# Patient Record
Sex: FEMALE | Race: WHITE | NOT HISPANIC OR LATINO
[De-identification: names, ages, dates, MRNs, and addresses within clinical notes are randomized per-mention and may not be internally consistent; named-entity substitution may affect disease eponyms.]

---

## 2018-06-22 PROBLEM — Z00.00 ENCOUNTER FOR PREVENTIVE HEALTH EXAMINATION: Status: ACTIVE | Noted: 2018-06-22

## 2018-06-27 ENCOUNTER — APPOINTMENT (OUTPATIENT)
Dept: RADIOLOGY | Facility: CLINIC | Age: 63
End: 2018-06-27
Payer: MEDICARE

## 2018-06-27 ENCOUNTER — OUTPATIENT (OUTPATIENT)
Dept: OUTPATIENT SERVICES | Facility: HOSPITAL | Age: 63
LOS: 1 days | End: 2018-06-27
Payer: MEDICARE

## 2018-06-27 DIAGNOSIS — Z00.8 ENCOUNTER FOR OTHER GENERAL EXAMINATION: ICD-10-CM

## 2018-06-27 PROCEDURE — 77080 DXA BONE DENSITY AXIAL: CPT | Mod: 26

## 2018-06-27 PROCEDURE — 73630 X-RAY EXAM OF FOOT: CPT | Mod: 26,LT

## 2018-06-27 PROCEDURE — 73630 X-RAY EXAM OF FOOT: CPT

## 2018-06-27 PROCEDURE — 77080 DXA BONE DENSITY AXIAL: CPT

## 2018-07-03 PROBLEM — Z00.00 ENCOUNTER FOR PREVENTIVE HEALTH EXAMINATION: Noted: 2018-07-03

## 2018-07-06 ENCOUNTER — APPOINTMENT (OUTPATIENT)
Dept: CT IMAGING | Facility: CLINIC | Age: 63
End: 2018-07-06

## 2018-07-11 ENCOUNTER — APPOINTMENT (OUTPATIENT)
Dept: CT IMAGING | Facility: CLINIC | Age: 63
End: 2018-07-11

## 2018-07-25 ENCOUNTER — APPOINTMENT (OUTPATIENT)
Dept: NEUROLOGY | Facility: CLINIC | Age: 63
End: 2018-07-25

## 2018-09-26 ENCOUNTER — APPOINTMENT (OUTPATIENT)
Dept: RADIOLOGY | Facility: CLINIC | Age: 63
End: 2018-09-26

## 2020-12-14 ENCOUNTER — TRANSCRIPTION ENCOUNTER (OUTPATIENT)
Age: 65
End: 2020-12-14

## 2023-10-26 ENCOUNTER — INPATIENT (INPATIENT)
Facility: HOSPITAL | Age: 68
LOS: 12 days | Discharge: NURSING, REHAB OR LONG-TERM CARE | DRG: 682 | End: 2023-11-08
Attending: INTERNAL MEDICINE | Admitting: INTERNAL MEDICINE
Payer: MEDICARE

## 2023-10-26 VITALS
HEIGHT: 64 IN | OXYGEN SATURATION: 97 % | HEART RATE: 71 BPM | SYSTOLIC BLOOD PRESSURE: 92 MMHG | TEMPERATURE: 100 F | DIASTOLIC BLOOD PRESSURE: 57 MMHG | RESPIRATION RATE: 20 BRPM | WEIGHT: 176.37 LBS

## 2023-10-26 PROCEDURE — 73562 X-RAY EXAM OF KNEE 3: CPT | Mod: 26,RT

## 2023-10-26 PROCEDURE — 93010 ELECTROCARDIOGRAM REPORT: CPT

## 2023-10-26 PROCEDURE — 71045 X-RAY EXAM CHEST 1 VIEW: CPT | Mod: 26

## 2023-10-26 PROCEDURE — 99285 EMERGENCY DEPT VISIT HI MDM: CPT

## 2023-10-26 PROCEDURE — 70450 CT HEAD/BRAIN W/O DYE: CPT | Mod: 26,MA

## 2023-10-26 RX ORDER — SODIUM CHLORIDE 9 MG/ML
2500 INJECTION INTRAMUSCULAR; INTRAVENOUS; SUBCUTANEOUS ONCE
Refills: 0 | Status: COMPLETED | OUTPATIENT
Start: 2023-10-26 | End: 2023-10-26
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
## 2023-10-26 NOTE — ED PROVIDER NOTE - NSICDXPASTMEDICALHX_GEN_ALL_CORE_FT
PAST MEDICAL HISTORY:  Bipolar illness     HLD (hyperlipidemia)     HTN (hypertension)     MDD (major depressive disorder)

## 2023-10-26 NOTE — ED ADULT NURSE NOTE - NSFALLHARMRISKINTERV_ED_ALL_ED
Assistance OOB with selected safe patient handling equipment if applicable/Assistance with ambulation/Communicate risk of Fall with Harm to all staff, patient, and family/Monitor gait and stability/Monitor for mental status changes and reorient to person, place, and time, as needed/Move patient closer to nursing station/within visual sight of ED staff/Provide patient with walking aids/Provide visual cue: red socks, yellow wristband, yellow gown, etc/Reinforce activity limits and safety measures with patient and family/Toileting schedule using arm’s reach rule for commode and bathroom/Use of alarms - bed, stretcher, chair and/or video monitoring/Bed in lowest position, wheels locked, appropriate side rails in place/Call bell, personal items and telephone in reach/Instruct patient to call for assistance before getting out of bed/chair/stretcher/Non-slip footwear applied when patient is off stretcher/[place] to call system/Physically safe environment - no spills, clutter or unnecessary equipment/Purposeful Proactive Rounding/Room/bathroom lighting operational, light cord in reach
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
## 2023-10-27 ENCOUNTER — TRANSCRIPTION ENCOUNTER (OUTPATIENT)
Age: 68
End: 2023-10-27

## 2023-10-27 DIAGNOSIS — E86.0 DEHYDRATION: ICD-10-CM

## 2023-10-27 LAB
ALBUMIN SERPL ELPH-MCNC: 3.1 G/DL — LOW (ref 3.3–5)
ALBUMIN SERPL ELPH-MCNC: 3.1 G/DL — LOW (ref 3.3–5)
ALBUMIN SERPL ELPH-MCNC: 3.2 G/DL — LOW (ref 3.3–5)
ALBUMIN SERPL ELPH-MCNC: 3.2 G/DL — LOW (ref 3.3–5)
ALBUMIN SERPL ELPH-MCNC: 3.3 G/DL — SIGNIFICANT CHANGE UP (ref 3.3–5)
ALBUMIN SERPL ELPH-MCNC: 3.3 G/DL — SIGNIFICANT CHANGE UP (ref 3.3–5)
ALP SERPL-CCNC: 121 U/L — HIGH (ref 30–120)
ALP SERPL-CCNC: 121 U/L — HIGH (ref 30–120)
ALP SERPL-CCNC: 122 U/L — HIGH (ref 30–120)
ALP SERPL-CCNC: 122 U/L — HIGH (ref 30–120)
ALP SERPL-CCNC: 128 U/L — HIGH (ref 30–120)
ALP SERPL-CCNC: 128 U/L — HIGH (ref 30–120)
ALT FLD-CCNC: 25 U/L — SIGNIFICANT CHANGE UP (ref 10–60)
ALT FLD-CCNC: 25 U/L — SIGNIFICANT CHANGE UP (ref 10–60)
ALT FLD-CCNC: 26 U/L — SIGNIFICANT CHANGE UP (ref 10–60)
AMMONIA BLD-MCNC: 17 UMOL/L — SIGNIFICANT CHANGE UP (ref 11–32)
AMMONIA BLD-MCNC: 17 UMOL/L — SIGNIFICANT CHANGE UP (ref 11–32)
ANION GAP SERPL CALC-SCNC: 4 MMOL/L — LOW (ref 5–17)
ANION GAP SERPL CALC-SCNC: 4 MMOL/L — LOW (ref 5–17)
ANION GAP SERPL CALC-SCNC: 5 MMOL/L — SIGNIFICANT CHANGE UP (ref 5–17)
ANION GAP SERPL CALC-SCNC: 5 MMOL/L — SIGNIFICANT CHANGE UP (ref 5–17)
ANION GAP SERPL CALC-SCNC: 6 MMOL/L — SIGNIFICANT CHANGE UP (ref 5–17)
ANION GAP SERPL CALC-SCNC: 6 MMOL/L — SIGNIFICANT CHANGE UP (ref 5–17)
APPEARANCE UR: ABNORMAL
APPEARANCE UR: ABNORMAL
APTT BLD: 26.9 SEC — SIGNIFICANT CHANGE UP (ref 24.5–35.6)
APTT BLD: 26.9 SEC — SIGNIFICANT CHANGE UP (ref 24.5–35.6)
AST SERPL-CCNC: 22 U/L — SIGNIFICANT CHANGE UP (ref 10–40)
AST SERPL-CCNC: 22 U/L — SIGNIFICANT CHANGE UP (ref 10–40)
AST SERPL-CCNC: 24 U/L — SIGNIFICANT CHANGE UP (ref 10–40)
AST SERPL-CCNC: 24 U/L — SIGNIFICANT CHANGE UP (ref 10–40)
AST SERPL-CCNC: 25 U/L — SIGNIFICANT CHANGE UP (ref 10–40)
AST SERPL-CCNC: 25 U/L — SIGNIFICANT CHANGE UP (ref 10–40)
BACTERIA # UR AUTO: ABNORMAL /HPF
BACTERIA # UR AUTO: ABNORMAL /HPF
BASOPHILS # BLD AUTO: 0.04 K/UL — SIGNIFICANT CHANGE UP (ref 0–0.2)
BASOPHILS # BLD AUTO: 0.04 K/UL — SIGNIFICANT CHANGE UP (ref 0–0.2)
BASOPHILS # BLD AUTO: 0.05 K/UL — SIGNIFICANT CHANGE UP (ref 0–0.2)
BASOPHILS # BLD AUTO: 0.05 K/UL — SIGNIFICANT CHANGE UP (ref 0–0.2)
BASOPHILS NFR BLD AUTO: 0.6 % — SIGNIFICANT CHANGE UP (ref 0–2)
BILIRUB SERPL-MCNC: 0.6 MG/DL — SIGNIFICANT CHANGE UP (ref 0.2–1.2)
BILIRUB SERPL-MCNC: 0.8 MG/DL — SIGNIFICANT CHANGE UP (ref 0.2–1.2)
BILIRUB SERPL-MCNC: 0.8 MG/DL — SIGNIFICANT CHANGE UP (ref 0.2–1.2)
BILIRUB UR-MCNC: NEGATIVE — SIGNIFICANT CHANGE UP
BILIRUB UR-MCNC: NEGATIVE — SIGNIFICANT CHANGE UP
BUN SERPL-MCNC: 19 MG/DL — SIGNIFICANT CHANGE UP (ref 7–23)
BUN SERPL-MCNC: 19 MG/DL — SIGNIFICANT CHANGE UP (ref 7–23)
BUN SERPL-MCNC: 24 MG/DL — HIGH (ref 7–23)
BUN SERPL-MCNC: 24 MG/DL — HIGH (ref 7–23)
BUN SERPL-MCNC: 27 MG/DL — HIGH (ref 7–23)
BUN SERPL-MCNC: 27 MG/DL — HIGH (ref 7–23)
CALCIUM SERPL-MCNC: 8.7 MG/DL — SIGNIFICANT CHANGE UP (ref 8.4–10.5)
CALCIUM SERPL-MCNC: 8.7 MG/DL — SIGNIFICANT CHANGE UP (ref 8.4–10.5)
CALCIUM SERPL-MCNC: 8.9 MG/DL — SIGNIFICANT CHANGE UP (ref 8.4–10.5)
CALCIUM SERPL-MCNC: 8.9 MG/DL — SIGNIFICANT CHANGE UP (ref 8.4–10.5)
CALCIUM SERPL-MCNC: 9.4 MG/DL — SIGNIFICANT CHANGE UP (ref 8.4–10.5)
CALCIUM SERPL-MCNC: 9.4 MG/DL — SIGNIFICANT CHANGE UP (ref 8.4–10.5)
CHLORIDE SERPL-SCNC: 104 MMOL/L — SIGNIFICANT CHANGE UP (ref 96–108)
CHLORIDE SERPL-SCNC: 104 MMOL/L — SIGNIFICANT CHANGE UP (ref 96–108)
CHLORIDE SERPL-SCNC: 106 MMOL/L — SIGNIFICANT CHANGE UP (ref 96–108)
CHLORIDE SERPL-SCNC: 106 MMOL/L — SIGNIFICANT CHANGE UP (ref 96–108)
CHLORIDE SERPL-SCNC: 109 MMOL/L — HIGH (ref 96–108)
CHLORIDE SERPL-SCNC: 109 MMOL/L — HIGH (ref 96–108)
CHOLEST SERPL-MCNC: 121 MG/DL — SIGNIFICANT CHANGE UP
CHOLEST SERPL-MCNC: 121 MG/DL — SIGNIFICANT CHANGE UP
CO2 SERPL-SCNC: 24 MMOL/L — SIGNIFICANT CHANGE UP (ref 22–31)
CO2 SERPL-SCNC: 24 MMOL/L — SIGNIFICANT CHANGE UP (ref 22–31)
CO2 SERPL-SCNC: 25 MMOL/L — SIGNIFICANT CHANGE UP (ref 22–31)
CO2 SERPL-SCNC: 25 MMOL/L — SIGNIFICANT CHANGE UP (ref 22–31)
CO2 SERPL-SCNC: 27 MMOL/L — SIGNIFICANT CHANGE UP (ref 22–31)
CO2 SERPL-SCNC: 27 MMOL/L — SIGNIFICANT CHANGE UP (ref 22–31)
COLOR SPEC: YELLOW — SIGNIFICANT CHANGE UP
COLOR SPEC: YELLOW — SIGNIFICANT CHANGE UP
CREAT SERPL-MCNC: 1.29 MG/DL — SIGNIFICANT CHANGE UP (ref 0.5–1.3)
CREAT SERPL-MCNC: 1.29 MG/DL — SIGNIFICANT CHANGE UP (ref 0.5–1.3)
CREAT SERPL-MCNC: 1.54 MG/DL — HIGH (ref 0.5–1.3)
CREAT SERPL-MCNC: 1.54 MG/DL — HIGH (ref 0.5–1.3)
CREAT SERPL-MCNC: 1.78 MG/DL — HIGH (ref 0.5–1.3)
CREAT SERPL-MCNC: 1.78 MG/DL — HIGH (ref 0.5–1.3)
DIFF PNL FLD: NEGATIVE — SIGNIFICANT CHANGE UP
DIFF PNL FLD: NEGATIVE — SIGNIFICANT CHANGE UP
EGFR: 30 ML/MIN/1.73M2 — LOW
EGFR: 30 ML/MIN/1.73M2 — LOW
EGFR: 36 ML/MIN/1.73M2 — LOW
EGFR: 36 ML/MIN/1.73M2 — LOW
EGFR: 45 ML/MIN/1.73M2 — LOW
EGFR: 45 ML/MIN/1.73M2 — LOW
EOSINOPHIL # BLD AUTO: 0.25 K/UL — SIGNIFICANT CHANGE UP (ref 0–0.5)
EOSINOPHIL # BLD AUTO: 0.25 K/UL — SIGNIFICANT CHANGE UP (ref 0–0.5)
EOSINOPHIL # BLD AUTO: 0.26 K/UL — SIGNIFICANT CHANGE UP (ref 0–0.5)
EOSINOPHIL # BLD AUTO: 0.26 K/UL — SIGNIFICANT CHANGE UP (ref 0–0.5)
EOSINOPHIL NFR BLD AUTO: 3.3 % — SIGNIFICANT CHANGE UP (ref 0–6)
EOSINOPHIL NFR BLD AUTO: 3.3 % — SIGNIFICANT CHANGE UP (ref 0–6)
EOSINOPHIL NFR BLD AUTO: 3.9 % — SIGNIFICANT CHANGE UP (ref 0–6)
EOSINOPHIL NFR BLD AUTO: 3.9 % — SIGNIFICANT CHANGE UP (ref 0–6)
EPI CELLS # UR: PRESENT
EPI CELLS # UR: PRESENT
FLUAV AG NPH QL: SIGNIFICANT CHANGE UP
FLUAV AG NPH QL: SIGNIFICANT CHANGE UP
FLUBV AG NPH QL: SIGNIFICANT CHANGE UP
FLUBV AG NPH QL: SIGNIFICANT CHANGE UP
GLUCOSE SERPL-MCNC: 102 MG/DL — HIGH (ref 70–99)
GLUCOSE SERPL-MCNC: 102 MG/DL — HIGH (ref 70–99)
GLUCOSE SERPL-MCNC: 134 MG/DL — HIGH (ref 70–99)
GLUCOSE SERPL-MCNC: 134 MG/DL — HIGH (ref 70–99)
GLUCOSE SERPL-MCNC: 98 MG/DL — SIGNIFICANT CHANGE UP (ref 70–99)
GLUCOSE SERPL-MCNC: 98 MG/DL — SIGNIFICANT CHANGE UP (ref 70–99)
GLUCOSE UR QL: NEGATIVE MG/DL — SIGNIFICANT CHANGE UP
GLUCOSE UR QL: NEGATIVE MG/DL — SIGNIFICANT CHANGE UP
HCT VFR BLD CALC: 33.3 % — LOW (ref 34.5–45)
HCT VFR BLD CALC: 33.3 % — LOW (ref 34.5–45)
HCT VFR BLD CALC: 40.3 % — SIGNIFICANT CHANGE UP (ref 34.5–45)
HCT VFR BLD CALC: 40.3 % — SIGNIFICANT CHANGE UP (ref 34.5–45)
HDLC SERPL-MCNC: 61 MG/DL — SIGNIFICANT CHANGE UP
HDLC SERPL-MCNC: 61 MG/DL — SIGNIFICANT CHANGE UP
HGB BLD-MCNC: 11.7 G/DL — SIGNIFICANT CHANGE UP (ref 11.5–15.5)
HGB BLD-MCNC: 11.7 G/DL — SIGNIFICANT CHANGE UP (ref 11.5–15.5)
HGB BLD-MCNC: 9.9 G/DL — LOW (ref 11.5–15.5)
HGB BLD-MCNC: 9.9 G/DL — LOW (ref 11.5–15.5)
IMM GRANULOCYTES NFR BLD AUTO: 0.2 % — SIGNIFICANT CHANGE UP (ref 0–0.9)
IMM GRANULOCYTES NFR BLD AUTO: 0.2 % — SIGNIFICANT CHANGE UP (ref 0–0.9)
IMM GRANULOCYTES NFR BLD AUTO: 0.3 % — SIGNIFICANT CHANGE UP (ref 0–0.9)
IMM GRANULOCYTES NFR BLD AUTO: 0.3 % — SIGNIFICANT CHANGE UP (ref 0–0.9)
INR BLD: 0.99 RATIO — SIGNIFICANT CHANGE UP (ref 0.85–1.18)
INR BLD: 0.99 RATIO — SIGNIFICANT CHANGE UP (ref 0.85–1.18)
KETONES UR-MCNC: NEGATIVE MG/DL — SIGNIFICANT CHANGE UP
KETONES UR-MCNC: NEGATIVE MG/DL — SIGNIFICANT CHANGE UP
LACTATE SERPL-SCNC: 0.9 MMOL/L — SIGNIFICANT CHANGE UP (ref 0.7–2)
LACTATE SERPL-SCNC: 0.9 MMOL/L — SIGNIFICANT CHANGE UP (ref 0.7–2)
LACTATE SERPL-SCNC: 1.1 MMOL/L — SIGNIFICANT CHANGE UP (ref 0.7–2)
LACTATE SERPL-SCNC: 1.1 MMOL/L — SIGNIFICANT CHANGE UP (ref 0.7–2)
LEUKOCYTE ESTERASE UR-ACNC: ABNORMAL
LEUKOCYTE ESTERASE UR-ACNC: ABNORMAL
LIPID PNL WITH DIRECT LDL SERPL: 40 MG/DL — SIGNIFICANT CHANGE UP
LIPID PNL WITH DIRECT LDL SERPL: 40 MG/DL — SIGNIFICANT CHANGE UP
LYMPHOCYTES # BLD AUTO: 1.59 K/UL — SIGNIFICANT CHANGE UP (ref 1–3.3)
LYMPHOCYTES # BLD AUTO: 1.59 K/UL — SIGNIFICANT CHANGE UP (ref 1–3.3)
LYMPHOCYTES # BLD AUTO: 1.87 K/UL — SIGNIFICANT CHANGE UP (ref 1–3.3)
LYMPHOCYTES # BLD AUTO: 1.87 K/UL — SIGNIFICANT CHANGE UP (ref 1–3.3)
LYMPHOCYTES # BLD AUTO: 23.4 % — SIGNIFICANT CHANGE UP (ref 13–44)
LYMPHOCYTES # BLD AUTO: 23.4 % — SIGNIFICANT CHANGE UP (ref 13–44)
LYMPHOCYTES # BLD AUTO: 24.6 % — SIGNIFICANT CHANGE UP (ref 13–44)
LYMPHOCYTES # BLD AUTO: 24.6 % — SIGNIFICANT CHANGE UP (ref 13–44)
MAGNESIUM SERPL-MCNC: 2.1 MG/DL — SIGNIFICANT CHANGE UP (ref 1.6–2.6)
MAGNESIUM SERPL-MCNC: 2.4 MG/DL — SIGNIFICANT CHANGE UP (ref 1.6–2.6)
MAGNESIUM SERPL-MCNC: 2.4 MG/DL — SIGNIFICANT CHANGE UP (ref 1.6–2.6)
MCHC RBC-ENTMCNC: 28.9 PG — SIGNIFICANT CHANGE UP (ref 27–34)
MCHC RBC-ENTMCNC: 28.9 PG — SIGNIFICANT CHANGE UP (ref 27–34)
MCHC RBC-ENTMCNC: 29 GM/DL — LOW (ref 32–36)
MCHC RBC-ENTMCNC: 29 GM/DL — LOW (ref 32–36)
MCHC RBC-ENTMCNC: 29.4 PG — SIGNIFICANT CHANGE UP (ref 27–34)
MCHC RBC-ENTMCNC: 29.4 PG — SIGNIFICANT CHANGE UP (ref 27–34)
MCHC RBC-ENTMCNC: 29.7 GM/DL — LOW (ref 32–36)
MCHC RBC-ENTMCNC: 29.7 GM/DL — LOW (ref 32–36)
MCV RBC AUTO: 98.8 FL — SIGNIFICANT CHANGE UP (ref 80–100)
MCV RBC AUTO: 98.8 FL — SIGNIFICANT CHANGE UP (ref 80–100)
MCV RBC AUTO: 99.5 FL — SIGNIFICANT CHANGE UP (ref 80–100)
MCV RBC AUTO: 99.5 FL — SIGNIFICANT CHANGE UP (ref 80–100)
MONOCYTES # BLD AUTO: 0.71 K/UL — SIGNIFICANT CHANGE UP (ref 0–0.9)
MONOCYTES # BLD AUTO: 0.71 K/UL — SIGNIFICANT CHANGE UP (ref 0–0.9)
MONOCYTES # BLD AUTO: 0.96 K/UL — HIGH (ref 0–0.9)
MONOCYTES # BLD AUTO: 0.96 K/UL — HIGH (ref 0–0.9)
MONOCYTES NFR BLD AUTO: 11 % — SIGNIFICANT CHANGE UP (ref 2–14)
MONOCYTES NFR BLD AUTO: 11 % — SIGNIFICANT CHANGE UP (ref 2–14)
MONOCYTES NFR BLD AUTO: 12 % — SIGNIFICANT CHANGE UP (ref 2–14)
MONOCYTES NFR BLD AUTO: 12 % — SIGNIFICANT CHANGE UP (ref 2–14)
MRSA PCR RESULT.: SIGNIFICANT CHANGE UP
MRSA PCR RESULT.: SIGNIFICANT CHANGE UP
NEUTROPHILS # BLD AUTO: 3.87 K/UL — SIGNIFICANT CHANGE UP (ref 1.8–7.4)
NEUTROPHILS # BLD AUTO: 3.87 K/UL — SIGNIFICANT CHANGE UP (ref 1.8–7.4)
NEUTROPHILS # BLD AUTO: 4.83 K/UL — SIGNIFICANT CHANGE UP (ref 1.8–7.4)
NEUTROPHILS # BLD AUTO: 4.83 K/UL — SIGNIFICANT CHANGE UP (ref 1.8–7.4)
NEUTROPHILS NFR BLD AUTO: 59.7 % — SIGNIFICANT CHANGE UP (ref 43–77)
NEUTROPHILS NFR BLD AUTO: 59.7 % — SIGNIFICANT CHANGE UP (ref 43–77)
NEUTROPHILS NFR BLD AUTO: 60.4 % — SIGNIFICANT CHANGE UP (ref 43–77)
NEUTROPHILS NFR BLD AUTO: 60.4 % — SIGNIFICANT CHANGE UP (ref 43–77)
NITRITE UR-MCNC: POSITIVE
NITRITE UR-MCNC: POSITIVE
NON HDL CHOLESTEROL: 60 MG/DL — SIGNIFICANT CHANGE UP
NON HDL CHOLESTEROL: 60 MG/DL — SIGNIFICANT CHANGE UP
NRBC # BLD: 0 /100 WBCS — SIGNIFICANT CHANGE UP (ref 0–0)
PH UR: 5.5 — SIGNIFICANT CHANGE UP (ref 5–8)
PH UR: 5.5 — SIGNIFICANT CHANGE UP (ref 5–8)
PHOSPHATE SERPL-MCNC: 3.5 MG/DL — SIGNIFICANT CHANGE UP (ref 2.5–4.5)
PHOSPHATE SERPL-MCNC: 3.5 MG/DL — SIGNIFICANT CHANGE UP (ref 2.5–4.5)
PLATELET # BLD AUTO: 251 K/UL — SIGNIFICANT CHANGE UP (ref 150–400)
PLATELET # BLD AUTO: 251 K/UL — SIGNIFICANT CHANGE UP (ref 150–400)
PLATELET # BLD AUTO: 258 K/UL — SIGNIFICANT CHANGE UP (ref 150–400)
PLATELET # BLD AUTO: 258 K/UL — SIGNIFICANT CHANGE UP (ref 150–400)
POTASSIUM SERPL-MCNC: 4 MMOL/L — SIGNIFICANT CHANGE UP (ref 3.5–5.3)
POTASSIUM SERPL-MCNC: 4 MMOL/L — SIGNIFICANT CHANGE UP (ref 3.5–5.3)
POTASSIUM SERPL-MCNC: 4.4 MMOL/L — SIGNIFICANT CHANGE UP (ref 3.5–5.3)
POTASSIUM SERPL-MCNC: 4.4 MMOL/L — SIGNIFICANT CHANGE UP (ref 3.5–5.3)
POTASSIUM SERPL-MCNC: 4.5 MMOL/L — SIGNIFICANT CHANGE UP (ref 3.5–5.3)
POTASSIUM SERPL-MCNC: 4.5 MMOL/L — SIGNIFICANT CHANGE UP (ref 3.5–5.3)
POTASSIUM SERPL-SCNC: 4 MMOL/L — SIGNIFICANT CHANGE UP (ref 3.5–5.3)
POTASSIUM SERPL-SCNC: 4 MMOL/L — SIGNIFICANT CHANGE UP (ref 3.5–5.3)
POTASSIUM SERPL-SCNC: 4.4 MMOL/L — SIGNIFICANT CHANGE UP (ref 3.5–5.3)
POTASSIUM SERPL-SCNC: 4.4 MMOL/L — SIGNIFICANT CHANGE UP (ref 3.5–5.3)
POTASSIUM SERPL-SCNC: 4.5 MMOL/L — SIGNIFICANT CHANGE UP (ref 3.5–5.3)
POTASSIUM SERPL-SCNC: 4.5 MMOL/L — SIGNIFICANT CHANGE UP (ref 3.5–5.3)
PROCALCITONIN SERPL-MCNC: 0.09 NG/ML — SIGNIFICANT CHANGE UP (ref 0.02–0.1)
PROCALCITONIN SERPL-MCNC: 0.09 NG/ML — SIGNIFICANT CHANGE UP (ref 0.02–0.1)
PROT SERPL-MCNC: 5.7 G/DL — LOW (ref 6–8.3)
PROT SERPL-MCNC: 5.7 G/DL — LOW (ref 6–8.3)
PROT SERPL-MCNC: 6.5 G/DL — SIGNIFICANT CHANGE UP (ref 6–8.3)
PROT SERPL-MCNC: 6.5 G/DL — SIGNIFICANT CHANGE UP (ref 6–8.3)
PROT SERPL-MCNC: 6.7 G/DL — SIGNIFICANT CHANGE UP (ref 6–8.3)
PROT SERPL-MCNC: 6.7 G/DL — SIGNIFICANT CHANGE UP (ref 6–8.3)
PROT UR-MCNC: SIGNIFICANT CHANGE UP MG/DL
PROT UR-MCNC: SIGNIFICANT CHANGE UP MG/DL
PROTHROM AB SERPL-ACNC: 11.1 SEC — SIGNIFICANT CHANGE UP (ref 9.5–13)
PROTHROM AB SERPL-ACNC: 11.1 SEC — SIGNIFICANT CHANGE UP (ref 9.5–13)
RBC # BLD: 3.37 M/UL — LOW (ref 3.8–5.2)
RBC # BLD: 3.37 M/UL — LOW (ref 3.8–5.2)
RBC # BLD: 4.05 M/UL — SIGNIFICANT CHANGE UP (ref 3.8–5.2)
RBC # BLD: 4.05 M/UL — SIGNIFICANT CHANGE UP (ref 3.8–5.2)
RBC # FLD: 14.1 % — SIGNIFICANT CHANGE UP (ref 10.3–14.5)
RBC CASTS # UR COMP ASSIST: 0 /HPF — SIGNIFICANT CHANGE UP (ref 0–4)
RBC CASTS # UR COMP ASSIST: 0 /HPF — SIGNIFICANT CHANGE UP (ref 0–4)
RSV RNA NPH QL NAA+NON-PROBE: SIGNIFICANT CHANGE UP
RSV RNA NPH QL NAA+NON-PROBE: SIGNIFICANT CHANGE UP
S AUREUS DNA NOSE QL NAA+PROBE: SIGNIFICANT CHANGE UP
S AUREUS DNA NOSE QL NAA+PROBE: SIGNIFICANT CHANGE UP
SARS-COV-2 RNA SPEC QL NAA+PROBE: SIGNIFICANT CHANGE UP
SARS-COV-2 RNA SPEC QL NAA+PROBE: SIGNIFICANT CHANGE UP
SODIUM SERPL-SCNC: 135 MMOL/L — SIGNIFICANT CHANGE UP (ref 135–145)
SODIUM SERPL-SCNC: 140 MMOL/L — SIGNIFICANT CHANGE UP (ref 135–145)
SODIUM SERPL-SCNC: 140 MMOL/L — SIGNIFICANT CHANGE UP (ref 135–145)
SP GR SPEC: 1.01 — SIGNIFICANT CHANGE UP (ref 1–1.03)
SP GR SPEC: 1.01 — SIGNIFICANT CHANGE UP (ref 1–1.03)
T3 SERPL-MCNC: 60 NG/DL — LOW (ref 80–200)
T3 SERPL-MCNC: 60 NG/DL — LOW (ref 80–200)
T4 AB SER-ACNC: 6.2 UG/DL — SIGNIFICANT CHANGE UP (ref 4.6–12)
T4 AB SER-ACNC: 6.2 UG/DL — SIGNIFICANT CHANGE UP (ref 4.6–12)
TRIGL SERPL-MCNC: 114 MG/DL — SIGNIFICANT CHANGE UP
TRIGL SERPL-MCNC: 114 MG/DL — SIGNIFICANT CHANGE UP
TROPONIN I, HIGH SENSITIVITY RESULT: 6.3 NG/L — SIGNIFICANT CHANGE UP
TROPONIN I, HIGH SENSITIVITY RESULT: 6.3 NG/L — SIGNIFICANT CHANGE UP
TSH SERPL-MCNC: 4.58 UIU/ML — HIGH (ref 0.27–4.2)
TSH SERPL-MCNC: 4.58 UIU/ML — HIGH (ref 0.27–4.2)
UROBILINOGEN FLD QL: 1 MG/DL — SIGNIFICANT CHANGE UP (ref 0.2–1)
UROBILINOGEN FLD QL: 1 MG/DL — SIGNIFICANT CHANGE UP (ref 0.2–1)
WBC # BLD: 6.47 K/UL — SIGNIFICANT CHANGE UP (ref 3.8–10.5)
WBC # BLD: 6.47 K/UL — SIGNIFICANT CHANGE UP (ref 3.8–10.5)
WBC # BLD: 7.99 K/UL — SIGNIFICANT CHANGE UP (ref 3.8–10.5)
WBC # BLD: 7.99 K/UL — SIGNIFICANT CHANGE UP (ref 3.8–10.5)
WBC # FLD AUTO: 6.47 K/UL — SIGNIFICANT CHANGE UP (ref 3.8–10.5)
WBC # FLD AUTO: 6.47 K/UL — SIGNIFICANT CHANGE UP (ref 3.8–10.5)
WBC # FLD AUTO: 7.99 K/UL — SIGNIFICANT CHANGE UP (ref 3.8–10.5)
WBC # FLD AUTO: 7.99 K/UL — SIGNIFICANT CHANGE UP (ref 3.8–10.5)
WBC UR QL: >50 /HPF — HIGH (ref 0–5)
WBC UR QL: >50 /HPF — HIGH (ref 0–5)

## 2023-10-27 PROCEDURE — 99221 1ST HOSP IP/OBS SF/LOW 40: CPT

## 2023-10-27 PROCEDURE — 99291 CRITICAL CARE FIRST HOUR: CPT

## 2023-10-27 PROCEDURE — 93010 ELECTROCARDIOGRAM REPORT: CPT

## 2023-10-27 RX ORDER — CLONAZEPAM 1 MG
0.5 TABLET ORAL DAILY
Refills: 0 | Status: DISCONTINUED | OUTPATIENT
Start: 2023-10-27 | End: 2023-10-27

## 2023-10-27 RX ORDER — PANTOPRAZOLE SODIUM 20 MG/1
40 TABLET, DELAYED RELEASE ORAL
Refills: 0 | Status: DISCONTINUED | OUTPATIENT
Start: 2023-10-27 | End: 2023-11-08

## 2023-10-27 RX ORDER — PIPERACILLIN AND TAZOBACTAM 4; .5 G/20ML; G/20ML
3.38 INJECTION, POWDER, LYOPHILIZED, FOR SOLUTION INTRAVENOUS ONCE
Refills: 0 | Status: COMPLETED | OUTPATIENT
Start: 2023-10-27 | End: 2023-10-27

## 2023-10-27 RX ORDER — SODIUM CHLORIDE 9 MG/ML
1000 INJECTION, SOLUTION INTRAVENOUS ONCE
Refills: 0 | Status: COMPLETED | OUTPATIENT
Start: 2023-10-27 | End: 2023-10-27

## 2023-10-27 RX ORDER — VANCOMYCIN HCL 1 G
1000 VIAL (EA) INTRAVENOUS ONCE
Refills: 0 | Status: COMPLETED | OUTPATIENT
Start: 2023-10-27 | End: 2023-10-27

## 2023-10-27 RX ORDER — GABAPENTIN 400 MG/1
0 CAPSULE ORAL
Refills: 0 | DISCHARGE

## 2023-10-27 RX ORDER — CLONAZEPAM 1 MG
0.5 TABLET ORAL
Refills: 0 | Status: DISCONTINUED | OUTPATIENT
Start: 2023-10-27 | End: 2023-11-03

## 2023-10-27 RX ORDER — SODIUM CHLORIDE 9 MG/ML
1000 INJECTION INTRAMUSCULAR; INTRAVENOUS; SUBCUTANEOUS
Refills: 0 | Status: DISCONTINUED | OUTPATIENT
Start: 2023-10-27 | End: 2023-10-27

## 2023-10-27 RX ORDER — ATORVASTATIN CALCIUM 80 MG/1
40 TABLET, FILM COATED ORAL AT BEDTIME
Refills: 0 | Status: DISCONTINUED | OUTPATIENT
Start: 2023-10-27 | End: 2023-11-08

## 2023-10-27 RX ORDER — FLUOXETINE HCL 10 MG
1 CAPSULE ORAL
Refills: 0 | DISCHARGE

## 2023-10-27 RX ORDER — CLOPIDOGREL BISULFATE 75 MG/1
75 TABLET, FILM COATED ORAL DAILY
Refills: 0 | Status: DISCONTINUED | OUTPATIENT
Start: 2023-10-27 | End: 2023-11-08

## 2023-10-27 RX ORDER — HEPARIN SODIUM 5000 [USP'U]/ML
5000 INJECTION INTRAVENOUS; SUBCUTANEOUS EVERY 8 HOURS
Refills: 0 | Status: DISCONTINUED | OUTPATIENT
Start: 2023-10-27 | End: 2023-11-08

## 2023-10-27 RX ORDER — MAGNESIUM SULFATE 500 MG/ML
2 VIAL (ML) INJECTION ONCE
Refills: 0 | Status: COMPLETED | OUTPATIENT
Start: 2023-10-27 | End: 2023-10-27

## 2023-10-27 RX ORDER — ACETAMINOPHEN 500 MG
650 TABLET ORAL EVERY 6 HOURS
Refills: 0 | Status: DISCONTINUED | OUTPATIENT
Start: 2023-10-27 | End: 2023-11-08

## 2023-10-27 RX ORDER — SODIUM CHLORIDE 9 MG/ML
1000 INJECTION, SOLUTION INTRAVENOUS
Refills: 0 | Status: DISCONTINUED | OUTPATIENT
Start: 2023-10-27 | End: 2023-10-29

## 2023-10-27 RX ORDER — HEPARIN SODIUM 5000 [USP'U]/ML
5000 INJECTION INTRAVENOUS; SUBCUTANEOUS EVERY 12 HOURS
Refills: 0 | Status: DISCONTINUED | OUTPATIENT
Start: 2023-10-27 | End: 2023-10-27

## 2023-10-27 RX ADMIN — SODIUM CHLORIDE 2500 MILLILITER(S): 9 INJECTION INTRAMUSCULAR; INTRAVENOUS; SUBCUTANEOUS at 01:45

## 2023-10-27 RX ADMIN — HEPARIN SODIUM 5000 UNIT(S): 5000 INJECTION INTRAVENOUS; SUBCUTANEOUS at 14:55

## 2023-10-27 RX ADMIN — SODIUM CHLORIDE 75 MILLILITER(S): 9 INJECTION, SOLUTION INTRAVENOUS at 22:11

## 2023-10-27 RX ADMIN — HEPARIN SODIUM 5000 UNIT(S): 5000 INJECTION INTRAVENOUS; SUBCUTANEOUS at 22:10

## 2023-10-27 RX ADMIN — PANTOPRAZOLE SODIUM 40 MILLIGRAM(S): 20 TABLET, DELAYED RELEASE ORAL at 06:25

## 2023-10-27 RX ADMIN — PIPERACILLIN AND TAZOBACTAM 3.38 GRAM(S): 4; .5 INJECTION, POWDER, LYOPHILIZED, FOR SOLUTION INTRAVENOUS at 02:30

## 2023-10-27 RX ADMIN — Medication 1000 MILLIGRAM(S): at 03:23

## 2023-10-27 RX ADMIN — Medication 250 MILLIGRAM(S): at 02:23

## 2023-10-27 RX ADMIN — SODIUM CHLORIDE 75 MILLILITER(S): 9 INJECTION, SOLUTION INTRAVENOUS at 06:25

## 2023-10-27 RX ADMIN — PIPERACILLIN AND TAZOBACTAM 200 GRAM(S): 4; .5 INJECTION, POWDER, LYOPHILIZED, FOR SOLUTION INTRAVENOUS at 02:00

## 2023-10-27 RX ADMIN — ATORVASTATIN CALCIUM 40 MILLIGRAM(S): 80 TABLET, FILM COATED ORAL at 22:11

## 2023-10-27 RX ADMIN — Medication 0.5 MILLIGRAM(S): at 11:50

## 2023-10-27 RX ADMIN — SODIUM CHLORIDE 2500 MILLILITER(S): 9 INJECTION INTRAMUSCULAR; INTRAVENOUS; SUBCUTANEOUS at 00:45

## 2023-10-27 RX ADMIN — SODIUM CHLORIDE 1000 MILLILITER(S): 9 INJECTION, SOLUTION INTRAVENOUS at 04:55

## 2023-10-27 RX ADMIN — Medication 0.5 MILLIGRAM(S): at 22:10

## 2023-10-27 RX ADMIN — Medication 2 GRAM(S): at 03:22

## 2023-10-27 RX ADMIN — HEPARIN SODIUM 5000 UNIT(S): 5000 INJECTION INTRAVENOUS; SUBCUTANEOUS at 06:25

## 2023-10-27 RX ADMIN — Medication 50 GRAM(S): at 02:22
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
## 2023-10-27 NOTE — CARE COORDINATION ASSESSMENT. - NSPASTMEDSURGHISTORY_GEN_ALL_CORE_FT
PAST MEDICAL & SURGICAL HISTORY:  MDD (major depressive disorder)      Bipolar illness      HLD (hyperlipidemia)      HTN (hypertension)      No significant past surgical history
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
## 2023-10-27 NOTE — CARE COORDINATION ASSESSMENT. - NSDCPLANSERVICES_GEN_ALL_CORE
RN/CM met with pt, noted her to be alert and able to participate but with intermittent periods of forgetfulness. CM explained role, provided pt with CM contact info. Initiated DC planning discussion. Pt stated that she has newly moved into assisted living facility- [facility] [phone]/fax [phone]. Utilizes pharmacy: [facility] [phone] and that she has NO family- as both her parents are  and her brother also passed away. Pt is NOT aware about any legal guardian. Pt stated that she utilizes a walker @ assisted living facility and sees assisted living facility MD is DR Carrie Quinn. DC plan pending pt's hospital course. CM remains available and continues to follow case. RN/CM met with pt, noted her to be alert and able to participate but with intermittent periods of forgetfulness. CM explained role, provided pt with CM contact info. Initiated DC planning discussion. Pt stated that she has newly moved into assisted living facility- [facility] [phone]/fax [phone]. Utilizes pharmacy: [facility] [phone] and that she has NO family- as both her parents are  and her brother also passed away. Pt is NOT aware about any legal guardian. Pt stated that she utilizes a walker @ assisted living facility and sees assisted living facility MD is DR Carrie Quinn. DC plan pending pt's hospital course. As per assisted living staff, once pt is medically optimized for transition back, will need the following DC paperwork to be faxed over to [phone]: 9912 LEX form, updated PT report, COVID swab within 24-72H of DC (if indicated); any NEW/CHANGED meds to be sent to [facility]; and if home care indicated, preferred home care agency is [facility] [phone].  @ this time, assisted living facility staff who has more info on pt is NOT available, CM will re-attempt to reach out to said staff.   CM remains available and continues to follow case. RN/CM met with pt, noted her to be alert and able to participate but with intermittent periods of forgetfulness. CM explained role, provided pt with CM contact info. Initiated DC planning discussion. Pt stated that she has newly moved into assisted living facility- [facility] [phone]/fax [phone]. Utilizes pharmacy: [facility] [phone] and that she has NO family- as both her parents are  and her brother also passed away. Pt is NOT aware about any legal guardian. Pt stated that she utilizes a walker @ assisted living facility and sees assisted living facility MD is DR Carrie Quinn. DC plan pending pt's hospital course. As per assisted living staff, once pt is medically optimized for transition back, will need the following DC paperwork to be faxed over to [phone]: 3122 [facility] form, updated PT report, COVID swab within 24-72H of DC (if indicated); any NEW/CHANGED meds to be sent to [facility]; and if home care indicated, preferred home care agency is [facility].  Phone: [phone].  @ this time, assisted living facility staff who has more info on pt is NOT available, CM will re-attempt to reach out to said staff.   CM remains available and continues to follow case.

## 2023-10-27 NOTE — DISCHARGE NOTE NURSING/CASE MANAGEMENT/SOCIAL WORK - NSDPFAC_GEN_ALL_CORE
[facility] [phone]/fax [phone]. [facility] [phone]/fax [phone].

## 2023-10-27 NOTE — DISCHARGE NOTE NURSING/CASE MANAGEMENT/SOCIAL WORK - NSDCFUADDAPPT_GEN_ALL_CORE_FT
You/ assisted living facility staff have been provided with discharge notice for today 11/08/2023.   You will have an appointment with your assisted living physician, Dr Carrie Quinn within 24-48 H of your discharge from the hospital.

## 2023-10-27 NOTE — CARE COORDINATION ASSESSMENT. - NSCAREPROVIDERS_GEN_ALL_CORE_FT
CARE PROVIDERS:  Accepting Physician: Emilee Garcia  Access Services: Verona Zamudio  Access Services: Ishaan Medina  Access Services: Jassi Munoz  Administration: Ida Graff  Administration: Genaro Bear  Administration: Pedrito Sagastume  Administration: Ebonie Rodriguez  Admitting: Duke Gleason  Attending: Duke Gleason  Case Management: Katerina Cartwright  Consultant: Deisy Kruse  Consultant: Xavier Rosado  Consultant: Chris Keyes  Consultant: Pedrito Sagastume  Consultant: Destin Brennan  Covering Team: NIMISHA Young  ED Attending: Oscar Wall  ED Nurse: Indira Stanton  Infection Control: Janneth White  Nurse: Mary Anne John  Nurse: Agatha James  Nurse: Stephy Nazario  Nurse: Margaret Cole  Nurse: Carlos Iraheta  Nurse: Raysa Stack  Nurse: Jossie Jorge  Nurse: Juliet Lopez  Nurse: Alvina Ennis  Nurse: Elaina Woodward  Nurse: Rajan, Soumya Ray  Ordered: ServiceAccount, SCMMLM  Ordered: ServiceAccount, SCMMLM  Ordered: Physician, Ordering  Outpatient Provider: Destin Brennan  Override: Kenny Stacksia  Override: Rajan, Soumya Ray  Override: Juliet Lopez  PCA/Nursing Assistant: Rosalind Hodgson  Primary Team: Emilee Garcia  Primary Team: Adin Miguel  Primary Team: Henrik Chu  Quality Review: Tiffanie Kong  Registered Dietitian: Amna Flores  Registered Dietitian: Delmis Horta  Respiratory Therapy: Brooklynn Vieyra  : Hieu Lemos  Team: GUSTAVO HARRELL Hospitalists, Team  UR// Supp. Assoc.: Lore Juarez  UR// Supp. Assoc.: Monique Nicolas   CARE PROVIDERS:  Accepting Physician: Emilee Garcia  Access Services: Verona Zamudio  Access Services: Ishaan Medina  Access Services: Jassi Munoz  Administration: Ida Graff  Administration: Pedrito Sagastume  Administration: Ebonie Rodriguez  Admitting: Duke Gleason  Attending: Duke Gleason  Case Management: Katerina Cartwright  Consultant: Deisy Kruse  Consultant: Xavier Rosado  Consultant: Chris Keyes  Consultant: Pedrito Sagastume  Consultant: Desitn Brennan  Covering Team: NIMISHA Young  ED Attending: Oscar Wall  ED Nurse: Indira Stanton  Infection Control: Janneth White  Nurse: Mary Anne John  Nurse: Agatha James  Nurse: Stephy Nazario  Nurse: Margaret Cole  Nurse: Carlos Iraheta  Nurse: Kenny Stacksia  Nurse: Jossie Jorge  Nurse: Alvina Ennis  Nurse: Elaina Woodward  Nurse: Soumya Tolentino  Nurse: Juliet Lopez  Ordered: ServiceAccount, SCMMLM  Ordered: ServiceAccount, SCMMLM  Ordered: Physician, Ordering  Outpatient Provider: Destin Brennan  Override: Kenny Stacksia  Override: Soumya Tolentino  Override: Arielle Lopezhleen  PCA/Nursing Assistant: Rosalind Hodgson  Physical Therapy: Abi Sanders  Primary Team: Adin Miguel  Primary Team: Henrik Chu  Primary Team: Emilee Garcia  Quality Review: Tiffanie Kong  Registered Dietitian: Amna Flores  Registered Dietitian: Delmis Horta  Respiratory Therapy: Brooklynn Vieyra  : Hieu Lemos  Team: GUSTAVO  Hospitalists, Team  UR// Supp. Assoc.: Lore Juarez  UR// Supp. Assoc.: Monique Nicolas

## 2023-10-27 NOTE — DISCHARGE NOTE NURSING/CASE MANAGEMENT/SOCIAL WORK - OTHER MODE OF TRANSPORTATION
You are being transported upon your discharge from the hospital via ambulette- [facility]/Ambul [phone]
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
## 2023-10-27 NOTE — PATIENT PROFILE ADULT - FALL HARM RISK - HARM RISK INTERVENTIONS
Assistance with ambulation/Assistance OOB with selected safe patient handling equipment/Communicate Risk of Fall with Harm to all staff/Discuss with provider need for PT consult/Monitor gait and stability/Reinforce activity limits and safety measures with patient and family/Tailored Fall Risk Interventions/Visual Cue: Yellow wristband and red socks/Bed in lowest position, wheels locked, appropriate side rails in place/Call bell, personal items and telephone in reach/Instruct patient to call for assistance before getting out of bed or chair/Non-slip footwear when patient is out of bed/[place] to call system/Physically safe environment - no spills, clutter or unnecessary equipment/Purposeful Proactive Rounding/Room/bathroom lighting operational, light cord in reach

## 2023-10-27 NOTE — PHYSICAL THERAPY INITIAL EVALUATION ADULT - RANGE OF MOTION EXAMINATION, REHAB EVAL
left shld flex 1/4 range; right knee flex 1/4 range; left knee flex 1/2 range/Right UE ROM was WFL (within functional limits)/deficits as listed below

## 2023-10-27 NOTE — DISCHARGE NOTE NURSING/CASE MANAGEMENT/SOCIAL WORK - NSDCPEFALRISK_GEN_ALL_CORE
From: Lico Salinas  To: Mynor Teresa M.D.  Sent: 1/1/2021 11:15 AM PST  Subject: Procedure Question    Lico Salinas can not get in to see you until Friday, January 8th. Please make a referral to an endocrinologist for him, ASABRAULIO. This Dr. Pickering asked for the MRI that you sent, but since he says this is not psychological he thinks it is medical, could this be a problem with his para-thyroid? The lethargy, anxiety, etc. and his shakiness and unsteady on his feet are the same that happened to a friends brother, and he got better after they removed it. If you think I should take him down to [facility] I will do that right away. I am losing him. He is no better for being at [facility].   Pina  
For information on Fall & Injury Prevention, visit: [email or web address] OR  [email or web address] OR  https://www.cdc.gov/steadi/patient.html

## 2023-10-27 NOTE — DIETITIAN INITIAL EVALUATION ADULT - PERTINENT MEDS FT
MEDICATIONS  (STANDING):  atorvastatin 40 milliGRAM(s) Oral at bedtime  clonazePAM  Tablet 0.5 milliGRAM(s) Oral daily  heparin   Injectable 5000 Unit(s) SubCutaneous every 8 hours  lactated ringers. 1000 milliLiter(s) (75 mL/Hr) IV Continuous <Continuous>  pantoprazole    Tablet 40 milliGRAM(s) Oral before breakfast    MEDICATIONS  (PRN):  acetaminophen     Tablet .. 650 milliGRAM(s) Oral every 6 hours PRN Temp greater or equal to 38C (100.4F), Mild Pain (1 - 3), Moderate Pain (4 - 6)

## 2023-10-27 NOTE — DISCHARGE NOTE NURSING/CASE MANAGEMENT/SOCIAL WORK - NSDCCRNAME_GEN_ALL_CORE_FT
You are a resident of [facility] [phone]/fax [phone]. Utilizing pharmacy: [facility] [phone]  You are a NEW resident of [facility] [phone]/fax [phone]. Utilizing [facility] [phone] pharmacy.

## 2023-10-27 NOTE — DIETITIAN INITIAL EVALUATION ADULT - ADD RECOMMEND
1) encourage PO intake  2) encourage menu selection  3) provide patient food preferences upon request

## 2023-10-27 NOTE — DISCHARGE NOTE NURSING/CASE MANAGEMENT/SOCIAL WORK - NSSCCARECORD_GEN_ALL_CORE
[facility]

## 2023-10-27 NOTE — BH CONSULTATION LIAISON ASSESSMENT NOTE - NSBHCHARTREVIEWVS_PSY_A_CORE FT
Vital Signs Last 24 Hrs  T(C): 36.7 (27 Oct 2023 12:00), Max: 37.5 (26 Oct 2023 23:09)  T(F): 98 (27 Oct 2023 12:00), Max: 99.5 (26 Oct 2023 23:09)  HR: 61 (27 Oct 2023 12:00) (61 - 71)  BP: 112/66 (27 Oct 2023 12:00) (79/41 - 112/66)  BP(mean): 80 (27 Oct 2023 12:00) (68 - 91)  RR: 18 (27 Oct 2023 12:00) (12 - 20)  SpO2: 100% (27 Oct 2023 12:00) (93% - 100%)    Parameters below as of 27 Oct 2023 12:00  Patient On (Oxygen Delivery Method): room air

## 2023-10-27 NOTE — PHYSICAL THERAPY INITIAL EVALUATION ADULT - ADDITIONAL COMMENTS
Pt. lives in [facility] living but only moved there ~2 weeks ago. Pt. states she ambulated with a walker and assistance but performed ADLs independently.  Unsure of accuracy of pt's history.
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
## 2023-10-27 NOTE — PROVIDER CONTACT NOTE (EICU) - ASSESSMENT
Telehealth evaluation precludes physical exam. Pt not evaluated by me due to location. Per bedside team, pt is awake and alert, non toxic appearing and in no distress.

## 2023-10-27 NOTE — BH CONSULTATION LIAISON ASSESSMENT NOTE - NSBHCHARTREVIEWLAB_PSY_A_CORE FT
11.7   7.99  )-----------( 258      ( 27 Oct 2023 05:30 )             40.3   10-27    140  |  109<H>  |  19  ----------------------------<  134<H>  4.4   |  25  |  1.29    Ca    8.7      27 Oct 2023 12:19  Phos  3.5     10-27  Mg     2.1     10-27    TPro  5.7<L>  /  Alb  3.1<L>  /  TBili  0.6  /  DBili  x   /  AST  24  /  ALT  26  /  AlkPhos  122<H>  10-27
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
## 2023-10-27 NOTE — CHART NOTE - NSCHARTNOTEFT_GEN_A_CORE
Patient seen, admitted for GURDEEP, AMS, prolonged qtc, moved to SPCU for initial borderline hypotension, improved with IVF. BP currently stable, can downgrade to remote tele. Dr. Garcia aware, will take over further management
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
## 2023-10-27 NOTE — DIETITIAN INITIAL EVALUATION ADULT - PERTINENT LABORATORY DATA
10-27    140  |  109<H>  |  19  ----------------------------<  134<H>  4.4   |  25  |  1.29    Ca    8.7      27 Oct 2023 12:19  Phos  3.5     10-27  Mg     2.1     10-27    TPro  5.7<L>  /  Alb  3.1<L>  /  TBili  0.6  /  DBili  x   /  AST  24  /  ALT  26  /  AlkPhos  122<H>  10-27

## 2023-10-27 NOTE — DIETITIAN INITIAL EVALUATION ADULT - ORAL INTAKE PTA/DIET HISTORY
patient reports good appetite and good PO intake PTA at assisted living, unclear if she was following any therapeutic diet at facility.

## 2023-10-27 NOTE — CARE COORDINATION ASSESSMENT. - PRO ARRIVE FROM
resident of assisted living facility [facility] [phone]/fax [phone]. Utilizes pharmacy: [facility] [phone]/assisted living facility
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
## 2023-10-27 NOTE — SOCIAL WORK PROGRESS NOTE - NSSWPROGRESSNOTE_GEN_ALL_CORE
SW reviewed patients medical record.  Pt is a 68-year-old female from [facility].  Admitted for aggression and dehydration.  No emergency contact listed.  Called [facility] and was told patient has no family and has no guardian.  She is diagnosed with dementia.   Pt has only been at [facility] since Tuesday.  The head nurse Rip has information about patients' history but will not be in until Sunday.  SW to follow for needs and support.

## 2023-10-27 NOTE — BH CONSULTATION LIAISON ASSESSMENT NOTE - ACCESS TO FIREARM
No
What Type Of Note Output Would You Prefer (Optional)?: Standard Output
What Is The Reason For Today's Visit?: Full Body Skin Examination
What Is The Reason For Today's Visit? (Being Monitored For X): the development of new lesions
Additional History: Patient has red itchy spots on his arms and abdomen. Present for weeks. He was given triamcinolone and that did not help. Patient has not started new medication or changed anything new.

## 2023-10-27 NOTE — H&P ADULT - NEUROLOGICAL
cranial nerves II-XII intact/sensation intact/responds to pain/responds to verbal commands/deep reflexes intact/cranial nerves intact details…

## 2023-10-27 NOTE — CARE COORDINATION ASSESSMENT. - LIVING ARRANGEMENTS, PROFILE
[facility] [phone]/fax [phone]./assisted living

## 2023-10-27 NOTE — CARE COORDINATION ASSESSMENT. - NSPTRESPFORCHILD_GEN_ALL_CORE
Rest, ice, and elevate. Take Tylenol for pain. I will let you know your x-rays results, but I went ahead and placed a referral for ortho. May need MRI.   Patient Education     Knee Pain  Knee pain is very common. It’s especially common in active people who put a lot of pressure on their knees, like runners. It affects women more often than men.  Your kneecap (patella) is a thick, round bone. It covers and protects the front portion of your knee joint. It moves along a groove in your thighbone (femur) as part of the patellofemoral joint. A layer of cartilage surrounds the underside of your kneecap. This layer protects it from grinding against your femur.  When this cartilage softens and breaks down, it can cause knee pain. This is partly because of repetitive stress. The stress irritates the lining of the joint. This causes pain in the underlying bone.  What causes knee pain?  Many things can cause knee pain. You may have more than one cause. Some of these include:  · Overuse of the knee joint  · The kneecap doesn’t line up with the tissue around it  · Damage to small nerves in the area  · Damage to the ligament-like structure that holds the kneecap in place (retinaculum)  · Breakdown of the bone under the cartilage  · Swelling in the soft tissues around the kneecap  · Injury  You might be more likely to have knee pain if you:  · Exercise a lot  · Recently increased the intensity of your workouts  · Have a body mass index (BMI) greater than 25  · Have poor alignment of your kneecap  · Walk with your feet turned overly outward or inward  · Have weakness in surrounding muscle groups (inner quad or hip adductor muscles)  · Have too much tightness in surrounding muscle groups (hamstrings or iliotibial band)  · Have a recent history of injury to the area  · Are female  Symptoms of knee pain  This type of knee pain is a dull, aching pain in the front of the knee in the area under and around the kneecap. This pain may start  quickly or slowly. Your pain might be worse when you squat, run, or sit for a long time. You might also sometimes feel like your knee is giving out. You may have symptoms in one or both of your knees.  Diagnosing knee pain  Your healthcare provider will ask about your medical history and your symptoms. Be sure to describe any activities that make your knee pain worse. He or she will look at your knee. This will include tests of your range of motion, strength, and areas of pain of your knee. Your knee alignment will be checked.  Your healthcare provider will need to rule out other causes of your knee pain, such as arthritis. You may need an imaging test, such as an X-ray or MRI.  Treatment for knee pain  Treatments that can help ease your symptoms may include:  · Avoiding activities for a while that make your pain worse, returning to activity over time  · Icing the outside of your knee when it causes you pain  · Taking over-the-counter pain medicine  · Wearing a knee brace or taping your knee to support it  · Wearing special shoe inserts to help keep your feet in the proper alignment  · Doing special exercises to stretch and strengthen the muscles around your hip and your knee  These steps help most people manage knee pain. But some cases of knee pain need to be treated with surgery. You may need surgery right away. Or you may need it later if other treatments don’t work. Your healthcare provider may refer you to an orthopedic surgeon. He or she will talk with you about your choices.  Preventing knee pain  Losing weight and correcting excess muscle tightness or muscle weakness may help lower your risk.  In some cases, you can prevent knee pain. To help prevent a flare-up of knee pain, you do these things:  · Regularly do all the exercises your doctor or physical therapist advises  · Support your knee as advised by your doctor or physical therapist  · Increase training gradually, and ease up on training when  needed  · Have an expert check your gait for running or other sporting activities  · Stretch properly before and after exercise  · Replace your running shoes regularly  · Lose excess weight     When to call your healthcare provider  Call your healthcare provider right away if:  · Your symptoms don’t get better after a few weeks of treatment  · You have any new symptoms   Date Last Reviewed: 4/1/2017  © 7030-6365 [employer]. [address] [place] [address], [place] [address]. All rights reserved. This information is not intended as a substitute for professional medical care. Always follow your healthcare professional's instructions.            No
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
## 2023-10-27 NOTE — DISCHARGE NOTE NURSING/CASE MANAGEMENT/SOCIAL WORK - NSSCNAMETXT_GEN_ALL_CORE
[facility].  Phone: [phone]. Home care agency will reach out to you within 24-72 hours of your discharge to schedule home care visit/eval appointment with you. Please call agency for any queries regarding home care services

## 2023-10-27 NOTE — DIETITIAN INITIAL EVALUATION ADULT - OTHER CALCULATIONS
IBW used in calculating patient's estimated nutrient needs as it is unclear to how accurate dosing wt is

## 2023-10-27 NOTE — PROVIDER CONTACT NOTE (EICU) - RECOMMENDATIONS
- etiology of hypotension unclear but low suspicion for infectious etiology  - CXR clear, awaiting UA- could be UTI explaining change in behavior  - receiving IVF hydration, lactate is negative  - noted prolonged QTc, avoid any QTc prolonging meds  - at this time, pt does not require ICU level of care - pt to be admitted to tele; please back with any further questions or if clinical status changes  Discussed w/ ICU KAROLYN Judge - etiology of hypotension unclear but low suspicion for infectious etiology  - CXR clear, awaiting UA- could be UTI explaining change in behavior  - receiving IVF hydration, lactate is negative  - noted prolonged QTc, avoid any QTc prolonging meds  - pt was admitted to tele initially but upgraded to SPCU due to recurrent hypotension - although not requiring pressors  Discussed w/ ICU KAROLYN Judge

## 2023-10-27 NOTE — DIETITIAN INITIAL EVALUATION ADULT - NSFNSPHYEXAMSKINFT_GEN_A_CORE
Pressure Injury 1: coccyx, Stage I  Pressure Injury 2: none, none  Pressure Injury 3: none, none  Pressure Injury 4: none, none  Pressure Injury 5: none, none  Pressure Injury 6: none, none  Pressure Injury 7: none, none  Pressure Injury 8: none, none  Pressure Injury 9: none, none  Pressure Injury 10: none, none  Pressure Injury 11: none, none, Pressure Injury 1: coccyx, Stage I  Pressure Injury 2: none, none  Pressure Injury 3: none, none  Pressure Injury 4: none, none  Pressure Injury 5: none, none  Pressure Injury 6: none, none  Pressure Injury 7: none, none  Pressure Injury 8: none, none  Pressure Injury 9: none, none  Pressure Injury 10: none, none  Pressure Injury 11: none, none

## 2023-10-28 LAB
ALBUMIN SERPL ELPH-MCNC: 3 G/DL — LOW (ref 3.3–5)
ALBUMIN SERPL ELPH-MCNC: 3 G/DL — LOW (ref 3.3–5)
ALBUMIN SERPL ELPH-MCNC: 3.3 G/DL — SIGNIFICANT CHANGE UP (ref 3.3–5)
ALBUMIN SERPL ELPH-MCNC: 3.3 G/DL — SIGNIFICANT CHANGE UP (ref 3.3–5)
ALP SERPL-CCNC: 133 U/L — HIGH (ref 30–120)
ALP SERPL-CCNC: 133 U/L — HIGH (ref 30–120)
ALP SERPL-CCNC: 139 U/L — HIGH (ref 30–120)
ALP SERPL-CCNC: 139 U/L — HIGH (ref 30–120)
ALT FLD-CCNC: 28 U/L — SIGNIFICANT CHANGE UP (ref 10–60)
ALT FLD-CCNC: 28 U/L — SIGNIFICANT CHANGE UP (ref 10–60)
ALT FLD-CCNC: 29 U/L — SIGNIFICANT CHANGE UP (ref 10–60)
ALT FLD-CCNC: 29 U/L — SIGNIFICANT CHANGE UP (ref 10–60)
ANION GAP SERPL CALC-SCNC: 5 MMOL/L — SIGNIFICANT CHANGE UP (ref 5–17)
ANION GAP SERPL CALC-SCNC: 5 MMOL/L — SIGNIFICANT CHANGE UP (ref 5–17)
ANION GAP SERPL CALC-SCNC: 7 MMOL/L — SIGNIFICANT CHANGE UP (ref 5–17)
ANION GAP SERPL CALC-SCNC: 7 MMOL/L — SIGNIFICANT CHANGE UP (ref 5–17)
AST SERPL-CCNC: 20 U/L — SIGNIFICANT CHANGE UP (ref 10–40)
AST SERPL-CCNC: 20 U/L — SIGNIFICANT CHANGE UP (ref 10–40)
AST SERPL-CCNC: 34 U/L — SIGNIFICANT CHANGE UP (ref 10–40)
AST SERPL-CCNC: 34 U/L — SIGNIFICANT CHANGE UP (ref 10–40)
BASOPHILS # BLD AUTO: 0.05 K/UL — SIGNIFICANT CHANGE UP (ref 0–0.2)
BASOPHILS # BLD AUTO: 0.05 K/UL — SIGNIFICANT CHANGE UP (ref 0–0.2)
BASOPHILS NFR BLD AUTO: 0.7 % — SIGNIFICANT CHANGE UP (ref 0–2)
BASOPHILS NFR BLD AUTO: 0.7 % — SIGNIFICANT CHANGE UP (ref 0–2)
BILIRUB SERPL-MCNC: 0.6 MG/DL — SIGNIFICANT CHANGE UP (ref 0.2–1.2)
BILIRUB SERPL-MCNC: 0.6 MG/DL — SIGNIFICANT CHANGE UP (ref 0.2–1.2)
BILIRUB SERPL-MCNC: 0.7 MG/DL — SIGNIFICANT CHANGE UP (ref 0.2–1.2)
BILIRUB SERPL-MCNC: 0.7 MG/DL — SIGNIFICANT CHANGE UP (ref 0.2–1.2)
BUN SERPL-MCNC: 14 MG/DL — SIGNIFICANT CHANGE UP (ref 7–23)
CALCIUM SERPL-MCNC: 9.5 MG/DL — SIGNIFICANT CHANGE UP (ref 8.4–10.5)
CALCIUM SERPL-MCNC: 9.5 MG/DL — SIGNIFICANT CHANGE UP (ref 8.4–10.5)
CALCIUM SERPL-MCNC: 9.6 MG/DL — SIGNIFICANT CHANGE UP (ref 8.4–10.5)
CALCIUM SERPL-MCNC: 9.6 MG/DL — SIGNIFICANT CHANGE UP (ref 8.4–10.5)
CHLORIDE SERPL-SCNC: 106 MMOL/L — SIGNIFICANT CHANGE UP (ref 96–108)
CO2 SERPL-SCNC: 24 MMOL/L — SIGNIFICANT CHANGE UP (ref 22–31)
CO2 SERPL-SCNC: 24 MMOL/L — SIGNIFICANT CHANGE UP (ref 22–31)
CO2 SERPL-SCNC: 27 MMOL/L — SIGNIFICANT CHANGE UP (ref 22–31)
CO2 SERPL-SCNC: 27 MMOL/L — SIGNIFICANT CHANGE UP (ref 22–31)
CORTIS AM PEAK SERPL-MCNC: 11.7 UG/DL — SIGNIFICANT CHANGE UP (ref 6–18.4)
CORTIS AM PEAK SERPL-MCNC: 11.7 UG/DL — SIGNIFICANT CHANGE UP (ref 6–18.4)
CREAT SERPL-MCNC: 0.91 MG/DL — SIGNIFICANT CHANGE UP (ref 0.5–1.3)
CREAT SERPL-MCNC: 0.91 MG/DL — SIGNIFICANT CHANGE UP (ref 0.5–1.3)
CREAT SERPL-MCNC: 1.1 MG/DL — SIGNIFICANT CHANGE UP (ref 0.5–1.3)
CREAT SERPL-MCNC: 1.1 MG/DL — SIGNIFICANT CHANGE UP (ref 0.5–1.3)
EGFR: 54 ML/MIN/1.73M2 — LOW
EGFR: 54 ML/MIN/1.73M2 — LOW
EGFR: 68 ML/MIN/1.73M2 — SIGNIFICANT CHANGE UP
EGFR: 68 ML/MIN/1.73M2 — SIGNIFICANT CHANGE UP
EOSINOPHIL # BLD AUTO: 0.24 K/UL — SIGNIFICANT CHANGE UP (ref 0–0.5)
EOSINOPHIL # BLD AUTO: 0.24 K/UL — SIGNIFICANT CHANGE UP (ref 0–0.5)
EOSINOPHIL NFR BLD AUTO: 3.5 % — SIGNIFICANT CHANGE UP (ref 0–6)
EOSINOPHIL NFR BLD AUTO: 3.5 % — SIGNIFICANT CHANGE UP (ref 0–6)
FERRITIN SERPL-MCNC: 39 NG/ML — SIGNIFICANT CHANGE UP (ref 13–330)
FERRITIN SERPL-MCNC: 39 NG/ML — SIGNIFICANT CHANGE UP (ref 13–330)
GLUCOSE SERPL-MCNC: 114 MG/DL — HIGH (ref 70–99)
GLUCOSE SERPL-MCNC: 114 MG/DL — HIGH (ref 70–99)
GLUCOSE SERPL-MCNC: 126 MG/DL — HIGH (ref 70–99)
GLUCOSE SERPL-MCNC: 126 MG/DL — HIGH (ref 70–99)
HCT VFR BLD CALC: 31.6 % — LOW (ref 34.5–45)
HCT VFR BLD CALC: 31.6 % — LOW (ref 34.5–45)
HCV AB S/CO SERPL IA: 0.1 S/CO — SIGNIFICANT CHANGE UP (ref 0–0.99)
HCV AB S/CO SERPL IA: 0.1 S/CO — SIGNIFICANT CHANGE UP (ref 0–0.99)
HCV AB SERPL-IMP: SIGNIFICANT CHANGE UP
HCV AB SERPL-IMP: SIGNIFICANT CHANGE UP
HGB BLD-MCNC: 9.8 G/DL — LOW (ref 11.5–15.5)
HGB BLD-MCNC: 9.8 G/DL — LOW (ref 11.5–15.5)
IMM GRANULOCYTES NFR BLD AUTO: 0.3 % — SIGNIFICANT CHANGE UP (ref 0–0.9)
IMM GRANULOCYTES NFR BLD AUTO: 0.3 % — SIGNIFICANT CHANGE UP (ref 0–0.9)
LITHIUM SERPL-MCNC: 1.25 MMOL/L — HIGH (ref 0.6–1.2)
LITHIUM SERPL-MCNC: 1.25 MMOL/L — HIGH (ref 0.6–1.2)
LYMPHOCYTES # BLD AUTO: 1.44 K/UL — SIGNIFICANT CHANGE UP (ref 1–3.3)
LYMPHOCYTES # BLD AUTO: 1.44 K/UL — SIGNIFICANT CHANGE UP (ref 1–3.3)
LYMPHOCYTES # BLD AUTO: 21.1 % — SIGNIFICANT CHANGE UP (ref 13–44)
LYMPHOCYTES # BLD AUTO: 21.1 % — SIGNIFICANT CHANGE UP (ref 13–44)
MAGNESIUM SERPL-MCNC: 1.7 MG/DL — SIGNIFICANT CHANGE UP (ref 1.6–2.6)
MAGNESIUM SERPL-MCNC: 1.7 MG/DL — SIGNIFICANT CHANGE UP (ref 1.6–2.6)
MCHC RBC-ENTMCNC: 30.2 PG — SIGNIFICANT CHANGE UP (ref 27–34)
MCHC RBC-ENTMCNC: 30.2 PG — SIGNIFICANT CHANGE UP (ref 27–34)
MCHC RBC-ENTMCNC: 31 GM/DL — LOW (ref 32–36)
MCHC RBC-ENTMCNC: 31 GM/DL — LOW (ref 32–36)
MCV RBC AUTO: 97.2 FL — SIGNIFICANT CHANGE UP (ref 80–100)
MCV RBC AUTO: 97.2 FL — SIGNIFICANT CHANGE UP (ref 80–100)
MONOCYTES # BLD AUTO: 0.48 K/UL — SIGNIFICANT CHANGE UP (ref 0–0.9)
MONOCYTES # BLD AUTO: 0.48 K/UL — SIGNIFICANT CHANGE UP (ref 0–0.9)
MONOCYTES NFR BLD AUTO: 7 % — SIGNIFICANT CHANGE UP (ref 2–14)
MONOCYTES NFR BLD AUTO: 7 % — SIGNIFICANT CHANGE UP (ref 2–14)
NEUTROPHILS # BLD AUTO: 4.61 K/UL — SIGNIFICANT CHANGE UP (ref 1.8–7.4)
NEUTROPHILS # BLD AUTO: 4.61 K/UL — SIGNIFICANT CHANGE UP (ref 1.8–7.4)
NEUTROPHILS NFR BLD AUTO: 67.4 % — SIGNIFICANT CHANGE UP (ref 43–77)
NEUTROPHILS NFR BLD AUTO: 67.4 % — SIGNIFICANT CHANGE UP (ref 43–77)
NRBC # BLD: 0 /100 WBCS — SIGNIFICANT CHANGE UP (ref 0–0)
NRBC # BLD: 0 /100 WBCS — SIGNIFICANT CHANGE UP (ref 0–0)
PLATELET # BLD AUTO: 239 K/UL — SIGNIFICANT CHANGE UP (ref 150–400)
PLATELET # BLD AUTO: 239 K/UL — SIGNIFICANT CHANGE UP (ref 150–400)
POTASSIUM SERPL-MCNC: 4.7 MMOL/L — SIGNIFICANT CHANGE UP (ref 3.5–5.3)
POTASSIUM SERPL-MCNC: 4.7 MMOL/L — SIGNIFICANT CHANGE UP (ref 3.5–5.3)
POTASSIUM SERPL-MCNC: 4.8 MMOL/L — SIGNIFICANT CHANGE UP (ref 3.5–5.3)
POTASSIUM SERPL-MCNC: 4.8 MMOL/L — SIGNIFICANT CHANGE UP (ref 3.5–5.3)
POTASSIUM SERPL-SCNC: 4.7 MMOL/L — SIGNIFICANT CHANGE UP (ref 3.5–5.3)
POTASSIUM SERPL-SCNC: 4.7 MMOL/L — SIGNIFICANT CHANGE UP (ref 3.5–5.3)
POTASSIUM SERPL-SCNC: 4.8 MMOL/L — SIGNIFICANT CHANGE UP (ref 3.5–5.3)
POTASSIUM SERPL-SCNC: 4.8 MMOL/L — SIGNIFICANT CHANGE UP (ref 3.5–5.3)
PROT SERPL-MCNC: 6.7 G/DL — SIGNIFICANT CHANGE UP (ref 6–8.3)
PROT SERPL-MCNC: 6.7 G/DL — SIGNIFICANT CHANGE UP (ref 6–8.3)
PROT SERPL-MCNC: 6.8 G/DL — SIGNIFICANT CHANGE UP (ref 6–8.3)
PROT SERPL-MCNC: 6.8 G/DL — SIGNIFICANT CHANGE UP (ref 6–8.3)
RBC # BLD: 3.25 M/UL — LOW (ref 3.8–5.2)
RBC # BLD: 3.25 M/UL — LOW (ref 3.8–5.2)
RBC # FLD: 13.9 % — SIGNIFICANT CHANGE UP (ref 10.3–14.5)
RBC # FLD: 13.9 % — SIGNIFICANT CHANGE UP (ref 10.3–14.5)
SODIUM SERPL-SCNC: 137 MMOL/L — SIGNIFICANT CHANGE UP (ref 135–145)
SODIUM SERPL-SCNC: 137 MMOL/L — SIGNIFICANT CHANGE UP (ref 135–145)
SODIUM SERPL-SCNC: 138 MMOL/L — SIGNIFICANT CHANGE UP (ref 135–145)
SODIUM SERPL-SCNC: 138 MMOL/L — SIGNIFICANT CHANGE UP (ref 135–145)
VIT B12 SERPL-MCNC: 1411 PG/ML — HIGH (ref 232–1245)
VIT B12 SERPL-MCNC: 1411 PG/ML — HIGH (ref 232–1245)
WBC # BLD: 6.84 K/UL — SIGNIFICANT CHANGE UP (ref 3.8–10.5)
WBC # BLD: 6.84 K/UL — SIGNIFICANT CHANGE UP (ref 3.8–10.5)
WBC # FLD AUTO: 6.84 K/UL — SIGNIFICANT CHANGE UP (ref 3.8–10.5)
WBC # FLD AUTO: 6.84 K/UL — SIGNIFICANT CHANGE UP (ref 3.8–10.5)

## 2023-10-28 PROCEDURE — 93010 ELECTROCARDIOGRAM REPORT: CPT

## 2023-10-28 RX ORDER — QUETIAPINE FUMARATE 200 MG/1
100 TABLET, FILM COATED ORAL AT BEDTIME
Refills: 0 | Status: DISCONTINUED | OUTPATIENT
Start: 2023-10-28 | End: 2023-11-08

## 2023-10-28 RX ORDER — SODIUM CHLORIDE 9 MG/ML
1000 INJECTION, SOLUTION INTRAVENOUS
Refills: 0 | Status: DISCONTINUED | OUTPATIENT
Start: 2023-10-28 | End: 2023-10-29

## 2023-10-28 RX ORDER — MAGNESIUM SULFATE 500 MG/ML
1 VIAL (ML) INJECTION ONCE
Refills: 0 | Status: COMPLETED | OUTPATIENT
Start: 2023-10-28 | End: 2023-10-28

## 2023-10-28 RX ORDER — GABAPENTIN 400 MG/1
300 CAPSULE ORAL EVERY 8 HOURS
Refills: 0 | Status: DISCONTINUED | OUTPATIENT
Start: 2023-10-28 | End: 2023-11-08

## 2023-10-28 RX ADMIN — Medication 0.5 MILLIGRAM(S): at 17:17

## 2023-10-28 RX ADMIN — HEPARIN SODIUM 5000 UNIT(S): 5000 INJECTION INTRAVENOUS; SUBCUTANEOUS at 06:06

## 2023-10-28 RX ADMIN — GABAPENTIN 300 MILLIGRAM(S): 400 CAPSULE ORAL at 21:46

## 2023-10-28 RX ADMIN — ATORVASTATIN CALCIUM 40 MILLIGRAM(S): 80 TABLET, FILM COATED ORAL at 21:46

## 2023-10-28 RX ADMIN — QUETIAPINE FUMARATE 100 MILLIGRAM(S): 200 TABLET, FILM COATED ORAL at 21:46

## 2023-10-28 RX ADMIN — HEPARIN SODIUM 5000 UNIT(S): 5000 INJECTION INTRAVENOUS; SUBCUTANEOUS at 14:34

## 2023-10-28 RX ADMIN — Medication 0.5 MILLIGRAM(S): at 06:06

## 2023-10-28 RX ADMIN — PANTOPRAZOLE SODIUM 40 MILLIGRAM(S): 20 TABLET, DELAYED RELEASE ORAL at 06:06

## 2023-10-28 RX ADMIN — HEPARIN SODIUM 5000 UNIT(S): 5000 INJECTION INTRAVENOUS; SUBCUTANEOUS at 21:47

## 2023-10-28 RX ADMIN — CLOPIDOGREL BISULFATE 75 MILLIGRAM(S): 75 TABLET, FILM COATED ORAL at 11:49

## 2023-10-28 RX ADMIN — Medication 100 GRAM(S): at 21:47
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
## 2023-10-28 NOTE — PROGRESS NOTE ADULT - ASSESSMENT
68F w/ MDD, HTN, HLD, bipolar disorder. Sent in from assisted living for aggressive behavior admitted to the ICU for    # Sepsis, resolving  # Hypotension, resolved  # UTI  # GURDEEP  # Prolonged QTC    Neuro:  - CTH negative  - Checked Pt Parminder NEVAREZ and discussion with pt about outpt medications however medication list sent from rehab does not reflect. As per Parminder NEVAREZ and PT she takes as follows;  - Lithium 300mg BID for Bipolar Schizophrenia, No lithium level upon admission, Level ordered for am  - Prozac 60 mg Daily for depression  - Klonopin 0.5 BID, I increased klonopin to BID  - Plavix 75mg daily added, Pt states she had MRI outpt which showed Ministroke  - Seroquel 200 nightly, pt states she has not taken in approx 3 weeks as she does not like how tired it makes her feel  - Gabapentin 300 mg TID for Knee Pain  - QTC tonight is 497, will continue to hold all antipsych meds till morning EKG, further medication to be adjusted as per primary team    CV:  - No active issues, Normotensive  - Statin  - Repeat EKG with QTC of 497, Daily EKG added, will continue to hold antipsych meds for the night pending repeat EKG in am.  - Holding Lisinopril currently in setting of GURDEEP which is improving, will treat hypertension PRN with hydralazine    Pulm:  - Supplemental oxygen as needed to maintain SpO2 > 92%,  - Incentive spirometry                  GI:  - Continue Protonix 40mg Daily  - Regular diet    Renal:  - Continue to monitor Bun/Cr and UOP  - Replacing electrolytes as needed with Goal K> 4, PO> 3, Mg> 2               - Strict I&O's  - Avoid Nephro toxic medication  - Renally dose meds, Cr improving    Heme:  - Heparin for DVT prophylaxis    ID:  - UA grossly positive awaiting urine Cx, Pt being monitored off Abx at this time per primary team. Procal .09. Blood Cx NGTD  - Microbiology and Radiology reviewed   - trend CBC with diff, CMP  and fever curve    Endo:  - ISS for aggressive glycemic control to limit FS glucose to < 180mg/dl.  - Keep Euthyroid    Time spent on this patient encounter, which includes documenting this note in the electronic medical record, was 45 minutes including assessing the presenting problems with associated risks, reviewing the medical record to prepare for the encounter, and meeting face to face with the patient to obtain additional history. I have also performed an appropriate physical exam, made interventions listed and ordered and interpreted appropriate diagnostic studies as documented. To improve communication and patient safety, I have coordinated care with the multidisciplinary team including the bedside nurse, appropriate attending of record and consultants as needed.
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
## 2023-10-28 NOTE — PROGRESS NOTE ADULT - ASSESSMENT
The patient is a 68 year old female with a history of HTN, HL, bipolar disorder, dementia who presents with agitation.     Plan:  - Initial ECG with significantly prolonged QTc  - Repeat ECG with improved QTc ~500  - Slow titration of QTc prolonging medications  - Hold lisinopril due to GURDEEP

## 2023-10-29 LAB
BASOPHILS # BLD AUTO: 0.04 K/UL — SIGNIFICANT CHANGE UP (ref 0–0.2)
BASOPHILS # BLD AUTO: 0.04 K/UL — SIGNIFICANT CHANGE UP (ref 0–0.2)
BASOPHILS NFR BLD AUTO: 0.7 % — SIGNIFICANT CHANGE UP (ref 0–2)
BASOPHILS NFR BLD AUTO: 0.7 % — SIGNIFICANT CHANGE UP (ref 0–2)
EOSINOPHIL # BLD AUTO: 0.14 K/UL — SIGNIFICANT CHANGE UP (ref 0–0.5)
EOSINOPHIL # BLD AUTO: 0.14 K/UL — SIGNIFICANT CHANGE UP (ref 0–0.5)
EOSINOPHIL NFR BLD AUTO: 2.3 % — SIGNIFICANT CHANGE UP (ref 0–6)
EOSINOPHIL NFR BLD AUTO: 2.3 % — SIGNIFICANT CHANGE UP (ref 0–6)
HCT VFR BLD CALC: 33.4 % — LOW (ref 34.5–45)
HCT VFR BLD CALC: 33.4 % — LOW (ref 34.5–45)
HGB BLD-MCNC: 10.6 G/DL — LOW (ref 11.5–15.5)
HGB BLD-MCNC: 10.6 G/DL — LOW (ref 11.5–15.5)
IMM GRANULOCYTES NFR BLD AUTO: 0.3 % — SIGNIFICANT CHANGE UP (ref 0–0.9)
IMM GRANULOCYTES NFR BLD AUTO: 0.3 % — SIGNIFICANT CHANGE UP (ref 0–0.9)
IRON SATN MFR SERPL: 15 % — SIGNIFICANT CHANGE UP (ref 14–50)
IRON SATN MFR SERPL: 15 % — SIGNIFICANT CHANGE UP (ref 14–50)
IRON SATN MFR SERPL: 46 UG/DL — SIGNIFICANT CHANGE UP (ref 30–160)
IRON SATN MFR SERPL: 46 UG/DL — SIGNIFICANT CHANGE UP (ref 30–160)
LYMPHOCYTES # BLD AUTO: 1.12 K/UL — SIGNIFICANT CHANGE UP (ref 1–3.3)
LYMPHOCYTES # BLD AUTO: 1.12 K/UL — SIGNIFICANT CHANGE UP (ref 1–3.3)
LYMPHOCYTES # BLD AUTO: 18.3 % — SIGNIFICANT CHANGE UP (ref 13–44)
LYMPHOCYTES # BLD AUTO: 18.3 % — SIGNIFICANT CHANGE UP (ref 13–44)
MCHC RBC-ENTMCNC: 30 PG — SIGNIFICANT CHANGE UP (ref 27–34)
MCHC RBC-ENTMCNC: 30 PG — SIGNIFICANT CHANGE UP (ref 27–34)
MCHC RBC-ENTMCNC: 31.7 GM/DL — LOW (ref 32–36)
MCHC RBC-ENTMCNC: 31.7 GM/DL — LOW (ref 32–36)
MCV RBC AUTO: 94.6 FL — SIGNIFICANT CHANGE UP (ref 80–100)
MCV RBC AUTO: 94.6 FL — SIGNIFICANT CHANGE UP (ref 80–100)
MONOCYTES # BLD AUTO: 0.41 K/UL — SIGNIFICANT CHANGE UP (ref 0–0.9)
MONOCYTES # BLD AUTO: 0.41 K/UL — SIGNIFICANT CHANGE UP (ref 0–0.9)
MONOCYTES NFR BLD AUTO: 6.7 % — SIGNIFICANT CHANGE UP (ref 2–14)
MONOCYTES NFR BLD AUTO: 6.7 % — SIGNIFICANT CHANGE UP (ref 2–14)
NEUTROPHILS # BLD AUTO: 4.4 K/UL — SIGNIFICANT CHANGE UP (ref 1.8–7.4)
NEUTROPHILS # BLD AUTO: 4.4 K/UL — SIGNIFICANT CHANGE UP (ref 1.8–7.4)
NEUTROPHILS NFR BLD AUTO: 71.7 % — SIGNIFICANT CHANGE UP (ref 43–77)
NEUTROPHILS NFR BLD AUTO: 71.7 % — SIGNIFICANT CHANGE UP (ref 43–77)
NRBC # BLD: 0 /100 WBCS — SIGNIFICANT CHANGE UP (ref 0–0)
NRBC # BLD: 0 /100 WBCS — SIGNIFICANT CHANGE UP (ref 0–0)
PLATELET # BLD AUTO: 234 K/UL — SIGNIFICANT CHANGE UP (ref 150–400)
PLATELET # BLD AUTO: 234 K/UL — SIGNIFICANT CHANGE UP (ref 150–400)
RBC # BLD: 3.53 M/UL — LOW (ref 3.8–5.2)
RBC # BLD: 3.53 M/UL — LOW (ref 3.8–5.2)
RBC # FLD: 13.8 % — SIGNIFICANT CHANGE UP (ref 10.3–14.5)
RBC # FLD: 13.8 % — SIGNIFICANT CHANGE UP (ref 10.3–14.5)
TIBC SERPL-MCNC: 296 UG/DL — SIGNIFICANT CHANGE UP (ref 220–430)
TIBC SERPL-MCNC: 296 UG/DL — SIGNIFICANT CHANGE UP (ref 220–430)
UIBC SERPL-MCNC: 250 UG/DL — SIGNIFICANT CHANGE UP (ref 110–370)
UIBC SERPL-MCNC: 250 UG/DL — SIGNIFICANT CHANGE UP (ref 110–370)
WBC # BLD: 6.13 K/UL — SIGNIFICANT CHANGE UP (ref 3.8–10.5)
WBC # BLD: 6.13 K/UL — SIGNIFICANT CHANGE UP (ref 3.8–10.5)
WBC # FLD AUTO: 6.13 K/UL — SIGNIFICANT CHANGE UP (ref 3.8–10.5)
WBC # FLD AUTO: 6.13 K/UL — SIGNIFICANT CHANGE UP (ref 3.8–10.5)

## 2023-10-29 RX ORDER — LISINOPRIL 2.5 MG/1
5 TABLET ORAL DAILY
Refills: 0 | Status: DISCONTINUED | OUTPATIENT
Start: 2023-10-29 | End: 2023-11-08

## 2023-10-29 RX ADMIN — Medication 0.5 MILLIGRAM(S): at 05:58

## 2023-10-29 RX ADMIN — HEPARIN SODIUM 5000 UNIT(S): 5000 INJECTION INTRAVENOUS; SUBCUTANEOUS at 05:58

## 2023-10-29 RX ADMIN — Medication 0.5 MILLIGRAM(S): at 17:33

## 2023-10-29 RX ADMIN — CLOPIDOGREL BISULFATE 75 MILLIGRAM(S): 75 TABLET, FILM COATED ORAL at 11:35

## 2023-10-29 RX ADMIN — ATORVASTATIN CALCIUM 40 MILLIGRAM(S): 80 TABLET, FILM COATED ORAL at 22:31

## 2023-10-29 RX ADMIN — SODIUM CHLORIDE 50 MILLILITER(S): 9 INJECTION, SOLUTION INTRAVENOUS at 00:15

## 2023-10-29 RX ADMIN — PANTOPRAZOLE SODIUM 40 MILLIGRAM(S): 20 TABLET, DELAYED RELEASE ORAL at 05:58

## 2023-10-29 RX ADMIN — GABAPENTIN 300 MILLIGRAM(S): 400 CAPSULE ORAL at 05:58

## 2023-10-29 RX ADMIN — HEPARIN SODIUM 5000 UNIT(S): 5000 INJECTION INTRAVENOUS; SUBCUTANEOUS at 14:24

## 2023-10-29 RX ADMIN — HEPARIN SODIUM 5000 UNIT(S): 5000 INJECTION INTRAVENOUS; SUBCUTANEOUS at 22:31

## 2023-10-29 RX ADMIN — LISINOPRIL 5 MILLIGRAM(S): 2.5 TABLET ORAL at 11:35

## 2023-10-29 RX ADMIN — QUETIAPINE FUMARATE 100 MILLIGRAM(S): 200 TABLET, FILM COATED ORAL at 22:31

## 2023-10-29 RX ADMIN — GABAPENTIN 300 MILLIGRAM(S): 400 CAPSULE ORAL at 22:31

## 2023-10-29 RX ADMIN — GABAPENTIN 300 MILLIGRAM(S): 400 CAPSULE ORAL at 14:23
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
## 2023-10-29 NOTE — PROGRESS NOTE ADULT - ASSESSMENT
The patient is a 68 year old female with a history of HTN, HL, bipolar disorder, dementia who presents with agitation.     Plan:  - Initial ECG with significantly prolonged QTc  - Repeat ECG with improved QTc ~500  - Slow titration of QTc prolonging medications. Repeat ECG for any significant changes to medications.  - Resume lisinopril 5 mg daily
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
## 2023-10-30 LAB
-  AMIKACIN: SIGNIFICANT CHANGE UP
-  AMIKACIN: SIGNIFICANT CHANGE UP
-  AMOXICILLIN/CLAVULANIC ACID: SIGNIFICANT CHANGE UP
-  AMOXICILLIN/CLAVULANIC ACID: SIGNIFICANT CHANGE UP
-  AMPICILLIN/SULBACTAM: SIGNIFICANT CHANGE UP
-  AMPICILLIN/SULBACTAM: SIGNIFICANT CHANGE UP
-  AMPICILLIN: SIGNIFICANT CHANGE UP
-  AMPICILLIN: SIGNIFICANT CHANGE UP
-  AZTREONAM: SIGNIFICANT CHANGE UP
-  AZTREONAM: SIGNIFICANT CHANGE UP
-  CEFAZOLIN: SIGNIFICANT CHANGE UP
-  CEFAZOLIN: SIGNIFICANT CHANGE UP
-  CEFEPIME: SIGNIFICANT CHANGE UP
-  CEFEPIME: SIGNIFICANT CHANGE UP
-  CEFOXITIN: SIGNIFICANT CHANGE UP
-  CEFOXITIN: SIGNIFICANT CHANGE UP
-  CEFTRIAXONE: SIGNIFICANT CHANGE UP
-  CEFTRIAXONE: SIGNIFICANT CHANGE UP
-  CEFUROXIME: SIGNIFICANT CHANGE UP
-  CEFUROXIME: SIGNIFICANT CHANGE UP
-  CIPROFLOXACIN: SIGNIFICANT CHANGE UP
-  CIPROFLOXACIN: SIGNIFICANT CHANGE UP
-  ERTAPENEM: SIGNIFICANT CHANGE UP
-  ERTAPENEM: SIGNIFICANT CHANGE UP
-  GENTAMICIN: SIGNIFICANT CHANGE UP
-  GENTAMICIN: SIGNIFICANT CHANGE UP
-  IMIPENEM: SIGNIFICANT CHANGE UP
-  IMIPENEM: SIGNIFICANT CHANGE UP
-  LEVOFLOXACIN: SIGNIFICANT CHANGE UP
-  LEVOFLOXACIN: SIGNIFICANT CHANGE UP
-  MEROPENEM: SIGNIFICANT CHANGE UP
-  MEROPENEM: SIGNIFICANT CHANGE UP
-  NITROFURANTOIN: SIGNIFICANT CHANGE UP
-  NITROFURANTOIN: SIGNIFICANT CHANGE UP
-  PIPERACILLIN/TAZOBACTAM: SIGNIFICANT CHANGE UP
-  PIPERACILLIN/TAZOBACTAM: SIGNIFICANT CHANGE UP
-  TOBRAMYCIN: SIGNIFICANT CHANGE UP
-  TOBRAMYCIN: SIGNIFICANT CHANGE UP
-  TRIMETHOPRIM/SULFAMETHOXAZOLE: SIGNIFICANT CHANGE UP
-  TRIMETHOPRIM/SULFAMETHOXAZOLE: SIGNIFICANT CHANGE UP
ALBUMIN SERPL ELPH-MCNC: 3.2 G/DL — LOW (ref 3.3–5)
ALBUMIN SERPL ELPH-MCNC: 3.2 G/DL — LOW (ref 3.3–5)
ALP SERPL-CCNC: 137 U/L — HIGH (ref 30–120)
ALP SERPL-CCNC: 137 U/L — HIGH (ref 30–120)
ALT FLD-CCNC: 41 U/L — SIGNIFICANT CHANGE UP (ref 10–60)
ALT FLD-CCNC: 41 U/L — SIGNIFICANT CHANGE UP (ref 10–60)
ANION GAP SERPL CALC-SCNC: 8 MMOL/L — SIGNIFICANT CHANGE UP (ref 5–17)
ANION GAP SERPL CALC-SCNC: 8 MMOL/L — SIGNIFICANT CHANGE UP (ref 5–17)
AST SERPL-CCNC: 40 U/L — SIGNIFICANT CHANGE UP (ref 10–40)
AST SERPL-CCNC: 40 U/L — SIGNIFICANT CHANGE UP (ref 10–40)
BASOPHILS # BLD AUTO: 0.06 K/UL — SIGNIFICANT CHANGE UP (ref 0–0.2)
BASOPHILS # BLD AUTO: 0.06 K/UL — SIGNIFICANT CHANGE UP (ref 0–0.2)
BASOPHILS NFR BLD AUTO: 1 % — SIGNIFICANT CHANGE UP (ref 0–2)
BASOPHILS NFR BLD AUTO: 1 % — SIGNIFICANT CHANGE UP (ref 0–2)
BILIRUB SERPL-MCNC: 1.1 MG/DL — SIGNIFICANT CHANGE UP (ref 0.2–1.2)
BILIRUB SERPL-MCNC: 1.1 MG/DL — SIGNIFICANT CHANGE UP (ref 0.2–1.2)
BUN SERPL-MCNC: 11 MG/DL — SIGNIFICANT CHANGE UP (ref 7–23)
BUN SERPL-MCNC: 11 MG/DL — SIGNIFICANT CHANGE UP (ref 7–23)
CALCIUM SERPL-MCNC: 9.5 MG/DL — SIGNIFICANT CHANGE UP (ref 8.4–10.5)
CALCIUM SERPL-MCNC: 9.5 MG/DL — SIGNIFICANT CHANGE UP (ref 8.4–10.5)
CHLORIDE SERPL-SCNC: 106 MMOL/L — SIGNIFICANT CHANGE UP (ref 96–108)
CHLORIDE SERPL-SCNC: 106 MMOL/L — SIGNIFICANT CHANGE UP (ref 96–108)
CO2 SERPL-SCNC: 26 MMOL/L — SIGNIFICANT CHANGE UP (ref 22–31)
CO2 SERPL-SCNC: 26 MMOL/L — SIGNIFICANT CHANGE UP (ref 22–31)
CREAT SERPL-MCNC: 0.91 MG/DL — SIGNIFICANT CHANGE UP (ref 0.5–1.3)
CREAT SERPL-MCNC: 0.91 MG/DL — SIGNIFICANT CHANGE UP (ref 0.5–1.3)
CULTURE RESULTS: ABNORMAL
CULTURE RESULTS: ABNORMAL
EGFR: 68 ML/MIN/1.73M2 — SIGNIFICANT CHANGE UP
EGFR: 68 ML/MIN/1.73M2 — SIGNIFICANT CHANGE UP
EOSINOPHIL # BLD AUTO: 0.25 K/UL — SIGNIFICANT CHANGE UP (ref 0–0.5)
EOSINOPHIL # BLD AUTO: 0.25 K/UL — SIGNIFICANT CHANGE UP (ref 0–0.5)
EOSINOPHIL NFR BLD AUTO: 4 % — SIGNIFICANT CHANGE UP (ref 0–6)
EOSINOPHIL NFR BLD AUTO: 4 % — SIGNIFICANT CHANGE UP (ref 0–6)
GLUCOSE SERPL-MCNC: 112 MG/DL — HIGH (ref 70–99)
GLUCOSE SERPL-MCNC: 112 MG/DL — HIGH (ref 70–99)
HCT VFR BLD CALC: 33.6 % — LOW (ref 34.5–45)
HCT VFR BLD CALC: 33.6 % — LOW (ref 34.5–45)
HGB BLD-MCNC: 10.6 G/DL — LOW (ref 11.5–15.5)
HGB BLD-MCNC: 10.6 G/DL — LOW (ref 11.5–15.5)
IMM GRANULOCYTES NFR BLD AUTO: 0.6 % — SIGNIFICANT CHANGE UP (ref 0–0.9)
IMM GRANULOCYTES NFR BLD AUTO: 0.6 % — SIGNIFICANT CHANGE UP (ref 0–0.9)
LYMPHOCYTES # BLD AUTO: 1.91 K/UL — SIGNIFICANT CHANGE UP (ref 1–3.3)
LYMPHOCYTES # BLD AUTO: 1.91 K/UL — SIGNIFICANT CHANGE UP (ref 1–3.3)
LYMPHOCYTES # BLD AUTO: 30.3 % — SIGNIFICANT CHANGE UP (ref 13–44)
LYMPHOCYTES # BLD AUTO: 30.3 % — SIGNIFICANT CHANGE UP (ref 13–44)
MCHC RBC-ENTMCNC: 30 PG — SIGNIFICANT CHANGE UP (ref 27–34)
MCHC RBC-ENTMCNC: 30 PG — SIGNIFICANT CHANGE UP (ref 27–34)
MCHC RBC-ENTMCNC: 31.5 GM/DL — LOW (ref 32–36)
MCHC RBC-ENTMCNC: 31.5 GM/DL — LOW (ref 32–36)
MCV RBC AUTO: 95.2 FL — SIGNIFICANT CHANGE UP (ref 80–100)
MCV RBC AUTO: 95.2 FL — SIGNIFICANT CHANGE UP (ref 80–100)
METHOD TYPE: SIGNIFICANT CHANGE UP
METHOD TYPE: SIGNIFICANT CHANGE UP
MONOCYTES # BLD AUTO: 0.5 K/UL — SIGNIFICANT CHANGE UP (ref 0–0.9)
MONOCYTES # BLD AUTO: 0.5 K/UL — SIGNIFICANT CHANGE UP (ref 0–0.9)
MONOCYTES NFR BLD AUTO: 7.9 % — SIGNIFICANT CHANGE UP (ref 2–14)
MONOCYTES NFR BLD AUTO: 7.9 % — SIGNIFICANT CHANGE UP (ref 2–14)
NEUTROPHILS # BLD AUTO: 3.55 K/UL — SIGNIFICANT CHANGE UP (ref 1.8–7.4)
NEUTROPHILS # BLD AUTO: 3.55 K/UL — SIGNIFICANT CHANGE UP (ref 1.8–7.4)
NEUTROPHILS NFR BLD AUTO: 56.2 % — SIGNIFICANT CHANGE UP (ref 43–77)
NEUTROPHILS NFR BLD AUTO: 56.2 % — SIGNIFICANT CHANGE UP (ref 43–77)
NRBC # BLD: 0 /100 WBCS — SIGNIFICANT CHANGE UP (ref 0–0)
NRBC # BLD: 0 /100 WBCS — SIGNIFICANT CHANGE UP (ref 0–0)
ORGANISM # SPEC MICROSCOPIC CNT: ABNORMAL
PLATELET # BLD AUTO: 184 K/UL — SIGNIFICANT CHANGE UP (ref 150–400)
PLATELET # BLD AUTO: 184 K/UL — SIGNIFICANT CHANGE UP (ref 150–400)
POTASSIUM SERPL-MCNC: 5 MMOL/L — SIGNIFICANT CHANGE UP (ref 3.5–5.3)
POTASSIUM SERPL-MCNC: 5 MMOL/L — SIGNIFICANT CHANGE UP (ref 3.5–5.3)
POTASSIUM SERPL-SCNC: 5 MMOL/L — SIGNIFICANT CHANGE UP (ref 3.5–5.3)
POTASSIUM SERPL-SCNC: 5 MMOL/L — SIGNIFICANT CHANGE UP (ref 3.5–5.3)
PROT SERPL-MCNC: 6.4 G/DL — SIGNIFICANT CHANGE UP (ref 6–8.3)
PROT SERPL-MCNC: 6.4 G/DL — SIGNIFICANT CHANGE UP (ref 6–8.3)
RBC # BLD: 3.53 M/UL — LOW (ref 3.8–5.2)
RBC # BLD: 3.53 M/UL — LOW (ref 3.8–5.2)
RBC # FLD: 14.4 % — SIGNIFICANT CHANGE UP (ref 10.3–14.5)
RBC # FLD: 14.4 % — SIGNIFICANT CHANGE UP (ref 10.3–14.5)
SODIUM SERPL-SCNC: 140 MMOL/L — SIGNIFICANT CHANGE UP (ref 135–145)
SODIUM SERPL-SCNC: 140 MMOL/L — SIGNIFICANT CHANGE UP (ref 135–145)
SPECIMEN SOURCE: SIGNIFICANT CHANGE UP
SPECIMEN SOURCE: SIGNIFICANT CHANGE UP
WBC # BLD: 6.31 K/UL — SIGNIFICANT CHANGE UP (ref 3.8–10.5)
WBC # BLD: 6.31 K/UL — SIGNIFICANT CHANGE UP (ref 3.8–10.5)
WBC # FLD AUTO: 6.31 K/UL — SIGNIFICANT CHANGE UP (ref 3.8–10.5)
WBC # FLD AUTO: 6.31 K/UL — SIGNIFICANT CHANGE UP (ref 3.8–10.5)

## 2023-10-30 PROCEDURE — 99231 SBSQ HOSP IP/OBS SF/LOW 25: CPT

## 2023-10-30 RX ORDER — OLANZAPINE 15 MG/1
5 TABLET, FILM COATED ORAL ONCE
Refills: 0 | Status: COMPLETED | OUTPATIENT
Start: 2023-10-30 | End: 2023-10-30

## 2023-10-30 RX ORDER — CEFTRIAXONE 500 MG/1
1000 INJECTION, POWDER, FOR SOLUTION INTRAMUSCULAR; INTRAVENOUS EVERY 24 HOURS
Refills: 0 | Status: COMPLETED | OUTPATIENT
Start: 2023-10-30 | End: 2023-11-02

## 2023-10-30 RX ORDER — LACTOBACILLUS ACIDOPHILUS 100MM CELL
1 CAPSULE ORAL
Refills: 0 | Status: DISCONTINUED | OUTPATIENT
Start: 2023-10-30 | End: 2023-11-08

## 2023-10-30 RX ADMIN — LISINOPRIL 5 MILLIGRAM(S): 2.5 TABLET ORAL at 06:03

## 2023-10-30 RX ADMIN — QUETIAPINE FUMARATE 100 MILLIGRAM(S): 200 TABLET, FILM COATED ORAL at 21:20

## 2023-10-30 RX ADMIN — Medication 0.5 MILLIGRAM(S): at 18:49

## 2023-10-30 RX ADMIN — Medication 0.5 MILLIGRAM(S): at 06:03

## 2023-10-30 RX ADMIN — ATORVASTATIN CALCIUM 40 MILLIGRAM(S): 80 TABLET, FILM COATED ORAL at 21:20

## 2023-10-30 RX ADMIN — CLOPIDOGREL BISULFATE 75 MILLIGRAM(S): 75 TABLET, FILM COATED ORAL at 11:22

## 2023-10-30 RX ADMIN — HEPARIN SODIUM 5000 UNIT(S): 5000 INJECTION INTRAVENOUS; SUBCUTANEOUS at 06:04

## 2023-10-30 RX ADMIN — CEFTRIAXONE 100 MILLIGRAM(S): 500 INJECTION, POWDER, FOR SOLUTION INTRAMUSCULAR; INTRAVENOUS at 11:23

## 2023-10-30 RX ADMIN — OLANZAPINE 5 MILLIGRAM(S): 15 TABLET, FILM COATED ORAL at 16:48

## 2023-10-30 RX ADMIN — PANTOPRAZOLE SODIUM 40 MILLIGRAM(S): 20 TABLET, DELAYED RELEASE ORAL at 06:04

## 2023-10-30 RX ADMIN — GABAPENTIN 300 MILLIGRAM(S): 400 CAPSULE ORAL at 21:20

## 2023-10-30 RX ADMIN — GABAPENTIN 300 MILLIGRAM(S): 400 CAPSULE ORAL at 06:04
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
## 2023-10-30 NOTE — BH CONSULTATION LIAISON PROGRESS NOTE - NSBHFUPINTERVALHXFT_PSY_A_CORE
Patient seen, evaluated and chart reviewed. Patient presents with pleasant confusion, engages, but with very limited understanding of her medical situation. The issue of capacity was raised.
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
## 2023-10-30 NOTE — BH CONSULTATION LIAISON PROGRESS NOTE - NSBHCHARTREVIEWLAB_PSY_A_CORE FT
10.6   6.31  )-----------( 184      ( 30 Oct 2023 06:30 )             33.6   10-30    140  |  106  |  11  ----------------------------<  112<H>  5.0   |  26  |  0.91    Ca    9.5      30 Oct 2023 06:30    TPro  6.4  /  Alb  3.2<L>  /  TBili  1.1  /  DBili  x   /  AST  40  /  ALT  41  /  AlkPhos  137<H>  10-30

## 2023-10-30 NOTE — SOCIAL WORK PROGRESS NOTE - NSSWPROGRESSNOTE_GEN_ALL_CORE
met with patient downgraded from [facility]. I explained name role availability. Patient seems confused at times. She said she was in a facility and then moved to [facility]. I was able to obtain additional collateral from  at [facility] Rip [phone]. She confirmed patient was admitted to them last Monday or Tuesday from [facility] where she was since 7/18/2023. When she arrived there Rip said she was not the same as how she had been presented during video interview. I spoke with admitting office Ebonie at [place] [phone] who confirmed that she was there from 7/18/2023-10/23/2023 and was paying privately. Rip from [facility] reported that she is paying privately for [facility] and she thinks she maybe more appropriate for [facility]. Patient seems confused at times, had no recollection of events leading up to admission to [place]. PTE recommending ASHWIN at this time. Per Rip to return to [facility] she needs to be able to walk 175 feet with device independently as well as transfers. SW will update team and speak with Dr Baird from psychiatry to determine if she can participate with planning.   met with patient downgraded from [facility]. I explained name role availability. Patient seems confused at times. She said she was in a facility and then moved to [facility]. I was able to obtain additional collateral from  at [facility] Rip [phone]. She confirmed patient was admitted to them last Monday or Tuesday from [facility] where she was since 7/18/2023. When she arrived there Rip said she was not the same as how she had been presented during video interview. I spoke with admitting office Ebonie at [place] [phone] who confirmed that she was there from 7/18/2023-10/23/2023 and was paying privately. Rip from [facility] reported that she is paying privately for [facility] and she thinks she maybe more appropriate for [facility]. Patient seems confused at times, had no recollection of events leading up to admission to [place]. PTE recommending ASHWIN at this time. Per Rip to return to [facility] she needs to be able to walk 175 feet with device independently as well as transfers. SW will update team and speak with Dr Baird from psychiatry to determine if she can participate with planning. Patient does not have an emergency contact or next of kin per patient and per OBM.  met with patient downgraded from [facility]. I explained name role availability. Patient seems confused at times. She said she was in a facility and then moved to [facility]. I was able to obtain additional collateral from  at [facility] Rip [phone]. She confirmed patient was admitted to them last Monday or Tuesday from [facility] where she was since 7/18/2023. When she arrived there Rip said she was not the same as how she had been presented during interview. I spoke with admitting office Eboine at [place] [phone] who confirmed that she was there from 7/18/2023-10/23/2023 and was paying privately. Rip from [facility] reported that she is paying privately for [facility] and she thinks she maybe more appropriate for [facility]. Patient seems confused at times, had no recollection of events leading up to admission to [place]. PTE recommending ASHWIN at this time. Per Rip to return to [facility] she needs to be able to walk 175 feet with device independently as well as transfers. SW will update team and speak with Dr Baird from psychiatry to determine if she can participate with planning. Patient does not have an emergency contact or next of kin per patient and per OBM.

## 2023-10-30 NOTE — BH CONSULTATION LIAISON PROGRESS NOTE - CURRENT MEDICATION
MEDICATIONS  (STANDING):  atorvastatin 40 milliGRAM(s) Oral at bedtime  cefTRIAXone   IVPB 1000 milliGRAM(s) IV Intermittent every 24 hours  clonazePAM  Tablet 0.5 milliGRAM(s) Oral two times a day  clopidogrel Tablet 75 milliGRAM(s) Oral daily  gabapentin 300 milliGRAM(s) Oral every 8 hours  heparin   Injectable 5000 Unit(s) SubCutaneous every 8 hours  lactobacillus acidophilus 1 Tablet(s) Oral two times a day with meals  lisinopril 5 milliGRAM(s) Oral daily  pantoprazole    Tablet 40 milliGRAM(s) Oral before breakfast  QUEtiapine 100 milliGRAM(s) Oral at bedtime    MEDICATIONS  (PRN):  acetaminophen     Tablet .. 650 milliGRAM(s) Oral every 6 hours PRN Temp greater or equal to 38C (100.4F), Mild Pain (1 - 3), Moderate Pain (4 - 6)
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
## 2023-10-30 NOTE — BH CONSULTATION LIAISON PROGRESS NOTE - NSBHCHARTREVIEWVS_PSY_A_CORE FT
Vital Signs Last 24 Hrs  T(C): 36.9 (30 Oct 2023 07:29), Max: 37.6 (29 Oct 2023 15:00)  T(F): 98.5 (30 Oct 2023 07:29), Max: 99.6 (29 Oct 2023 15:00)  HR: 66 (30 Oct 2023 07:29) (65 - 70)  BP: 137/80 (30 Oct 2023 07:29) (116/56 - 149/74)  BP(mean): --  RR: 18 (30 Oct 2023 07:29) (18 - 18)  SpO2: 94% (30 Oct 2023 07:29) (93% - 97%)    Parameters below as of 30 Oct 2023 07:29  Patient On (Oxygen Delivery Method): room air

## 2023-10-30 NOTE — BH CONSULTATION LIAISON PROGRESS NOTE - NSBHFUPMEDSE_PSY_A_CORE
None known FOLLOW UP WITH YOUR PEDIATRICIAN WITHIN 3 DAYS          Jaundice, K[place]  Jaundice is when certain parts of the body turn a yellowish color. These include:  The skin.  The whites of the eyes.  The lining of the mouth and nose.  Jaundice often lasts about 2–3 weeks in babies who are . It often goes away in less than 2 weeks in babies who are fed with formula.    What are the causes?  Jaundice is caused by having too much of a substance called bilirubin in the blood. Bilirubin is made during the normal breakdown of red blood cells in the body.    In newborns, red blood cells break down quickly, but the liver is not yet ready to process the extra bilirubin at a normal rate. This is often the cause of jaundice. There are many things that can lead to jaundice in newborns.    Problems before, during, or right after birth    This condition may occur if a baby:  Was born at less than 38 weeks (premature).  Is smaller than other babies of the same age.  Is getting breast milk only. Do not stop breastfeeding unless your baby's doctor tells you to do that.  Is not feeding well and is not getting enough calories.  Is born to a mother who has diabetes.  Has birth injuries, such as bruises on the scalp or other areas of the body.  Has a blood type that does not match the mother's blood type.  Other health problems    This condition may also occur if a baby:  Has liver problems.  Does not have enough of certain enzymes.  Has red blood cells that break apart too quickly.  Has bleeding inside his or her body.  Has disorders that are passed from parent to child (inherited).  Is born with too many red blood cells.  Has an infection.  What increases the risk?  Having a family history of jaundice.  Being of , , or Spanish descent.  What are the signs or symptoms?  Yellow color in these areas:  The skin.  Whites of the eyes.  Inside the nose, mouth, or lips.  Not feeding well.  Being sleepy.  Weak cry.  Seizures, in very bad cases.  How is this treated?  A baby lying down under a light therapy lamp. The baby's eyes are covered with an eye mask.  Treatment for jaundice depends on how bad the condition is.  Mild cases may not need treatment.  Worse cases will be treated. Treatment may include:  Using a certain type of lamp or a mattress with a certain type of lights. This is called light therapy (phototherapy).  Feeding your baby more often, such as every 1–2 hours.  Giving fluids in an IV tube to make it easy for your baby to pee (urinate) and poop (have a bowel movement).  Giving your baby a protein (immunoglobulin G or IgG) through an IV tube.  A blood exchange (exchange transfusion). The baby's blood is removed and replaced with blood from a donor. This is very rare.  Treating any other causes of the jaundice.  Follow these instructions at home:  Phototherapy    You may be given lights or a blanket that treats jaundice. Follow instructions from your baby's doctor. You may be told:  How to use these lights for your baby.  To cover your baby's eyes while he or she is under the lights.  To avoid interruptions. Only take your baby out of the lights for feedings and diaper changes.  General instructions    Watch your baby to see if he or she is getting more yellow. Undress your baby and look at his or her skin in natural sunlight. You may not be able to see the yellow color under the lights in your home.  Feed your baby often. This includes the following:  If you are breastfeeding, feed your baby 8–12 times a day.  If you are feeding with formula, ask your baby's doctor how often to feed your baby.  Give added fluids only as told by your baby's doctor.  Keep track of how many times your baby pees and poops each day. Watch for changes.  Keep all follow-up visits. Your baby may need more blood tests.  Contact a doctor if your baby:  Has jaundice that lasts more than 2 weeks.  Stops wetting diapers normally. During the first 4 days after birth, your baby should:  Have 4–6 wet diapers a day.  Poop 3–4 times a day.  Gets more fussy than usual.  Is more sleepy than usual.  Has a fever.  Is not nursing or bottle-feeding well or vomits often.  Does not gain weight as expected.  Gets more yellow or the color spreads to your baby's arms, legs, or feet.  Gets a rash after being treated with lights.  Get help right away if your baby:  Stops breathing or turns blue.  Starts to look or act sick.  Is very sleepy or is hard to wake up.  Seems floppy or arches his or her back.  Has an unusual or high-pitched cry.  Has movements that are not normal.  Has eye movements that are not normal.  Is younger than 3 months and has a temperature of 100.4°F (38°C) or higher.  These symptoms may be an emergency. Do not wait to see if the symptoms will go away. Get help right away. Call your local emergency services (911 in the [place]    Summary  Jaundice is when the skin, the whites of the eyes, and the lining of the mouth and nose turn a yellow color.  Jaundice often lasts about 2–3 weeks in babies who are . It often clears up in less than 2 weeks in babies who are formula fed.  Contact the doctor if your baby is not feeding well, or if the jaundice lasts more than 2 weeks.  Get help right away if your baby stops breathing or turns blue, acts sick, or has eye movements that are not normal.  This information is not intended to replace advice given to you by your health care provider. Make sure you discuss any questions you have with your health care provider.

## 2023-10-30 NOTE — PROGRESS NOTE ADULT - ASSESSMENT
The patient is a 68 year old female with a history of HTN, HL, bipolar disorder, dementia who presents with agitation.     Plan:  - Initial ECG with significantly prolonged QTc  - Repeat ECG with improved QTc ~500  - Slow titration of QTc prolonging medications. Repeat ECG for any significant changes to medications.  - Continue lisinopril 5 mg daily

## 2023-10-31 LAB
ALBUMIN SERPL ELPH-MCNC: 3.3 G/DL — SIGNIFICANT CHANGE UP (ref 3.3–5)
ALBUMIN SERPL ELPH-MCNC: 3.3 G/DL — SIGNIFICANT CHANGE UP (ref 3.3–5)
ALP SERPL-CCNC: 136 U/L — HIGH (ref 30–120)
ALP SERPL-CCNC: 136 U/L — HIGH (ref 30–120)
ALT FLD-CCNC: 45 U/L — SIGNIFICANT CHANGE UP (ref 10–60)
ALT FLD-CCNC: 45 U/L — SIGNIFICANT CHANGE UP (ref 10–60)
ANION GAP SERPL CALC-SCNC: 9 MMOL/L — SIGNIFICANT CHANGE UP (ref 5–17)
ANION GAP SERPL CALC-SCNC: 9 MMOL/L — SIGNIFICANT CHANGE UP (ref 5–17)
AST SERPL-CCNC: 48 U/L — HIGH (ref 10–40)
AST SERPL-CCNC: 48 U/L — HIGH (ref 10–40)
BASOPHILS # BLD AUTO: 0.04 K/UL — SIGNIFICANT CHANGE UP (ref 0–0.2)
BASOPHILS # BLD AUTO: 0.04 K/UL — SIGNIFICANT CHANGE UP (ref 0–0.2)
BASOPHILS NFR BLD AUTO: 0.7 % — SIGNIFICANT CHANGE UP (ref 0–2)
BASOPHILS NFR BLD AUTO: 0.7 % — SIGNIFICANT CHANGE UP (ref 0–2)
BILIRUB SERPL-MCNC: 0.7 MG/DL — SIGNIFICANT CHANGE UP (ref 0.2–1.2)
BILIRUB SERPL-MCNC: 0.7 MG/DL — SIGNIFICANT CHANGE UP (ref 0.2–1.2)
BUN SERPL-MCNC: 13 MG/DL — SIGNIFICANT CHANGE UP (ref 7–23)
BUN SERPL-MCNC: 13 MG/DL — SIGNIFICANT CHANGE UP (ref 7–23)
CALCIUM SERPL-MCNC: 10 MG/DL — SIGNIFICANT CHANGE UP (ref 8.4–10.5)
CALCIUM SERPL-MCNC: 10 MG/DL — SIGNIFICANT CHANGE UP (ref 8.4–10.5)
CHLORIDE SERPL-SCNC: 104 MMOL/L — SIGNIFICANT CHANGE UP (ref 96–108)
CHLORIDE SERPL-SCNC: 104 MMOL/L — SIGNIFICANT CHANGE UP (ref 96–108)
CO2 SERPL-SCNC: 29 MMOL/L — SIGNIFICANT CHANGE UP (ref 22–31)
CO2 SERPL-SCNC: 29 MMOL/L — SIGNIFICANT CHANGE UP (ref 22–31)
CREAT SERPL-MCNC: 0.95 MG/DL — SIGNIFICANT CHANGE UP (ref 0.5–1.3)
CREAT SERPL-MCNC: 0.95 MG/DL — SIGNIFICANT CHANGE UP (ref 0.5–1.3)
EGFR: 64 ML/MIN/1.73M2 — SIGNIFICANT CHANGE UP
EGFR: 64 ML/MIN/1.73M2 — SIGNIFICANT CHANGE UP
EOSINOPHIL # BLD AUTO: 0.22 K/UL — SIGNIFICANT CHANGE UP (ref 0–0.5)
EOSINOPHIL # BLD AUTO: 0.22 K/UL — SIGNIFICANT CHANGE UP (ref 0–0.5)
EOSINOPHIL NFR BLD AUTO: 4 % — SIGNIFICANT CHANGE UP (ref 0–6)
EOSINOPHIL NFR BLD AUTO: 4 % — SIGNIFICANT CHANGE UP (ref 0–6)
GLUCOSE SERPL-MCNC: 117 MG/DL — HIGH (ref 70–99)
GLUCOSE SERPL-MCNC: 117 MG/DL — HIGH (ref 70–99)
HCT VFR BLD CALC: 36.5 % — SIGNIFICANT CHANGE UP (ref 34.5–45)
HCT VFR BLD CALC: 36.5 % — SIGNIFICANT CHANGE UP (ref 34.5–45)
HGB BLD-MCNC: 10.9 G/DL — LOW (ref 11.5–15.5)
HGB BLD-MCNC: 10.9 G/DL — LOW (ref 11.5–15.5)
IMM GRANULOCYTES NFR BLD AUTO: 0.5 % — SIGNIFICANT CHANGE UP (ref 0–0.9)
IMM GRANULOCYTES NFR BLD AUTO: 0.5 % — SIGNIFICANT CHANGE UP (ref 0–0.9)
LITHIUM SERPL-MCNC: 0.31 MMOL/L — LOW (ref 0.6–1.2)
LITHIUM SERPL-MCNC: 0.31 MMOL/L — LOW (ref 0.6–1.2)
LYMPHOCYTES # BLD AUTO: 1.57 K/UL — SIGNIFICANT CHANGE UP (ref 1–3.3)
LYMPHOCYTES # BLD AUTO: 1.57 K/UL — SIGNIFICANT CHANGE UP (ref 1–3.3)
LYMPHOCYTES # BLD AUTO: 28.3 % — SIGNIFICANT CHANGE UP (ref 13–44)
LYMPHOCYTES # BLD AUTO: 28.3 % — SIGNIFICANT CHANGE UP (ref 13–44)
MCHC RBC-ENTMCNC: 29.1 PG — SIGNIFICANT CHANGE UP (ref 27–34)
MCHC RBC-ENTMCNC: 29.1 PG — SIGNIFICANT CHANGE UP (ref 27–34)
MCHC RBC-ENTMCNC: 29.9 GM/DL — LOW (ref 32–36)
MCHC RBC-ENTMCNC: 29.9 GM/DL — LOW (ref 32–36)
MCV RBC AUTO: 97.3 FL — SIGNIFICANT CHANGE UP (ref 80–100)
MCV RBC AUTO: 97.3 FL — SIGNIFICANT CHANGE UP (ref 80–100)
MONOCYTES # BLD AUTO: 0.42 K/UL — SIGNIFICANT CHANGE UP (ref 0–0.9)
MONOCYTES # BLD AUTO: 0.42 K/UL — SIGNIFICANT CHANGE UP (ref 0–0.9)
MONOCYTES NFR BLD AUTO: 7.6 % — SIGNIFICANT CHANGE UP (ref 2–14)
MONOCYTES NFR BLD AUTO: 7.6 % — SIGNIFICANT CHANGE UP (ref 2–14)
NEUTROPHILS # BLD AUTO: 3.27 K/UL — SIGNIFICANT CHANGE UP (ref 1.8–7.4)
NEUTROPHILS # BLD AUTO: 3.27 K/UL — SIGNIFICANT CHANGE UP (ref 1.8–7.4)
NEUTROPHILS NFR BLD AUTO: 58.9 % — SIGNIFICANT CHANGE UP (ref 43–77)
NEUTROPHILS NFR BLD AUTO: 58.9 % — SIGNIFICANT CHANGE UP (ref 43–77)
NRBC # BLD: 0 /100 WBCS — SIGNIFICANT CHANGE UP (ref 0–0)
NRBC # BLD: 0 /100 WBCS — SIGNIFICANT CHANGE UP (ref 0–0)
PLATELET # BLD AUTO: 212 K/UL — SIGNIFICANT CHANGE UP (ref 150–400)
PLATELET # BLD AUTO: 212 K/UL — SIGNIFICANT CHANGE UP (ref 150–400)
POTASSIUM SERPL-MCNC: 4.7 MMOL/L — SIGNIFICANT CHANGE UP (ref 3.5–5.3)
POTASSIUM SERPL-MCNC: 4.7 MMOL/L — SIGNIFICANT CHANGE UP (ref 3.5–5.3)
POTASSIUM SERPL-SCNC: 4.7 MMOL/L — SIGNIFICANT CHANGE UP (ref 3.5–5.3)
POTASSIUM SERPL-SCNC: 4.7 MMOL/L — SIGNIFICANT CHANGE UP (ref 3.5–5.3)
PROT SERPL-MCNC: 7.1 G/DL — SIGNIFICANT CHANGE UP (ref 6–8.3)
PROT SERPL-MCNC: 7.1 G/DL — SIGNIFICANT CHANGE UP (ref 6–8.3)
RBC # BLD: 3.75 M/UL — LOW (ref 3.8–5.2)
RBC # BLD: 3.75 M/UL — LOW (ref 3.8–5.2)
RBC # FLD: 14.4 % — SIGNIFICANT CHANGE UP (ref 10.3–14.5)
RBC # FLD: 14.4 % — SIGNIFICANT CHANGE UP (ref 10.3–14.5)
SODIUM SERPL-SCNC: 142 MMOL/L — SIGNIFICANT CHANGE UP (ref 135–145)
SODIUM SERPL-SCNC: 142 MMOL/L — SIGNIFICANT CHANGE UP (ref 135–145)
WBC # BLD: 5.55 K/UL — SIGNIFICANT CHANGE UP (ref 3.8–10.5)
WBC # BLD: 5.55 K/UL — SIGNIFICANT CHANGE UP (ref 3.8–10.5)
WBC # FLD AUTO: 5.55 K/UL — SIGNIFICANT CHANGE UP (ref 3.8–10.5)
WBC # FLD AUTO: 5.55 K/UL — SIGNIFICANT CHANGE UP (ref 3.8–10.5)

## 2023-10-31 RX ORDER — LITHIUM CARBONATE 300 MG/1
300 TABLET, EXTENDED RELEASE ORAL
Refills: 0 | Status: DISCONTINUED | OUTPATIENT
Start: 2023-10-31 | End: 2023-11-08

## 2023-10-31 RX ORDER — FLUOXETINE HCL 10 MG
40 CAPSULE ORAL DAILY
Refills: 0 | Status: DISCONTINUED | OUTPATIENT
Start: 2023-10-31 | End: 2023-11-08

## 2023-10-31 RX ADMIN — Medication 0.5 MILLIGRAM(S): at 06:18

## 2023-10-31 RX ADMIN — CLOPIDOGREL BISULFATE 75 MILLIGRAM(S): 75 TABLET, FILM COATED ORAL at 12:47

## 2023-10-31 RX ADMIN — GABAPENTIN 300 MILLIGRAM(S): 400 CAPSULE ORAL at 06:18

## 2023-10-31 RX ADMIN — LITHIUM CARBONATE 300 MILLIGRAM(S): 300 TABLET, EXTENDED RELEASE ORAL at 18:49

## 2023-10-31 RX ADMIN — QUETIAPINE FUMARATE 100 MILLIGRAM(S): 200 TABLET, FILM COATED ORAL at 21:24

## 2023-10-31 RX ADMIN — Medication 1 TABLET(S): at 09:16

## 2023-10-31 RX ADMIN — HEPARIN SODIUM 5000 UNIT(S): 5000 INJECTION INTRAVENOUS; SUBCUTANEOUS at 21:24

## 2023-10-31 RX ADMIN — Medication 40 MILLIGRAM(S): at 12:47

## 2023-10-31 RX ADMIN — GABAPENTIN 300 MILLIGRAM(S): 400 CAPSULE ORAL at 14:01

## 2023-10-31 RX ADMIN — CEFTRIAXONE 100 MILLIGRAM(S): 500 INJECTION, POWDER, FOR SOLUTION INTRAMUSCULAR; INTRAVENOUS at 12:46

## 2023-10-31 RX ADMIN — LISINOPRIL 5 MILLIGRAM(S): 2.5 TABLET ORAL at 06:19

## 2023-10-31 RX ADMIN — Medication 1 TABLET(S): at 18:49

## 2023-10-31 RX ADMIN — HEPARIN SODIUM 5000 UNIT(S): 5000 INJECTION INTRAVENOUS; SUBCUTANEOUS at 06:19

## 2023-10-31 RX ADMIN — ATORVASTATIN CALCIUM 40 MILLIGRAM(S): 80 TABLET, FILM COATED ORAL at 21:24

## 2023-10-31 RX ADMIN — Medication 0.5 MILLIGRAM(S): at 18:49

## 2023-10-31 RX ADMIN — GABAPENTIN 300 MILLIGRAM(S): 400 CAPSULE ORAL at 21:31

## 2023-10-31 RX ADMIN — HEPARIN SODIUM 5000 UNIT(S): 5000 INJECTION INTRAVENOUS; SUBCUTANEOUS at 14:01

## 2023-10-31 RX ADMIN — PANTOPRAZOLE SODIUM 40 MILLIGRAM(S): 20 TABLET, DELAYED RELEASE ORAL at 06:19

## 2023-10-31 NOTE — PROGRESS NOTE ADULT - ASSESSMENT
The patient is a 68 year old female with a history of HTN, HL, bipolar disorder, dementia who presents with agitation.     Plan:  - Initial ECG with significantly prolonged QTc  - Repeat ECG with improved QTc ~500  - Slow titration of QTc prolonging medications. Repeat ECG for any significant changes to medications.  - Continue lisinopril 5 mg daily The patient is a 68 year old female with a history of HTN, HL, bipolar disorder, dementia who presents with agitation.     Plan:  - Initial ECG with significantly prolonged QTc  - QTc now near normal around 470  - No cardiac contraindication to use of QTc prolonging medications  - Continue lisinopril 5 mg daily
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
## 2023-10-31 NOTE — SOCIAL WORK PROGRESS NOTE - NSSWPROGRESSNOTE_GEN_ALL_CORE
received call from Rip the  from [facility] and wanted return call from me to discuss case with her and with Patti. I called back and left message. I met with patient to gather additional information and she was able to tell me she paid [facility] by check 300 for a deposit and then additional money for her stay which is 4000.00 per month for semi private. She said they told her once she spends down her money to 30K they will apply for medicaid for her. She was not able to tell me where she was prior to her admission to [facility]. She cannot recall where she was living before that. She does not have POA and reported her bank account is with [employer] plus she seemed to describe a care home account. SW will follow and I encouraged her to work with physical therapy when they see her.
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
## 2023-11-01 LAB
ALBUMIN SERPL ELPH-MCNC: 2.9 G/DL — LOW (ref 3.3–5)
ALBUMIN SERPL ELPH-MCNC: 2.9 G/DL — LOW (ref 3.3–5)
ALP SERPL-CCNC: 121 U/L — HIGH (ref 30–120)
ALP SERPL-CCNC: 121 U/L — HIGH (ref 30–120)
ALT FLD-CCNC: 42 U/L — SIGNIFICANT CHANGE UP (ref 10–60)
ALT FLD-CCNC: 42 U/L — SIGNIFICANT CHANGE UP (ref 10–60)
ANION GAP SERPL CALC-SCNC: 7 MMOL/L — SIGNIFICANT CHANGE UP (ref 5–17)
ANION GAP SERPL CALC-SCNC: 7 MMOL/L — SIGNIFICANT CHANGE UP (ref 5–17)
AST SERPL-CCNC: 35 U/L — SIGNIFICANT CHANGE UP (ref 10–40)
AST SERPL-CCNC: 35 U/L — SIGNIFICANT CHANGE UP (ref 10–40)
BASOPHILS # BLD AUTO: 0.06 K/UL — SIGNIFICANT CHANGE UP (ref 0–0.2)
BASOPHILS # BLD AUTO: 0.06 K/UL — SIGNIFICANT CHANGE UP (ref 0–0.2)
BASOPHILS NFR BLD AUTO: 1.2 % — SIGNIFICANT CHANGE UP (ref 0–2)
BASOPHILS NFR BLD AUTO: 1.2 % — SIGNIFICANT CHANGE UP (ref 0–2)
BILIRUB SERPL-MCNC: 0.4 MG/DL — SIGNIFICANT CHANGE UP (ref 0.2–1.2)
BILIRUB SERPL-MCNC: 0.4 MG/DL — SIGNIFICANT CHANGE UP (ref 0.2–1.2)
BUN SERPL-MCNC: 15 MG/DL — SIGNIFICANT CHANGE UP (ref 7–23)
BUN SERPL-MCNC: 15 MG/DL — SIGNIFICANT CHANGE UP (ref 7–23)
CALCIUM SERPL-MCNC: 9.4 MG/DL — SIGNIFICANT CHANGE UP (ref 8.4–10.5)
CALCIUM SERPL-MCNC: 9.4 MG/DL — SIGNIFICANT CHANGE UP (ref 8.4–10.5)
CHLORIDE SERPL-SCNC: 103 MMOL/L — SIGNIFICANT CHANGE UP (ref 96–108)
CHLORIDE SERPL-SCNC: 103 MMOL/L — SIGNIFICANT CHANGE UP (ref 96–108)
CO2 SERPL-SCNC: 26 MMOL/L — SIGNIFICANT CHANGE UP (ref 22–31)
CO2 SERPL-SCNC: 26 MMOL/L — SIGNIFICANT CHANGE UP (ref 22–31)
CREAT SERPL-MCNC: 1.14 MG/DL — SIGNIFICANT CHANGE UP (ref 0.5–1.3)
CREAT SERPL-MCNC: 1.14 MG/DL — SIGNIFICANT CHANGE UP (ref 0.5–1.3)
CULTURE RESULTS: SIGNIFICANT CHANGE UP
EGFR: 52 ML/MIN/1.73M2 — LOW
EGFR: 52 ML/MIN/1.73M2 — LOW
EOSINOPHIL # BLD AUTO: 0.24 K/UL — SIGNIFICANT CHANGE UP (ref 0–0.5)
EOSINOPHIL # BLD AUTO: 0.24 K/UL — SIGNIFICANT CHANGE UP (ref 0–0.5)
EOSINOPHIL NFR BLD AUTO: 4.7 % — SIGNIFICANT CHANGE UP (ref 0–6)
EOSINOPHIL NFR BLD AUTO: 4.7 % — SIGNIFICANT CHANGE UP (ref 0–6)
GLUCOSE SERPL-MCNC: 123 MG/DL — HIGH (ref 70–99)
GLUCOSE SERPL-MCNC: 123 MG/DL — HIGH (ref 70–99)
HCT VFR BLD CALC: 33.5 % — LOW (ref 34.5–45)
HCT VFR BLD CALC: 33.5 % — LOW (ref 34.5–45)
HGB BLD-MCNC: 10.1 G/DL — LOW (ref 11.5–15.5)
HGB BLD-MCNC: 10.1 G/DL — LOW (ref 11.5–15.5)
IMM GRANULOCYTES NFR BLD AUTO: 0.4 % — SIGNIFICANT CHANGE UP (ref 0–0.9)
IMM GRANULOCYTES NFR BLD AUTO: 0.4 % — SIGNIFICANT CHANGE UP (ref 0–0.9)
LYMPHOCYTES # BLD AUTO: 1.46 K/UL — SIGNIFICANT CHANGE UP (ref 1–3.3)
LYMPHOCYTES # BLD AUTO: 1.46 K/UL — SIGNIFICANT CHANGE UP (ref 1–3.3)
LYMPHOCYTES # BLD AUTO: 28.5 % — SIGNIFICANT CHANGE UP (ref 13–44)
LYMPHOCYTES # BLD AUTO: 28.5 % — SIGNIFICANT CHANGE UP (ref 13–44)
MCHC RBC-ENTMCNC: 29.2 PG — SIGNIFICANT CHANGE UP (ref 27–34)
MCHC RBC-ENTMCNC: 29.2 PG — SIGNIFICANT CHANGE UP (ref 27–34)
MCHC RBC-ENTMCNC: 30.1 GM/DL — LOW (ref 32–36)
MCHC RBC-ENTMCNC: 30.1 GM/DL — LOW (ref 32–36)
MCV RBC AUTO: 96.8 FL — SIGNIFICANT CHANGE UP (ref 80–100)
MCV RBC AUTO: 96.8 FL — SIGNIFICANT CHANGE UP (ref 80–100)
MONOCYTES # BLD AUTO: 0.34 K/UL — SIGNIFICANT CHANGE UP (ref 0–0.9)
MONOCYTES # BLD AUTO: 0.34 K/UL — SIGNIFICANT CHANGE UP (ref 0–0.9)
MONOCYTES NFR BLD AUTO: 6.6 % — SIGNIFICANT CHANGE UP (ref 2–14)
MONOCYTES NFR BLD AUTO: 6.6 % — SIGNIFICANT CHANGE UP (ref 2–14)
NEUTROPHILS # BLD AUTO: 3 K/UL — SIGNIFICANT CHANGE UP (ref 1.8–7.4)
NEUTROPHILS # BLD AUTO: 3 K/UL — SIGNIFICANT CHANGE UP (ref 1.8–7.4)
NEUTROPHILS NFR BLD AUTO: 58.6 % — SIGNIFICANT CHANGE UP (ref 43–77)
NEUTROPHILS NFR BLD AUTO: 58.6 % — SIGNIFICANT CHANGE UP (ref 43–77)
NRBC # BLD: 0 /100 WBCS — SIGNIFICANT CHANGE UP (ref 0–0)
NRBC # BLD: 0 /100 WBCS — SIGNIFICANT CHANGE UP (ref 0–0)
PLATELET # BLD AUTO: 259 K/UL — SIGNIFICANT CHANGE UP (ref 150–400)
PLATELET # BLD AUTO: 259 K/UL — SIGNIFICANT CHANGE UP (ref 150–400)
POTASSIUM SERPL-MCNC: 4.5 MMOL/L — SIGNIFICANT CHANGE UP (ref 3.5–5.3)
POTASSIUM SERPL-MCNC: 4.5 MMOL/L — SIGNIFICANT CHANGE UP (ref 3.5–5.3)
POTASSIUM SERPL-SCNC: 4.5 MMOL/L — SIGNIFICANT CHANGE UP (ref 3.5–5.3)
POTASSIUM SERPL-SCNC: 4.5 MMOL/L — SIGNIFICANT CHANGE UP (ref 3.5–5.3)
PROT SERPL-MCNC: 6.4 G/DL — SIGNIFICANT CHANGE UP (ref 6–8.3)
PROT SERPL-MCNC: 6.4 G/DL — SIGNIFICANT CHANGE UP (ref 6–8.3)
RBC # BLD: 3.46 M/UL — LOW (ref 3.8–5.2)
RBC # BLD: 3.46 M/UL — LOW (ref 3.8–5.2)
RBC # FLD: 14.5 % — SIGNIFICANT CHANGE UP (ref 10.3–14.5)
RBC # FLD: 14.5 % — SIGNIFICANT CHANGE UP (ref 10.3–14.5)
SODIUM SERPL-SCNC: 136 MMOL/L — SIGNIFICANT CHANGE UP (ref 135–145)
SODIUM SERPL-SCNC: 136 MMOL/L — SIGNIFICANT CHANGE UP (ref 135–145)
SPECIMEN SOURCE: SIGNIFICANT CHANGE UP
WBC # BLD: 5.12 K/UL — SIGNIFICANT CHANGE UP (ref 3.8–10.5)
WBC # BLD: 5.12 K/UL — SIGNIFICANT CHANGE UP (ref 3.8–10.5)
WBC # FLD AUTO: 5.12 K/UL — SIGNIFICANT CHANGE UP (ref 3.8–10.5)
WBC # FLD AUTO: 5.12 K/UL — SIGNIFICANT CHANGE UP (ref 3.8–10.5)

## 2023-11-01 RX ADMIN — GABAPENTIN 300 MILLIGRAM(S): 400 CAPSULE ORAL at 06:08

## 2023-11-01 RX ADMIN — ATORVASTATIN CALCIUM 40 MILLIGRAM(S): 80 TABLET, FILM COATED ORAL at 21:23

## 2023-11-01 RX ADMIN — CEFTRIAXONE 100 MILLIGRAM(S): 500 INJECTION, POWDER, FOR SOLUTION INTRAMUSCULAR; INTRAVENOUS at 12:48

## 2023-11-01 RX ADMIN — LITHIUM CARBONATE 300 MILLIGRAM(S): 300 TABLET, EXTENDED RELEASE ORAL at 06:08

## 2023-11-01 RX ADMIN — PANTOPRAZOLE SODIUM 40 MILLIGRAM(S): 20 TABLET, DELAYED RELEASE ORAL at 06:08

## 2023-11-01 RX ADMIN — Medication 1 TABLET(S): at 18:46

## 2023-11-01 RX ADMIN — Medication 0.5 MILLIGRAM(S): at 06:08

## 2023-11-01 RX ADMIN — Medication 0.5 MILLIGRAM(S): at 18:47

## 2023-11-01 RX ADMIN — CLOPIDOGREL BISULFATE 75 MILLIGRAM(S): 75 TABLET, FILM COATED ORAL at 13:34

## 2023-11-01 RX ADMIN — LITHIUM CARBONATE 300 MILLIGRAM(S): 300 TABLET, EXTENDED RELEASE ORAL at 18:47

## 2023-11-01 RX ADMIN — GABAPENTIN 300 MILLIGRAM(S): 400 CAPSULE ORAL at 13:35

## 2023-11-01 RX ADMIN — Medication 1 TABLET(S): at 09:10

## 2023-11-01 RX ADMIN — QUETIAPINE FUMARATE 100 MILLIGRAM(S): 200 TABLET, FILM COATED ORAL at 21:23

## 2023-11-01 RX ADMIN — HEPARIN SODIUM 5000 UNIT(S): 5000 INJECTION INTRAVENOUS; SUBCUTANEOUS at 13:35

## 2023-11-01 RX ADMIN — LISINOPRIL 5 MILLIGRAM(S): 2.5 TABLET ORAL at 06:08

## 2023-11-01 RX ADMIN — GABAPENTIN 300 MILLIGRAM(S): 400 CAPSULE ORAL at 21:24

## 2023-11-01 RX ADMIN — HEPARIN SODIUM 5000 UNIT(S): 5000 INJECTION INTRAVENOUS; SUBCUTANEOUS at 21:23

## 2023-11-01 RX ADMIN — HEPARIN SODIUM 5000 UNIT(S): 5000 INJECTION INTRAVENOUS; SUBCUTANEOUS at 06:08

## 2023-11-01 RX ADMIN — Medication 40 MILLIGRAM(S): at 13:34

## 2023-11-01 NOTE — PROGRESS NOTE ADULT - ASSESSMENT
The patient is a 68 year old female with a history of HTN, HL, bipolar disorder, dementia who presents with agitation.     Plan:  - Initial ECG with significantly prolonged QTc  - QTc now near normal around 470  - No cardiac contraindication to use of QTc prolonging medications  - Continue lisinopril 5 mg daily

## 2023-11-01 NOTE — SOCIAL WORK PROGRESS NOTE - NSSWPROGRESSNOTE_GEN_ALL_CORE
met with patient who said Blanca came to visit her and she gave me permission to call her back [phone]. She is the residential  at [facility] where she was living prior to [place] [facility] [place] [place]. then she was transferred to sub acute rehab at [place] until her transition last Monday to [facility]. I spoke with Crystal at [facility] and she said they can accept her back would need 3122, mental health form, covid19 swab and she will need to be able to walk 75 feet with walker and she will accept her back if she is assist of one. Blanca provided us with name of a cousin Maria Del Carmen Pendleton [phone] and another cousin Julius Lozano [phone]. I met with patient provided support and I encouraged her to work with Physical therapy today so she can return to [facility]. I met with patient who seems more alert today and Dr Petit will see her again to determine if she can participate with planning. I met with her and she agrees with plan to return to [facility] but at this point she refused us contacting any family. She will speak with [facility] about getting a roomate there so the cost is more affordable. Blanca at [facility] requested we call her once we have plan so she can arrange to have her belongings brought to [facility]. I will update
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
## 2023-11-02 LAB
ALBUMIN SERPL ELPH-MCNC: 3 G/DL — LOW (ref 3.3–5)
ALBUMIN SERPL ELPH-MCNC: 3 G/DL — LOW (ref 3.3–5)
ALP SERPL-CCNC: 127 U/L — HIGH (ref 30–120)
ALP SERPL-CCNC: 127 U/L — HIGH (ref 30–120)
ALT FLD-CCNC: 34 U/L — SIGNIFICANT CHANGE UP (ref 10–60)
ALT FLD-CCNC: 34 U/L — SIGNIFICANT CHANGE UP (ref 10–60)
ANION GAP SERPL CALC-SCNC: 8 MMOL/L — SIGNIFICANT CHANGE UP (ref 5–17)
ANION GAP SERPL CALC-SCNC: 8 MMOL/L — SIGNIFICANT CHANGE UP (ref 5–17)
AST SERPL-CCNC: 29 U/L — SIGNIFICANT CHANGE UP (ref 10–40)
AST SERPL-CCNC: 29 U/L — SIGNIFICANT CHANGE UP (ref 10–40)
BASOPHILS # BLD AUTO: 0.06 K/UL — SIGNIFICANT CHANGE UP (ref 0–0.2)
BASOPHILS # BLD AUTO: 0.06 K/UL — SIGNIFICANT CHANGE UP (ref 0–0.2)
BASOPHILS NFR BLD AUTO: 1 % — SIGNIFICANT CHANGE UP (ref 0–2)
BASOPHILS NFR BLD AUTO: 1 % — SIGNIFICANT CHANGE UP (ref 0–2)
BILIRUB SERPL-MCNC: 0.4 MG/DL — SIGNIFICANT CHANGE UP (ref 0.2–1.2)
BILIRUB SERPL-MCNC: 0.4 MG/DL — SIGNIFICANT CHANGE UP (ref 0.2–1.2)
BUN SERPL-MCNC: 19 MG/DL — SIGNIFICANT CHANGE UP (ref 7–23)
BUN SERPL-MCNC: 19 MG/DL — SIGNIFICANT CHANGE UP (ref 7–23)
CALCIUM SERPL-MCNC: 9.9 MG/DL — SIGNIFICANT CHANGE UP (ref 8.4–10.5)
CALCIUM SERPL-MCNC: 9.9 MG/DL — SIGNIFICANT CHANGE UP (ref 8.4–10.5)
CHLORIDE SERPL-SCNC: 104 MMOL/L — SIGNIFICANT CHANGE UP (ref 96–108)
CHLORIDE SERPL-SCNC: 104 MMOL/L — SIGNIFICANT CHANGE UP (ref 96–108)
CO2 SERPL-SCNC: 27 MMOL/L — SIGNIFICANT CHANGE UP (ref 22–31)
CO2 SERPL-SCNC: 27 MMOL/L — SIGNIFICANT CHANGE UP (ref 22–31)
CREAT SERPL-MCNC: 0.87 MG/DL — SIGNIFICANT CHANGE UP (ref 0.5–1.3)
CREAT SERPL-MCNC: 0.87 MG/DL — SIGNIFICANT CHANGE UP (ref 0.5–1.3)
EGFR: 72 ML/MIN/1.73M2 — SIGNIFICANT CHANGE UP
EGFR: 72 ML/MIN/1.73M2 — SIGNIFICANT CHANGE UP
EOSINOPHIL # BLD AUTO: 0.25 K/UL — SIGNIFICANT CHANGE UP (ref 0–0.5)
EOSINOPHIL # BLD AUTO: 0.25 K/UL — SIGNIFICANT CHANGE UP (ref 0–0.5)
EOSINOPHIL NFR BLD AUTO: 4.3 % — SIGNIFICANT CHANGE UP (ref 0–6)
EOSINOPHIL NFR BLD AUTO: 4.3 % — SIGNIFICANT CHANGE UP (ref 0–6)
GLUCOSE SERPL-MCNC: 110 MG/DL — HIGH (ref 70–99)
GLUCOSE SERPL-MCNC: 110 MG/DL — HIGH (ref 70–99)
HCT VFR BLD CALC: 36.8 % — SIGNIFICANT CHANGE UP (ref 34.5–45)
HCT VFR BLD CALC: 36.8 % — SIGNIFICANT CHANGE UP (ref 34.5–45)
HGB BLD-MCNC: 11 G/DL — LOW (ref 11.5–15.5)
HGB BLD-MCNC: 11 G/DL — LOW (ref 11.5–15.5)
IMM GRANULOCYTES NFR BLD AUTO: 0.3 % — SIGNIFICANT CHANGE UP (ref 0–0.9)
IMM GRANULOCYTES NFR BLD AUTO: 0.3 % — SIGNIFICANT CHANGE UP (ref 0–0.9)
LITHIUM SERPL-MCNC: 0.51 MMOL/L — LOW (ref 0.6–1.2)
LITHIUM SERPL-MCNC: 0.51 MMOL/L — LOW (ref 0.6–1.2)
LYMPHOCYTES # BLD AUTO: 1.76 K/UL — SIGNIFICANT CHANGE UP (ref 1–3.3)
LYMPHOCYTES # BLD AUTO: 1.76 K/UL — SIGNIFICANT CHANGE UP (ref 1–3.3)
LYMPHOCYTES # BLD AUTO: 30.3 % — SIGNIFICANT CHANGE UP (ref 13–44)
LYMPHOCYTES # BLD AUTO: 30.3 % — SIGNIFICANT CHANGE UP (ref 13–44)
MCHC RBC-ENTMCNC: 29.3 PG — SIGNIFICANT CHANGE UP (ref 27–34)
MCHC RBC-ENTMCNC: 29.3 PG — SIGNIFICANT CHANGE UP (ref 27–34)
MCHC RBC-ENTMCNC: 29.9 GM/DL — LOW (ref 32–36)
MCHC RBC-ENTMCNC: 29.9 GM/DL — LOW (ref 32–36)
MCV RBC AUTO: 98.1 FL — SIGNIFICANT CHANGE UP (ref 80–100)
MCV RBC AUTO: 98.1 FL — SIGNIFICANT CHANGE UP (ref 80–100)
MONOCYTES # BLD AUTO: 0.48 K/UL — SIGNIFICANT CHANGE UP (ref 0–0.9)
MONOCYTES # BLD AUTO: 0.48 K/UL — SIGNIFICANT CHANGE UP (ref 0–0.9)
MONOCYTES NFR BLD AUTO: 8.3 % — SIGNIFICANT CHANGE UP (ref 2–14)
MONOCYTES NFR BLD AUTO: 8.3 % — SIGNIFICANT CHANGE UP (ref 2–14)
NEUTROPHILS # BLD AUTO: 3.23 K/UL — SIGNIFICANT CHANGE UP (ref 1.8–7.4)
NEUTROPHILS # BLD AUTO: 3.23 K/UL — SIGNIFICANT CHANGE UP (ref 1.8–7.4)
NEUTROPHILS NFR BLD AUTO: 55.8 % — SIGNIFICANT CHANGE UP (ref 43–77)
NEUTROPHILS NFR BLD AUTO: 55.8 % — SIGNIFICANT CHANGE UP (ref 43–77)
NRBC # BLD: 0 /100 WBCS — SIGNIFICANT CHANGE UP (ref 0–0)
NRBC # BLD: 0 /100 WBCS — SIGNIFICANT CHANGE UP (ref 0–0)
PLATELET # BLD AUTO: 256 K/UL — SIGNIFICANT CHANGE UP (ref 150–400)
PLATELET # BLD AUTO: 256 K/UL — SIGNIFICANT CHANGE UP (ref 150–400)
POTASSIUM SERPL-MCNC: 4.4 MMOL/L — SIGNIFICANT CHANGE UP (ref 3.5–5.3)
POTASSIUM SERPL-MCNC: 4.4 MMOL/L — SIGNIFICANT CHANGE UP (ref 3.5–5.3)
POTASSIUM SERPL-SCNC: 4.4 MMOL/L — SIGNIFICANT CHANGE UP (ref 3.5–5.3)
POTASSIUM SERPL-SCNC: 4.4 MMOL/L — SIGNIFICANT CHANGE UP (ref 3.5–5.3)
PROT SERPL-MCNC: 6.9 G/DL — SIGNIFICANT CHANGE UP (ref 6–8.3)
PROT SERPL-MCNC: 6.9 G/DL — SIGNIFICANT CHANGE UP (ref 6–8.3)
RBC # BLD: 3.75 M/UL — LOW (ref 3.8–5.2)
RBC # BLD: 3.75 M/UL — LOW (ref 3.8–5.2)
RBC # FLD: 14.3 % — SIGNIFICANT CHANGE UP (ref 10.3–14.5)
RBC # FLD: 14.3 % — SIGNIFICANT CHANGE UP (ref 10.3–14.5)
SODIUM SERPL-SCNC: 139 MMOL/L — SIGNIFICANT CHANGE UP (ref 135–145)
SODIUM SERPL-SCNC: 139 MMOL/L — SIGNIFICANT CHANGE UP (ref 135–145)
WBC # BLD: 5.8 K/UL — SIGNIFICANT CHANGE UP (ref 3.8–10.5)
WBC # BLD: 5.8 K/UL — SIGNIFICANT CHANGE UP (ref 3.8–10.5)
WBC # FLD AUTO: 5.8 K/UL — SIGNIFICANT CHANGE UP (ref 3.8–10.5)
WBC # FLD AUTO: 5.8 K/UL — SIGNIFICANT CHANGE UP (ref 3.8–10.5)

## 2023-11-02 PROCEDURE — 70220 X-RAY EXAM OF SINUSES: CPT | Mod: 26

## 2023-11-02 RX ORDER — CEFUROXIME AXETIL 250 MG
500 TABLET ORAL EVERY 12 HOURS
Refills: 0 | Status: COMPLETED | OUTPATIENT
Start: 2023-11-03 | End: 2023-11-04

## 2023-11-02 RX ADMIN — GABAPENTIN 300 MILLIGRAM(S): 400 CAPSULE ORAL at 15:30

## 2023-11-02 RX ADMIN — HEPARIN SODIUM 5000 UNIT(S): 5000 INJECTION INTRAVENOUS; SUBCUTANEOUS at 06:09

## 2023-11-02 RX ADMIN — PANTOPRAZOLE SODIUM 40 MILLIGRAM(S): 20 TABLET, DELAYED RELEASE ORAL at 06:10

## 2023-11-02 RX ADMIN — QUETIAPINE FUMARATE 100 MILLIGRAM(S): 200 TABLET, FILM COATED ORAL at 21:34

## 2023-11-02 RX ADMIN — GABAPENTIN 300 MILLIGRAM(S): 400 CAPSULE ORAL at 06:10

## 2023-11-02 RX ADMIN — HEPARIN SODIUM 5000 UNIT(S): 5000 INJECTION INTRAVENOUS; SUBCUTANEOUS at 21:35

## 2023-11-02 RX ADMIN — Medication 0.5 MILLIGRAM(S): at 06:10

## 2023-11-02 RX ADMIN — HEPARIN SODIUM 5000 UNIT(S): 5000 INJECTION INTRAVENOUS; SUBCUTANEOUS at 15:29

## 2023-11-02 RX ADMIN — Medication 1 TABLET(S): at 17:36

## 2023-11-02 RX ADMIN — GABAPENTIN 300 MILLIGRAM(S): 400 CAPSULE ORAL at 21:34

## 2023-11-02 RX ADMIN — CEFTRIAXONE 100 MILLIGRAM(S): 500 INJECTION, POWDER, FOR SOLUTION INTRAMUSCULAR; INTRAVENOUS at 11:57

## 2023-11-02 RX ADMIN — Medication 40 MILLIGRAM(S): at 11:57

## 2023-11-02 RX ADMIN — CLOPIDOGREL BISULFATE 75 MILLIGRAM(S): 75 TABLET, FILM COATED ORAL at 11:57

## 2023-11-02 RX ADMIN — LITHIUM CARBONATE 300 MILLIGRAM(S): 300 TABLET, EXTENDED RELEASE ORAL at 06:10

## 2023-11-02 RX ADMIN — LITHIUM CARBONATE 300 MILLIGRAM(S): 300 TABLET, EXTENDED RELEASE ORAL at 17:36

## 2023-11-02 RX ADMIN — Medication 1 TABLET(S): at 11:57

## 2023-11-02 RX ADMIN — LISINOPRIL 5 MILLIGRAM(S): 2.5 TABLET ORAL at 06:14

## 2023-11-02 RX ADMIN — ATORVASTATIN CALCIUM 40 MILLIGRAM(S): 80 TABLET, FILM COATED ORAL at 21:34

## 2023-11-02 RX ADMIN — Medication 0.5 MILLIGRAM(S): at 17:36
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
## 2023-11-02 NOTE — PROGRESS NOTE ADULT - ASSESSMENT
The patient is a 68 year old female with a history of HTN, HL, bipolar disorder, dementia who presents with agitation.     Plan:  - Initial ECG with significantly prolonged QTc  - QTc now near normal around 470  - No cardiac contraindication to use of QTc prolonging medications  - Continue lisinopril 5 mg daily  - Discharge planning
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
## 2023-11-03 ENCOUNTER — TRANSCRIPTION ENCOUNTER (OUTPATIENT)
Age: 68
End: 2023-11-03

## 2023-11-03 LAB
ALBUMIN SERPL ELPH-MCNC: 3.1 G/DL — LOW (ref 3.3–5)
ALBUMIN SERPL ELPH-MCNC: 3.1 G/DL — LOW (ref 3.3–5)
ALP SERPL-CCNC: 121 U/L — HIGH (ref 30–120)
ALP SERPL-CCNC: 121 U/L — HIGH (ref 30–120)
ALT FLD-CCNC: 39 U/L — SIGNIFICANT CHANGE UP (ref 10–60)
ALT FLD-CCNC: 39 U/L — SIGNIFICANT CHANGE UP (ref 10–60)
ANION GAP SERPL CALC-SCNC: 7 MMOL/L — SIGNIFICANT CHANGE UP (ref 5–17)
ANION GAP SERPL CALC-SCNC: 7 MMOL/L — SIGNIFICANT CHANGE UP (ref 5–17)
AST SERPL-CCNC: 34 U/L — SIGNIFICANT CHANGE UP (ref 10–40)
AST SERPL-CCNC: 34 U/L — SIGNIFICANT CHANGE UP (ref 10–40)
BASOPHILS # BLD AUTO: 0.06 K/UL — SIGNIFICANT CHANGE UP (ref 0–0.2)
BASOPHILS # BLD AUTO: 0.06 K/UL — SIGNIFICANT CHANGE UP (ref 0–0.2)
BASOPHILS NFR BLD AUTO: 1 % — SIGNIFICANT CHANGE UP (ref 0–2)
BASOPHILS NFR BLD AUTO: 1 % — SIGNIFICANT CHANGE UP (ref 0–2)
BILIRUB SERPL-MCNC: 0.4 MG/DL — SIGNIFICANT CHANGE UP (ref 0.2–1.2)
BILIRUB SERPL-MCNC: 0.4 MG/DL — SIGNIFICANT CHANGE UP (ref 0.2–1.2)
BUN SERPL-MCNC: 17 MG/DL — SIGNIFICANT CHANGE UP (ref 7–23)
BUN SERPL-MCNC: 17 MG/DL — SIGNIFICANT CHANGE UP (ref 7–23)
CALCIUM SERPL-MCNC: 9.9 MG/DL — SIGNIFICANT CHANGE UP (ref 8.4–10.5)
CALCIUM SERPL-MCNC: 9.9 MG/DL — SIGNIFICANT CHANGE UP (ref 8.4–10.5)
CHLORIDE SERPL-SCNC: 104 MMOL/L — SIGNIFICANT CHANGE UP (ref 96–108)
CHLORIDE SERPL-SCNC: 104 MMOL/L — SIGNIFICANT CHANGE UP (ref 96–108)
CO2 SERPL-SCNC: 28 MMOL/L — SIGNIFICANT CHANGE UP (ref 22–31)
CO2 SERPL-SCNC: 28 MMOL/L — SIGNIFICANT CHANGE UP (ref 22–31)
CREAT SERPL-MCNC: 0.89 MG/DL — SIGNIFICANT CHANGE UP (ref 0.5–1.3)
CREAT SERPL-MCNC: 0.89 MG/DL — SIGNIFICANT CHANGE UP (ref 0.5–1.3)
EGFR: 70 ML/MIN/1.73M2 — SIGNIFICANT CHANGE UP
EGFR: 70 ML/MIN/1.73M2 — SIGNIFICANT CHANGE UP
EOSINOPHIL # BLD AUTO: 0.29 K/UL — SIGNIFICANT CHANGE UP (ref 0–0.5)
EOSINOPHIL # BLD AUTO: 0.29 K/UL — SIGNIFICANT CHANGE UP (ref 0–0.5)
EOSINOPHIL NFR BLD AUTO: 4.9 % — SIGNIFICANT CHANGE UP (ref 0–6)
EOSINOPHIL NFR BLD AUTO: 4.9 % — SIGNIFICANT CHANGE UP (ref 0–6)
GLUCOSE SERPL-MCNC: 107 MG/DL — HIGH (ref 70–99)
GLUCOSE SERPL-MCNC: 107 MG/DL — HIGH (ref 70–99)
HCT VFR BLD CALC: 31.2 % — LOW (ref 34.5–45)
HCT VFR BLD CALC: 31.2 % — LOW (ref 34.5–45)
HGB BLD-MCNC: 9.6 G/DL — LOW (ref 11.5–15.5)
HGB BLD-MCNC: 9.6 G/DL — LOW (ref 11.5–15.5)
IMM GRANULOCYTES NFR BLD AUTO: 0.5 % — SIGNIFICANT CHANGE UP (ref 0–0.9)
IMM GRANULOCYTES NFR BLD AUTO: 0.5 % — SIGNIFICANT CHANGE UP (ref 0–0.9)
LYMPHOCYTES # BLD AUTO: 1.58 K/UL — SIGNIFICANT CHANGE UP (ref 1–3.3)
LYMPHOCYTES # BLD AUTO: 1.58 K/UL — SIGNIFICANT CHANGE UP (ref 1–3.3)
LYMPHOCYTES # BLD AUTO: 26.9 % — SIGNIFICANT CHANGE UP (ref 13–44)
LYMPHOCYTES # BLD AUTO: 26.9 % — SIGNIFICANT CHANGE UP (ref 13–44)
MCHC RBC-ENTMCNC: 29.8 PG — SIGNIFICANT CHANGE UP (ref 27–34)
MCHC RBC-ENTMCNC: 29.8 PG — SIGNIFICANT CHANGE UP (ref 27–34)
MCHC RBC-ENTMCNC: 30.8 GM/DL — LOW (ref 32–36)
MCHC RBC-ENTMCNC: 30.8 GM/DL — LOW (ref 32–36)
MCV RBC AUTO: 96.9 FL — SIGNIFICANT CHANGE UP (ref 80–100)
MCV RBC AUTO: 96.9 FL — SIGNIFICANT CHANGE UP (ref 80–100)
MONOCYTES # BLD AUTO: 0.47 K/UL — SIGNIFICANT CHANGE UP (ref 0–0.9)
MONOCYTES # BLD AUTO: 0.47 K/UL — SIGNIFICANT CHANGE UP (ref 0–0.9)
MONOCYTES NFR BLD AUTO: 8 % — SIGNIFICANT CHANGE UP (ref 2–14)
MONOCYTES NFR BLD AUTO: 8 % — SIGNIFICANT CHANGE UP (ref 2–14)
NEUTROPHILS # BLD AUTO: 3.44 K/UL — SIGNIFICANT CHANGE UP (ref 1.8–7.4)
NEUTROPHILS # BLD AUTO: 3.44 K/UL — SIGNIFICANT CHANGE UP (ref 1.8–7.4)
NEUTROPHILS NFR BLD AUTO: 58.7 % — SIGNIFICANT CHANGE UP (ref 43–77)
NEUTROPHILS NFR BLD AUTO: 58.7 % — SIGNIFICANT CHANGE UP (ref 43–77)
NRBC # BLD: 0 /100 WBCS — SIGNIFICANT CHANGE UP (ref 0–0)
NRBC # BLD: 0 /100 WBCS — SIGNIFICANT CHANGE UP (ref 0–0)
PLATELET # BLD AUTO: 263 K/UL — SIGNIFICANT CHANGE UP (ref 150–400)
PLATELET # BLD AUTO: 263 K/UL — SIGNIFICANT CHANGE UP (ref 150–400)
POTASSIUM SERPL-MCNC: 4.4 MMOL/L — SIGNIFICANT CHANGE UP (ref 3.5–5.3)
POTASSIUM SERPL-MCNC: 4.4 MMOL/L — SIGNIFICANT CHANGE UP (ref 3.5–5.3)
POTASSIUM SERPL-SCNC: 4.4 MMOL/L — SIGNIFICANT CHANGE UP (ref 3.5–5.3)
POTASSIUM SERPL-SCNC: 4.4 MMOL/L — SIGNIFICANT CHANGE UP (ref 3.5–5.3)
PROT SERPL-MCNC: 6.7 G/DL — SIGNIFICANT CHANGE UP (ref 6–8.3)
PROT SERPL-MCNC: 6.7 G/DL — SIGNIFICANT CHANGE UP (ref 6–8.3)
RBC # BLD: 3.22 M/UL — LOW (ref 3.8–5.2)
RBC # BLD: 3.22 M/UL — LOW (ref 3.8–5.2)
RBC # FLD: 14.3 % — SIGNIFICANT CHANGE UP (ref 10.3–14.5)
RBC # FLD: 14.3 % — SIGNIFICANT CHANGE UP (ref 10.3–14.5)
SARS-COV-2 RNA SPEC QL NAA+PROBE: SIGNIFICANT CHANGE UP
SARS-COV-2 RNA SPEC QL NAA+PROBE: SIGNIFICANT CHANGE UP
SODIUM SERPL-SCNC: 139 MMOL/L — SIGNIFICANT CHANGE UP (ref 135–145)
SODIUM SERPL-SCNC: 139 MMOL/L — SIGNIFICANT CHANGE UP (ref 135–145)
WBC # BLD: 5.87 K/UL — SIGNIFICANT CHANGE UP (ref 3.8–10.5)
WBC # BLD: 5.87 K/UL — SIGNIFICANT CHANGE UP (ref 3.8–10.5)
WBC # FLD AUTO: 5.87 K/UL — SIGNIFICANT CHANGE UP (ref 3.8–10.5)
WBC # FLD AUTO: 5.87 K/UL — SIGNIFICANT CHANGE UP (ref 3.8–10.5)

## 2023-11-03 RX ORDER — CLONAZEPAM 1 MG
1 TABLET ORAL
Refills: 0 | DISCHARGE

## 2023-11-03 RX ORDER — CEFUROXIME AXETIL 250 MG
1 TABLET ORAL
Qty: 4 | Refills: 0
Start: 2023-11-03 | End: 2023-11-04

## 2023-11-03 RX ORDER — LISINOPRIL 2.5 MG/1
1 TABLET ORAL
Qty: 0 | Refills: 0 | DISCHARGE
Start: 2023-11-03

## 2023-11-03 RX ADMIN — LITHIUM CARBONATE 300 MILLIGRAM(S): 300 TABLET, EXTENDED RELEASE ORAL at 17:04

## 2023-11-03 RX ADMIN — GABAPENTIN 300 MILLIGRAM(S): 400 CAPSULE ORAL at 13:32

## 2023-11-03 RX ADMIN — Medication 500 MILLIGRAM(S): at 05:55

## 2023-11-03 RX ADMIN — Medication 500 MILLIGRAM(S): at 17:04

## 2023-11-03 RX ADMIN — LITHIUM CARBONATE 300 MILLIGRAM(S): 300 TABLET, EXTENDED RELEASE ORAL at 05:55

## 2023-11-03 RX ADMIN — HEPARIN SODIUM 5000 UNIT(S): 5000 INJECTION INTRAVENOUS; SUBCUTANEOUS at 05:53

## 2023-11-03 RX ADMIN — LISINOPRIL 5 MILLIGRAM(S): 2.5 TABLET ORAL at 05:55

## 2023-11-03 RX ADMIN — Medication 0.5 MILLIGRAM(S): at 05:53

## 2023-11-03 RX ADMIN — Medication 40 MILLIGRAM(S): at 11:00

## 2023-11-03 RX ADMIN — GABAPENTIN 300 MILLIGRAM(S): 400 CAPSULE ORAL at 21:12

## 2023-11-03 RX ADMIN — PANTOPRAZOLE SODIUM 40 MILLIGRAM(S): 20 TABLET, DELAYED RELEASE ORAL at 08:08

## 2023-11-03 RX ADMIN — ATORVASTATIN CALCIUM 40 MILLIGRAM(S): 80 TABLET, FILM COATED ORAL at 21:12

## 2023-11-03 RX ADMIN — HEPARIN SODIUM 5000 UNIT(S): 5000 INJECTION INTRAVENOUS; SUBCUTANEOUS at 13:33

## 2023-11-03 RX ADMIN — Medication 0.5 MILLIGRAM(S): at 17:04

## 2023-11-03 RX ADMIN — Medication 1 TABLET(S): at 17:04

## 2023-11-03 RX ADMIN — QUETIAPINE FUMARATE 100 MILLIGRAM(S): 200 TABLET, FILM COATED ORAL at 21:12

## 2023-11-03 RX ADMIN — CLOPIDOGREL BISULFATE 75 MILLIGRAM(S): 75 TABLET, FILM COATED ORAL at 11:00

## 2023-11-03 RX ADMIN — HEPARIN SODIUM 5000 UNIT(S): 5000 INJECTION INTRAVENOUS; SUBCUTANEOUS at 21:12

## 2023-11-03 RX ADMIN — GABAPENTIN 300 MILLIGRAM(S): 400 CAPSULE ORAL at 05:53

## 2023-11-03 RX ADMIN — Medication 1 TABLET(S): at 08:07

## 2023-11-03 NOTE — DISCHARGE NOTE PROVIDER - NSDCCAREPROVSEEN_GEN_ALL_CORE_FT
Jose, Emilee Petit, Jerrica Brennan, Destin Boswell, Karen Rosado, Xavier Lay, Harmeet Sagastume, Pedrito

## 2023-11-03 NOTE — CASE MANAGEMENT PROGRESS NOTE - NSCMPROGRESSNOTE_GEN_ALL_CORE
RN/CM has received notification from MD that pt is medically cleared for transition back today 11/03/2023 to [facility] [phone] [phone]. CM has reached out assisted living facility, faxed over DC paperwork and then received call from assisted living facility RN Rip [phone] who stated that @ this time, they are UNABLE to accept pt back as they would need to eval if pt is still appropriate for return to [facility] or would she need a different level of care. Assisted living facility RN evaluator's earliest availability will be on TUES 11/07/2023. Staff on unit and MD made aware of above. CM remains available and continues to follow case.

## 2023-11-03 NOTE — DISCHARGE NOTE PROVIDER - DETAILS OF MALNUTRITION DIAGNOSIS/DIAGNOSES
This patient has been assessed with a concern for Malnutrition and was treated during this hospitalization for the following Nutrition diagnosis/diagnoses:     -  10/31/2023: Morbid obesity (BMI > 40)

## 2023-11-03 NOTE — DISCHARGE NOTE PROVIDER - NSDCMRMEDTOKEN_GEN_ALL_CORE_FT
atorvastatin 40 mg oral tablet: 1 tab(s) orally once a day (at bedtime)  cefuroxime 500 mg oral tablet: 1 tab(s) orally every 12 hours  FLUoxetine 60 mg oral tablet: 1 tab(s) orally once a day  gabapentin 300 mg oral tablet: orally 3 times a day  KlonoPIN 0.5 mg oral tablet: 1 tab(s) orally 2 times a day  lisinopril 5 mg oral tablet: 1 tab(s) orally once a day  lithium 300 mg oral capsule: 1 cap(s) orally 2 times a day  Plavix 75 mg oral tablet: 1 tab(s) orally once a day  Seroquel 200 mg oral tablet: 1 tab(s) orally once a day (at bedtime)  Vitamin D2 capsule po weekly on Wednesdays:
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
## 2023-11-03 NOTE — PATIENT CHOICE NOTE. - NSPTCHOICENOTES_GEN_ALL_CORE
Pt has deferred to assisted living facility staff to coordinate any skilled home care services. As per assisted living facility, home care agency of choice is [facility].  Phone: [phone]

## 2023-11-03 NOTE — DISCHARGE NOTE PROVIDER - NSDCCPCAREPLAN_GEN_ALL_CORE_FT
PRINCIPAL DISCHARGE DIAGNOSIS  Diagnosis: Long QT interval  Assessment and Plan of Treatment: resolved      SECONDARY DISCHARGE DIAGNOSES  Diagnosis: E. coli UTI  Assessment and Plan of Treatment: ceftin x 2 days    Diagnosis: Chronic sinusitis  Assessment and Plan of Treatment: f up with ent    Diagnosis: Bipolar disorder  Assessment and Plan of Treatment: continue current tt
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
## 2023-11-04 RX ADMIN — Medication 40 MILLIGRAM(S): at 12:01

## 2023-11-04 RX ADMIN — GABAPENTIN 300 MILLIGRAM(S): 400 CAPSULE ORAL at 15:16

## 2023-11-04 RX ADMIN — LITHIUM CARBONATE 300 MILLIGRAM(S): 300 TABLET, EXTENDED RELEASE ORAL at 05:24

## 2023-11-04 RX ADMIN — ATORVASTATIN CALCIUM 40 MILLIGRAM(S): 80 TABLET, FILM COATED ORAL at 20:48

## 2023-11-04 RX ADMIN — QUETIAPINE FUMARATE 100 MILLIGRAM(S): 200 TABLET, FILM COATED ORAL at 20:48

## 2023-11-04 RX ADMIN — PANTOPRAZOLE SODIUM 40 MILLIGRAM(S): 20 TABLET, DELAYED RELEASE ORAL at 05:25

## 2023-11-04 RX ADMIN — LISINOPRIL 5 MILLIGRAM(S): 2.5 TABLET ORAL at 05:24

## 2023-11-04 RX ADMIN — CLOPIDOGREL BISULFATE 75 MILLIGRAM(S): 75 TABLET, FILM COATED ORAL at 12:01

## 2023-11-04 RX ADMIN — HEPARIN SODIUM 5000 UNIT(S): 5000 INJECTION INTRAVENOUS; SUBCUTANEOUS at 05:24

## 2023-11-04 RX ADMIN — Medication 500 MILLIGRAM(S): at 05:24

## 2023-11-04 RX ADMIN — GABAPENTIN 300 MILLIGRAM(S): 400 CAPSULE ORAL at 05:24

## 2023-11-04 RX ADMIN — Medication 1 TABLET(S): at 07:41

## 2023-11-04 RX ADMIN — Medication 1 TABLET(S): at 18:18

## 2023-11-04 RX ADMIN — HEPARIN SODIUM 5000 UNIT(S): 5000 INJECTION INTRAVENOUS; SUBCUTANEOUS at 15:16

## 2023-11-04 RX ADMIN — HEPARIN SODIUM 5000 UNIT(S): 5000 INJECTION INTRAVENOUS; SUBCUTANEOUS at 20:48

## 2023-11-04 RX ADMIN — Medication 500 MILLIGRAM(S): at 18:19

## 2023-11-04 RX ADMIN — GABAPENTIN 300 MILLIGRAM(S): 400 CAPSULE ORAL at 20:48

## 2023-11-04 RX ADMIN — LITHIUM CARBONATE 300 MILLIGRAM(S): 300 TABLET, EXTENDED RELEASE ORAL at 18:18
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
## 2023-11-04 NOTE — PROGRESS NOTE ADULT - ASSESSMENT
The patient is a 68 year old female with a history of HTN, HL, bipolar disorder, dementia who presents with agitation.     11/4/23  Seen at [facility] telemetry  Lying flat, comfortably  Awake and alert  No cardiac complaints    Plan:  - Initial ECG with significantly prolonged QTc  - QTc now near normal around 470  - No cardiac contraindication to use of QTc prolonging medications  - Continue lisinopril 5 mg daily                  atorvastatin-40mg HS  - Follow EKG's  - On ceftin  - Discharge planning

## 2023-11-05 RX ORDER — CLONAZEPAM 1 MG
0.5 TABLET ORAL
Refills: 0 | Status: DISCONTINUED | OUTPATIENT
Start: 2023-11-05 | End: 2023-11-08

## 2023-11-05 RX ADMIN — HEPARIN SODIUM 5000 UNIT(S): 5000 INJECTION INTRAVENOUS; SUBCUTANEOUS at 05:31

## 2023-11-05 RX ADMIN — LITHIUM CARBONATE 300 MILLIGRAM(S): 300 TABLET, EXTENDED RELEASE ORAL at 05:31

## 2023-11-05 RX ADMIN — PANTOPRAZOLE SODIUM 40 MILLIGRAM(S): 20 TABLET, DELAYED RELEASE ORAL at 05:31

## 2023-11-05 RX ADMIN — LITHIUM CARBONATE 300 MILLIGRAM(S): 300 TABLET, EXTENDED RELEASE ORAL at 17:47

## 2023-11-05 RX ADMIN — GABAPENTIN 300 MILLIGRAM(S): 400 CAPSULE ORAL at 05:31

## 2023-11-05 RX ADMIN — Medication 0.5 MILLIGRAM(S): at 17:47

## 2023-11-05 RX ADMIN — Medication 1 TABLET(S): at 08:38

## 2023-11-05 RX ADMIN — QUETIAPINE FUMARATE 100 MILLIGRAM(S): 200 TABLET, FILM COATED ORAL at 21:19

## 2023-11-05 RX ADMIN — Medication 1 TABLET(S): at 17:47

## 2023-11-05 RX ADMIN — ATORVASTATIN CALCIUM 40 MILLIGRAM(S): 80 TABLET, FILM COATED ORAL at 21:19

## 2023-11-05 RX ADMIN — GABAPENTIN 300 MILLIGRAM(S): 400 CAPSULE ORAL at 14:08

## 2023-11-05 RX ADMIN — LISINOPRIL 5 MILLIGRAM(S): 2.5 TABLET ORAL at 05:31

## 2023-11-05 RX ADMIN — CLOPIDOGREL BISULFATE 75 MILLIGRAM(S): 75 TABLET, FILM COATED ORAL at 12:11

## 2023-11-05 RX ADMIN — HEPARIN SODIUM 5000 UNIT(S): 5000 INJECTION INTRAVENOUS; SUBCUTANEOUS at 14:08

## 2023-11-05 RX ADMIN — GABAPENTIN 300 MILLIGRAM(S): 400 CAPSULE ORAL at 21:19

## 2023-11-05 RX ADMIN — HEPARIN SODIUM 5000 UNIT(S): 5000 INJECTION INTRAVENOUS; SUBCUTANEOUS at 21:20

## 2023-11-05 RX ADMIN — Medication 40 MILLIGRAM(S): at 12:11
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
## 2023-11-05 NOTE — PROGRESS NOTE ADULT - ASSESSMENT
The patient is a 68 year old female with a history of HTN, HL, bipolar disorder, dementia who presents with agitation.     11/5/23  Seen at [facility] telemetry  Lying flat, comfortably  Awake and alert  No cardiac complaints    Plan:  - Initial ECG with significantly prolonged QTc  - QTc now near normal around 470  - No cardiac contraindication to use of QTc prolonging medications  - Continue lisinopril 5 mg daily                  atorvastatin-40mg HS  - Follow EKG's  - Completed ceftin  - Discharge planning The patient is a 68 year old female with a history of HTN, HL, bipolar disorder, dementia who presents with agitation.     11/5/23  Seen at [facility]  Lying flat, comfortably  Awake and alert  No cardiac complaints    Plan:  - Initial ECG with significantly prolonged QTc  - QTc now near normal around 470  - No cardiac contraindication to use of QTc prolonging medications  - Continue lisinopril 5 mg daily                  atorvastatin-40mg HS  - Follow EKG's  - Completed ceftin  - Discharge planning
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
## 2023-11-06 LAB
ALBUMIN SERPL ELPH-MCNC: 3.1 G/DL — LOW (ref 3.3–5)
ALBUMIN SERPL ELPH-MCNC: 3.1 G/DL — LOW (ref 3.3–5)
ALP SERPL-CCNC: 105 U/L — SIGNIFICANT CHANGE UP (ref 30–120)
ALP SERPL-CCNC: 105 U/L — SIGNIFICANT CHANGE UP (ref 30–120)
ALT FLD-CCNC: 34 U/L — SIGNIFICANT CHANGE UP (ref 10–60)
ALT FLD-CCNC: 34 U/L — SIGNIFICANT CHANGE UP (ref 10–60)
ANION GAP SERPL CALC-SCNC: 7 MMOL/L — SIGNIFICANT CHANGE UP (ref 5–17)
ANION GAP SERPL CALC-SCNC: 7 MMOL/L — SIGNIFICANT CHANGE UP (ref 5–17)
AST SERPL-CCNC: 21 U/L — SIGNIFICANT CHANGE UP (ref 10–40)
AST SERPL-CCNC: 21 U/L — SIGNIFICANT CHANGE UP (ref 10–40)
BILIRUB SERPL-MCNC: 0.4 MG/DL — SIGNIFICANT CHANGE UP (ref 0.2–1.2)
BILIRUB SERPL-MCNC: 0.4 MG/DL — SIGNIFICANT CHANGE UP (ref 0.2–1.2)
BUN SERPL-MCNC: 22 MG/DL — SIGNIFICANT CHANGE UP (ref 7–23)
BUN SERPL-MCNC: 22 MG/DL — SIGNIFICANT CHANGE UP (ref 7–23)
CALCIUM SERPL-MCNC: 9.6 MG/DL — SIGNIFICANT CHANGE UP (ref 8.4–10.5)
CALCIUM SERPL-MCNC: 9.6 MG/DL — SIGNIFICANT CHANGE UP (ref 8.4–10.5)
CHLORIDE SERPL-SCNC: 103 MMOL/L — SIGNIFICANT CHANGE UP (ref 96–108)
CHLORIDE SERPL-SCNC: 103 MMOL/L — SIGNIFICANT CHANGE UP (ref 96–108)
CO2 SERPL-SCNC: 26 MMOL/L — SIGNIFICANT CHANGE UP (ref 22–31)
CO2 SERPL-SCNC: 26 MMOL/L — SIGNIFICANT CHANGE UP (ref 22–31)
CREAT SERPL-MCNC: 0.94 MG/DL — SIGNIFICANT CHANGE UP (ref 0.5–1.3)
CREAT SERPL-MCNC: 0.94 MG/DL — SIGNIFICANT CHANGE UP (ref 0.5–1.3)
EGFR: 65 ML/MIN/1.73M2 — SIGNIFICANT CHANGE UP
EGFR: 65 ML/MIN/1.73M2 — SIGNIFICANT CHANGE UP
GLUCOSE SERPL-MCNC: 112 MG/DL — HIGH (ref 70–99)
GLUCOSE SERPL-MCNC: 112 MG/DL — HIGH (ref 70–99)
HCT VFR BLD CALC: 33 % — LOW (ref 34.5–45)
HCT VFR BLD CALC: 33 % — LOW (ref 34.5–45)
HGB BLD-MCNC: 10 G/DL — LOW (ref 11.5–15.5)
HGB BLD-MCNC: 10 G/DL — LOW (ref 11.5–15.5)
MCHC RBC-ENTMCNC: 29.3 PG — SIGNIFICANT CHANGE UP (ref 27–34)
MCHC RBC-ENTMCNC: 29.3 PG — SIGNIFICANT CHANGE UP (ref 27–34)
MCHC RBC-ENTMCNC: 30.3 GM/DL — LOW (ref 32–36)
MCHC RBC-ENTMCNC: 30.3 GM/DL — LOW (ref 32–36)
MCV RBC AUTO: 96.8 FL — SIGNIFICANT CHANGE UP (ref 80–100)
MCV RBC AUTO: 96.8 FL — SIGNIFICANT CHANGE UP (ref 80–100)
NRBC # BLD: 0 /100 WBCS — SIGNIFICANT CHANGE UP (ref 0–0)
NRBC # BLD: 0 /100 WBCS — SIGNIFICANT CHANGE UP (ref 0–0)
PLATELET # BLD AUTO: 272 K/UL — SIGNIFICANT CHANGE UP (ref 150–400)
PLATELET # BLD AUTO: 272 K/UL — SIGNIFICANT CHANGE UP (ref 150–400)
POTASSIUM SERPL-MCNC: 4.5 MMOL/L — SIGNIFICANT CHANGE UP (ref 3.5–5.3)
POTASSIUM SERPL-MCNC: 4.5 MMOL/L — SIGNIFICANT CHANGE UP (ref 3.5–5.3)
POTASSIUM SERPL-SCNC: 4.5 MMOL/L — SIGNIFICANT CHANGE UP (ref 3.5–5.3)
POTASSIUM SERPL-SCNC: 4.5 MMOL/L — SIGNIFICANT CHANGE UP (ref 3.5–5.3)
PROT SERPL-MCNC: 6.6 G/DL — SIGNIFICANT CHANGE UP (ref 6–8.3)
PROT SERPL-MCNC: 6.6 G/DL — SIGNIFICANT CHANGE UP (ref 6–8.3)
RBC # BLD: 3.41 M/UL — LOW (ref 3.8–5.2)
RBC # BLD: 3.41 M/UL — LOW (ref 3.8–5.2)
RBC # FLD: 13.8 % — SIGNIFICANT CHANGE UP (ref 10.3–14.5)
RBC # FLD: 13.8 % — SIGNIFICANT CHANGE UP (ref 10.3–14.5)
SODIUM SERPL-SCNC: 136 MMOL/L — SIGNIFICANT CHANGE UP (ref 135–145)
SODIUM SERPL-SCNC: 136 MMOL/L — SIGNIFICANT CHANGE UP (ref 135–145)
WBC # BLD: 5.32 K/UL — SIGNIFICANT CHANGE UP (ref 3.8–10.5)
WBC # BLD: 5.32 K/UL — SIGNIFICANT CHANGE UP (ref 3.8–10.5)
WBC # FLD AUTO: 5.32 K/UL — SIGNIFICANT CHANGE UP (ref 3.8–10.5)
WBC # FLD AUTO: 5.32 K/UL — SIGNIFICANT CHANGE UP (ref 3.8–10.5)

## 2023-11-06 RX ADMIN — GABAPENTIN 300 MILLIGRAM(S): 400 CAPSULE ORAL at 05:01

## 2023-11-06 RX ADMIN — PANTOPRAZOLE SODIUM 40 MILLIGRAM(S): 20 TABLET, DELAYED RELEASE ORAL at 05:04

## 2023-11-06 RX ADMIN — Medication 40 MILLIGRAM(S): at 11:35

## 2023-11-06 RX ADMIN — LISINOPRIL 5 MILLIGRAM(S): 2.5 TABLET ORAL at 05:01

## 2023-11-06 RX ADMIN — Medication 0.5 MILLIGRAM(S): at 17:12

## 2023-11-06 RX ADMIN — GABAPENTIN 300 MILLIGRAM(S): 400 CAPSULE ORAL at 21:29

## 2023-11-06 RX ADMIN — LITHIUM CARBONATE 300 MILLIGRAM(S): 300 TABLET, EXTENDED RELEASE ORAL at 17:11

## 2023-11-06 RX ADMIN — Medication 1 TABLET(S): at 07:55

## 2023-11-06 RX ADMIN — HEPARIN SODIUM 5000 UNIT(S): 5000 INJECTION INTRAVENOUS; SUBCUTANEOUS at 21:30

## 2023-11-06 RX ADMIN — HEPARIN SODIUM 5000 UNIT(S): 5000 INJECTION INTRAVENOUS; SUBCUTANEOUS at 05:02

## 2023-11-06 RX ADMIN — CLOPIDOGREL BISULFATE 75 MILLIGRAM(S): 75 TABLET, FILM COATED ORAL at 11:35

## 2023-11-06 RX ADMIN — GABAPENTIN 300 MILLIGRAM(S): 400 CAPSULE ORAL at 13:15

## 2023-11-06 RX ADMIN — HEPARIN SODIUM 5000 UNIT(S): 5000 INJECTION INTRAVENOUS; SUBCUTANEOUS at 13:15

## 2023-11-06 RX ADMIN — LITHIUM CARBONATE 300 MILLIGRAM(S): 300 TABLET, EXTENDED RELEASE ORAL at 05:01

## 2023-11-06 RX ADMIN — ATORVASTATIN CALCIUM 40 MILLIGRAM(S): 80 TABLET, FILM COATED ORAL at 21:29

## 2023-11-06 RX ADMIN — Medication 1 TABLET(S): at 17:11

## 2023-11-06 RX ADMIN — QUETIAPINE FUMARATE 100 MILLIGRAM(S): 200 TABLET, FILM COATED ORAL at 21:29

## 2023-11-06 RX ADMIN — Medication 0.5 MILLIGRAM(S): at 05:01
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
## 2023-11-06 NOTE — CASE MANAGEMENT PROGRESS NOTE - NSCMPROGRESSNOTE_GEN_ALL_CORE
RN/CM received notification from [facility] RN evaluator, Rip that she will come tomorrow TUES 11/07/2023 between 9:30 am-10 am to evaluate if pt is appropriate for return to assisted living facility. CM has reached out to PT dept and made them aware of above scheduled evaluation. CM has notified CM leadership of above as well. CM remains available and continues to follow case.

## 2023-11-07 LAB
SARS-COV-2 RNA SPEC QL NAA+PROBE: SIGNIFICANT CHANGE UP
SARS-COV-2 RNA SPEC QL NAA+PROBE: SIGNIFICANT CHANGE UP

## 2023-11-07 RX ADMIN — Medication 0.5 MILLIGRAM(S): at 17:16

## 2023-11-07 RX ADMIN — PANTOPRAZOLE SODIUM 40 MILLIGRAM(S): 20 TABLET, DELAYED RELEASE ORAL at 05:54

## 2023-11-07 RX ADMIN — ATORVASTATIN CALCIUM 40 MILLIGRAM(S): 80 TABLET, FILM COATED ORAL at 21:51

## 2023-11-07 RX ADMIN — LITHIUM CARBONATE 300 MILLIGRAM(S): 300 TABLET, EXTENDED RELEASE ORAL at 17:16

## 2023-11-07 RX ADMIN — HEPARIN SODIUM 5000 UNIT(S): 5000 INJECTION INTRAVENOUS; SUBCUTANEOUS at 13:45

## 2023-11-07 RX ADMIN — Medication 0.5 MILLIGRAM(S): at 05:54

## 2023-11-07 RX ADMIN — GABAPENTIN 300 MILLIGRAM(S): 400 CAPSULE ORAL at 05:54

## 2023-11-07 RX ADMIN — HEPARIN SODIUM 5000 UNIT(S): 5000 INJECTION INTRAVENOUS; SUBCUTANEOUS at 21:52

## 2023-11-07 RX ADMIN — HEPARIN SODIUM 5000 UNIT(S): 5000 INJECTION INTRAVENOUS; SUBCUTANEOUS at 05:53

## 2023-11-07 RX ADMIN — Medication 40 MILLIGRAM(S): at 11:29

## 2023-11-07 RX ADMIN — GABAPENTIN 300 MILLIGRAM(S): 400 CAPSULE ORAL at 21:51

## 2023-11-07 RX ADMIN — QUETIAPINE FUMARATE 100 MILLIGRAM(S): 200 TABLET, FILM COATED ORAL at 21:51

## 2023-11-07 RX ADMIN — Medication 1 TABLET(S): at 08:05

## 2023-11-07 RX ADMIN — Medication 1 TABLET(S): at 17:15

## 2023-11-07 RX ADMIN — CLOPIDOGREL BISULFATE 75 MILLIGRAM(S): 75 TABLET, FILM COATED ORAL at 11:29

## 2023-11-07 RX ADMIN — LITHIUM CARBONATE 300 MILLIGRAM(S): 300 TABLET, EXTENDED RELEASE ORAL at 05:47

## 2023-11-07 RX ADMIN — GABAPENTIN 300 MILLIGRAM(S): 400 CAPSULE ORAL at 13:46
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
## 2023-11-07 NOTE — CASE MANAGEMENT PROGRESS NOTE - NSCMPROGRESSNOTE_GEN_ALL_CORE
RN/CM has met with RN evaluator Rip from [facility] [phone]. Pt evaluated today with PT session and as per said evaluator, current assisted living facility [facility] unable to meet pt's current needs but [facility] [phone] will be able to and recommended that this CM arrange for pt to be DCed to NEW assisted living facility [facility] tomorrow WED 11/08/2023. Assisted living facility evaluator has requested the following DC paperwork to be faxed over to [phone]: [phone] snf form, Mental Health Eval form, PT report, COVID swab within 24-72H of DC (if indicated); any NEW/CHANGED meds to be sent to [facility]; and if home care indicated, preferred home care agency is [facility]. Phone: [phone]. CM has referred case to aforementioned home care agency and confirmed that pt has been accepted with start of care on 11/09/2023. CM made hospital MD aware of need for above DC paperwork and will fax them accordingly once available. CM will arrange for DC transport accordingly. Staff on unit apprised re: all above. CM remains available and continues to follow case.

## 2023-11-08 VITALS
DIASTOLIC BLOOD PRESSURE: 69 MMHG | TEMPERATURE: 98 F | SYSTOLIC BLOOD PRESSURE: 130 MMHG | HEART RATE: 78 BPM | RESPIRATION RATE: 18 BRPM | OXYGEN SATURATION: 98 %

## 2023-11-08 RX ORDER — GABAPENTIN 400 MG/1
1 CAPSULE ORAL
Qty: 90 | Refills: 0
Start: 2023-11-08 | End: 2023-12-07

## 2023-11-08 RX ORDER — CLONAZEPAM 1 MG
1 TABLET ORAL
Refills: 0 | DISCHARGE

## 2023-11-08 RX ORDER — LITHIUM CARBONATE 300 MG/1
1 TABLET, EXTENDED RELEASE ORAL
Qty: 60 | Refills: 0
Start: 2023-11-08 | End: 2023-12-07

## 2023-11-08 RX ORDER — LITHIUM CARBONATE 300 MG/1
1 TABLET, EXTENDED RELEASE ORAL
Refills: 0 | DISCHARGE

## 2023-11-08 RX ORDER — CLONAZEPAM 1 MG
1 TABLET ORAL
Qty: 60 | Refills: 0
Start: 2023-11-08 | End: 2023-12-07

## 2023-11-08 RX ORDER — CHOLECALCIFEROL (VITAMIN D3) 125 MCG
1 CAPSULE ORAL
Qty: 1 | Refills: 0
Start: 2023-11-08 | End: 2023-12-07

## 2023-11-08 RX ORDER — QUETIAPINE FUMARATE 200 MG/1
1 TABLET, FILM COATED ORAL
Refills: 0 | DISCHARGE

## 2023-11-08 RX ORDER — FLUOXETINE HCL 10 MG
1 CAPSULE ORAL
Refills: 0 | DISCHARGE

## 2023-11-08 RX ORDER — ATORVASTATIN CALCIUM 80 MG/1
1 TABLET, FILM COATED ORAL
Refills: 0 | DISCHARGE

## 2023-11-08 RX ORDER — QUETIAPINE FUMARATE 200 MG/1
1 TABLET, FILM COATED ORAL
Qty: 30 | Refills: 0
Start: 2023-11-08 | End: 2023-12-07

## 2023-11-08 RX ORDER — CLOPIDOGREL BISULFATE 75 MG/1
1 TABLET, FILM COATED ORAL
Refills: 0 | DISCHARGE

## 2023-11-08 RX ORDER — CLOPIDOGREL BISULFATE 75 MG/1
1 TABLET, FILM COATED ORAL
Qty: 30 | Refills: 0
Start: 2023-11-08 | End: 2023-12-07

## 2023-11-08 RX ORDER — FLUOXETINE HCL 10 MG
1 CAPSULE ORAL
Qty: 30 | Refills: 0
Start: 2023-11-08 | End: 2023-12-07

## 2023-11-08 RX ORDER — ATORVASTATIN CALCIUM 80 MG/1
1 TABLET, FILM COATED ORAL
Qty: 30 | Refills: 0
Start: 2023-11-08 | End: 2023-12-07

## 2023-11-08 RX ORDER — LITHIUM CARBONATE 300 MG
1 CAPSULE ORAL
Qty: 60 | Refills: 0 | DISCHARGE
Start: 2023-11-08 | End: 2023-12-07

## 2023-11-08 RX ADMIN — GABAPENTIN 300 MILLIGRAM(S): 400 CAPSULE ORAL at 05:39

## 2023-11-08 RX ADMIN — CLOPIDOGREL BISULFATE 75 MILLIGRAM(S): 75 TABLET, FILM COATED ORAL at 12:35

## 2023-11-08 RX ADMIN — Medication 1 TABLET(S): at 07:49

## 2023-11-08 RX ADMIN — LISINOPRIL 5 MILLIGRAM(S): 2.5 TABLET ORAL at 05:39

## 2023-11-08 RX ADMIN — Medication 0.5 MILLIGRAM(S): at 05:39

## 2023-11-08 RX ADMIN — HEPARIN SODIUM 5000 UNIT(S): 5000 INJECTION INTRAVENOUS; SUBCUTANEOUS at 05:39

## 2023-11-08 RX ADMIN — PANTOPRAZOLE SODIUM 40 MILLIGRAM(S): 20 TABLET, DELAYED RELEASE ORAL at 05:38

## 2023-11-08 RX ADMIN — LITHIUM CARBONATE 300 MILLIGRAM(S): 300 TABLET, EXTENDED RELEASE ORAL at 05:39

## 2023-11-08 RX ADMIN — Medication 40 MILLIGRAM(S): at 12:35

## 2023-11-08 NOTE — PROGRESS NOTE ADULT - SUBJECTIVE AND OBJECTIVE BOX
SRIDEVI BELLE is a 68yFemale , patient examined and chart reviewed.     INTERVAL HPI/ OVERNIGHT EVENTS:   No events.   Afebrile.    PAST MEDICAL & SURGICAL HISTORY:  HTN (hypertension)  HLD (hyperlipidemia)  Bipolar illness  MDD (major depressive disorder)      For details regarding the patient's social history, family history, and other miscellaneous elements, please refer the initial infectious diseases consultation and/or the admitting history and physical examination for this admission.    ROS:  Unable to obtain due to : pt's condition    ALLERGIES  sulfa drugs (Other)  Ambien (Other)      Current inpatient medications :    ANTIBIOTICS/RELEVANT:    MEDICATIONS  (STANDING):  atorvastatin 40 milliGRAM(s) Oral at bedtime  clonazePAM  Tablet 0.5 milliGRAM(s) Oral two times a day  clopidogrel Tablet 75 milliGRAM(s) Oral daily  FLUoxetine 40 milliGRAM(s) Oral daily  gabapentin 300 milliGRAM(s) Oral every 8 hours  heparin   Injectable 5000 Unit(s) SubCutaneous every 8 hours  lactobacillus acidophilus 1 Tablet(s) Oral two times a day with meals  lisinopril 5 milliGRAM(s) Oral daily  lithium 300 milliGRAM(s) Oral two times a day  pantoprazole    Tablet 40 milliGRAM(s) Oral before breakfast  QUEtiapine 100 milliGRAM(s) Oral at bedtime    MEDICATIONS  (PRN):  acetaminophen     Tablet .. 650 milliGRAM(s) Oral every 6 hours PRN Temp greater or equal to 38C (100.4F), Mild Pain (1 - 3), Moderate Pain (4 - 6)    Objective:  Vital Signs Last 24 Hrs  T(C): 36.8 (06 Nov 2023 04:52), Max: 36.8 (06 Nov 2023 04:52)  T(F): 98.2 (06 Nov 2023 04:52), Max: 98.2 (06 Nov 2023 04:52)  HR: 73 (06 Nov 2023 04:52) (69 - 73)  BP: 105/68 (06 Nov 2023 04:52) (95/64 - 105/68)  RR: 17 (06 Nov 2023 04:52) (17 - 19)  SpO2: 93% (06 Nov 2023 04:52) (93% - 95%)    Parameters below as of 06 Nov 2023 04:52  Patient On (Oxygen Delivery Method): room air    Physical Exam:  General: no acute distress  Neck: supple, trachea midline  Lungs: clear, no wheeze/rhonchi  Cardiovascular: regular rate and rhythm, S1 S2  Abdomen: soft, nontender,  bowel sounds normal  Neurological: confused  Skin: no rash  Extremities: no edema    LABS:                        10.0   5.32  )-----------( 272      ( 06 Nov 2023 06:30 )             33.0   11-06    136  |  103  |  22  ----------------------------<  112<H>  4.5   |  26  |  0.94    Ca    9.6      06 Nov 2023 06:30    TPro  6.6  /  Alb  3.1<L>  /  TBili  0.4  /  DBili  x   /  AST  21  /  ALT  34  /  AlkPhos  105  11-06      Urine Microscopic-Add On (NC) (10.27.23 @ 05:48)    Red Blood Cell - Urine: 0 /HPF   White Blood Cell - Urine: >50 /HPF   Bacteria: Many /HPF   Squamous Epithelial Cells: Present  Urinalysis (10.27.23 @ 05:48)    Glucose Qualitative, Urine: Negative mg/dL   Blood, Urine: Negative   pH Urine: 5.5   Color: Yellow   Urine Appearance: Cloudy   Bilirubin: Negative   Ketone - Urine: Negative mg/dL   Specific Gravity: 1.013   Protein, Urine: Trace mg/dL   Urobilinogen: 1.0 mg/dL   Nitrite: Positive   Leukocyte Esterase Concentration: Large      MICROBIOLOGY:  Culture - Urine (10.27.23 @ 05:48)    -  Amikacin: S <=16   -  Amoxicillin/Clavulanic Acid: S <=8/4   -  Ampicillin: S <=8 These ampicillin results predict results for amoxicillin   -  Ampicillin/Sulbactam: S <=4/2   -  Aztreonam: S <=4   -  Cefazolin: S <=2 For uncomplicated UTI with K. pneumoniae, E. coli, or P. mirablis: SARA <=16 is sensitive and SARA >=32 is resistant. This also predicts results for oral agents cefaclor, cefdinir, cefpodoxime, cefprozil, cefuroxime axetil, cephalexin and locarbef for uncomplicated UTI. Note that some isolates may be susceptible to these agents while testing resistant to cefazolin.   -  Cefepime: S <=2   -  Cefoxitin: S <=8   -  Ceftriaxone: S <=1   -  Cefuroxime: S <=4   -  Ciprofloxacin: S <=0.25   -  Ertapenem: S <=0.5   -  Gentamicin: S <=2   -  Imipenem: S <=1   -  Levofloxacin: S <=0.5   -  Meropenem: S <=1   -  Nitrofurantoin: S <=32 Should not be used to treat pyelonephritis   -  Piperacillin/Tazobactam: S <=8   -  Tobramycin: S <=2   -  Trimethoprim/Sulfamethoxazole: S <=0.5/9.5   Specimen Source: Clean Catch Clean Catch (Midstream)   Culture Results:   50,000 - 99,000 CFU/mL Escherichia coli   Organism Identification: Escherichia coli   Organism: Escherichia coli   Method Type: SARA      RADIOLOGY & ADDITIONAL STUDIES:      Assessment :  69YO F resident of [facility] PMH of MDD, HTN HLD, Bipolar disorder fro [facility],  brought from assisted living by EMS with complaint of aggressive behavior.  Seen by psych.   UA with pyuria. Ucx grew Ecoli.  Pt unable to provide history. ?UTI vs asymptomatic bacteruria  Clinically stable    Plan :   Supportive care and monitor off antibiotics  Off Rocephin -->Ceftin ended 11/4/23  Trend temps and cbc  Asp precautions  Stable from ID standpoint  Dc planning per primary team      Continue with present regiment.  Appropriate use of antibiotics and adverse effects reviewed.      > 35 minutes were spent in direct patient care reviewing notes, medications ,labs data/ imaging , discussion with multidisciplinary team.    Thank you for allowing me to participate in care of your patient .    Karen Boswell MD  Infectious Disease  [phone]
Chief Complaint: Agitation    Interval Events: No events overnight.    Review of Systems:  General: No fevers, chills, weight gain  Skin: No rashes, color changes  Cardiovascular: No chest pain, orthopnea  Respiratory: No shortness of breath, cough  Gastrointestinal: No nausea, abdominal pain  Genitourinary: No incontinence, pain with urination  Musculoskeletal: No pain, swelling, decreased range of motion  Neurological: No headache, weakness  Psychiatric: No depression, anxiety  Endocrine: No weight gain, increased thirst  All other systems are comprehensively negative.    Physical Exam:  Vital Signs Last 24 Hrs  T(C): 36.6 (31 Oct 2023 08:17), Max: 36.7 (30 Oct 2023 23:30)  T(F): 97.8 (31 Oct 2023 08:17), Max: 98.1 (30 Oct 2023 23:30)  HR: 77 (31 Oct 2023 08:17) (64 - 79)  BP: 121/73 (31 Oct 2023 08:17) (119/59 - 123/78)  BP(mean): --  RR: 18 (31 Oct 2023 08:17) (17 - 18)  SpO2: 96% (31 Oct 2023 08:17) (95% - 96%)  Parameters below as of 31 Oct 2023 08:17  Patient On (Oxygen Delivery Method): room air  General: NAD  HEENT: MMM  Neck: No JVD, no carotid bruit  Lungs: CTAB  CV: RRR, nl S1/S2, no M/R/G  Abdomen: S/NT/ND, +BS  Extremities: No LE edema, no cyanosis  Neuro: AAOx3, non-focal  Skin: No rash    Labs:    10-30    140  |  106  |  11  ----------------------------<  112<H>  5.0   |  26  |  0.91    Ca    9.5      30 Oct 2023 06:30    TPro  6.4  /  Alb  3.2<L>  /  TBili  1.1  /  DBili  x   /  AST  40  /  ALT  41  /  AlkPhos  137<H>  10-30                        10.9   5.55  )-----------( 212      ( 31 Oct 2023 06:00 )             36.5       ECG/Telemetry: Sinus rhythm
Chief Complaint: Agitation    Interval Events: No events overnight.    Review of Systems:  General: No fevers, chills, weight gain  Skin: No rashes, color changes  Cardiovascular: No chest pain, orthopnea  Respiratory: No shortness of breath, cough  Gastrointestinal: No nausea, abdominal pain  Genitourinary: No incontinence, pain with urination  Musculoskeletal: No pain, swelling, decreased range of motion  Neurological: No headache, weakness  Psychiatric: No depression, anxiety  Endocrine: No weight gain, increased thirst  All other systems are comprehensively negative.    Physical Exam:  Vital Signs Last 24 Hrs  T(C): 36.8 (07 Nov 2023 05:04), Max: 37 (06 Nov 2023 20:58)  T(F): 98.2 (07 Nov 2023 05:04), Max: 98.6 (06 Nov 2023 20:58)  HR: 65 (07 Nov 2023 05:51) (65 - 78)  BP: 97/53 (07 Nov 2023 05:51) (95/55 - 111/66)  BP(mean): 67 (07 Nov 2023 05:51) (67 - 67)  RR: 17 (07 Nov 2023 05:04) (17 - 18)  SpO2: 96% (07 Nov 2023 05:04) (96% - 97%)  Parameters below as of 07 Nov 2023 05:04  Patient On (Oxygen Delivery Method): room air  General: NAD  HEENT: MMM  Neck: No JVD, no carotid bruit  Lungs: CTAB  CV: RRR, nl S1/S2, no M/R/G  Abdomen: S/NT/ND, +BS  Extremities: No LE edema, no cyanosis  Neuro: AAOx3, non-focal  Skin: No rash    Labs:    11-06    136  |  103  |  22  ----------------------------<  112<H>  4.5   |  26  |  0.94    Ca    9.6      06 Nov 2023 06:30    TPro  6.6  /  Alb  3.1<L>  /  TBili  0.4  /  DBili  x   /  AST  21  /  ALT  34  /  AlkPhos  105  11-06                        10.0   5.32  )-----------( 272      ( 06 Nov 2023 06:30 )             33.0       ECG/Telemetry: Sinus rhythm
Chief Complaint: Agitation    Interval Events: No events overnight.    Review of Systems:  General: No fevers, chills, weight gain  Skin: No rashes, color changes  Cardiovascular: No chest pain, orthopnea  Respiratory: No shortness of breath, cough  Gastrointestinal: No nausea, abdominal pain  Genitourinary: No incontinence, pain with urination  Musculoskeletal: No pain, swelling, decreased range of motion  Neurological: No headache, weakness  Psychiatric: No depression, anxiety  Endocrine: No weight gain, increased thirst  All other systems are comprehensively negative.    Physical Exam:  Vital Signs Last 24 Hrs  T(C): 36.8 (08 Nov 2023 04:42), Max: 36.9 (07 Nov 2023 21:24)  T(F): 98.2 (08 Nov 2023 04:42), Max: 98.4 (07 Nov 2023 21:24)  HR: 74 (08 Nov 2023 04:42) (62 - 75)  BP: 129/74 (08 Nov 2023 04:42) (98/65 - 129/74)  BP(mean): --  RR: 18 (08 Nov 2023 04:42) (18 - 19)  SpO2: 95% (08 Nov 2023 04:42) (93% - 95%)  Parameters below as of 08 Nov 2023 04:42  Patient On (Oxygen Delivery Method): room air  General: NAD  HEENT: MMM  Neck: No JVD, no carotid bruit  Lungs: CTAB  CV: RRR, nl S1/S2, no M/R/G  Abdomen: S/NT/ND, +BS  Extremities: No LE edema, no cyanosis  Neuro: AAOx3, non-focal  Skin: No rash    Labs:        ECG/Telemetry: Sinus rhythm
Chief Complaint: Agitation    Interval Events: No events overnight.    Review of Systems:  General: No fevers, chills, weight gain  Skin: No rashes, color changes  Cardiovascular: No chest pain, orthopnea  Respiratory: No shortness of breath, cough  Gastrointestinal: No nausea, abdominal pain  Genitourinary: No incontinence, pain with urination  Musculoskeletal: No pain, swelling, decreased range of motion  Neurological: No headache, weakness  Psychiatric: No depression, anxiety  Endocrine: No weight gain, increased thirst  All other systems are comprehensively negative.    Physical Exam:  Vital Signs Last 24 Hrs  T(C): 36.9 (30 Oct 2023 07:29), Max: 37.6 (29 Oct 2023 15:00)  T(F): 98.5 (30 Oct 2023 07:29), Max: 99.6 (29 Oct 2023 15:00)  HR: 66 (30 Oct 2023 07:29) (65 - 70)  BP: 137/80 (30 Oct 2023 07:29) (116/56 - 149/74)  BP(mean): --  RR: 18 (30 Oct 2023 07:29) (18 - 18)  SpO2: 94% (30 Oct 2023 07:29) (93% - 97%)  Parameters below as of 30 Oct 2023 07:29  Patient On (Oxygen Delivery Method): room air  General: NAD  HEENT: MMM  Neck: No JVD, no carotid bruit  Lungs: CTAB  CV: RRR, nl S1/S2, no M/R/G  Abdomen: S/NT/ND, +BS  Extremities: No LE edema, no cyanosis  Neuro: AAOx3, non-focal  Skin: No rash    Labs:    10-30    140  |  106  |  11  ----------------------------<  112<H>  5.0   |  26  |  0.91    Ca    9.5      30 Oct 2023 06:30    TPro  6.4  /  Alb  3.2<L>  /  TBili  1.1  /  DBili  x   /  AST  40  /  ALT  41  /  AlkPhos  137<H>  10-30                        10.6   6.31  )-----------( 184      ( 30 Oct 2023 06:30 )             33.6       ECG/Telemetry: Sinus rhythm
Chief Complaint: Agitation    Interval Events: No events overnight.    Review of Systems:  General: No fevers, chills, weight gain  Skin: No rashes, color changes  Cardiovascular: No chest pain, orthopnea  Respiratory: No shortness of breath, cough  Gastrointestinal: No nausea, abdominal pain  Genitourinary: No incontinence, pain with urination  Musculoskeletal: No pain, swelling, decreased range of motion  Neurological: No headache, weakness  Psychiatric: No depression, anxiety  Endocrine: No weight gain, increased thirst  All other systems are comprehensively negative.    Physical Exam:  Vitals:        Vital Signs Last 24 Hrs  T(C): 36.8 (28 Oct 2023 08:02), Max: 37.1 (27 Oct 2023 19:31)  T(F): 98.3 (28 Oct 2023 08:02), Max: 98.8 (28 Oct 2023 01:26)  HR: 65 (28 Oct 2023 08:00) (61 - 69)  BP: 132/69 (28 Oct 2023 08:00) (95/58 - 132/69)  BP(mean): 85 (28 Oct 2023 08:00) (65 - 86)  RR: 15 (28 Oct 2023 08:00) (10 - 20)  SpO2: 99% (28 Oct 2023 08:00) (94% - 100%)  Parameters below as of 28 Oct 2023 08:00  Patient On (Oxygen Delivery Method): room air  General: NAD  HEENT: MMM  Neck: No JVD, no carotid bruit  Lungs: CTAB  CV: RRR, nl S1/S2, no M/R/G  Abdomen: S/NT/ND, +BS  Extremities: No LE edema, no cyanosis  Neuro: AAOx3, non-focal  Skin: No rash    Labs:                        9.8    6.84  )-----------( 239      ( 28 Oct 2023 05:45 )             31.6     10-28    138  |  106  |  14  ----------------------------<  114<H>  4.7   |  27  |  1.10    Ca    9.6      28 Oct 2023 05:45  Phos  3.5     10-27  Mg     1.7     10-28    TPro  6.8  /  Alb  3.3  /  TBili  0.6  /  DBili  x   /  AST  20  /  ALT  28  /  AlkPhos  139<H>  10-28        PT/INR - ( 27 Oct 2023 00:50 )   PT: 11.1 sec;   INR: 0.99 ratio         PTT - ( 27 Oct 2023 00:50 )  PTT:26.9 sec    ECG/Telemetry: Sinus rhythm
Date/Time Patient Seen:  		  Referring MD:   Data Reviewed	       Patient is a 68y old  Female who presents with a chief complaint of aggressive behaviour (02 Nov 2023 12:09)      Subjective/HPI     PAST MEDICAL & SURGICAL HISTORY:  HTN (hypertension)    HLD (hyperlipidemia)    Bipolar illness    MDD (major depressive disorder)    No significant past surgical history          Medication list         MEDICATIONS  (STANDING):  atorvastatin 40 milliGRAM(s) Oral at bedtime  cefuroxime   Tablet 500 milliGRAM(s) Oral every 12 hours  clonazePAM  Tablet 0.5 milliGRAM(s) Oral two times a day  clopidogrel Tablet 75 milliGRAM(s) Oral daily  FLUoxetine 40 milliGRAM(s) Oral daily  gabapentin 300 milliGRAM(s) Oral every 8 hours  heparin   Injectable 5000 Unit(s) SubCutaneous every 8 hours  lactobacillus acidophilus 1 Tablet(s) Oral two times a day with meals  lisinopril 5 milliGRAM(s) Oral daily  lithium 300 milliGRAM(s) Oral two times a day  pantoprazole    Tablet 40 milliGRAM(s) Oral before breakfast  QUEtiapine 100 milliGRAM(s) Oral at bedtime    MEDICATIONS  (PRN):  acetaminophen     Tablet .. 650 milliGRAM(s) Oral every 6 hours PRN Temp greater or equal to 38C (100.4F), Mild Pain (1 - 3), Moderate Pain (4 - 6)         Vitals log        ICU Vital Signs Last 24 Hrs  T(C): 36.8 (03 Nov 2023 04:57), Max: 37.1 (02 Nov 2023 17:05)  T(F): 98.2 (03 Nov 2023 04:57), Max: 98.7 (02 Nov 2023 17:05)  HR: 68 (03 Nov 2023 04:57) (62 - 78)  BP: 107/72 (03 Nov 2023 04:57) (101/60 - 118/58)  BP(mean): --  ABP: --  ABP(mean): --  RR: 18 (03 Nov 2023 04:57) (15 - 18)  SpO2: 97% (03 Nov 2023 04:57) (92% - 97%)    O2 Parameters below as of 03 Nov 2023 04:57  Patient On (Oxygen Delivery Method): room air                 Input and Output:  I&O's Detail    01 Nov 2023 07:01  -  02 Nov 2023 07:00  --------------------------------------------------------  IN:  Total IN: 0 mL    OUT:    Voided (mL): 801 mL  Total OUT: 801 mL    Total NET: -801 mL      02 Nov 2023 07:01  -  03 Nov 2023 06:32  --------------------------------------------------------  IN:  Total IN: 0 mL    OUT:    Voided (mL): 1700 mL  Total OUT: 1700 mL    Total NET: -1700 mL          Lab Data                        11.0   5.80  )-----------( 256      ( 02 Nov 2023 06:00 )             36.8     11-02    139  |  104  |  19  ----------------------------<  110<H>  4.4   |  27  |  0.87    Ca    9.9      02 Nov 2023 06:00    TPro  6.9  /  Alb  3.0<L>  /  TBili  0.4  /  DBili  x   /  AST  29  /  ALT  34  /  AlkPhos  127<H>  11-02            Review of Systems	      Objective     Physical Examination    heart s1s2  lung dc BS  head nc      Pertinent Lab findings & Imaging      Teresita:  NO   Adequate UO     I&O's Detail    01 Nov 2023 07:01  -  02 Nov 2023 07:00  --------------------------------------------------------  IN:  Total IN: 0 mL    OUT:    Voided (mL): 801 mL  Total OUT: 801 mL    Total NET: -801 mL      02 Nov 2023 07:01  -  03 Nov 2023 06:32  --------------------------------------------------------  IN:  Total IN: 0 mL    OUT:    Voided (mL): 1700 mL  Total OUT: 1700 mL    Total NET: -1700 mL               Discussed with:     Cultures:	        Radiology                            
Date/Time Patient Seen:  		  Referring MD:   Data Reviewed	       Patient is a 68y old  Female who presents with a chief complaint of aggressive behaviour (06 Nov 2023 12:31)      Subjective/HPI     PAST MEDICAL & SURGICAL HISTORY:  HTN (hypertension)    HLD (hyperlipidemia)    Bipolar illness    MDD (major depressive disorder)    No significant past surgical history          Medication list         MEDICATIONS  (STANDING):  atorvastatin 40 milliGRAM(s) Oral at bedtime  clonazePAM  Tablet 0.5 milliGRAM(s) Oral two times a day  clopidogrel Tablet 75 milliGRAM(s) Oral daily  FLUoxetine 40 milliGRAM(s) Oral daily  gabapentin 300 milliGRAM(s) Oral every 8 hours  heparin   Injectable 5000 Unit(s) SubCutaneous every 8 hours  lactobacillus acidophilus 1 Tablet(s) Oral two times a day with meals  lisinopril 5 milliGRAM(s) Oral daily  lithium 300 milliGRAM(s) Oral two times a day  pantoprazole    Tablet 40 milliGRAM(s) Oral before breakfast  QUEtiapine 100 milliGRAM(s) Oral at bedtime    MEDICATIONS  (PRN):  acetaminophen     Tablet .. 650 milliGRAM(s) Oral every 6 hours PRN Temp greater or equal to 38C (100.4F), Mild Pain (1 - 3), Moderate Pain (4 - 6)         Vitals log        ICU Vital Signs Last 24 Hrs  T(C): 36.8 (07 Nov 2023 05:04), Max: 37 (06 Nov 2023 20:58)  T(F): 98.2 (07 Nov 2023 05:04), Max: 98.6 (06 Nov 2023 20:58)  HR: 65 (07 Nov 2023 05:51) (65 - 78)  BP: 97/53 (07 Nov 2023 05:51) (95/55 - 111/66)  BP(mean): 67 (07 Nov 2023 05:51) (67 - 67)  ABP: --  ABP(mean): --  RR: 17 (07 Nov 2023 05:04) (17 - 18)  SpO2: 96% (07 Nov 2023 05:04) (96% - 97%)    O2 Parameters below as of 07 Nov 2023 05:04  Patient On (Oxygen Delivery Method): room air                 Input and Output:  I&O's Detail    05 Nov 2023 07:01  -  06 Nov 2023 07:00  --------------------------------------------------------  IN:  Total IN: 0 mL    OUT:    Voided (mL): 2200 mL  Total OUT: 2200 mL    Total NET: -2200 mL      06 Nov 2023 07:01  - 07 Nov 2023 05:52  --------------------------------------------------------  IN:  Total IN: 0 mL    OUT:    Voided (mL): 700 mL  Total OUT: 700 mL    Total NET: -700 mL          Lab Data                        10.0   5.32  )-----------( 272      ( 06 Nov 2023 06:30 )             33.0     11-06    136  |  103  |  22  ----------------------------<  112<H>  4.5   |  26  |  0.94    Ca    9.6      06 Nov 2023 06:30    TPro  6.6  /  Alb  3.1<L>  /  TBili  0.4  /  DBili  x   /  AST  21  /  ALT  34  /  AlkPhos  105  11-06            Review of Systems	      Objective     Physical Examination    heart s1s2  lung dc BS  head nc      Pertinent Lab findings & Imaging      Teresita:  NO   Adequate UO     I&O's Detail    05 Nov 2023 07:01  -  06 Nov 2023 07:00  --------------------------------------------------------  IN:  Total IN: 0 mL    OUT:    Voided (mL): 2200 mL  Total OUT: 2200 mL    Total NET: -2200 mL      06 Nov 2023 07:01  -  07 Nov 2023 05:52  --------------------------------------------------------  IN:  Total IN: 0 mL    OUT:    Voided (mL): 700 mL  Total OUT: 700 mL    Total NET: -700 mL               Discussed with:     Cultures:	        Radiology                            
Neurology follow up note    SRIDEVI [id number]      Interval History:    Patient feels ok no new complaints.    Allergies    sulfa drugs (Other)  Ambien (Other)    Intolerances        MEDICATIONS    acetaminophen     Tablet .. 650 milliGRAM(s) Oral every 6 hours PRN  atorvastatin 40 milliGRAM(s) Oral at bedtime  cefTRIAXone   IVPB 1000 milliGRAM(s) IV Intermittent every 24 hours  clonazePAM  Tablet 0.5 milliGRAM(s) Oral two times a day  clopidogrel Tablet 75 milliGRAM(s) Oral daily  FLUoxetine 40 milliGRAM(s) Oral daily  gabapentin 300 milliGRAM(s) Oral every 8 hours  heparin   Injectable 5000 Unit(s) SubCutaneous every 8 hours  lactobacillus acidophilus 1 Tablet(s) Oral two times a day with meals  lisinopril 5 milliGRAM(s) Oral daily  lithium 300 milliGRAM(s) Oral two times a day  pantoprazole    Tablet 40 milliGRAM(s) Oral before breakfast  QUEtiapine 100 milliGRAM(s) Oral at bedtime              Vital Signs Last 24 Hrs  T(C): 36.7 (02 Nov 2023 08:08), Max: 36.9 (02 Nov 2023 01:00)  T(F): 98.1 (02 Nov 2023 08:08), Max: 98.4 (02 Nov 2023 01:00)  HR: 62 (02 Nov 2023 08:08) (62 - 71)  BP: 110/60 (02 Nov 2023 08:08) (110/60 - 116/77)  BP(mean): --  RR: 17 (02 Nov 2023 08:08) (17 - 17)  SpO2: 92% (02 Nov 2023 08:08) (92% - 94%)    Parameters below as of 02 Nov 2023 01:00  Patient On (Oxygen Delivery Method): room air            REVIEW OF SYSTEMS:  Constitutionally, the patient denies fever, chills, or night sweats.  Head:  No headaches.  Eyes:  No double vision or blurry vision.  Ears:  No ringing in the ears.  Neck:  No neck pain.  Cardiovascular:  No chest pain.  Respiratory:  No shortness of breath.  Abdomen:  No nausea, vomiting, or abdominal pain.  Extremities/Neurological:  No numbness or tingling.  Musculoskeletal:  No joint pain.        PHYSICAL EXAMINATION:  HEENT:  Head:  Normocephalic, atraumatic.  Eyes:  No scleral icterus.  Ears:  Hearing appeared to be intact.  NECK:  Supple.  CARDIOVASCULAR:  S1 and S2 heard.  RESPIRATORY:  Air entry bilaterally.  ABDOMEN:  Soft, nontender.EXTREMITIES:  No clubbing or cyanosis was noted.      NEUROLOGIC:  The patient awake and alert.  Location [facility], year was 2023, month was November.  Upon conversing with the patient at times she appears slightly forgetful, was able to name simple objects.  Extraocular movements were intact.  Speech was fluent.  Smile symmetric.  The patient has a runny nose on the left, as per her states that she has underlying nose issue, not new.  Motor:  Bilateral upper 4/5, bilateral lower 3/5.  Sensory:  Bilateral upper and lower intact to light touch.            LABS:  CBC Full  -  ( 02 Nov 2023 06:00 )  WBC Count : 5.80 K/uL  RBC Count : 3.75 M/uL  Hemoglobin : 11.0 g/dL  Hematocrit : 36.8 %  Platelet Count - Automated : 256 K/uL  Mean Cell Volume : 98.1 fl  Mean Cell Hemoglobin : 29.3 pg  Mean Cell Hemoglobin Concentration : 29.9 gm/dL  Auto Neutrophil # : 3.23 K/uL  Auto Lymphocyte # : 1.76 K/uL  Auto Monocyte # : 0.48 K/uL  Auto Eosinophil # : 0.25 K/uL  Auto Basophil # : 0.06 K/uL  Auto Neutrophil % : 55.8 %  Auto Lymphocyte % : 30.3 %  Auto Monocyte % : 8.3 %  Auto Eosinophil % : 4.3 %  Auto Basophil % : 1.0 %    Urinalysis Basic - ( 02 Nov 2023 06:00 )    Color: x / Appearance: x / SG: x / pH: x  Gluc: 110 mg/dL / Ketone: x  / Bili: x / Urobili: x   Blood: x / Protein: x / Nitrite: x   Leuk Esterase: x / RBC: x / WBC x   Sq Epi: x / Non Sq Epi: x / Bacteria: x      11-02    139  |  104  |  19  ----------------------------<  110<H>  4.4   |  27  |  0.87    Ca    9.9      02 Nov 2023 06:00    TPro  6.9  /  Alb  3.0<L>  /  TBili  0.4  /  DBili  x   /  AST  29  /  ALT  34  /  AlkPhos  127<H>  11-02    Hemoglobin A1C:     LIVER FUNCTIONS - ( 02 Nov 2023 06:00 )  Alb: 3.0 g/dL / Pro: 6.9 g/dL / ALK PHOS: 127 U/L / ALT: 34 U/L / AST: 29 U/L / GGT: x           Vitamin B12         RADIOLOGY      ANALYSIS AND PLAN:  This is a 68-year-old with episode of altered mental status.  For episode of altered mental status, most like secondary to metabolic underlying urinary tract infection, suspect the patient also has slight mild cognitive impairment and a history of underlying bipolar disorder, suspect less likely this is a primary CNS event.  For possibly urinary tract infection, antibiotics as needed.  For history of bipolar disorder, continue the patient on home psychiatric medications, if necessary can try the patient on Depakote or Seroquel if she continues to be agitated.  For hyperlipidemia, continue the patient's statin.  For hypertension, monitor systolic blood pressure.  fall precautions   neurologic wise stable   physical therapy as tolerated      50 minutes of time was spent with the patient, plan of care, reviewing data, with greater than 50% of the visit was spent counseling and/or coordinating care with multidisciplinary healthcare team  
Neurology follow up note    SRIDEVI [id number]      Interval History:    Patient feels ok no new complaints.    Allergies    sulfa drugs (Other)  Ambien (Other)    Intolerances        MEDICATIONS    acetaminophen     Tablet .. 650 milliGRAM(s) Oral every 6 hours PRN  atorvastatin 40 milliGRAM(s) Oral at bedtime  clonazePAM  Tablet 0.5 milliGRAM(s) Oral two times a day  clopidogrel Tablet 75 milliGRAM(s) Oral daily  FLUoxetine 40 milliGRAM(s) Oral daily  gabapentin 300 milliGRAM(s) Oral every 8 hours  heparin   Injectable 5000 Unit(s) SubCutaneous every 8 hours  lactobacillus acidophilus 1 Tablet(s) Oral two times a day with meals  lisinopril 5 milliGRAM(s) Oral daily  lithium 300 milliGRAM(s) Oral two times a day  pantoprazole    Tablet 40 milliGRAM(s) Oral before breakfast  QUEtiapine 100 milliGRAM(s) Oral at bedtime          Height (cm): 162.6 (11-08 @ 04:42)  Weight (kg): 100.1 (11-08 @ 04:42)  BMI (kg/m2): 37.9 (11-08 @ 04:42)    Vital Signs Last 24 Hrs  T(C): 36.8 (08 Nov 2023 04:42), Max: 36.9 (07 Nov 2023 21:24)  T(F): 98.2 (08 Nov 2023 04:42), Max: 98.4 (07 Nov 2023 21:24)  HR: 74 (08 Nov 2023 04:42) (62 - 75)  BP: 129/74 (08 Nov 2023 04:42) (98/65 - 129/74)  BP(mean): --  RR: 18 (08 Nov 2023 04:42) (18 - 19)  SpO2: 95% (08 Nov 2023 04:42) (93% - 95%)    Parameters below as of 08 Nov 2023 04:42  Patient On (Oxygen Delivery Method): room air      REVIEW OF SYSTEMS:  Constitutionally, the patient denies fever, chills, or night sweats.  Head:  No headaches.  Eyes:  No double vision or blurry vision.  Ears:  No ringing in the ears.  Neck:  No neck pain.  Cardiovascular:  No chest pain.  Respiratory:  No shortness of breath.  Abdomen:  No nausea, vomiting, or abdominal pain.  Extremities/Neurological:  No numbness or tingling.  Musculoskeletal:  No joint pain.      PHYSICAL EXAMINATION:  HEENT:  Head:  Normocephalic, atraumatic.  Eyes:  No scleral icterus.  Ears:  Hearing appeared to be intact.  NECK:  Supple.  CARDIOVASCULAR:  S1 and S2 heard.  RESPIRATORY:  Air entry bilaterally.  ABDOMEN:  Soft, nontender.EXTREMITIES:  No clubbing or cyanosis was noted.      NEUROLOGIC:  The patient awake and alert.  Location hospital, year was 2023, month was November.  Upon conversing with the patient at times she appears slightly forgetful, was able to name simple objects.  Extraocular movements were intact.  Speech was fluent.  Smile symmetric.  The patient has a runny nose on the left, as per her states that she has underlying nose issue, not new.  Motor:  Bilateral upper 4/5, bilateral lower 3/5.  Sensory:  Bilateral upper and lower intact to light touch.    LABS:            Hemoglobin A1C:       Vitamin B12         RADIOLOGY    ANALYSIS AND PLAN:  This is a 68-year-old with episode of altered mental status.  For episode of altered mental status, most like secondary to metabolic underlying urinary tract infection, suspect the patient also has slight mild cognitive impairment and a history of underlying bipolar disorder, suspect less likely this is a primary CNS event.  For possibly urinary tract infection, antibiotics as needed.  For history of bipolar disorder, continue the patient on home psychiatric medications, if necessary can try the patient on Depakote or Seroquel if she continues to be agitated.  For hyperlipidemia, continue the patient's statin.  For hypertension, monitor systolic blood pressure.  fall precautions   neurologic wise stable   physical therapy as tolerated neurologic wise stable   monitor oral intake as needed      50 minutes of time was spent with the patient, plan of care, reviewing data, with greater than 50% of the visit was spent counseling and/or coordinating care with multidisciplinary healthcare team  
Neurology follow up note    SRIDEVI [id number]      Interval History:    Patient feels ok no new complaints.    Allergies    sulfa drugs (Other)  Ambien (Other)    Intolerances  atorvastatin 40 milliGRAM(s) Oral at bedtime  clonazePAM  Tablet 0.5 milliGRAM(s) Oral two times a day  clopidogrel Tablet 75 milliGRAM(s) Oral daily  FLUoxetine 40 milliGRAM(s) Oral daily  gabapentin 300 milliGRAM(s) Oral every 8 hours  heparin   Injectable 5000 Unit(s) SubCutaneous every 8 hours  lactobacillus acidophilus 1 Tablet(s) Oral two times a day with meals  lisinopril 5 milliGRAM(s) Oral daily  lithium 300 milliGRAM(s) Oral two times a day  pantoprazole    Tablet 40 milliGRAM(s) Oral before breakfast  QUEtiapine 100 milliGRAM(s) Oral at bedtime      Vital Signs Last 24 Hrs  T(C): 36.6 (05 Nov 2023 04:46), Max: 37.4 (04 Nov 2023 14:38)  T(F): 97.8 (05 Nov 2023 04:46), Max: 99.4 (04 Nov 2023 14:38)  HR: 64 (05 Nov 2023 04:46) (64 - 74)  BP: 101/67 (05 Nov 2023 04:46) (99/62 - 111/74)  BP(mean): --  RR: 16 (05 Nov 2023 04:46) (16 - 19)  SpO2: 94% (05 Nov 2023 04:46) (94% - 98%)    REVIEW OF SYSTEMS:  Constitutionally, the patient denies fever, chills, or night sweats.  Head:  No headaches.  Eyes:  No double vision or blurry vision.  Ears:  No ringing in the ears.  Neck:  No neck pain.  Cardiovascular:  No chest pain.  Respiratory:  No shortness of breath.  Abdomen:  No nausea, vomiting, or abdominal pain.  Extremities/Neurological:  No numbness or tingling.  Musculoskeletal:  No joint pain.        PHYSICAL EXAMINATION:  HEENT:  Head:  Normocephalic, atraumatic.  Eyes:  No scleral icterus.  Ears:  Hearing appeared to be intact.  NECK:  Supple.  CARDIOVASCULAR:  S1 and S2 heard.  RESPIRATORY:  Air entry bilaterally.  ABDOMEN:  Soft, nontender.EXTREMITIES:  No clubbing or cyanosis was noted.      NEUROLOGIC:  The patient awake and alert.  Location hospital,  Upon conversing with the patient at times she appears slightly forgetful, was able to name simple objects.  Extraocular movements were intact.  Speech was fluent.  Smile symmetric.  The patient has a runny nose on the left, as per her states that she has underlying nose issue, not new.  Motor:  Bilateral upper 4/5, bilateral lower 3/5.  Sensory:  Bilateral upper and lower intact to light touch.        LABS:    IN:  Total IN: 0 mL    OUT:    Voided (mL): 1700 mL  Total OUT: 1700 mL    Total NET: -1700 mL      11-04 @ 08:01  -  11-04 @ 14:19  --------------------------------------------------------  IN:  Total IN: 0 mL    OUT:    Voided (mL): 900 mL  Total OUT: 900 mL    Total NET: -900 mL              RADIOLOGY        ANALYSIS AND PLAN:  This is a 68-year-old with episode of altered mental status.  For episode of altered mental status, most like secondary to metabolic underlying urinary tract infection, suspect the patient also has slight mild cognitive impairment and a history of underlying bipolar disorder, suspect less likely this is a primary CNS event.  For possibly urinary tract infection, antibiotics as needed.  For history of bipolar disorder, continue the patient on home psychiatric medications, if necessary can try the patient on Depakote or Seroquel if she continues to be agitated.  For hyperlipidemia, continue the patient's statin.  For hypertension, monitor systolic blood pressure.  fall precautions   neurologic wise stable   physical therapy as tolerated   monitor oral intake as needed    50 minutes of time was spent with the patient, plan of care, reviewing data, with greater than 50% of the visit was spent counseling and/or coordinating care with multidisciplinary healthcare team        
Patient is a 68y old  Female who presents with a chief complaint of aggressive behaviour (04 Nov 2023 11:46)      INTERVAL HPI/OVERNIGHT EVENTS:overnight events noted    Home Medications:  atorvastatin 40 mg oral tablet: 1 tab(s) orally once a day (at bedtime) (27 Oct 2023 02:34)  FLUoxetine 60 mg oral tablet: 1 tab(s) orally once a day (27 Oct 2023 02:34)  gabapentin 300 mg oral tablet: orally 3 times a day (27 Oct 2023 02:34)  KlonoPIN 0.5 mg oral tablet: 1 tab(s) orally 2 times a day (27 Oct 2023 22:35)  lisinopril 5 mg oral tablet: 1 tab(s) orally once a day (03 Nov 2023 11:55)  lithium 300 mg oral capsule: 1 cap(s) orally 2 times a day (27 Oct 2023 22:32)  Plavix 75 mg oral tablet: 1 tab(s) orally once a day (27 Oct 2023 22:33)  Seroquel 200 mg oral tablet: 1 tab(s) orally once a day (at bedtime) (27 Oct 2023 22:34)  Vitamin D2 capsule po weekly on Wednesdays:  (27 Oct 2023 02:34)      MEDICATIONS  (STANDING):  atorvastatin 40 milliGRAM(s) Oral at bedtime  cefuroxime   Tablet 500 milliGRAM(s) Oral every 12 hours  clopidogrel Tablet 75 milliGRAM(s) Oral daily  FLUoxetine 40 milliGRAM(s) Oral daily  gabapentin 300 milliGRAM(s) Oral every 8 hours  heparin   Injectable 5000 Unit(s) SubCutaneous every 8 hours  lactobacillus acidophilus 1 Tablet(s) Oral two times a day with meals  lisinopril 5 milliGRAM(s) Oral daily  lithium 300 milliGRAM(s) Oral two times a day  pantoprazole    Tablet 40 milliGRAM(s) Oral before breakfast  QUEtiapine 100 milliGRAM(s) Oral at bedtime    MEDICATIONS  (PRN):  acetaminophen     Tablet .. 650 milliGRAM(s) Oral every 6 hours PRN Temp greater or equal to 38C (100.4F), Mild Pain (1 - 3), Moderate Pain (4 - 6)      Allergies    sulfa drugs (Other)  Ambien (Other)    Intolerances        REVIEW OF SYSTEMS:  CONSTITUTIONAL: No fever, weight loss, or fatigue  EYES: No eye pain, visual disturbances, or discharge  ENMT:  No difficulty hearing, tinnitus, vertigo; No sinus or throat pain  NECK: No pain or stiffness  BREASTS: No pain, masses, or nipple discharge  RESPIRATORY: No cough, wheezing, chills or hemoptysis; No shortness of breath  CARDIOVASCULAR: No chest pain, palpitations, dizziness, or leg swelling  GASTROINTESTINAL: No abdominal or epigastric pain. No nausea, vomiting, or hematemesis; No diarrhea or constipation. No melena or hematochezia.  GENITOURINARY: No dysuria, frequency, hematuria, or incontinence  NEUROLOGICAL: No headaches, memory loss, loss of strength, numbness, or tremors    Vital Signs Last 24 Hrs  T(C): 37.4 (04 Nov 2023 10:37), Max: 37.4 (04 Nov 2023 10:37)  T(F): 99.3 (04 Nov 2023 10:37), Max: 99.3 (04 Nov 2023 10:37)  HR: 70 (04 Nov 2023 10:37) (64 - 71)  BP: 110/58 (04 Nov 2023 10:37) (94/57 - 117/61)  BP(mean): --  RR: 17 (04 Nov 2023 10:37) (17 - 19)  SpO2: 100% (04 Nov 2023 10:37) (94% - 100%)    Parameters below as of 04 Nov 2023 10:37  Patient On (Oxygen Delivery Method): room air        PHYSICAL EXAM:  GENERAL: NAD, well-groomed, well-developed  HEAD:  Atraumatic, Normocephalic  EYES: EOMI, PERRLA, conjunctiva and sclera clear  ENMT: Moist mucous membranes,   NECK: Supple, No JVD, Normal thyroid  NERVOUS SYSTEM:  Alert & Oriented X2, non focal  CHEST/LUNG: Clear to percussion bilaterally; No rales, rhonchi, wheezing, or rubs  HEART: Regular rate and rhythm; No murmurs, rubs, or gallops  ABDOMEN: Soft, Nontender, Nondistended; Bowel sounds present  EXTREMITIES:  2+ Peripheral Pulses, No clubbing, cyanosis, or edema  LYMPH: No lymphadenopathy noted  SKIN: No rashes or lesions    LABS:                        9.6    5.87  )-----------( 263      ( 03 Nov 2023 07:56 )             31.2     11-03    139  |  104  |  17  ----------------------------<  107<H>  4.4   |  28  |  0.89    Ca    9.9      03 Nov 2023 07:56    TPro  6.7  /  Alb  3.1<L>  /  TBili  0.4  /  DBili  x   /  AST  34  /  ALT  39  /  AlkPhos  121<H>  11-03      Urinalysis Basic - ( 03 Nov 2023 07:56 )    Color: x / Appearance: x / SG: x / pH: x  Gluc: 107 mg/dL / Ketone: x  / Bili: x / Urobili: x   Blood: x / Protein: x / Nitrite: x   Leuk Esterase: x / RBC: x / WBC x   Sq Epi: x / Non Sq Epi: x / Bacteria: x      CAPILLARY BLOOD GLUCOSE              I&O's Summary    03 Nov 2023 07:01  -  04 Nov 2023 07:00  --------------------------------------------------------  IN: 0 mL / OUT: 1700 mL / NET: -1700 mL    04 Nov 2023 08:01  -  04 Nov 2023 11:52  --------------------------------------------------------  IN: 0 mL / OUT: 900 mL / NET: -900 mL        RADIOLOGY & ADDITIONAL TESTS:    Imaging Personally Reviewed:  [ x] YES  [ ] NO    Consultant(s) Notes Reviewed:  [ x] YES  [ ] NO    Care Discussed with Consultants/Other Providers [x ] YES  [ ] NO
     SRIDEVI BELLE is a 68yFemale , patient examined and chart reviewed.     INTERVAL HPI/ OVERNIGHT EVENTS:   No events. NAD.  Awake. Calm.    PAST MEDICAL & SURGICAL HISTORY:  HTN (hypertension)  HLD (hyperlipidemia)  Bipolar illness  MDD (major depressive disorder)      For details regarding the patient's social history, family history, and other miscellaneous elements, please refer the initial infectious diseases consultation and/or the admitting history and physical examination for this admission.    ROS:  Unable to obtain due to : pt's condition    ALLERGIES  sulfa drugs (Other)  Ambien (Other)      Current inpatient medications :    ANTIBIOTICS/RELEVANT:  cefuroxime   Tablet 500 milliGRAM(s) Oral every 12 hours    MEDICATIONS  (STANDING):  atorvastatin 40 milliGRAM(s) Oral at bedtime  clonazePAM  Tablet 0.5 milliGRAM(s) Oral two times a day  clopidogrel Tablet 75 milliGRAM(s) Oral daily  FLUoxetine 40 milliGRAM(s) Oral daily  gabapentin 300 milliGRAM(s) Oral every 8 hours  heparin   Injectable 5000 Unit(s) SubCutaneous every 8 hours  lactobacillus acidophilus 1 Tablet(s) Oral two times a day with meals  lisinopril 5 milliGRAM(s) Oral daily  lithium 300 milliGRAM(s) Oral two times a day  pantoprazole    Tablet 40 milliGRAM(s) Oral before breakfast  QUEtiapine 100 milliGRAM(s) Oral at bedtime    MEDICATIONS  (PRN):  acetaminophen     Tablet .. 650 milliGRAM(s) Oral every 6 hours PRN Temp greater or equal to 38C (100.4F), Mild Pain (1 - 3), Moderate Pain (4 - 6)      Objective:  Vital Signs Last 24 Hrs  T(C): 37 (11-03-23 @ 14:00), Max: 37.1 (11-02-23 @ 17:05)  T(F): 98.6 (11-03-23 @ 14:00), Max: 98.7 (11-02-23 @ 17:05)  HR: 78 (11-03-23 @ 14:00) (68 - 78)  BP: 106/63 (11-03-23 @ 14:00) (101/60 - 118/58)  RR: 17 (11-03-23 @ 14:00) (15 - 18)  SpO2: 98% (11-03-23 @ 14:00) (94% - 98%)      Physical Exam:  General: no acute distress  Neck: supple, trachea midline  Lungs: clear, no wheeze/rhonchi  Cardiovascular: regular rate and rhythm, S1 S2  Abdomen: soft, nontender,  bowel sounds normal  Neurological: confused  Skin: no rash  Extremities: no edema        LABS:                        9.6    5.87  )-----------( 263      ( 03 Nov 2023 07:56 )             31.2   11-03    139  |  104  |  17  ----------------------------<  107<H>  4.4   |  28  |  0.89    Ca    9.9      03 Nov 2023 07:56    TPro  6.7  /  Alb  3.1<L>  /  TBili  0.4  /  DBili  x   /  AST  34  /  ALT  39  /  AlkPhos  121<H>  11-03    Urine Microscopic-Add On (NC) (10.27.23 @ 05:48)    Red Blood Cell - Urine: 0 /HPF   White Blood Cell - Urine: >50 /HPF   Bacteria: Many /HPF   Squamous Epithelial Cells: Present  Urinalysis (10.27.23 @ 05:48)    Glucose Qualitative, Urine: Negative mg/dL   Blood, Urine: Negative   pH Urine: 5.5   Color: Yellow   Urine Appearance: Cloudy   Bilirubin: Negative   Ketone - Urine: Negative mg/dL   Specific Gravity: 1.013   Protein, Urine: Trace mg/dL   Urobilinogen: 1.0 mg/dL   Nitrite: Positive   Leukocyte Esterase Concentration: Large      MICROBIOLOGY:  Culture - Urine (10.27.23 @ 05:48)    -  Amikacin: S <=16   -  Amoxicillin/Clavulanic Acid: S <=8/4   -  Ampicillin: S <=8 These ampicillin results predict results for amoxicillin   -  Ampicillin/Sulbactam: S <=4/2   -  Aztreonam: S <=4   -  Cefazolin: S <=2 For uncomplicated UTI with K. pneumoniae, E. coli, or P. mirablis: SARA <=16 is sensitive and SARA >=32 is resistant. This also predicts results for oral agents cefaclor, cefdinir, cefpodoxime, cefprozil, cefuroxime axetil, cephalexin and locarbef for uncomplicated UTI. Note that some isolates may be susceptible to these agents while testing resistant to cefazolin.   -  Cefepime: S <=2   -  Cefoxitin: S <=8   -  Ceftriaxone: S <=1   -  Cefuroxime: S <=4   -  Ciprofloxacin: S <=0.25   -  Ertapenem: S <=0.5   -  Gentamicin: S <=2   -  Imipenem: S <=1   -  Levofloxacin: S <=0.5   -  Meropenem: S <=1   -  Nitrofurantoin: S <=32 Should not be used to treat pyelonephritis   -  Piperacillin/Tazobactam: S <=8   -  Tobramycin: S <=2   -  Trimethoprim/Sulfamethoxazole: S <=0.5/9.5   Specimen Source: Clean Catch Clean Catch (Midstream)   Culture Results:   50,000 - 99,000 CFU/mL Escherichia coli   Organism Identification: Escherichia coli   Organism: Escherichia coli   Method Type: SARA      RADIOLOGY & ADDITIONAL STUDIES:      Assessment :  67YO F resident of [facility] PMH of MDD, HTN HLD, Bipolar disorder fro [facility] [employer],  brought from assisted living by EMS with complaint of aggressive behavior.  Seen by psych.   UA with pyuria. Ucx grew Ecoli.  Pt unable to provide history. ?UTI vs asymptomatic bacteruria  Clinically stable    Plan :   Of Rocephin   Po Ceftin till 11/4/23  Trend temps and cbc  Asp precautions  Stable from ID standpoint  Dc planning per primary team      Continue with present regiment.  Appropriate use of antibiotics and adverse effects reviewed.      > 35 minutes were spent in direct patient care reviewing notes, medications ,labs data/ imaging , discussion with multidisciplinary team.    Thank you for allowing me to participate in care of your patient .    Karen Boswell MD  Infectious Disease  [phone]
Chief Complaint: Agitation    Interval Events: No events overnight.    Review of Systems:  General: No fevers, chills, weight gain  Skin: No rashes, color changes  Cardiovascular: No chest pain, orthopnea  Respiratory: No shortness of breath, cough  Gastrointestinal: No nausea, abdominal pain  Genitourinary: No incontinence, pain with urination  Musculoskeletal: No pain, swelling, decreased range of motion  Neurological: No headache, weakness  Psychiatric: No depression, anxiety  Endocrine: No weight gain, increased thirst  All other systems are comprehensively negative.    Physical Exam:  Vital Signs Last 24 Hrs  T(C): 36.7 (02 Nov 2023 08:08), Max: 36.9 (02 Nov 2023 01:00)  T(F): 98.1 (02 Nov 2023 08:08), Max: 98.4 (02 Nov 2023 01:00)  HR: 62 (02 Nov 2023 08:08) (62 - 71)  BP: 110/60 (02 Nov 2023 08:08) (110/60 - 116/77)  BP(mean): --  RR: 17 (02 Nov 2023 08:08) (17 - 17)  SpO2: 92% (02 Nov 2023 08:08) (92% - 94%)  Parameters below as of 02 Nov 2023 01:00  Patient On (Oxygen Delivery Method): room air  General: NAD  HEENT: MMM  Neck: No JVD, no carotid bruit  Lungs: CTAB  CV: RRR, nl S1/S2, no M/R/G  Abdomen: S/NT/ND, +BS  Extremities: No LE edema, no cyanosis  Neuro: AAOx3, non-focal  Skin: No rash    Labs:    11-02    139  |  104  |  19  ----------------------------<  110<H>  4.4   |  27  |  0.87    Ca    9.9      02 Nov 2023 06:00    TPro  6.9  /  Alb  3.0<L>  /  TBili  0.4  /  DBili  x   /  AST  29  /  ALT  34  /  AlkPhos  127<H>  11-02                        11.0   5.80  )-----------( 256      ( 02 Nov 2023 06:00 )             36.8     ECG/Telemetry: Sinus rhythm
Date/Time Patient Seen:  		  Referring MD:   Data Reviewed	       Patient is a 68y old  Female who presents with a chief complaint of aggressive behaviour (01 Nov 2023 15:42)      Subjective/HPI     PAST MEDICAL & SURGICAL HISTORY:  HTN (hypertension)    HLD (hyperlipidemia)    Bipolar illness    MDD (major depressive disorder)    No significant past surgical history          Medication list         MEDICATIONS  (STANDING):  atorvastatin 40 milliGRAM(s) Oral at bedtime  cefTRIAXone   IVPB 1000 milliGRAM(s) IV Intermittent every 24 hours  clonazePAM  Tablet 0.5 milliGRAM(s) Oral two times a day  clopidogrel Tablet 75 milliGRAM(s) Oral daily  FLUoxetine 40 milliGRAM(s) Oral daily  gabapentin 300 milliGRAM(s) Oral every 8 hours  heparin   Injectable 5000 Unit(s) SubCutaneous every 8 hours  lactobacillus acidophilus 1 Tablet(s) Oral two times a day with meals  lisinopril 5 milliGRAM(s) Oral daily  lithium 300 milliGRAM(s) Oral two times a day  pantoprazole    Tablet 40 milliGRAM(s) Oral before breakfast  QUEtiapine 100 milliGRAM(s) Oral at bedtime    MEDICATIONS  (PRN):  acetaminophen     Tablet .. 650 milliGRAM(s) Oral every 6 hours PRN Temp greater or equal to 38C (100.4F), Mild Pain (1 - 3), Moderate Pain (4 - 6)         Vitals log        ICU Vital Signs Last 24 Hrs  T(C): 36.9 (02 Nov 2023 01:00), Max: 36.9 (02 Nov 2023 01:00)  T(F): 98.4 (02 Nov 2023 01:00), Max: 98.4 (02 Nov 2023 01:00)  HR: 69 (02 Nov 2023 01:00) (69 - 84)  BP: 110/68 (02 Nov 2023 01:00) (110/68 - 147/78)  BP(mean): --  ABP: --  ABP(mean): --  RR: 17 (02 Nov 2023 01:00) (17 - 18)  SpO2: 93% (02 Nov 2023 01:00) (93% - 98%)    O2 Parameters below as of 02 Nov 2023 01:00  Patient On (Oxygen Delivery Method): room air                 Input and Output:  I&O's Detail    31 Oct 2023 07:01  -  01 Nov 2023 07:00  --------------------------------------------------------  IN:  Total IN: 0 mL    OUT:    Voided (mL): 700 mL  Total OUT: 700 mL    Total NET: -700 mL      01 Nov 2023 07:01  -  02 Nov 2023 05:58  --------------------------------------------------------  IN:  Total IN: 0 mL    OUT:    Voided (mL): 1 mL  Total OUT: 1 mL    Total NET: -1 mL          Lab Data                        10.1   5.12  )-----------( 259      ( 01 Nov 2023 06:30 )             33.5     11-01    136  |  103  |  15  ----------------------------<  123<H>  4.5   |  26  |  1.14    Ca    9.4      01 Nov 2023 06:30    TPro  6.4  /  Alb  2.9<L>  /  TBili  0.4  /  DBili  x   /  AST  35  /  ALT  42  /  AlkPhos  121<H>  11-01            Review of Systems	      Objective     Physical Examination    heart s1s2  lung dc BS  head nc      Pertinent Lab findings & Imaging      Teresita:  NO   Adequate UO     I&O's Detail    31 Oct 2023 07:01  -  01 Nov 2023 07:00  --------------------------------------------------------  IN:  Total IN: 0 mL    OUT:    Voided (mL): 700 mL  Total OUT: 700 mL    Total NET: -700 mL      01 Nov 2023 07:01  -  02 Nov 2023 05:58  --------------------------------------------------------  IN:  Total IN: 0 mL    OUT:    Voided (mL): 1 mL  Total OUT: 1 mL    Total NET: -1 mL               Discussed with:     Cultures:	        Radiology                            
Neurology follow up note    SRIDEVI [id number]      Interval History:    Patient feels ok walking with physical Thearpy     Allergies    sulfa drugs (Other)  Ambien (Other)    Intolerances        MEDICATIONS    acetaminophen     Tablet .. 650 milliGRAM(s) Oral every 6 hours PRN  atorvastatin 40 milliGRAM(s) Oral at bedtime  cefTRIAXone   IVPB 1000 milliGRAM(s) IV Intermittent every 24 hours  clonazePAM  Tablet 0.5 milliGRAM(s) Oral two times a day  clopidogrel Tablet 75 milliGRAM(s) Oral daily  FLUoxetine 40 milliGRAM(s) Oral daily  gabapentin 300 milliGRAM(s) Oral every 8 hours  heparin   Injectable 5000 Unit(s) SubCutaneous every 8 hours  lactobacillus acidophilus 1 Tablet(s) Oral two times a day with meals  lisinopril 5 milliGRAM(s) Oral daily  lithium 300 milliGRAM(s) Oral two times a day  pantoprazole    Tablet 40 milliGRAM(s) Oral before breakfast  QUEtiapine 100 milliGRAM(s) Oral at bedtime              Vital Signs Last 24 Hrs  T(C): 36.8 (01 Nov 2023 07:50), Max: 36.8 (31 Oct 2023 23:25)  T(F): 98.2 (01 Nov 2023 07:50), Max: 98.3 (31 Oct 2023 23:25)  HR: 71 (01 Nov 2023 07:50) (71 - 84)  BP: 126/79 (01 Nov 2023 07:50) (109/55 - 147/78)  BP(mean): --  RR: 18 (01 Nov 2023 07:50) (16 - 18)  SpO2: 98% (01 Nov 2023 07:50) (95% - 98%)    Parameters below as of 01 Nov 2023 07:50  Patient On (Oxygen Delivery Method): room air      REVIEW OF SYSTEMS:  Constitutionally, the patient denies fever, chills, or night sweats.  Head:  No headaches.  Eyes:  No double vision or blurry vision.  Ears:  No ringing in the ears.  Neck:  No neck pain.  Cardiovascular:  No chest pain.  Respiratory:  No shortness of breath.  Abdomen:  No nausea, vomiting, or abdominal pain.  Extremities/Neurological:  No numbness or tingling.  Musculoskeletal:  No joint pain.        PHYSICAL EXAMINATION:  HEENT:  Head:  Normocephalic, atraumatic.  Eyes:  No scleral icterus.  Ears:  Hearing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 results predict results for amoxicillin   -  Ampicillin/Sulbactam: S <=4/2   -  Aztreonam: S <=4   -  Cefazolin: S <=2 For uncomplicated UTI with K. pneumoniae, E. coli, or P. mirablis: SARA <=16 is sensitive and SARA >=32 is resistant. This also predicts results for oral agents cefaclor, cefdinir, cefpodoxime, cefprozil, cefuroxime axetil, cephalexin and locarbef for uncomplicated UTI. Note that some isolates may be susceptible to these agents while testing resistant to cefazolin.   -  Cefepime: S <=2   -  Cefoxitin: S <=8   -  Ceftriaxone: S <=1   -  Cefuroxime: S <=4   -  Ciprofloxacin: S <=0.25   -  Ertapenem: S <=0.5   -  Gentamicin: S <=2   -  Imipenem: S <=1   -  Levofloxacin: S <=0.5   -  Meropenem: S <=1   -  Nitrofurantoin: S <=32 Should not be used to treat pyelonephritis   -  Piperacillin/Tazobactam: S <=8   -  Tobramycin: S <=2   -  Trimethoprim/Sulfamethoxazole: S <=0.5/9.5   Specimen Source: Clean Catch Clean Catch (Midstream)   Culture Results:   50,000 - 99,000 CFU/mL Escherichia coli   Organism Identification: Escherichia coli   Organism: Escherichia coli   Method Type: SARA      RADIOLOGY & ADDITIONAL STUDIES:      Assessment :  67YO F resident of [facility] PMH of MDD, HTN HLD, Bipolar disorder fro [facility],  brought from assisted living by EMS with complaint of aggressive behavior.  Seen by psych.   UA with pyuria. Ucx grew Ecoli.  Pt unable to provide history. ?UTI vs asymptomatic bacteruria      Plan :   Cont Rocephin x 5 days  Trend temps and cbc  Asp precautions  Stable from ID standpoint    Continue with present regiment.  Appropriate use of antibiotics and adverse effects reviewed.      > 35 minutes were spent in direct patient care reviewing notes, medications ,labs data/ imaging , discussion with multidisciplinary team.    Thank you for allowing me to participate in care of your patient .    Karen Boswell MD  Infectious Disease  [phone]
     SRIDEVI BELLE is a 68yFemale , patient examined and chart reviewed.     INTERVAL HPI/ OVERNIGHT EVENTS:   No events. Afebrile.    PAST MEDICAL & SURGICAL HISTORY:  HTN (hypertension)  HLD (hyperlipidemia)  Bipolar illness  MDD (major depressive disorder)      For details regarding the patient's social history, family history, and other miscellaneous elements, please refer the initial infectious diseases consultation and/or the admitting history and physical examination for this admission.    ROS:  Unable to obtain due to : pt's condition    ALLERGIES  sulfa drugs (Other)  Ambien (Other)      Current inpatient medications :    ANTIBIOTICS/RELEVANT:  cefTRIAXone   IVPB 1000 milliGRAM(s) IV Intermittent every 24 hours    MEDICATIONS  (STANDING):  atorvastatin 40 milliGRAM(s) Oral at bedtime  clonazePAM  Tablet 0.5 milliGRAM(s) Oral two times a day  clopidogrel Tablet 75 milliGRAM(s) Oral daily  FLUoxetine 40 milliGRAM(s) Oral daily  gabapentin 300 milliGRAM(s) Oral every 8 hours  heparin   Injectable 5000 Unit(s) SubCutaneous every 8 hours  lactobacillus acidophilus 1 Tablet(s) Oral two times a day with meals  lisinopril 5 milliGRAM(s) Oral daily  lithium 300 milliGRAM(s) Oral two times a day  pantoprazole    Tablet 40 milliGRAM(s) Oral before breakfast  QUEtiapine 100 milliGRAM(s) Oral at bedtime    MEDICATIONS  (PRN):  acetaminophen     Tablet .. 650 milliGRAM(s) Oral every 6 hours PRN Temp greater or equal to 38C (100.4F), Mild Pain (1 - 3), Moderate Pain (4 - 6)    Objective:  Vital Signs Last 24 Hrs  T(C): 36.8 (01 Nov 2023 07:50), Max: 36.8 (31 Oct 2023 23:25)  T(F): 98.2 (01 Nov 2023 07:50), Max: 98.3 (31 Oct 2023 23:25)  HR: 71 (01 Nov 2023 07:50) (71 - 84)  BP: 126/79 (01 Nov 2023 07:50) (109/55 - 147/78)  RR: 18 (01 Nov 2023 07:50) (17 - 18)  SpO2: 98% (01 Nov 2023 07:50) (95% - 98%)    Parameters below as of 01 Nov 2023 07:50  Patient On (Oxygen Delivery Method): room air      Physical Exam:  General: no acute distress  Neck: supple, trachea midline  Lungs: clear, no wheeze/rhonchi  Cardiovascular: regular rate and rhythm, S1 S2  Abdomen: soft, nontender,  bowel sounds normal  Neurological: confused  Skin: no rash  Extremities: no edema        LABS:                        10.1   5.12  )-----------( 259      ( 01 Nov 2023 06:30 )             33.5   11-01    136  |  103  |  15  ----------------------------<  123<H>  4.5   |  26  |  1.14    Ca    9.4      01 Nov 2023 06:30    TPro  6.4  /  Alb  2.9<L>  /  TBili  0.4  /  DBili  x   /  AST  35  /  ALT  42  /  AlkPhos  121<H>  11-01      Urine Microscopic-Add On (NC) (10.27.23 @ 05:48)    Red Blood Cell - Urine: 0 /HPF   White Blood Cell - Urine: >50 /HPF   Bacteria: Many /HPF   Squamous Epithelial Cells: Present  Urinalysis (10.27.23 @ 05:48)    Glucose Qualitative, Urine: Negative mg/dL   Blood, Urine: Negative   pH Urine: 5.5   Color: Yellow   Urine Appearance: Cloudy   Bilirubin: Negative   Ketone - Urine: Negative mg/dL   Specific Gravity: 1.013   Protein, Urine: Trace mg/dL   Urobilinogen: 1.0 mg/dL   Nitrite: Positive   Leukocyte Esterase Concentration: Large      MICROBIOLOGY:  Culture - Urine (10.27.23 @ 05:48)    -  Amikacin: S <=16   -  Amoxicillin/Clavulanic Acid: S <=8/4   -  Ampicillin: S <=8 These ampicillin results predict results for amoxicillin   -  Ampicillin/Sulbactam: S <=4/2   -  Aztreonam: S <=4   -  Cefazolin: S <=2 For uncomplicated UTI with K. pneumoniae, E. coli, or P. mirablis: SARA <=16 is sensitive and SARA >=32 is resistant. This also predicts results for oral agents cefaclor, cefdinir, cefpodoxime, cefprozil, cefuroxime axetil, cephalexin and locarbef for uncomplicated UTI. Note that some isolates may be susceptible to these agents while testing resistant to cefazolin.   -  Cefepime: S <=2   -  Cefoxitin: S <=8   -  Ceftriaxone: S <=1   -  Cefuroxime: S <=4   -  Ciprofloxacin: S <=0.25   -  Ertapenem: S <=0.5   -  Gentamicin: S <=2   -  Imipenem: S <=1   -  Levofloxacin: S <=0.5   -  Meropenem: S <=1   -  Nitrofurantoin: S <=32 Should not be used to treat pyelonephritis   -  Piperacillin/Tazobactam: S <=8   -  Tobramycin: S <=2   -  Trimethoprim/Sulfamethoxazole: S <=0.5/9.5   Specimen Source: Clean Catch Clean Catch (Midstream)   Culture Results:   50,000 - 99,000 CFU/mL Escherichia coli   Organism Identification: Escherichia coli   Organism: Escherichia coli   Method Type: SARA      RADIOLOGY & ADDITIONAL STUDIES:      Assessment :  69YO F resident of [facility] PMH of MDD, HTN HLD, B[facility] disorder [facility],  brought from assisted living by EMS with complaint of aggressive behavior.  Seen by psych.   UA with pyuria. Ucx grew Ecoli.  Pt unable to provide history. ?UTI vs asymptomatic bacteruria      Plan :   Cont Rocephin x 5 days till 11/4/23  Trend temps and cbc  Asp precautions  Stable from ID standpoint    Continue with present regiment.  Appropriate use of antibiotics and adverse effects reviewed.      > 35 minutes were spent in direct patient care reviewing notes, medications ,labs data/ imaging , discussion with multidisciplinary team.    Thank you for allowing me to participate in care of your patient .    Karen Boswell MD  Infectious Disease  [phone]
     SRIDEVI BELLE is a 68yFemale , patient examined and chart reviewed.     INTERVAL HPI/ OVERNIGHT EVENTS:   No events. NAD.    PAST MEDICAL & SURGICAL HISTORY:  HTN (hypertension)  HLD (hyperlipidemia)  Bipolar illness  MDD (major depressive disorder)      For details regarding the patient's social history, family history, and other miscellaneous elements, please refer the initial infectious diseases consultation and/or the admitting history and physical examination for this admission.    ROS:  Unable to obtain due to : pt's condition    ALLERGIES  sulfa drugs (Other)  Ambien (Other)      Current inpatient medications :    ANTIBIOTICS/RELEVANT:  cefTRIAXone   IVPB 1000 milliGRAM(s) IV Intermittent every 24 hours    MEDICATIONS  (STANDING):  atorvastatin 40 milliGRAM(s) Oral at bedtime  clonazePAM  Tablet 0.5 milliGRAM(s) Oral two times a day  clopidogrel Tablet 75 milliGRAM(s) Oral daily  FLUoxetine 40 milliGRAM(s) Oral daily  gabapentin 300 milliGRAM(s) Oral every 8 hours  heparin   Injectable 5000 Unit(s) SubCutaneous every 8 hours  lactobacillus acidophilus 1 Tablet(s) Oral two times a day with meals  lisinopril 5 milliGRAM(s) Oral daily  lithium 300 milliGRAM(s) Oral two times a day  pantoprazole    Tablet 40 milliGRAM(s) Oral before breakfast  QUEtiapine 100 milliGRAM(s) Oral at bedtime    MEDICATIONS  (PRN):  acetaminophen     Tablet .. 650 milliGRAM(s) Oral every 6 hours PRN Temp greater or equal to 38C (100.4F), Mild Pain (1 - 3), Moderate Pain (4 - 6)      Objective:  Vital Signs Last 24 Hrs  T(C): 37.1 (02 Nov 2023 17:05), Max: 37.1 (02 Nov 2023 17:05)  T(F): 98.7 (02 Nov 2023 17:05), Max: 98.7 (02 Nov 2023 17:05)  HR: 78 (02 Nov 2023 17:05) (62 - 78)  BP: 118/58 (02 Nov 2023 17:05) (110/60 - 118/58)  RR: 16 (02 Nov 2023 17:05) (16 - 17)  SpO2: 97% (02 Nov 2023 17:05) (92% - 97%)    Parameters below as of 02 Nov 2023 17:05  Patient On (Oxygen Delivery Method): room air      Physical Exam:  General: no acute distress  Neck: supple, trachea midline  Lungs: clear, no wheeze/rhonchi  Cardiovascular: regular rate and rhythm, S1 S2  Abdomen: soft, nontender,  bowel sounds normal  Neurological: confused  Skin: no rash  Extremities: no edema        LABS:                        11.0   5.80  )-----------( 256      ( 02 Nov 2023 06:00 )             36.8   11-02    139  |  104  |  19  ----------------------------<  110<H>  4.4   |  27  |  0.87    Ca    9.9      02 Nov 2023 06:00    TPro  6.9  /  Alb  3.0<L>  /  TBili  0.4  /  DBili  x   /  AST  29  /  ALT  34  /  AlkPhos  127<H>  11-02    Urine Microscopic-Add On (NC) (10.27.23 @ 05:48)    Red Blood Cell - Urine: 0 /HPF   White Blood Cell - Urine: >50 /HPF   Bacteria: Many /HPF   Squamous Epithelial Cells: Present  Urinalysis (10.27.23 @ 05:48)    Glucose Qualitative, Urine: Negative mg/dL   Blood, Urine: Negative   pH Urine: 5.5   Color: Yellow   Urine Appearance: Cloudy   Bilirubin: Negative   Ketone - Urine: Negative mg/dL   Specific Gravity: 1.013   Protein, Urine: Trace mg/dL   Urobilinogen: 1.0 mg/dL   Nitrite: Positive   Leukocyte Esterase Concentration: Large      MICROBIOLOGY:  Culture - Urine (10.27.23 @ 05:48)    -  Amikacin: S <=16   -  Amoxicillin/Clavulanic Acid: S <=8/4   -  Ampicillin: S <=8 These ampicillin results predict results for amoxicillin   -  Ampicillin/Sulbactam: S <=4/2   -  Aztreonam: S <=4   -  Cefazolin: S <=2 For uncomplicated UTI with K. pneumoniae, E. coli, or P. mirablis: SARA <=16 is sensitive and SARA >=32 is resistant. This also predicts results for oral agents cefaclor, cefdinir, cefpodoxime, cefprozil, cefuroxime axetil, cephalexin and locarbef for uncomplicated UTI. Note that some isolates may be susceptible to these agents while testing resistant to cefazolin.   -  Cefepime: S <=2   -  Cefoxitin: S <=8   -  Ceftriaxone: S <=1   -  Cefuroxime: S <=4   -  Ciprofloxacin: S <=0.25   -  Ertapenem: S <=0.5   -  Gentamicin: S <=2   -  Imipenem: S <=1   -  Levofloxacin: S <=0.5   -  Meropenem: S <=1   -  Nitrofurantoin: S <=32 Should not be used to treat pyelonephritis   -  Piperacillin/Tazobactam: S <=8   -  Tobramycin: S <=2   -  Trimethoprim/Sulfamethoxazole: S <=0.5/9.5   Specimen Source: Clean Catch Clean Catch (Midstream)   Culture Results:   50,000 - 99,000 CFU/mL Escherichia coli   Organism Identification: Escherichia coli   Organism: Escherichia coli   Method Type: SARA      RADIOLOGY & ADDITIONAL STUDIES:      Assessment :  69YO F resident of [facility] PMH of MDD, HTN HLD, Bipolar disorder fro [facility],  brought from assisted living by EMS with complaint of aggressive behavior.  Seen by psych.   UA with pyuria. Ucx grew Ecoli.  Pt unable to provide history. ?UTI vs asymptomatic bacteruria  Clinically stable    Plan :   On Rocephin   Will change to po Ceftin till 11/4/23  Trend temps and cbc  Asp precautions  Stable from ID standpoint  Dc planning per primary team      Continue with present regiment.  Appropriate use of antibiotics and adverse effects reviewed.      > 35 minutes were spent in direct patient care reviewing notes, medications ,labs data/ imaging , discussion with multidisciplinary team.    Thank you for allowing me to participate in care of your patient .    Karen Boswell MD  Infectious Disease  [phone]
     SRIDEVI BELLE is a 68yFemale , patient examined and chart reviewed.     INTERVAL HPI/ OVERNIGHT EVENTS:   No events. NAD.  Afebrile.    PAST MEDICAL & SURGICAL HISTORY:  HTN (hypertension)  HLD (hyperlipidemia)  Bipolar illness  MDD (major depressive disorder)      For details regarding the patient's social history, family history, and other miscellaneous elements, please refer the initial infectious diseases consultation and/or the admitting history and physical examination for this admission.    ROS:  Unable to obtain due to : pt's condition    ALLERGIES  sulfa drugs (Other)  Ambien (Other)      Current inpatient medications :    ANTIBIOTICS/RELEVANT:    MEDICATIONS  (STANDING):  atorvastatin 40 milliGRAM(s) Oral at bedtime  clonazePAM  Tablet 0.5 milliGRAM(s) Oral two times a day  clopidogrel Tablet 75 milliGRAM(s) Oral daily  FLUoxetine 40 milliGRAM(s) Oral daily  gabapentin 300 milliGRAM(s) Oral every 8 hours  heparin   Injectable 5000 Unit(s) SubCutaneous every 8 hours  lactobacillus acidophilus 1 Tablet(s) Oral two times a day with meals  lisinopril 5 milliGRAM(s) Oral daily  lithium 300 milliGRAM(s) Oral two times a day  pantoprazole    Tablet 40 milliGRAM(s) Oral before breakfast  QUEtiapine 100 milliGRAM(s) Oral at bedtime    MEDICATIONS  (PRN):  acetaminophen     Tablet .. 650 milliGRAM(s) Oral every 6 hours PRN Temp greater or equal to 38C (100.4F), Mild Pain (1 - 3), Moderate Pain (4 - 6)      Objective:  Vital Signs Last 24 Hrs  T(C): 36.8 (07 Nov 2023 05:04), Max: 37 (06 Nov 2023 20:58)  T(F): 98.2 (07 Nov 2023 05:04), Max: 98.6 (06 Nov 2023 20:58)  HR: 65 (07 Nov 2023 05:51) (65 - 78)  BP: 97/53 (07 Nov 2023 05:51) (95/55 - 111/66)  BP(mean): 67 (07 Nov 2023 05:51) (67 - 67)  RR: 17 (07 Nov 2023 05:04) (17 - 18)  SpO2: 96% (07 Nov 2023 05:04) (96% - 97%)    Parameters below as of 07 Nov 2023 05:04  Patient On (Oxygen Delivery Method): room air      Physical Exam:  General: no acute distress  Neck: supple, trachea midline  Lungs: clear, no wheeze/rhonchi  Cardiovascular: regular rate and rhythm, S1 S2  Abdomen: soft, nontender,  bowel sounds normal  Neurological: confused  Skin: no rash  Extremities: no edema      LABS:                        10.0   5.32  )-----------( 272      ( 06 Nov 2023 06:30 )             33.0   11-06    136  |  103  |  22  ----------------------------<  112<H>  4.5   |  26  |  0.94    Ca    9.6      06 Nov 2023 06:30    TPro  6.6  /  Alb  3.1<L>  /  TBili  0.4  /  DBili  x   /  AST  21  /  ALT  34  /  AlkPhos  105  11-06      Urine Microscopic-Add On (NC) (10.27.23 @ 05:48)    Red Blood Cell - Urine: 0 /HPF   White Blood Cell - Urine: >50 /HPF   Bacteria: Many /HPF   Squamous Epithelial Cells: Present  Urinalysis (10.27.23 @ 05:48)    Glucose Qualitative, Urine: Negative mg/dL   Blood, Urine: Negative   pH Urine: 5.5   Color: Yellow   Urine Appearance: Cloudy   Bilirubin: Negative   Ketone - Urine: Negative mg/dL   Specific Gravity: 1.013   Protein, Urine: Trace mg/dL   Urobilinogen: 1.0 mg/dL   Nitrite: Positive   Leukocyte Esterase Concentration: Large      MICROBIOLOGY:  Culture - Urine (10.27.23 @ 05:48)    -  Amikacin: S <=16   -  Amoxicillin/Clavulanic Acid: S <=8/4   -  Ampicillin: S <=8 These ampicillin results predict results for amoxicillin   -  Ampicillin/Sulbactam: S <=4/2   -  Aztreonam: S <=4   -  Cefazolin: S <=2 For uncomplicated UTI with K. pneumoniae, E. coli, or P. mirablis: SARA <=16 is sensitive and SARA >=32 is resistant. This also predicts results for oral agents cefaclor, cefdinir, cefpodoxime, cefprozil, cefuroxime axetil, cephalexin and locarbef for uncomplicated UTI. Note that some isolates may be susceptible to these agents while testing resistant to cefazolin.   -  Cefepime: S <=2   -  Cefoxitin: S <=8   -  Ceftriaxone: S <=1   -  Cefuroxime: S <=4   -  Ciprofloxacin: S <=0.25   -  Ertapenem: S <=0.5   -  Gentamicin: S <=2   -  Imipenem: S <=1   -  Levofloxacin: S <=0.5   -  Meropenem: S <=1   -  Nitrofurantoin: S <=32 Should not be used to treat pyelonephritis   -  Piperacillin/Tazobactam: S <=8   -  Tobramycin: S <=2   -  Trimethoprim/Sulfamethoxazole: S <=0.5/9.5   Specimen Source: Clean Catch Clean Catch (Midstream)   Culture Results:   50,000 - 99,000 CFU/mL Escherichia coli   Organism Identification: Escherichia coli   Organism: Escherichia coli   Method Type: SARA      RADIOLOGY & ADDITIONAL STUDIES:      Assessment :  67YO F resident of [facility] PMH of MDD, HTN HLD, Bipolar disorder fro [facility],  brought from assisted living by EMS with complaint of aggressive behavior.  Seen by psych.   UA with pyuria. Ucx grew Ecoli.  Pt unable to provide history. ?UTI vs asymptomatic bacteruria  Clinically stable    Plan :   Supportive care and monitor off antibiotics  Off Rocephin -->Ceftin ended 11/4/23  Trend temps and cbc  Asp precautions  Stable from ID standpoint  Dc planning per primary team      Continue with present regiment.  Appropriate use of antibiotics and adverse effects reviewed.      > 35 minutes were spent in direct patient care reviewing notes, medications ,labs data/ imaging , discussion with multidisciplinary team.    Thank you for allowing me to participate in care of your patient .    Karen Boswell MD  Infectious Disease  [phone]
     SRIDEVI BELLE is a 68yFemale , patient examined and chart reviewed.     INTERVAL HPI/ OVERNIGHT EVENTS:   No events. NAD.  Afebrile.    PAST MEDICAL & SURGICAL HISTORY:  HTN (hypertension)  HLD (hyperlipidemia)  Bipolar illness  MDD (major depressive disorder)      For details regarding the patient's social history, family history, and other miscellaneous elements, please refer the initial infectious diseases consultation and/or the admitting history and physical examination for this admission.    ROS:  Unable to obtain due to : pt's condition    ALLERGIES  sulfa drugs (Other)  Ambien (Other)      Current inpatient medications :    ANTIBIOTICS/RELEVANT:  cefuroxime   Tablet 500 milliGRAM(s) Oral every 12 hours    MEDICATIONS  (STANDING):  atorvastatin 40 milliGRAM(s) Oral at bedtime  clopidogrel Tablet 75 milliGRAM(s) Oral daily  FLUoxetine 40 milliGRAM(s) Oral daily  gabapentin 300 milliGRAM(s) Oral every 8 hours  heparin   Injectable 5000 Unit(s) SubCutaneous every 8 hours  lactobacillus acidophilus 1 Tablet(s) Oral two times a day with meals  lisinopril 5 milliGRAM(s) Oral daily  lithium 300 milliGRAM(s) Oral two times a day  pantoprazole    Tablet 40 milliGRAM(s) Oral before breakfast  QUEtiapine 100 milliGRAM(s) Oral at bedtime    MEDICATIONS  (PRN):  acetaminophen     Tablet .. 650 milliGRAM(s) Oral every 6 hours PRN Temp greater or equal to 38C (100.4F), Mild Pain (1 - 3), Moderate Pain (4 - 6)    Objective:  Vital Signs Last 24 Hrs  T(C): 37.4 (04 Nov 2023 14:38), Max: 37.4 (04 Nov 2023 10:37)  T(F): 99.4 (04 Nov 2023 14:38), Max: 99.4 (04 Nov 2023 14:38)  HR: 70 (04 Nov 2023 14:38) (67 - 71)  BP: 111/58 (04 Nov 2023 14:38) (94/57 - 111/58)  RR: 17 (04 Nov 2023 14:38) (17 - 18)  SpO2: 98% (04 Nov 2023 14:38) (94% - 100%)    Parameters below as of 04 Nov 2023 14:38  Patient On (Oxygen Delivery Method): room air      Physical Exam:  General: no acute distress  Neck: supple, trachea midline  Lungs: clear, no wheeze/rhonchi  Cardiovascular: regular rate and rhythm, S1 S2  Abdomen: soft, nontender,  bowel sounds normal  Neurological: confused  Skin: no rash  Extremities: no edema        LABS:                        9.6    5.87  )-----------( 263      ( 03 Nov 2023 07:56 )             31.2   11-03    139  |  104  |  17  ----------------------------<  107<H>  4.4   |  28  |  0.89    Ca    9.9      03 Nov 2023 07:56    TPro  6.7  /  Alb  3.1<L>  /  TBili  0.4  /  DBili  x   /  AST  34  /  ALT  39  /  AlkPhos  121<H>  11-03      Urine Microscopic-Add On (NC) (10.27.23 @ 05:48)    Red Blood Cell - Urine: 0 /HPF   White Blood Cell - Urine: >50 /HPF   Bacteria: Many /HPF   Squamous Epithelial Cells: Present  Urinalysis (10.27.23 @ 05:48)    Glucose Qualitative, Urine: Negative mg/dL   Blood, Urine: Negative   pH Urine: 5.5   Color: Yellow   Urine Appearance: Cloudy   Bilirubin: Negative   Ketone - Urine: Negative mg/dL   Specific Gravity: 1.013   Protein, Urine: Trace mg/dL   Urobilinogen: 1.0 mg/dL   Nitrite: Positive   Leukocyte Esterase Concentration: Large      MICROBIOLOGY:  Culture - Urine (10.27.23 @ 05:48)    -  Amikacin: S <=16   -  Amoxicillin/Clavulanic Acid: S <=8/4   -  Ampicillin: S <=8 These ampicillin results predict results for amoxicillin   -  Ampicillin/Sulbactam: S <=4/2   -  Aztreonam: S <=4   -  Cefazolin: S <=2 For uncomplicated UTI with K. pneumoniae, E. coli, or P. mirablis: SARA <=16 is sensitive and SARA >=32 is resistant. This also predicts results for oral agents cefaclor, cefdinir, cefpodoxime, cefprozil, cefuroxime axetil, cephalexin and locarbef for uncomplicated UTI. Note that some isolates may be susceptible to these agents while testing resistant to cefazolin.   -  Cefepime: S <=2   -  Cefoxitin: S <=8   -  Ceftriaxone: S <=1   -  Cefuroxime: S <=4   -  Ciprofloxacin: S <=0.25   -  Ertapenem: S <=0.5   -  Gentamicin: S <=2   -  Imipenem: S <=1   -  Levofloxacin: S <=0.5   -  Meropenem: S <=1   -  Nitrofurantoin: S <=32 Should not be used to treat pyelonephritis   -  Piperacillin/Tazobactam: S <=8   -  Tobramycin: S <=2   -  Trimethoprim/Sulfamethoxazole: S <=0.5/9.5   Specimen Source: Clean Catch Clean Catch (Midstream)   Culture Results:   50,000 - 99,000 CFU/mL Escherichia coli   Organism Identification: Escherichia coli   Organism: Escherichia coli   Method Type: SARA      RADIOLOGY & ADDITIONAL STUDIES:      Assessment :  67YO F resident of [facility] PMH of MDD, HTN HLD, Bipolar disorder fro [facility],  brought from assisted living by EMS with complaint of aggressive behavior.  Seen by psych.   UA with pyuria. Ucx grew Ecoli.  Pt unable to provide history. ?UTI vs asymptomatic bacteruria  Clinically stable    Plan :   Of Rocephin   Po Ceftin till 11/4/23  Trend temps and cbc  Asp precautions  Stable from ID standpoint  Dc planning per primary team      Continue with present regiment.  Appropriate use of antibiotics and adverse effects reviewed.      > 35 minutes were spent in direct patient care reviewing notes, medications ,labs data/ imaging , discussion with multidisciplinary team.    Thank you for allowing me to participate in care of your patient .    Karen Boswell MD  Infectious Disease  [phone]
     SRIDEVI BELLE is a 68yFemale , patient examined and chart reviewed.     INTERVAL HPI/ OVERNIGHT EVENTS:   No events. NAD.  Afebrile. In chair.    PAST MEDICAL & SURGICAL HISTORY:  HTN (hypertension)  HLD (hyperlipidemia)  Bipolar illness  MDD (major depressive disorder)      For details regarding the patient's social history, family history, and other miscellaneous elements, please refer the initial infectious diseases consultation and/or the admitting history and physical examination for this admission.    ROS:  Unable to obtain due to : pt's condition    ALLERGIES  sulfa drugs (Other)  Ambien (Other)      Current inpatient medications :    ANTIBIOTICS/RELEVANT:    MEDICATIONS  (STANDING):  atorvastatin 40 milliGRAM(s) Oral at bedtime  clonazePAM  Tablet 0.5 milliGRAM(s) Oral two times a day  clopidogrel Tablet 75 milliGRAM(s) Oral daily  FLUoxetine 40 milliGRAM(s) Oral daily  gabapentin 300 milliGRAM(s) Oral every 8 hours  heparin   Injectable 5000 Unit(s) SubCutaneous every 8 hours  lactobacillus acidophilus 1 Tablet(s) Oral two times a day with meals  lisinopril 5 milliGRAM(s) Oral daily  lithium 300 milliGRAM(s) Oral two times a day  pantoprazole    Tablet 40 milliGRAM(s) Oral before breakfast  QUEtiapine 100 milliGRAM(s) Oral at bedtime    MEDICATIONS  (PRN):  acetaminophen     Tablet .. 650 milliGRAM(s) Oral every 6 hours PRN Temp greater or equal to 38C (100.4F), Mild Pain (1 - 3), Moderate Pain (4 - 6)        Objective:  Vital Signs Last 24 Hrs  T(C): 36.8 (08 Nov 2023 04:42), Max: 36.9 (07 Nov 2023 21:24)  T(F): 98.2 (08 Nov 2023 04:42), Max: 98.4 (07 Nov 2023 21:24)  HR: 74 (08 Nov 2023 04:42) (62 - 75)  BP: 129/74 (08 Nov 2023 04:42) (98/65 - 129/74)  RR: 18 (08 Nov 2023 04:42) (18 - 19)  SpO2: 95% (08 Nov 2023 04:42) (93% - 95%)    Parameters below as of 08 Nov 2023 04:42  Patient On (Oxygen Delivery Method): room air    Physical Exam:  General: no acute distress  Neck: supple, trachea midline  Lungs: clear, no wheeze/rhonchi  Cardiovascular: regular rate and rhythm, S1 S2  Abdomen: soft, nontender,  bowel sounds normal  Neurological: confused  Skin: no rash  Extremities: no edema      LABS:        Urine Microscopic-Add On (NC) (10.27.23 @ 05:48)    Red Blood Cell - Urine: 0 /HPF   White Blood Cell - Urine: >50 /HPF   Bacteria: Many /HPF   Squamous Epithelial Cells: Present  Urinalysis (10.27.23 @ 05:48)    Glucose Qualitative, Urine: Negative mg/dL   Blood, Urine: Negative   pH Urine: 5.5   Color: Yellow   Urine Appearance: Cloudy   Bilirubin: Negative   Ketone - Urine: Negative mg/dL   Specific Gravity: 1.013   Protein, Urine: Trace mg/dL   Urobilinogen: 1.0 mg/dL   Nitrite: Positive   Leukocyte Esterase Concentration: Large      MICROBIOLOGY:  Culture - Urine (10.27.23 @ 05:48)    -  Amikacin: S <=16   -  Amoxicillin/Clavulanic Acid: S <=8/4   -  Ampicillin: S <=8 These ampicillin results predict results for amoxicillin   -  Ampicillin/Sulbactam: S <=4/2   -  Aztreonam: S <=4   -  Cefazolin: S <=2 For uncomplicated UTI with K. pneumoniae, E. coli, or P. mirablis: SARA <=16 is sensitive and SARA >=32 is resistant. This also predicts results for oral agents cefaclor, cefdinir, cefpodoxime, cefprozil, cefuroxime axetil, cephalexin and locarbef for uncomplicated UTI. Note that some isolates may be susceptible to these agents while testing resistant to cefazolin.   -  Cefepime: S <=2   -  Cefoxitin: S <=8   -  Ceftriaxone: S <=1   -  Cefuroxime: S <=4   -  Ciprofloxacin: S <=0.25   -  Ertapenem: S <=0.5   -  Gentamicin: S <=2   -  Imipenem: S <=1   -  Levofloxacin: S <=0.5   -  Meropenem: S <=1   -  Nitrofurantoin: S <=32 Should not be used to treat pyelonephritis   -  Piperacillin/Tazobactam: S <=8   -  Tobramycin: S <=2   -  Trimethoprim/Sulfamethoxazole: S <=0.5/9.5   Specimen Source: Clean Catch Clean Catch (Midstream)   Culture Results:   50,000 - 99,000 CFU/mL Escherichia coli   Organism Identification: Escherichia coli   Organism: Escherichia coli   Method Type: SARA      RADIOLOGY & ADDITIONAL STUDIES:      Assessment :  67YO F resident of [facility] PMH of MDD, HTN HLD, Bipolar disorder fro [facility],  brought from assisted living by EMS with complaint of aggressive behavior.  Seen by psych.   UA with pyuria. Ucx grew Ecoli.  Pt unable to provide history. ?UTI vs asymptomatic bacteruria  Clinically stable and doing well    Plan :   Supportive care and monitor off antibiotics  Off Rocephin -->Ceftin ended 11/4/23  Trend temps and cbc  Asp precautions  Stable from ID standpoint  Dc planning per primary team      Continue with present regiment.  Appropriate use of antibiotics and adverse effects reviewed.      > 35 minutes were spent in direct patient care reviewing notes, medications ,labs data/ imaging , discussion with multidisciplinary team.    Thank you for allowing me to participate in care of your patient .    Karen Boswell MD  Infectious Disease  [phone]
Chief Complaint: Agitation    Interval Events: No events overnight.    Review of Systems:  General: No fevers, chills, weight gain  Skin: No rashes, color changes  Cardiovascular: No chest pain, orthopnea  Respiratory: No shortness of breath, cough  Gastrointestinal: No nausea, abdominal pain  Genitourinary: No incontinence, pain with urination  Musculoskeletal: No pain, swelling, decreased range of motion  Neurological: No headache, weakness  Psychiatric: No depression, anxiety  Endocrine: No weight gain, increased thirst  All other systems are comprehensively negative.    Physical Exam:  Vital Signs Last 24 Hrs  T(C): 36.7 (29 Oct 2023 05:15), Max: 36.9 (28 Oct 2023 12:08)  T(F): 98.1 (29 Oct 2023 05:15), Max: 98.5 (28 Oct 2023 12:08)  HR: 69 (29 Oct 2023 05:15) (61 - 71)  BP: 151/82 (29 Oct 2023 05:15) (97/53 - 151/82)  BP(mean): 97 (29 Oct 2023 04:00) (66 - 100)  RR: 20 (29 Oct 2023 05:15) (14 - 23)  SpO2: 97% (29 Oct 2023 05:15) (76% - 100%)  Parameters below as of 29 Oct 2023 05:15  Patient On (Oxygen Delivery Method): room air  General: NAD  HEENT: MMM  Neck: No JVD, no carotid bruit  Lungs: CTAB  CV: RRR, nl S1/S2, no M/R/G  Abdomen: S/NT/ND, +BS  Extremities: No LE edema, no cyanosis  Neuro: AAOx3, non-focal  Skin: No rash    Labs:             10-28    137  |  106  |  14  ----------------------------<  126<H>  4.8   |  24  |  0.91    Ca    9.5      28 Oct 2023 11:45  Phos  3.5     10-27  Mg     1.7     10-28    TPro  6.7  /  Alb  3.0<L>  /  TBili  0.7  /  DBili  x   /  AST  34  /  ALT  29  /  AlkPhos  133<H>  10-28                        10.6   6.13  )-----------( 234      ( 29 Oct 2023 06:00 )             33.4       ECG/Telemetry: Sinus rhythm
Chief Complaint: Agitation    Interval Events: No events overnight.    Review of Systems:  General: No fevers, chills, weight gain  Skin: No rashes, color changes  Cardiovascular: No chest pain, orthopnea  Respiratory: No shortness of breath, cough  Gastrointestinal: No nausea, abdominal pain  Genitourinary: No incontinence, pain with urination  Musculoskeletal: No pain, swelling, decreased range of motion  Neurological: No headache, weakness  Psychiatric: No depression, anxiety  Endocrine: No weight gain, increased thirst  All other systems are comprehensively negative.    Physical Exam:  Vital Signs Last 24 Hrs  T(C): 36.8 (01 Nov 2023 07:50), Max: 36.8 (31 Oct 2023 23:25)  T(F): 98.2 (01 Nov 2023 07:50), Max: 98.3 (31 Oct 2023 23:25)  HR: 71 (01 Nov 2023 07:50) (71 - 84)  BP: 126/79 (01 Nov 2023 07:50) (109/55 - 147/78)  BP(mean): --  RR: 18 (01 Nov 2023 07:50) (16 - 18)  SpO2: 98% (01 Nov 2023 07:50) (95% - 98%)  Parameters below as of 01 Nov 2023 07:50  Patient On (Oxygen Delivery Method): room air  General: NAD  HEENT: MMM  Neck: No JVD, no carotid bruit  Lungs: CTAB  CV: RRR, nl S1/S2, no M/R/G  Abdomen: S/NT/ND, +BS  Extremities: No LE edema, no cyanosis  Neuro: AAOx3, non-focal  Skin: No rash    Labs:    11-01    136  |  103  |  15  ----------------------------<  123<H>  4.5   |  26  |  1.14    Ca    9.4      01 Nov 2023 06:30    TPro  6.4  /  Alb  2.9<L>  /  TBili  0.4  /  DBili  x   /  AST  35  /  ALT  42  /  AlkPhos  121<H>  11-01                        10.1   5.12  )-----------( 259      ( 01 Nov 2023 06:30 )             33.5       ECG/Telemetry: Sinus rhythm
Chief Complaint: Agitation    Interval Events: No events overnight.    Review of Systems:  General: No fevers, chills, weight gain  Skin: No rashes, color changes  Cardiovascular: No chest pain, orthopnea  Respiratory: No shortness of breath, cough  Gastrointestinal: No nausea, abdominal pain  Genitourinary: No incontinence, pain with urination  Musculoskeletal: No pain, swelling, decreased range of motion  Neurological: No headache, weakness  Psychiatric: No depression, anxiety  Endocrine: No weight gain, increased thirst  All other systems are comprehensively negative.    Physical Exam:  Vital Signs Last 24 Hrs  T(C): 36.8 (03 Nov 2023 04:57), Max: 37.1 (02 Nov 2023 17:05)  T(F): 98.2 (03 Nov 2023 04:57), Max: 98.7 (02 Nov 2023 17:05)  HR: 68 (03 Nov 2023 04:57) (68 - 78)  BP: 107/72 (03 Nov 2023 04:57) (101/60 - 118/58)  BP(mean): --  RR: 18 (03 Nov 2023 04:57) (15 - 18)  SpO2: 97% (03 Nov 2023 04:57) (94% - 97%)  Parameters below as of 03 Nov 2023 04:57  Patient On (Oxygen Delivery Method): room air  General: NAD  HEENT: MMM  Neck: No JVD, no carotid bruit  Lungs: CTAB  CV: RRR, nl S1/S2, no M/R/G  Abdomen: S/NT/ND, +BS  Extremities: No LE edema, no cyanosis  Neuro: AAOx3, non-focal  Skin: No rash    Labs:    11-02    139  |  104  |  19  ----------------------------<  110<H>  4.4   |  27  |  0.87    Ca    9.9      02 Nov 2023 06:00    TPro  6.9  /  Alb  3.0<L>  /  TBili  0.4  /  DBili  x   /  AST  29  /  ALT  34  /  AlkPhos  127<H>  11-02                        9.6    5.87  )-----------( 263      ( 03 Nov 2023 07:56 )             31.2       ECG/Telemetry: Sinus rhythm
Chief Complaint: Agitation    Interval Events: No events overnight.    Review of Systems:  General: No fevers, chills, weight gain  Skin: No rashes, color changes  Cardiovascular: No chest pain, orthopnea  Respiratory: No shortness of breath, cough  Gastrointestinal: No nausea, abdominal pain  Genitourinary: No incontinence, pain with urination  Musculoskeletal: No pain, swelling, decreased range of motion  Neurological: No headache, weakness  Psychiatric: No depression, anxiety  Endocrine: No weight gain, increased thirst  All other systems are comprehensively negative.    Physical Exam:  Vital Signs Last 24 Hrs  T(C): 36.8 (06 Nov 2023 04:52), Max: 36.8 (05 Nov 2023 10:14)  T(F): 98.2 (06 Nov 2023 04:52), Max: 98.2 (05 Nov 2023 10:14)  HR: 73 (06 Nov 2023 04:52) (64 - 73)  BP: 105/68 (06 Nov 2023 04:52) (95/64 - 105/68)  BP(mean): --  RR: 17 (06 Nov 2023 04:52) (17 - 19)  SpO2: 93% (06 Nov 2023 04:52) (93% - 97%)  Parameters below as of 06 Nov 2023 04:52  Patient On (Oxygen Delivery Method): room air  General: NAD  HEENT: MMM  Neck: No JVD, no carotid bruit  Lungs: CTAB  CV: RRR, nl S1/S2, no M/R/G  Abdomen: S/NT/ND, +BS  Extremities: No LE edema, no cyanosis  Neuro: AAOx3, non-focal  Skin: No rash    Labs:    11-06    136  |  103  |  22  ----------------------------<  112<H>  4.5   |  26  |  0.94    Ca    9.6      06 Nov 2023 06:30    TPro  6.6  /  Alb  3.1<L>  /  TBili  0.4  /  DBili  x   /  AST  21  /  ALT  34  /  AlkPhos  105  11-06                        10.0   5.32  )-----------( 272      ( 06 Nov 2023 06:30 )             33.0       ECG/Telemetry: Sinus rhythm
Date/Time Patient Seen:  		  Referring MD:   Data Reviewed	       Patient is a 68y old  Female who presents with a chief complaint of aggressive behaviour (03 Nov 2023 13:24)      Subjective/HPI     PAST MEDICAL & SURGICAL HISTORY:  HTN (hypertension)    HLD (hyperlipidemia)    Bipolar illness    MDD (major depressive disorder)    No significant past surgical history          Medication list         MEDICATIONS  (STANDING):  atorvastatin 40 milliGRAM(s) Oral at bedtime  cefuroxime   Tablet 500 milliGRAM(s) Oral every 12 hours  clopidogrel Tablet 75 milliGRAM(s) Oral daily  FLUoxetine 40 milliGRAM(s) Oral daily  gabapentin 300 milliGRAM(s) Oral every 8 hours  heparin   Injectable 5000 Unit(s) SubCutaneous every 8 hours  lactobacillus acidophilus 1 Tablet(s) Oral two times a day with meals  lisinopril 5 milliGRAM(s) Oral daily  lithium 300 milliGRAM(s) Oral two times a day  pantoprazole    Tablet 40 milliGRAM(s) Oral before breakfast  QUEtiapine 100 milliGRAM(s) Oral at bedtime    MEDICATIONS  (PRN):  acetaminophen     Tablet .. 650 milliGRAM(s) Oral every 6 hours PRN Temp greater or equal to 38C (100.4F), Mild Pain (1 - 3), Moderate Pain (4 - 6)         Vitals log        ICU Vital Signs Last 24 Hrs  T(C): 36.9 (04 Nov 2023 04:32), Max: 36.9 (04 Nov 2023 04:32)  T(F): 98.5 (04 Nov 2023 04:32), Max: 98.5 (04 Nov 2023 04:32)  HR: 68 (04 Nov 2023 04:32) (64 - 71)  BP: 102/63 (04 Nov 2023 04:32) (94/57 - 117/61)  BP(mean): --  ABP: --  ABP(mean): --  RR: 17 (04 Nov 2023 04:32) (17 - 19)  SpO2: 99% (04 Nov 2023 04:32) (94% - 99%)    O2 Parameters below as of 04 Nov 2023 04:32  Patient On (Oxygen Delivery Method): room air                 Input and Output:  I&O's Detail    03 Nov 2023 07:01  -  04 Nov 2023 07:00  --------------------------------------------------------  IN:  Total IN: 0 mL    OUT:    Voided (mL): 1700 mL  Total OUT: 1700 mL    Total NET: -1700 mL          Lab Data                        9.6    5.87  )-----------( 263      ( 03 Nov 2023 07:56 )             31.2     11-03    139  |  104  |  17  ----------------------------<  107<H>  4.4   |  28  |  0.89    Ca    9.9      03 Nov 2023 07:56    TPro  6.7  /  Alb  3.1<L>  /  TBili  0.4  /  DBili  x   /  AST  34  /  ALT  39  /  AlkPhos  121<H>  11-03            Review of Systems	      Objective     Physical Examination    heart s1s2  lung dc BS  head nc      Pertinent Lab findings & Imaging      Teresita:  NO   Adequate UO     I&O's Detail    03 Nov 2023 07:01  -  04 Nov 2023 07:00  --------------------------------------------------------  IN:  Total IN: 0 mL    OUT:    Voided (mL): 1700 mL  Total OUT: 1700 mL    Total NET: -1700 mL               Discussed with:     Cultures:	        Radiology                            
Date/Time Patient Seen:  		  Referring MD:   Data Reviewed	       Patient is a 68y old  Female who presents with a chief complaint of aggressive behaviour (05 Nov 2023 15:58)      Subjective/HPI     PAST MEDICAL & SURGICAL HISTORY:  HTN (hypertension)    HLD (hyperlipidemia)    Bipolar illness    MDD (major depressive disorder)    No significant past surgical history          Medication list         MEDICATIONS  (STANDING):  atorvastatin 40 milliGRAM(s) Oral at bedtime  clonazePAM  Tablet 0.5 milliGRAM(s) Oral two times a day  clopidogrel Tablet 75 milliGRAM(s) Oral daily  FLUoxetine 40 milliGRAM(s) Oral daily  gabapentin 300 milliGRAM(s) Oral every 8 hours  heparin   Injectable 5000 Unit(s) SubCutaneous every 8 hours  lactobacillus acidophilus 1 Tablet(s) Oral two times a day with meals  lisinopril 5 milliGRAM(s) Oral daily  lithium 300 milliGRAM(s) Oral two times a day  pantoprazole    Tablet 40 milliGRAM(s) Oral before breakfast  QUEtiapine 100 milliGRAM(s) Oral at bedtime    MEDICATIONS  (PRN):  acetaminophen     Tablet .. 650 milliGRAM(s) Oral every 6 hours PRN Temp greater or equal to 38C (100.4F), Mild Pain (1 - 3), Moderate Pain (4 - 6)         Vitals log        ICU Vital Signs Last 24 Hrs  T(C): 36.8 (06 Nov 2023 04:52), Max: 36.8 (05 Nov 2023 10:14)  T(F): 98.2 (06 Nov 2023 04:52), Max: 98.2 (05 Nov 2023 10:14)  HR: 73 (06 Nov 2023 04:52) (64 - 73)  BP: 105/68 (06 Nov 2023 04:52) (95/64 - 105/68)  BP(mean): --  ABP: --  ABP(mean): --  RR: 17 (06 Nov 2023 04:52) (17 - 19)  SpO2: 93% (06 Nov 2023 04:52) (93% - 97%)    O2 Parameters below as of 06 Nov 2023 04:52  Patient On (Oxygen Delivery Method): room air                 Input and Output:  I&O's Detail    04 Nov 2023 08:01  -  05 Nov 2023 07:00  --------------------------------------------------------  IN:  Total IN: 0 mL    OUT:    Voided (mL): 3100 mL  Total OUT: 3100 mL    Total NET: -3100 mL      05 Nov 2023 07:01  -  06 Nov 2023 06:38  --------------------------------------------------------  IN:  Total IN: 0 mL    OUT:    Voided (mL): 1500 mL  Total OUT: 1500 mL    Total NET: -1500 mL          Lab Data                  Review of Systems	      Objective     Physical Examination    heart s1s2  lung dc BS  head nc      Pertinent Lab findings & Imaging      Teresita:  NO   Adequate UO     I&O's Detail    04 Nov 2023 08:01  -  05 Nov 2023 07:00  --------------------------------------------------------  IN:  Total IN: 0 mL    OUT:    Voided (mL): 3100 mL  Total OUT: 3100 mL    Total NET: -3100 mL      05 Nov 2023 07:01  -  06 Nov 2023 06:38  --------------------------------------------------------  IN:  Total IN: 0 mL    OUT:    Voided (mL): 1500 mL  Total OUT: 1500 mL    Total NET: -1500 mL               Discussed with:     Cultures:	        Radiology                            
Date/Time Patient Seen:  		  Referring MD:   Data Reviewed	       Patient is a 68y old  Female who presents with a chief complaint of aggressive behaviour (28 Oct 2023 09:50)      Subjective/HPI     PAST MEDICAL & SURGICAL HISTORY:  HTN (hypertension)    HLD (hyperlipidemia)    Bipolar illness    MDD (major depressive disorder)    No significant past surgical history          Medication list         MEDICATIONS  (STANDING):  atorvastatin 40 milliGRAM(s) Oral at bedtime  clonazePAM  Tablet 0.5 milliGRAM(s) Oral two times a day  clopidogrel Tablet 75 milliGRAM(s) Oral daily  gabapentin 300 milliGRAM(s) Oral every 8 hours  heparin   Injectable 5000 Unit(s) SubCutaneous every 8 hours  pantoprazole    Tablet 40 milliGRAM(s) Oral before breakfast  QUEtiapine 100 milliGRAM(s) Oral at bedtime    MEDICATIONS  (PRN):  acetaminophen     Tablet .. 650 milliGRAM(s) Oral every 6 hours PRN Temp greater or equal to 38C (100.4F), Mild Pain (1 - 3), Moderate Pain (4 - 6)         Vitals log        ICU Vital Signs Last 24 Hrs  T(C): 36.7 (29 Oct 2023 05:15), Max: 36.9 (28 Oct 2023 12:08)  T(F): 98.1 (29 Oct 2023 05:15), Max: 98.5 (28 Oct 2023 12:08)  HR: 69 (29 Oct 2023 05:15) (61 - 71)  BP: 151/82 (29 Oct 2023 05:15) (97/53 - 151/82)  BP(mean): 97 (29 Oct 2023 04:00) (66 - 100)  ABP: --  ABP(mean): --  RR: 20 (29 Oct 2023 05:15) (14 - 23)  SpO2: 97% (29 Oct 2023 05:15) (76% - 100%)    O2 Parameters below as of 29 Oct 2023 05:15  Patient On (Oxygen Delivery Method): room air                 Input and Output:  I&O's Detail    28 Oct 2023 07:01  -  29 Oct 2023 07:00  --------------------------------------------------------  IN:    IV PiggyBack: 100 mL    Lactated Ringers: 425 mL    Lactated Ringers: 450 mL    Oral Fluid: 320 mL  Total IN: 1295 mL    OUT:    Voided (mL): 1700 mL  Total OUT: 1700 mL    Total NET: -405 mL          Lab Data                        10.6   6.13  )-----------( 234      ( 29 Oct 2023 06:00 )             33.4     10-28    137  |  106  |  14  ----------------------------<  126<H>  4.8   |  24  |  0.91    Ca    9.5      28 Oct 2023 11:45  Phos  3.5     10-27  Mg     1.7     10-28    TPro  6.7  /  Alb  3.0<L>  /  TBili  0.7  /  DBili  x   /  AST  34  /  ALT  29  /  AlkPhos  133<H>  10-28            Review of Systems	      Objective     Physical Examination    heart s1s2  lung dec BS  head nc      Pertinent Lab findings & Imaging      Teresita:  NO   Adequate UO     I&O's Detail    28 Oct 2023 07:01  -  29 Oct 2023 07:00  --------------------------------------------------------  IN:    IV PiggyBack: 100 mL    Lactated Ringers: 425 mL    Lactated Ringers: 450 mL    Oral Fluid: 320 mL  Total IN: 1295 mL    OUT:    Voided (mL): 1700 mL  Total OUT: 1700 mL    Total NET: -405 mL               Discussed with:     Cultures:	        Radiology                            
Date/Time Patient Seen:  		  Referring MD:   Data Reviewed	       Patient is a 68y old  Female who presents with a chief complaint of aggressive behaviour (31 Oct 2023 11:28)      Subjective/HPI     PAST MEDICAL & SURGICAL HISTORY:  HTN (hypertension)    HLD (hyperlipidemia)    Bipolar illness    MDD (major depressive disorder)    No significant past surgical history          Medication list         MEDICATIONS  (STANDING):  atorvastatin 40 milliGRAM(s) Oral at bedtime  cefTRIAXone   IVPB 1000 milliGRAM(s) IV Intermittent every 24 hours  clonazePAM  Tablet 0.5 milliGRAM(s) Oral two times a day  clopidogrel Tablet 75 milliGRAM(s) Oral daily  FLUoxetine 40 milliGRAM(s) Oral daily  gabapentin 300 milliGRAM(s) Oral every 8 hours  heparin   Injectable 5000 Unit(s) SubCutaneous every 8 hours  lactobacillus acidophilus 1 Tablet(s) Oral two times a day with meals  lisinopril 5 milliGRAM(s) Oral daily  lithium 300 milliGRAM(s) Oral two times a day  pantoprazole    Tablet 40 milliGRAM(s) Oral before breakfast  QUEtiapine 100 milliGRAM(s) Oral at bedtime    MEDICATIONS  (PRN):  acetaminophen     Tablet .. 650 milliGRAM(s) Oral every 6 hours PRN Temp greater or equal to 38C (100.4F), Mild Pain (1 - 3), Moderate Pain (4 - 6)         Vitals log        ICU Vital Signs Last 24 Hrs  T(C): 36.8 (31 Oct 2023 23:25), Max: 36.8 (31 Oct 2023 23:25)  T(F): 98.3 (31 Oct 2023 23:25), Max: 98.3 (31 Oct 2023 23:25)  HR: 71 (31 Oct 2023 23:25) (71 - 82)  BP: 109/55 (31 Oct 2023 23:25) (109/55 - 124/70)  BP(mean): --  ABP: --  ABP(mean): --  RR: 17 (31 Oct 2023 23:25) (16 - 18)  SpO2: 95% (31 Oct 2023 23:25) (95% - 97%)    O2 Parameters below as of 31 Oct 2023 23:25  Patient On (Oxygen Delivery Method): room air                 Input and Output:  I&O's Detail    30 Oct 2023 07:01  -  31 Oct 2023 07:00  --------------------------------------------------------  IN:  Total IN: 0 mL    OUT:    Voided (mL): 800 mL  Total OUT: 800 mL    Total NET: -800 mL          Lab Data                        10.9   5.55  )-----------( 212      ( 31 Oct 2023 06:00 )             36.5     10-31    142  |  104  |  13  ----------------------------<  117<H>  4.7   |  29  |  0.95    Ca    10.0      31 Oct 2023 06:00    TPro  7.1  /  Alb  3.3  /  TBili  0.7  /  DBili  x   /  AST  48<H>  /  ALT  45  /  AlkPhos  136<H>  10-31            Review of Systems	      Objective     Physical Examination    heart s1s2  lung dc BS  head nc      Pertinent Lab findings & Imaging      Teresita:  NO   Adequate UO     I&O's Detail    30 Oct 2023 07:01  -  31 Oct 2023 07:00  --------------------------------------------------------  IN:  Total IN: 0 mL    OUT:    Voided (mL): 800 mL  Total OUT: 800 mL    Total NET: -800 mL               Discussed with:     Cultures:	        Radiology                            
Neurology follow up note    [id number]      Interval History:    Patient feels ok no new complaints.    Allergies    sulfa drugs (Other)  Ambien (Other)    Intolerances        MEDICATIONS    acetaminophen     Tablet .. 650 milliGRAM(s) Oral every 6 hours PRN  atorvastatin 40 milliGRAM(s) Oral at bedtime  cefuroxime   Tablet 500 milliGRAM(s) Oral every 12 hours  clopidogrel Tablet 75 milliGRAM(s) Oral daily  FLUoxetine 40 milliGRAM(s) Oral daily  gabapentin 300 milliGRAM(s) Oral every 8 hours  heparin   Injectable 5000 Unit(s) SubCutaneous every 8 hours  lactobacillus acidophilus 1 Tablet(s) Oral two times a day with meals  lisinopril 5 milliGRAM(s) Oral daily  lithium 300 milliGRAM(s) Oral two times a day  pantoprazole    Tablet 40 milliGRAM(s) Oral before breakfast  QUEtiapine 100 milliGRAM(s) Oral at bedtime              Vital Signs Last 24 Hrs  T(C): 37.4 (04 Nov 2023 10:37), Max: 37.4 (04 Nov 2023 10:37)  T(F): 99.3 (04 Nov 2023 10:37), Max: 99.3 (04 Nov 2023 10:37)  HR: 70 (04 Nov 2023 10:37) (64 - 71)  BP: 110/58 (04 Nov 2023 10:37) (94/57 - 117/61)  BP(mean): --  RR: 17 (04 Nov 2023 10:37) (17 - 19)  SpO2: 100% (04 Nov 2023 10:37) (94% - 100%)    Parameters below as of 04 Nov 2023 10:37  Patient On (Oxygen Delivery Method): room air        REVIEW OF SYSTEMS:  Constitutionally, the patient denies fever, chills, or night sweats.  Head:  No headaches.  Eyes:  No double vision or blurry vision.  Ears:  No ringing in the ears.  Neck:  No neck pain.  Cardiovascular:  No chest pain.  Respiratory:  No shortness of breath.  Abdomen:  No nausea, vomiting, or abdominal pain.  Extremities/Neurological:  No numbness or tingling.  Musculoskeletal:  No joint pain.        PHYSICAL EXAMINATION:  HEENT:  Head:  Normocephalic, atraumatic.  Eyes:  No scleral icterus.  Ears:  Hearing appeared to be intact.  NECK:  Supple.  CARDIOVASCULAR:  S1 and S2 heard.  RESPIRATORY:  Air entry bilaterally.  ABDOMEN:  Soft, nontender.EXTREMITIES:  No clubbing or cyanosis was noted.      NEUROLOGIC:  The patient awake and alert.  Location [facility], year was 2023, month was November.  Upon conversing with the patient at times she appears slightly forgetful, was able to name simple objects.  Extraocular movements were intact.  Speech was fluent.  Smile symmetric.  The patient has a runny nose on the left, as per her states that she has underlying nose issue, not new.  Motor:  Bilateral upper 4/5, bilateral lower 3/5.  Sensory:  Bilateral upper and lower intact to light touch.     LABS:  CBC Full  -  ( 03 Nov 2023 07:56 )  WBC Count : 5.87 K/uL  RBC Count : 3.22 M/uL  Hemoglobin : 9.6 g/dL  Hematocrit : 31.2 %  Platelet Count - Automated : 263 K/uL  Mean Cell Volume : 96.9 fl  Mean Cell Hemoglobin : 29.8 pg  Mean Cell Hemoglobin Concentration : 30.8 gm/dL  Auto Neutrophil # : 3.44 K/uL  Auto Lymphocyte # : 1.58 K/uL  Auto Monocyte # : 0.47 K/uL  Auto Eosinophil # : 0.29 K/uL  Auto Basophil # : 0.06 K/uL  Auto Neutrophil % : 58.7 %  Auto Lymphocyte % : 26.9 %  Auto Monocyte % : 8.0 %  Auto Eosinophil % : 4.9 %  Auto Basophil % : 1.0 %    Urinalysis Basic - ( 03 Nov 2023 07:56 )    Color: x / Appearance: x / SG: x / pH: x  Gluc: 107 mg/dL / Ketone: x  / Bili: x / Urobili: x   Blood: x / Protein: x / Nitrite: x   Leuk Esterase: x / RBC: x / WBC x   Sq Epi: x / Non Sq Epi: x / Bacteria: x      11-03    139  |  104  |  17  ----------------------------<  107<H>  4.4   |  28  |  0.89    Ca    9.9      03 Nov 2023 07:56    TPro  6.7  /  Alb  3.1<L>  /  TBili  0.4  /  DBili  x   /  AST  34  /  ALT  39  /  AlkPhos  121<H>  11-03    Hemoglobin A1C:     LIVER FUNCTIONS - ( 03 Nov 2023 07:56 )  Alb: 3.1 g/dL / Pro: 6.7 g/dL / ALK PHOS: 121 U/L / ALT: 39 U/L / AST: 34 U/L / GGT: x           Vitamin B12         RADIOLOGY    ANALYSIS AND PLAN:  This is a 68-year-old with episode of altered mental status.  For episode of altered mental status, most like secondary to metabolic underlying urinary tract infection, suspect the patient also has slight mild cognitive impairment and a history of underlying bipolar disorder, suspect less likely this is a primary CNS event.  For possibly urinary tract infection, antibiotics as needed.  For history of bipolar disorder, continue the patient on home psychiatric medications, if necessary can try the patient on Depakote or Seroquel if she continues to be agitated.  For hyperlipidemia, continue the patient's statin.  For hypertension, monitor systolic blood pressure.  fall precautions   neurologic wise stable   physical therapy as tolerated      50 minutes of time was spent with the patient, plan of care, reviewing data, with greater than 50% of the visit was spent counseling and/or coordinating care with multidisciplinary healthcare team  
Neurology follow up note    SRIDEVI [id number]      Interval History:    Patient feels ok no new complaints.    Allergies    sulfa drugs (Other)  Ambien (Other)    Intolerances        MEDICATIONS    acetaminophen     Tablet .. 650 milliGRAM(s) Oral every 6 hours PRN  atorvastatin 40 milliGRAM(s) Oral at bedtime  cefTRIAXone   IVPB 1000 milliGRAM(s) IV Intermittent every 24 hours  clonazePAM  Tablet 0.5 milliGRAM(s) Oral two times a day  clopidogrel Tablet 75 milliGRAM(s) Oral daily  FLUoxetine 40 milliGRAM(s) Oral daily  gabapentin 300 milliGRAM(s) Oral every 8 hours  heparin   Injectable 5000 Unit(s) SubCutaneous every 8 hours  lactobacillus acidophilus 1 Tablet(s) Oral two times a day with meals  lisinopril 5 milliGRAM(s) Oral daily  lithium 300 milliGRAM(s) Oral two times a day  pantoprazole    Tablet 40 milliGRAM(s) Oral before breakfast  QUEtiapine 100 milliGRAM(s) Oral at bedtime              Vital Signs Last 24 Hrs  T(C): 36.6 (31 Oct 2023 08:17), Max: 36.7 (30 Oct 2023 23:30)  T(F): 97.8 (31 Oct 2023 08:17), Max: 98.1 (30 Oct 2023 23:30)  HR: 77 (31 Oct 2023 08:17) (64 - 79)  BP: 121/73 (31 Oct 2023 08:17) (119/59 - 123/78)  BP(mean): --  RR: 18 (31 Oct 2023 08:17) (17 - 18)  SpO2: 96% (31 Oct 2023 08:17) (95% - 96%)    Parameters below as of 31 Oct 2023 08:17  Patient On (Oxygen Delivery Method): room air    REVIEW OF SYSTEMS:  Constitutionally, the patient denies fever, chills, or night sweats.  Head:  No headaches.  Eyes:  No double vision or blurry vision.  Ears:  No ringing in the ears.  Neck:  No neck pain.  Cardiovascular:  No chest pain.  Respiratory:  No shortness of breath.  Abdomen:  No nausea, vomiting, or abdominal pain.  Extremities/Neurological:  No numbness or tingling.  Musculoskeletal:  No joint pain.        PHYSICAL EXAMINATION:  HEENT:  Head:  Normocephalic, atraumatic.  Eyes:  No scleral icterus.  Ears:  Hearing appeared to be intact.  NECK:  Supple.  CARDIOVASCULAR:  S1 and S2 heard.  RESPIRATORY:  Air entry bilaterally.  ABDOMEN:  Soft, nontender.EXTREMITIES:  No clubbing or cyanosis was noted.      NEUROLOGIC:  The patient awake and alert.  Location [facility], year was 2023, month was November.  Upon conversing with the patient at times she appears slightly forgetful, was able to name simple objects.  Extraocular movements were intact.  Speech was fluent.  Smile symmetric.  The patient has a runny nose on the left, as per her states that she has underlying nose issue, not new.  Motor:  Bilateral upper 4/5, bilateral lower 3-/5.  Sensory:  Bilateral upper and lower intact to light touch.    LABS:  CBC Full  -  ( 31 Oct 2023 06:00 )  WBC Count : 5.55 K/uL  RBC Count : 3.75 M/uL  Hemoglobin : 10.9 g/dL  Hematocrit : 36.5 %  Platelet Count - Automated : 212 K/uL  Mean Cell Volume : 97.3 fl  Mean Cell Hemoglobin : 29.1 pg  Mean Cell Hemoglobin Concentration : 29.9 gm/dL  Auto Neutrophil # : 3.27 K/uL  Auto Lymphocyte # : 1.57 K/uL  Auto Monocyte # : 0.42 K/uL  Auto Eosinophil # : 0.22 K/uL  Auto Basophil # : 0.04 K/uL  Auto Neutrophil % : 58.9 %  Auto Lymphocyte % : 28.3 %  Auto Monocyte % : 7.6 %  Auto Eosinophil % : 4.0 %  Auto Basophil % : 0.7 %    Urinalysis Basic - ( 31 Oct 2023 06:00 )    Color: x / Appearance: x / SG: x / pH: x  Gluc: 117 mg/dL / Ketone: x  / Bili: x / Urobili: x   Blood: x / Protein: x / Nitrite: x   Leuk Esterase: x / RBC: x / WBC x   Sq Epi: x / Non Sq Epi: x / Bacteria: x      10-31    142  |  104  |  13  ----------------------------<  117<H>  4.7   |  29  |  0.95    Ca    10.0      31 Oct 2023 06:00    TPro  7.1  /  Alb  3.3  /  TBili  0.7  /  DBili  x   /  AST  48<H>  /  ALT  45  /  AlkPhos  136<H>  10-31    Hemoglobin A1C:     LIVER FUNCTIONS - ( 31 Oct 2023 06:00 )  Alb: 3.3 g/dL / Pro: 7.1 g/dL / ALK PHOS: 136 U/L / ALT: 45 U/L / AST: 48 U/L / GGT: x           Vitamin B12         RADIOLOGY    ANALYSIS AND PLAN:  This is a 68-year-old with episode of altered mental status.  For episode of altered mental status, most like secondary to metabolic underlying urinary tract infection, suspect the patient also has slight mild cognitive impairment and a history of underlying bipolar disorder, suspect less likely this is a primary CNS event.  For possibly urinary tract infection, antibiotics as needed.  For history of bipolar disorder, continue the patient on home psychiatric medications, if necessary can try the patient on Depakote or Seroquel if she continues to be agitated.  For hyperlipidemia, continue the patient's statin.  For hypertension, monitor systolic blood pressure.  fall precautions      50 minutes of time was spent with the patient, plan of care, reviewing data, with greater than 50% of the visit was spent counseling and/or coordinating care with multidisciplinary healthcare team    
Neurology follow up note    SRIDEVI [id number]      Interval History:    Patient feels ok no new complaints.    Allergies    sulfa drugs (Other)  Ambien (Other)    Intolerances        MEDICATIONS    acetaminophen     Tablet .. 650 milliGRAM(s) Oral every 6 hours PRN  atorvastatin 40 milliGRAM(s) Oral at bedtime  clonazePAM  Tablet 0.5 milliGRAM(s) Oral two times a day  clopidogrel Tablet 75 milliGRAM(s) Oral daily  heparin   Injectable 5000 Unit(s) SubCutaneous every 8 hours  lactated ringers. 1000 milliLiter(s) IV Continuous <Continuous>  pantoprazole    Tablet 40 milliGRAM(s) Oral before breakfast              Vital Signs Last 24 Hrs  T(C): 36.8 (28 Oct 2023 08:02), Max: 37.1 (27 Oct 2023 19:31)  T(F): 98.3 (28 Oct 2023 08:02), Max: 98.8 (28 Oct 2023 01:26)  HR: 65 (28 Oct 2023 08:00) (61 - 69)  BP: 132/69 (28 Oct 2023 08:00) (95/58 - 132/69)  BP(mean): 85 (28 Oct 2023 08:00) (65 - 86)  RR: 15 (28 Oct 2023 08:00) (10 - 20)  SpO2: 99% (28 Oct 2023 08:00) (94% - 100%)    Parameters below as of 28 Oct 2023 08:00  Patient On (Oxygen Delivery Method): room air        REVIEW OF SYSTEMS:  Constitutionally, the patient denies fever, chills, or night sweats.  Head:  No headaches.  Eyes:  No double vision or blurry vision.  Ears:  No ringing in the ears.  Neck:  No neck pain.  Cardiovascular:  No chest pain.  Respiratory:  No shortness of breath.  Abdomen:  No nausea, vomiting, or abdominal pain.  Extremities/Neurological:  No numbness or tingling.  Musculoskeletal:  No joint pain.        PHYSICAL EXAMINATION:  HEENT:  Head:  Normocephalic, atraumatic.  Eyes:  No scleral icterus.  Ears:  Hearing appeared to be intact.  NECK:  Supple.  CARDIOVASCULAR:  S1 and S2 heard.  RESPIRATORY:  Air entry bilaterally.  ABDOMEN:  Soft, nontender.EXTREMITIES:  No clubbing or cyanosis was noted.      NEUROLOGIC:  The patient awake and alert.  Location hospital, year was 2023, month was November.  Upon conversing with the patient at times she appears slightly forgetful, was able to name simple objects.  Extraocular movements were intact.  Speech was fluent.  Smile symmetric.  The patient has a runny nose on the left, as per her states that she has underlying nose issue, not new.  Motor:  Bilateral upper 4/5, bilateral lower 3-/5.  Sensory:  Bilateral upper and lower intact to light touch.      LABS:  CBC Full  -  ( 28 Oct 2023 05:45 )  WBC Count : 6.84 K/uL  RBC Count : 3.25 M/uL  Hemoglobin : 9.8 g/dL  Hematocrit : 31.6 %  Platelet Count - Automated : 239 K/uL  Mean Cell Volume : 97.2 fl  Mean Cell Hemoglobin : 30.2 pg  Mean Cell Hemoglobin Concentration : 31.0 gm/dL  Auto Neutrophil # : 4.61 K/uL  Auto Lymphocyte # : 1.44 K/uL  Auto Monocyte # : 0.48 K/uL  Auto Eosinophil # : 0.24 K/uL  Auto Basophil # : 0.05 K/uL  Auto Neutrophil % : 67.4 %  Auto Lymphocyte % : 21.1 %  Auto Monocyte % : 7.0 %  Auto Eosinophil % : 3.5 %  Auto Basophil % : 0.7 %    Urinalysis Basic - ( 28 Oct 2023 05:45 )    Color: x / Appearance: x / SG: x / pH: x  Gluc: 114 mg/dL / Ketone: x  / Bili: x / Urobili: x   Blood: x / Protein: x / Nitrite: x   Leuk Esterase: x / RBC: x / WBC x   Sq Epi: x / Non Sq Epi: x / Bacteria: x      10-28    138  |  106  |  14  ----------------------------<  114<H>  4.7   |  27  |  1.10    Ca    9.6      28 Oct 2023 05:45  Phos  3.5     10-27  Mg     1.7     10-28    TPro  6.8  /  Alb  3.3  /  TBili  0.6  /  DBili  x   /  AST  20  /  ALT  28  /  AlkPhos  139<H>  10-28    Hemoglobin A1C:     LIVER FUNCTIONS - ( 28 Oct 2023 05:45 )  Alb: 3.3 g/dL / Pro: 6.8 g/dL / ALK PHOS: 139 U/L / ALT: 28 U/L / AST: 20 U/L / GGT: x           Vitamin B12   PT/INR - ( 27 Oct 2023 00:50 )   PT: 11.1 sec;   INR: 0.99 ratio         PTT - ( 27 Oct 2023 00:50 )  PTT:26.9 sec      RADIOLOGY    ANALYSIS AND PLAN:  This is a 68-year-old with episode of altered mental status.  For episode of altered mental status, most like secondary to metabolic underlying urinary tract infection, suspect the patient also has slight mild cognitive impairment and a history of underlying bipolar disorder, suspect less likely this is a primary CNS event.  For possibly urinary tract infection, antibiotics as needed.  For history of bipolar disorder, continue the patient on home psychiatric medications, if necessary can try the patient on Depakote or Seroquel if she continues to be agitated.  For hyperlipidemia, continue the patient's statin.  For hypertension, monitor systolic blood pressure.     50 minutes of time was spent with the patient, plan of care, reviewing data, with greater than 50% of the visit was spent counseling and/or coordinating care with multidisciplinary healthcare team  
Neurology follow up note    SRIDEVI [id number]      Interval History:    Patient feels ok no new complaints.    Allergies    sulfa drugs (Other)  Ambien (Other)    Intolerances        MEDICATIONS    acetaminophen     Tablet .. 650 milliGRAM(s) Oral every 6 hours PRN  atorvastatin 40 milliGRAM(s) Oral at bedtime  cefuroxime   Tablet 500 milliGRAM(s) Oral every 12 hours  clonazePAM  Tablet 0.5 milliGRAM(s) Oral two times a day  clopidogrel Tablet 75 milliGRAM(s) Oral daily  FLUoxetine 40 milliGRAM(s) Oral daily  gabapentin 300 milliGRAM(s) Oral every 8 hours  heparin   Injectable 5000 Unit(s) SubCutaneous every 8 hours  lactobacillus acidophilus 1 Tablet(s) Oral two times a day with meals  lisinopril 5 milliGRAM(s) Oral daily  lithium 300 milliGRAM(s) Oral two times a day  pantoprazole    Tablet 40 milliGRAM(s) Oral before breakfast  QUEtiapine 100 milliGRAM(s) Oral at bedtime              Vital Signs Last 24 Hrs  T(C): 36.8 (03 Nov 2023 04:57), Max: 37.1 (02 Nov 2023 17:05)  T(F): 98.2 (03 Nov 2023 04:57), Max: 98.7 (02 Nov 2023 17:05)  HR: 68 (03 Nov 2023 04:57) (68 - 78)  BP: 107/72 (03 Nov 2023 04:57) (101/60 - 118/58)  BP(mean): --  RR: 18 (03 Nov 2023 04:57) (15 - 18)  SpO2: 97% (03 Nov 2023 04:57) (94% - 97%)    Parameters below as of 03 Nov 2023 04:57  Patient On (Oxygen Delivery Method): room air      REVIEW OF SYSTEMS:  Constitutionally, the patient denies fever, chills, or night sweats.  Head:  No headaches.  Eyes:  No double vision or blurry vision.  Ears:  No ringing in the ears.  Neck:  No neck pain.  Cardiovascular:  No chest pain.  Respiratory:  No shortness of breath.  Abdomen:  No nausea, vomiting, or abdominal pain.  Extremities/Neurological:  No numbness or tingling.  Musculoskeletal:  No joint pain.        PHYSICAL EXAMINATION:  HEENT:  Head:  Normocephalic, atraumatic.  Eyes:  No scleral icterus.  Ears:  Hearing appeared to be intact.  NECK:  Supple.  CARDIOVASCULAR:  S1 and S2 heard.  RESPIRATORY:  Air entry bilaterally.  ABDOMEN:  Soft, nontender.EXTREMITIES:  No clubbing or cyanosis was noted.      NEUROLOGIC:  The patient awake and alert.  Location [facility], year was 2023, month was November.  Upon conversing with the patient at times she appears slightly forgetful, was able to name simple objects.  Extraocular movements were intact.  Speech was fluent.  Smile symmetric.  The patient has a runny nose on the left, as per her states that she has underlying nose issue, not new.  Motor:  Bilateral upper 4/5, bilateral lower 3/5.  Sensory:  Bilateral upper and lower intact to light touch.       LABS:  CBC Full  -  ( 03 Nov 2023 07:56 )  WBC Count : 5.87 K/uL  RBC Count : 3.22 M/uL  Hemoglobin : 9.6 g/dL  Hematocrit : 31.2 %  Platelet Count - Automated : 263 K/uL  Mean Cell Volume : 96.9 fl  Mean Cell Hemoglobin : 29.8 pg  Mean Cell Hemoglobin Concentration : 30.8 gm/dL  Auto Neutrophil # : 3.44 K/uL  Auto Lymphocyte # : 1.58 K/uL  Auto Monocyte # : 0.47 K/uL  Auto Eosinophil # : 0.29 K/uL  Auto Basophil # : 0.06 K/uL  Auto Neutrophil % : 58.7 %  Auto Lymphocyte % : 26.9 %  Auto Monocyte % : 8.0 %  Auto Eosinophil % : 4.9 %  Auto Basophil % : 1.0 %    Urinalysis Basic - ( 03 Nov 2023 07:56 )    Color: x / Appearance: x / SG: x / pH: x  Gluc: 107 mg/dL / Ketone: x  / Bili: x / Urobili: x   Blood: x / Protein: x / Nitrite: x   Leuk Esterase: x / RBC: x / WBC x   Sq Epi: x / Non Sq Epi: x / Bacteria: x      11-03    139  |  104  |  17  ----------------------------<  107<H>  4.4   |  28  |  0.89    Ca    9.9      03 Nov 2023 07:56    TPro  6.7  /  Alb  3.1<L>  /  TBili  0.4  /  DBili  x   /  AST  34  /  ALT  39  /  AlkPhos  121<H>  11-03    Hemoglobin A1C:     LIVER FUNCTIONS - ( 03 Nov 2023 07:56 )  Alb: 3.1 g/dL / Pro: 6.7 g/dL / ALK PHOS: 121 U/L / ALT: 39 U/L / AST: 34 U/L / GGT: x           Vitamin B12         RADIOLOGY    ANALYSIS AND PLAN:  This is a 68-year-old with episode of altered mental status.  For episode of altered mental status, most like secondary to metabolic underlying urinary tract infection, suspect the patient also has slight mild cognitive impairment and a history of underlying bipolar disorder, suspect less likely this is a primary CNS event.  For possibly urinary tract infection, antibiotics as needed.  For history of bipolar disorder, continue the patient on home psychiatric medications, if necessary can try the patient on Depakote or Seroquel if she continues to be agitated.  For hyperlipidemia, continue the patient's statin.  For hypertension, monitor systolic blood pressure.  fall precautions   neurologic wise stable   physical therapy as tolerated      50 minutes of time was spent with the patient, plan of care, reviewing data, with greater than 50% of the visit was spent counseling and/or coordinating care with multidisciplinary healthcare team
Neurology follow up note    SRIDEVI [id number]      Interval History:    Patient feels ok no new complaints.    Allergies    sulfa drugs (Other)  Ambien (Other)    Intolerances        MEDICATIONS    acetaminophen     Tablet .. 650 milliGRAM(s) Oral every 6 hours PRN  atorvastatin 40 milliGRAM(s) Oral at bedtime  cefTRIAXone   IVPB 1000 milliGRAM(s) IV Intermittent every 24 hours  clonazePAM  Tablet 0.5 milliGRAM(s) Oral two times a day  clopidogrel Tablet 75 milliGRAM(s) Oral daily  gabapentin 300 milliGRAM(s) Oral every 8 hours  heparin   Injectable 5000 Unit(s) SubCutaneous every 8 hours  lactobacillus acidophilus 1 Tablet(s) Oral two times a day with meals  lisinopril 5 milliGRAM(s) Oral daily  pantoprazole    Tablet 40 milliGRAM(s) Oral before breakfast  QUEtiapine 100 milliGRAM(s) Oral at bedtime              Vital Signs Last 24 Hrs  T(C): 36.9 (30 Oct 2023 07:29), Max: 37.6 (29 Oct 2023 15:00)  T(F): 98.5 (30 Oct 2023 07:29), Max: 99.6 (29 Oct 2023 15:00)  HR: 66 (30 Oct 2023 07:29) (65 - 70)  BP: 137/80 (30 Oct 2023 07:29) (116/56 - 149/74)  BP(mean): --  RR: 18 (30 Oct 2023 07:29) (18 - 18)  SpO2: 94% (30 Oct 2023 07:29) (93% - 97%)    Parameters below as of 30 Oct 2023 07:29  Patient On (Oxygen Delivery Method): room air      REVIEW OF SYSTEMS:  Constitutionally, the patient denies fever, chills, or night sweats.  Head:  No headaches.  Eyes:  No double vision or blurry vision.  Ears:  No ringing in the ears.  Neck:  No neck pain.  Cardiovascular:  No chest pain.  Respiratory:  No shortness of breath.  Abdomen:  No nausea, vomiting, or abdominal pain.  Extremities/Neurological:  No numbness or tingling.  Musculoskeletal:  No joint pain.        PHYSICAL EXAMINATION:  HEENT:  Head:  Normocephalic, atraumatic.  Eyes:  No scleral icterus.  Ears:  Hearing appeared to be intact.  NECK:  Supple.  CARDIOVASCULAR:  S1 and S2 heard.  RESPIRATORY:  Air entry bilaterally.  ABDOMEN:  Soft, nontender.EXTREMITIES:  No clubbing or cyanosis was noted.      NEUROLOGIC:  The patient awake and alert.  Location hospital, year was 2023, month was November.  Upon conversing with the patient at times she appears slightly forgetful, was able to name simple objects.  Extraocular movements were intact.  Speech was fluent.  Smile symmetric.  The patient has a runny nose on the left, as per her states that she has underlying nose issue, not new.  Motor:  Bilateral upper 4/5, bilateral lower 3-/5.  Sensory:  Bilateral upper and lower intact to light touch.    LABS:  CBC Full  -  ( 30 Oct 2023 06:30 )  WBC Count : 6.31 K/uL  RBC Count : 3.53 M/uL  Hemoglobin : 10.6 g/dL  Hematocrit : 33.6 %  Platelet Count - Automated : 184 K/uL  Mean Cell Volume : 95.2 fl  Mean Cell Hemoglobin : 30.0 pg  Mean Cell Hemoglobin Concentration : 31.5 gm/dL  Auto Neutrophil # : 3.55 K/uL  Auto Lymphocyte # : 1.91 K/uL  Auto Monocyte # : 0.50 K/uL  Auto Eosinophil # : 0.25 K/uL  Auto Basophil # : 0.06 K/uL  Auto Neutrophil % : 56.2 %  Auto Lymphocyte % : 30.3 %  Auto Monocyte % : 7.9 %  Auto Eosinophil % : 4.0 %  Auto Basophil % : 1.0 %    Urinalysis Basic - ( 30 Oct 2023 06:30 )    Color: x / Appearance: x / SG: x / pH: x  Gluc: 112 mg/dL / Ketone: x  / Bili: x / Urobili: x   Blood: x / Protein: x / Nitrite: x   Leuk Esterase: x / RBC: x / WBC x   Sq Epi: x / Non Sq Epi: x / Bacteria: x      10-30    140  |  106  |  11  ----------------------------<  112<H>  5.0   |  26  |  0.91    Ca    9.5      30 Oct 2023 06:30    TPro  6.4  /  Alb  3.2<L>  /  TBili  1.1  /  DBili  x   /  AST  40  /  ALT  41  /  AlkPhos  137<H>  10-30    Hemoglobin A1C:     LIVER FUNCTIONS - ( 30 Oct 2023 06:30 )  Alb: 3.2 g/dL / Pro: 6.4 g/dL / ALK PHOS: 137 U/L / ALT: 41 U/L / AST: 40 U/L / GGT: x           Vitamin B12         RADIOLOGY    ANALYSIS AND PLAN:  This is a 68-year-old with episode of altered mental status.  For episode of altered mental status, most like secondary to metabolic underlying urinary tract infection, suspect the patient also has slight mild cognitive impairment and a history of underlying bipolar disorder, suspect less likely this is a primary CNS event.  For possibly urinary tract infection, antibiotics as needed.  For history of bipolar disorder, continue the patient on home psychiatric medications, if necessary can try the patient on Depakote or Seroquel if she continues to be agitated.  For hyperlipidemia, continue the patient's statin.  For hypertension, monitor systolic blood pressure.  fall precautions      50 minutes of time was spent with the patient, plan of care, reviewing data, with greater than 50% of the visit was spent counseling and/or coordinating care with multidisciplinary healthcare team  
Neurology follow up note    SRIDEVI [id number]      Interval History:    Patient feels ok no new complaints.    Allergies    sulfa drugs (Other)  Ambien (Other)    Intolerances        MEDICATIONS    acetaminophen     Tablet .. 650 milliGRAM(s) Oral every 6 hours PRN  atorvastatin 40 milliGRAM(s) Oral at bedtime  clonazePAM  Tablet 0.5 milliGRAM(s) Oral two times a day  clopidogrel Tablet 75 milliGRAM(s) Oral daily  FLUoxetine 40 milliGRAM(s) Oral daily  gabapentin 300 milliGRAM(s) Oral every 8 hours  heparin   Injectable 5000 Unit(s) SubCutaneous every 8 hours  lactobacillus acidophilus 1 Tablet(s) Oral two times a day with meals  lisinopril 5 milliGRAM(s) Oral daily  lithium 300 milliGRAM(s) Oral two times a day  pantoprazole    Tablet 40 milliGRAM(s) Oral before breakfast  QUEtiapine 100 milliGRAM(s) Oral at bedtime              Vital Signs Last 24 Hrs  T(C): 36.8 (06 Nov 2023 04:52), Max: 36.8 (06 Nov 2023 04:52)  T(F): 98.2 (06 Nov 2023 04:52), Max: 98.2 (06 Nov 2023 04:52)  HR: 73 (06 Nov 2023 04:52) (69 - 73)  BP: 105/68 (06 Nov 2023 04:52) (95/64 - 105/68)  BP(mean): --  RR: 17 (06 Nov 2023 04:52) (17 - 19)  SpO2: 93% (06 Nov 2023 04:52) (93% - 95%)    Parameters below as of 06 Nov 2023 04:52  Patient On (Oxygen Delivery Method): room air    REVIEW OF SYSTEMS:  Constitutionally, the patient denies fever, chills, or night sweats.  Head:  No headaches.  Eyes:  No double vision or blurry vision.  Ears:  No ringing in the ears.  Neck:  No neck pain.  Cardiovascular:  No chest pain.  Respiratory:  No shortness of breath.  Abdomen:  No nausea, vomiting, or abdominal pain.  Extremities/Neurological:  No numbness or tingling.  Musculoskeletal:  No joint pain.        PHYSICAL EXAMINATION:  HEENT:  Head:  Normocephalic, atraumatic.  Eyes:  No scleral icterus.  Ears:  Hearing appeared to be intact.  NECK:  Supple.  CARDIOVASCULAR:  S1 and S2 heard.  RESPIRATORY:  Air entry bilaterally.  ABDOMEN:  Soft, nontender.EXTREMITIES:  No clubbing or cyanosis was noted.      NEUROLOGIC:  The patient awake and alert.  Location hospital, year was 2023, month was November.  Upon conversing with the patient at times she appears slightly forgetful, was able to name simple objects.  Extraocular movements were intact.  Speech was fluent.  Smile symmetric.  The patient has a runny nose on the left, as per her states that she has underlying nose issue, not new.  Motor:  Bilateral upper 4/5, bilateral lower 3/5.  Sensory:  Bilateral upper and lower intact to light touch.       LABS:  CBC Full  -  ( 06 Nov 2023 06:30 )  WBC Count : 5.32 K/uL  RBC Count : 3.41 M/uL  Hemoglobin : 10.0 g/dL  Hematocrit : 33.0 %  Platelet Count - Automated : 272 K/uL  Mean Cell Volume : 96.8 fl  Mean Cell Hemoglobin : 29.3 pg  Mean Cell Hemoglobin Concentration : 30.3 gm/dL  Auto Neutrophil # : x  Auto Lymphocyte # : x  Auto Monocyte # : x  Auto Eosinophil # : x  Auto Basophil # : x  Auto Neutrophil % : x  Auto Lymphocyte % : x  Auto Monocyte % : x  Auto Eosinophil % : x  Auto Basophil % : x    Urinalysis Basic - ( 06 Nov 2023 06:30 )    Color: x / Appearance: x / SG: x / pH: x  Gluc: 112 mg/dL / Ketone: x  / Bili: x / Urobili: x   Blood: x / Protein: x / Nitrite: x   Leuk Esterase: x / RBC: x / WBC x   Sq Epi: x / Non Sq Epi: x / Bacteria: x      11-06    136  |  103  |  22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 rhonchi, wheezing, or rubs  HEART: Regular rate and rhythm; No murmurs, rubs, or gallops  ABDOMEN: Soft, Nontender, Nondistended; Bowel sounds present  EXTREMITIES:  2+ Peripheral Pulses, No clubbing, cyanosis, or edema  LYMPH: No lymphadenopathy noted  SKIN: No rashes or lesions    LABS:                        10.9   5.55  )-----------( 212      ( 31 Oct 2023 06:00 )             36.5     10-31    142  |  104  |  13  ----------------------------<  117<H>  4.7   |  29  |  0.95    Ca    10.0      31 Oct 2023 06:00    TPro  7.1  /  Alb  3.3  /  TBili  0.7  /  DBili  x   /  AST  48<H>  /  ALT  45  /  AlkPhos  136<H>  10-31      Urinalysis Basic - ( 31 Oct 2023 06:00 )    Color: x / Appearance: x / SG: x / pH: x  Gluc: 117 mg/dL / Ketone: x  / Bili: x / Urobili: x   Blood: x / Protein: x / Nitrite: x   Leuk Esterase: x / RBC: x / WBC x   Sq Epi: x / Non Sq Epi: x / Bacteria: x      CAPILLARY BLOOD GLUCOSE            Culture - Urine (collected 10-27-23 @ 05:48)  Source: Clean Catch Clean Catch (Midstream)  Final Report (10-30-23 @ 12:20):    50,000 - 99,000 CFU/mL Escherichia coli  Organism: Escherichia coli (10-30-23 @ 12:20)  Organism: Escherichia coli (10-30-23 @ 12:20)      Method Type: SARA      -  Amikacin: S <=16      -  Amoxicillin/Clavulanic Acid: S <=8/4      -  Ampicillin: S <=8 These ampicillin results predict results for amoxicillin      -  Ampicillin/Sulbactam: S <=4/2      -  Aztreonam: S <=4      -  Cefazolin: S <=2 For uncomplicated UTI with K. pneumoniae, E. coli, or P. mirablis: SARA <=16 is sensitive and SARA >=32 is resistant. This also predicts results for oral agents cefaclor, cefdinir, cefpodoxime, cefprozil, cefuroxime axetil, cephalexin and locarbef for uncomplicated UTI. Note that some isolates may be susceptible to these agents while testing resistant to cefazolin.      -  Cefepime: S <=2      -  Cefoxitin: S <=8      -  Ceftriaxone: S <=1      -  Cefuroxime: S <=4      -  Ciprofloxacin: S <=0.25      -  Ertapenem: S <=0.5      -  Gentamicin: S <=2      -  Imipenem: S <=1      -  Levofloxacin: S <=0.5      -  Meropenem: S <=1      -  Nitrofurantoin: S <=32 Should not be used to treat pyelonephritis      -  Piperacillin/Tazobactam: S <=8      -  Tobramycin: S <=2      -  Trimethoprim/Sulfamethoxazole: S <=0.5/9.5    Culture - Blood (collected 10-27-23 @ 03:42)  Source: .Blood Blood-Peripheral  Preliminary Report (10-30-23 @ 13:01):    No growth at 72 Hours    Culture - Blood (collected 10-27-23 @ 00:51)  Source: .Blood Blood-Peripheral  Preliminary Report (10-30-23 @ 13:01):    No growth at 72 Hours        I&O's Summary    30 Oct 2023 07:01  -  31 Oct 2023 07:00  --------------------------------------------------------  IN: 0 mL / OUT: 800 mL / NET: -800 mL        RADIOLOGY & ADDITIONAL TESTS:    Imaging Personally Reviewed:  [ x] YES  [ ] NO    Consultant(s) Notes Reviewed:  [x ] YES  [ ] NO    Care Discussed with Consultants/Other Providers [ x] YES  [ ] NO
Patient is a 68y old  Female who presents with a chief complaint of aggressive behaviour (01 Nov 2023 12:10)      INTERVAL HPI/OVERNIGHT EVENTS:overnight events noted    Home Medications:  atorvastatin 40 mg oral tablet: 1 tab(s) orally once a day (at bedtime) (27 Oct 2023 02:34)  clonazePAM 0.5 mg oral tablet: 1 tab(s) orally (27 Oct 2023 02:34)  FLUoxetine 60 mg oral tablet: 1 tab(s) orally once a day (27 Oct 2023 02:34)  gabapentin 300 mg oral tablet: orally 3 times a day (27 Oct 2023 02:34)  KlonoPIN 0.5 mg oral tablet: 1 tab(s) orally 2 times a day (27 Oct 2023 22:35)  Lisinopril 5mg po daily:  (27 Oct 2023 02:34)  lithium 300 mg oral capsule: 1 cap(s) orally 2 times a day (27 Oct 2023 22:32)  Plavix 75 mg oral tablet: 1 tab(s) orally once a day (27 Oct 2023 22:33)  Seroquel 200 mg oral tablet: 1 tab(s) orally once a day (at bedtime) (27 Oct 2023 22:34)  Vitamin D2 capsule po weekly on Wednesdays:  (27 Oct 2023 02:34)      MEDICATIONS  (STANDING):  atorvastatin 40 milliGRAM(s) Oral at bedtime  cefTRIAXone   IVPB 1000 milliGRAM(s) IV Intermittent every 24 hours  clonazePAM  Tablet 0.5 milliGRAM(s) Oral two times a day  clopidogrel Tablet 75 milliGRAM(s) Oral daily  FLUoxetine 40 milliGRAM(s) Oral daily  gabapentin 300 milliGRAM(s) Oral every 8 hours  heparin   Injectable 5000 Unit(s) SubCutaneous every 8 hours  lactobacillus acidophilus 1 Tablet(s) Oral two times a day with meals  lisinopril 5 milliGRAM(s) Oral daily  lithium 300 milliGRAM(s) Oral two times a day  pantoprazole    Tablet 40 milliGRAM(s) Oral before breakfast  QUEtiapine 100 milliGRAM(s) Oral at bedtime    MEDICATIONS  (PRN):  acetaminophen     Tablet .. 650 milliGRAM(s) Oral every 6 hours PRN Temp greater or equal to 38C (100.4F), Mild Pain (1 - 3), Moderate Pain (4 - 6)      Allergies    sulfa drugs (Other)  Ambien (Other)    Intolerances        REVIEW OF SYSTEMS:  CONSTITUTIONAL: No fever, weight loss, or fatigue  EYES: No eye pain, visual disturbances, or discharge  ENMT:  No difficulty hearing, tinnitus, vertigo; No sinus or throat pain  NECK: No pain or stiffness  BREASTS: No pain, masses, or nipple discharge  RESPIRATORY: No cough, wheezing, chills or hemoptysis; No shortness of breath  CARDIOVASCULAR: No chest pain, palpitations, dizziness, or leg swelling  GASTROINTESTINAL: No abdominal or epigastric pain. No nausea, vomiting, or hematemesis; No diarrhea or constipation. No melena or hematochezia.  GENITOURINARY: No dysuria, frequency, hematuria, or incontinence  NEUROLOGICAL: No headaches, memory loss, loss of strength, numbness, or tremors  SKIN: No itching, burning, rashes, or lesions     Vital Signs Last 24 Hrs  T(C): 36.8 (01 Nov 2023 07:50), Max: 36.8 (31 Oct 2023 23:25)  T(F): 98.2 (01 Nov 2023 07:50), Max: 98.3 (31 Oct 2023 23:25)  HR: 71 (01 Nov 2023 07:50) (71 - 84)  BP: 126/79 (01 Nov 2023 07:50) (109/55 - 147/78)  BP(mean): --  RR: 18 (01 Nov 2023 07:50) (16 - 18)  SpO2: 98% (01 Nov 2023 07:50) (95% - 98%)    Parameters below as of 01 Nov 2023 07:50  Patient On (Oxygen Delivery Method): room air        PHYSICAL EXAM:  GENERAL: NAD, well-groomed, well-developed  HEAD:  Atraumatic, Normocephalic  EYES: EOMI, PERRLA, conjunctiva and sclera clear  ENMT: Moist mucous membranes,   NECK: Supple, No JVD, Normal thyroid  NERVOUS SYSTEM:  Alert & Oriented X2, non focal  CHEST/LUNG: Clear to percussion bilaterally; No rales, rhonchi, wheezing, or rubs  HEART: Regular rate and rhythm; No murmurs, rubs, or gallops  ABDOMEN: Soft, Nontender, Nondistended; Bowel sounds present  EXTREMITIES:  2+ Peripheral Pulses, No clubbing, cyanosis, or edema  LYMPH: No lymphadenopathy noted  SKIN: No rashes or lesions    LABS:                        10.1   5.12  )-----------( 259      ( 01 Nov 2023 06:30 )             33.5     11-01    136  |  103  |  15  ----------------------------<  123<H>  4.5   |  26  |  1.14    Ca    9.4      01 Nov 2023 06:30    TPro  6.4  /  Alb  2.9<L>  /  TBili  0.4  /  DBili  x   /  AST  35  /  ALT  42  /  AlkPhos  121<H>  11-01      Urinalysis Basic - ( 01 Nov 2023 06:30 )    Color: x / Appearance: x / SG: x / pH: x  Gluc: 123 mg/dL / Ketone: x  / Bili: x / Urobili: x   Blood: x / Protein: x / Nitrite: x   Leuk Esterase: x / RBC: x / WBC x   Sq Epi: x / Non Sq Epi: x / Bacteria: x      CAPILLARY BLOOD GLUCOSE            Culture - Urine (collected 10-27-23 @ 05:48)  Source: Clean Catch Clean Catch (Midstream)  Final Report (10-30-23 @ 12:20):    50,000 - 99,000 CFU/mL Escherichia coli  Organism: Escherichia coli (10-30-23 @ 12:20)  Organism: Escherichia coli (10-30-23 @ 12:20)      -  Levofloxacin: S <=0.5      -  Tobramycin: S <=2      -  Nitrofurantoin: S <=32 Should not be used to treat pyelonephritis      -  Aztreonam: S <=4      -  Gentamicin: S <=2      -  Cefazolin: S <=2 For uncomplicated UTI with K. pneumoniae, E. coli, or P. mirablis: SARA <=16 is sensitive and SARA >=32 is resistant. This also predicts results for oral agents cefaclor, cefdinir, cefpodoxime, cefprozil, cefuroxime axetil, cephalexin and locarbef for uncomplicated UTI. Note that some isolates may be susceptible to these agents while testing resistant to cefazolin.      -  Cefepime: S <=2      -  Piperacillin/Tazobactam: S <=8      -  Ciprofloxacin: S <=0.25      -  Imipenem: S <=1      -  Ceftriaxone: S <=1      -  Ampicillin: S <=8 These ampicillin results predict results for amoxicillin      Method Type: SARA      -  Meropenem: S <=1      -  Ampicillin/Sulbactam: S <=4/2      -  Cefoxitin: S <=8      -  Cefuroxime: S <=4      -  Amikacin: S <=16      -  Amoxicillin/Clavulanic Acid: S <=8/4      -  Trimethoprim/Sulfamethoxazole: S <=0.5/9.5      -  Ertapenem: S <=0.5    Culture - Blood (collected 10-27-23 @ 03:42)  Source: .Blood Blood-Peripheral  Preliminary Report (10-31-23 @ 13:01):    No growth at 4 days    Culture - Blood (collected 10-27-23 @ 00:51)  Source: .Blood Blood-Peripheral  Preliminary Report (10-31-23 @ 13:01):    No growth at 4 days        I&O's Summary    31 Oct 2023 07:01  -  01 Nov 2023 07:00  --------------------------------------------------------  IN: 0 mL / OUT: 700 mL / NET: -700 mL        RADIOLOGY & ADDITIONAL TESTS:    Imaging Personally Reviewed:  [ x] YES  [ ] NO    Consultant(s) Notes Reviewed:  [x] YES  [ ] NO    Care Discussed with Consultants/Other Providers [ x] YES  [ ] NO
Patient is a 68y old  Female who presents with a chief complaint of aggressive behaviour (29 Oct 2023 07:46)      INTERVAL HPI/OVERNIGHT EVENTS:overniht events noted    Home Medications:  atorvastatin 40 mg oral tablet: 1 tab(s) orally once a day (at bedtime) (27 Oct 2023 02:34)  clonazePAM 0.5 mg oral tablet: 1 tab(s) orally (27 Oct 2023 02:34)  FLUoxetine 60 mg oral tablet: 1 tab(s) orally once a day (27 Oct 2023 02:34)  gabapentin 300 mg oral tablet: orally 3 times a day (27 Oct 2023 02:34)  KlonoPIN 0.5 mg oral tablet: 1 tab(s) orally 2 times a day (27 Oct 2023 22:35)  Lisinopril 5mg po daily:  (27 Oct 2023 02:34)  lithium 300 mg oral capsule: 1 cap(s) orally 2 times a day (27 Oct 2023 22:32)  Plavix 75 mg oral tablet: 1 tab(s) orally once a day (27 Oct 2023 22:33)  Seroquel 200 mg oral tablet: 1 tab(s) orally once a day (at bedtime) (27 Oct 2023 22:34)  Vitamin D2 capsule po weekly on Wednesdays:  (27 Oct 2023 02:34)      MEDICATIONS  (STANDING):  atorvastatin 40 milliGRAM(s) Oral at bedtime  clonazePAM  Tablet 0.5 milliGRAM(s) Oral two times a day  clopidogrel Tablet 75 milliGRAM(s) Oral daily  gabapentin 300 milliGRAM(s) Oral every 8 hours  heparin   Injectable 5000 Unit(s) SubCutaneous every 8 hours  lisinopril 5 milliGRAM(s) Oral daily  pantoprazole    Tablet 40 milliGRAM(s) Oral before breakfast  QUEtiapine 100 milliGRAM(s) Oral at bedtime    MEDICATIONS  (PRN):  acetaminophen     Tablet .. 650 milliGRAM(s) Oral every 6 hours PRN Temp greater or equal to 38C (100.4F), Mild Pain (1 - 3), Moderate Pain (4 - 6)      Allergies    sulfa drugs (Other)  Ambien (Other)    Intolerances        REVIEW OF SYSTEMS:  CONSTITUTIONAL: No fever, weight loss, or fatigue  EYES: No eye pain, visual disturbances, or discharge  ENMT:  No difficulty hearing, tinnitus, vertigo; No sinus or throat pain  NECK: No pain or stiffness  BREASTS: No pain, masses, or nipple discharge  RESPIRATORY: No cough, wheezing, chills or hemoptysis; No shortness of breath  CARDIOVASCULAR: No chest pain, palpitations, dizziness, or leg swelling  GASTROINTESTINAL: No abdominal or epigastric pain. No nausea, vomiting, or hematemesis; No diarrhea or constipation. No melena or hematochezia.  GENITOURINARY: No dysuria, frequency, hematuria, or incontinence  NEUROLOGICAL: poor memory    Vital Signs Last 24 Hrs  T(C): 36.7 (29 Oct 2023 05:15), Max: 36.9 (28 Oct 2023 20:00)  T(F): 98.1 (29 Oct 2023 05:15), Max: 98.4 (28 Oct 2023 20:00)  HR: 69 (29 Oct 2023 05:15) (61 - 71)  BP: 151/82 (29 Oct 2023 05:15) (105/60 - 151/82)  BP(mean): 97 (29 Oct 2023 04:00) (70 - 100)  RR: 20 (29 Oct 2023 05:15) (14 - 23)  SpO2: 97% (29 Oct 2023 05:15) (92% - 99%)    Parameters below as of 29 Oct 2023 05:15  Patient On (Oxygen Delivery Method): room air        PHYSICAL EXAM:  GENERAL: NAD, well-groomed, well-developed  HEAD:  Atraumatic, Normocephalic  EYES: EOMI, PERRLA, conjunctiva and sclera clear  ENMT: Moist mucous membranes,   NECK: Supple, No JVD, Normal thyroid  NERVOUS SYSTEM:  Alert & Oriented X2, non focal  CHEST/LUNG: Clear to percussion bilaterally; No rales, rhonchi, wheezing, or rubs  HEART: Regular rate and rhythm; No murmurs, rubs, or gallops  ABDOMEN: Soft, Nontender, Nondistended; Bowel sounds present  EXTREMITIES:  2+ Peripheral Pulses, No clubbing, cyanosis, or edema  SKIN: No rashes or lesions    LABS:                        10.6   6.13  )-----------( 234      ( 29 Oct 2023 06:00 )             33.4     10-28    137  |  106  |  14  ----------------------------<  126<H>  4.8   |  24  |  0.91    Ca    9.5      28 Oct 2023 11:45  Mg     1.7     10-28    TPro  6.7  /  Alb  3.0<L>  /  TBili  0.7  /  DBili  x   /  AST  34  /  ALT  29  /  AlkPhos  133<H>  10-28      Urinalysis Basic - ( 28 Oct 2023 11:45 )    Color: x / Appearance: x / SG: x / pH: x  Gluc: 126 mg/dL / Ketone: x  / Bili: x / Urobili: x   Blood: x / Protein: x / Nitrite: x   Leuk Esterase: x / RBC: x / WBC x   Sq Epi: x / Non Sq Epi: x / Bacteria: x      CAPILLARY BLOOD GLUCOSE            Culture - Urine (collected 10-27-23 @ 05:48)  Source: Clean Catch Clean Catch (Midstream)  Preliminary Report (10-28-23 @ 22:58):    50,000 - 99,000 CFU/mL Escherichia coli    Culture - Blood (collected 10-27-23 @ 03:42)  Source: .Blood Blood-Peripheral  Preliminary Report (10-28-23 @ 13:02):    No growth at 24 hours    Culture - Blood (collected 10-27-23 @ 00:51)  Source: .Blood Blood-Peripheral  Preliminary Report (10-28-23 @ 13:02):    No growth at 24 hours        I&O's Summary    28 Oct 2023 07:01  -  29 Oct 2023 07:00  --------------------------------------------------------  IN: 1295 mL / OUT: 1700 mL / NET: -405 mL        RADIOLOGY & ADDITIONAL TESTS:    Imaging Personally Reviewed:  [ x] YES  [ ] NO    Consultant(s) Notes Reviewed:  [ x] YES  [ ] NO    Care Discussed with Consultants/Other Providers [ x] YES  [ ] NO
Patient is a 68y old  Female who presents with a chief complaint of aggressive behaviour (30 Oct 2023 06:21)      INTERVAL HPI/OVERNIGHT EVENTS:overnight events noted    Home Medications:  atorvastatin 40 mg oral tablet: 1 tab(s) orally once a day (at bedtime) (27 Oct 2023 02:34)  clonazePAM 0.5 mg oral tablet: 1 tab(s) orally (27 Oct 2023 02:34)  FLUoxetine 60 mg oral tablet: 1 tab(s) orally once a day (27 Oct 2023 02:34)  gabapentin 300 mg oral tablet: orally 3 times a day (27 Oct 2023 02:34)  KlonoPIN 0.5 mg oral tablet: 1 tab(s) orally 2 times a day (27 Oct 2023 22:35)  Lisinopril 5mg po daily:  (27 Oct 2023 02:34)  lithium 300 mg oral capsule: 1 cap(s) orally 2 times a day (27 Oct 2023 22:32)  Plavix 75 mg oral tablet: 1 tab(s) orally once a day (27 Oct 2023 22:33)  Seroquel 200 mg oral tablet: 1 tab(s) orally once a day (at bedtime) (27 Oct 2023 22:34)  Vitamin D2 capsule po weekly on Wednesdays:  (27 Oct 2023 02:34)      MEDICATIONS  (STANDING):  atorvastatin 40 milliGRAM(s) Oral at bedtime  cefTRIAXone   IVPB 1000 milliGRAM(s) IV Intermittent every 24 hours  clonazePAM  Tablet 0.5 milliGRAM(s) Oral two times a day  clopidogrel Tablet 75 milliGRAM(s) Oral daily  gabapentin 300 milliGRAM(s) Oral every 8 hours  heparin   Injectable 5000 Unit(s) SubCutaneous every 8 hours  lactobacillus acidophilus 1 Tablet(s) Oral two times a day with meals  lisinopril 5 milliGRAM(s) Oral daily  pantoprazole    Tablet 40 milliGRAM(s) Oral before breakfast  QUEtiapine 100 milliGRAM(s) Oral at bedtime    MEDICATIONS  (PRN):  acetaminophen     Tablet .. 650 milliGRAM(s) Oral every 6 hours PRN Temp greater or equal to 38C (100.4F), Mild Pain (1 - 3), Moderate Pain (4 - 6)      Allergies    sulfa drugs (Other)  Ambien (Other)    Intolerances        REVIEW OF SYSTEMS:  CONSTITUTIONAL: No fever, weight loss, or fatigue  EYES: No eye pain, visual disturbances, or discharge  ENMT:  No difficulty hearing, tinnitus, vertigo; No sinus or throat pain  NECK: No pain or stiffness  BREASTS: No pain, masses, or nipple discharge  RESPIRATORY: No cough, wheezing, chills or hemoptysis; No shortness of breath  CARDIOVASCULAR: No chest pain, palpitations, dizziness, or leg swelling  GASTROINTESTINAL: No abdominal or epigastric pain. No nausea, vomiting, GENITOURINARY: No dysuria, frequency, hematuria, or incontinence  NEUROLOGICAL: No headaches, memory loss, loss of strength, numbness, or tremors  SKIN: No itching, burning, rashes, or lesions     Vital Signs Last 24 Hrs  T(C): 36.9 (30 Oct 2023 07:29), Max: 37.6 (29 Oct 2023 15:00)  T(F): 98.5 (30 Oct 2023 07:29), Max: 99.6 (29 Oct 2023 15:00)  HR: 66 (30 Oct 2023 07:29) (65 - 70)  BP: 137/80 (30 Oct 2023 07:29) (116/56 - 149/74)  BP(mean): --  RR: 18 (30 Oct 2023 07:29) (18 - 18)  SpO2: 94% (30 Oct 2023 07:29) (93% - 97%)    Parameters below as of 30 Oct 2023 07:29  Patient On (Oxygen Delivery Method): room air        PHYSICAL EXAM:  GENERAL: NAD, well-groomed, well-developed  HEAD:  Atraumatic, Normocephalic  EYES: EOMI, PERRLA, conjunctiva and sclera clear  ENMT: Moist mucous membranes,   NECK: Supple, No JVD, Normal thyroid  NERVOUS SYSTEM:  Alert & Oriented X2, non focal  CHEST/LUNG: Clear to percussion bilaterally; No rales, rhonchi, wheezing, or rubs  HEART: Regular rate and rhythm; No murmurs, rubs, or gallops  ABDOMEN: Soft, Nontender, Nondistended; Bowel sounds present  EXTREMITIES:  2+ Peripheral Pulses, No clubbing, cyanosis, or edema  LYMPH: No lymphadenopathy noted  SKIN: No rashes or lesions    LABS:                        10.6   6.31  )-----------( 184      ( 30 Oct 2023 06:30 )             33.6     10-30    140  |  106  |  11  ----------------------------<  112<H>  5.0   |  26  |  0.91    Ca    9.5      30 Oct 2023 06:30    TPro  6.4  /  Alb  3.2<L>  /  TBili  1.1  /  DBili  x   /  AST  40  /  ALT  41  /  AlkPhos  137<H>  10-30      Urinalysis Basic - ( 30 Oct 2023 06:30 )    Color: x / Appearance: x / SG: x / pH: x  Gluc: 112 mg/dL / Ketone: x  / Bili: x / Urobili: x   Blood: x / Protein: x / Nitrite: x   Leuk Esterase: x / RBC: x / WBC x   Sq Epi: x / Non Sq Epi: x / Bacteria: x      CAPILLARY BLOOD GLUCOSE            Culture - Urine (collected 10-27-23 @ 05:48)  Source: Clean Catch Clean Catch (Midstream)  Preliminary Report (10-28-23 @ 22:58):    50,000 - 99,000 CFU/mL Escherichia coli    Culture - Blood (collected 10-27-23 @ 03:42)  Source: .Blood Blood-Peripheral  Preliminary Report (10-29-23 @ 13:01):    No growth at 48 Hours    Culture - Blood (collected 10-27-23 @ 00:51)  Source: .Blood Blood-Peripheral  Preliminary Report (10-29-23 @ 13:01):    No growth at 48 Hours        I&O's Summary    29 Oct 2023 07:01  -  30 Oct 2023 07:00  --------------------------------------------------------  IN: 0 mL / OUT: 1200 mL / NET: -1200 mL        RADIOLOGY & ADDITIONAL TESTS:    Imaging Personally Reviewed:  [ x] YES  [ ] NO    Consultant(s) Notes Reviewed:  [x ] YES  [ ] NO    Care Discussed with Consultants/Other Providers [x ] YES  [ ] NO
Patient is a 68y old  Female who presents with a chief complaint of aggressive behaviour (02 Nov 2023 10:27)      INTERVAL HPI/OVERNIGHT EVENTS:c/o left ear pain    Home Medications:  atorvastatin 40 mg oral tablet: 1 tab(s) orally once a day (at bedtime) (27 Oct 2023 02:34)  clonazePAM 0.5 mg oral tablet: 1 tab(s) orally (27 Oct 2023 02:34)  FLUoxetine 60 mg oral tablet: 1 tab(s) orally once a day (27 Oct 2023 02:34)  gabapentin 300 mg oral tablet: orally 3 times a day (27 Oct 2023 02:34)  KlonoPIN 0.5 mg oral tablet: 1 tab(s) orally 2 times a day (27 Oct 2023 22:35)  Lisinopril 5mg po daily:  (27 Oct 2023 02:34)  lithium 300 mg oral capsule: 1 cap(s) orally 2 times a day (27 Oct 2023 22:32)  Plavix 75 mg oral tablet: 1 tab(s) orally once a day (27 Oct 2023 22:33)  Seroquel 200 mg oral tablet: 1 tab(s) orally once a day (at bedtime) (27 Oct 2023 22:34)  Vitamin D2 capsule po weekly on Wednesdays:  (27 Oct 2023 02:34)      MEDICATIONS  (STANDING):  atorvastatin 40 milliGRAM(s) Oral at bedtime  cefTRIAXone   IVPB 1000 milliGRAM(s) IV Intermittent every 24 hours  clonazePAM  Tablet 0.5 milliGRAM(s) Oral two times a day  clopidogrel Tablet 75 milliGRAM(s) Oral daily  FLUoxetine 40 milliGRAM(s) Oral daily  gabapentin 300 milliGRAM(s) Oral every 8 hours  heparin   Injectable 5000 Unit(s) SubCutaneous every 8 hours  lactobacillus acidophilus 1 Tablet(s) Oral two times a day with meals  lisinopril 5 milliGRAM(s) Oral daily  lithium 300 milliGRAM(s) Oral two times a day  pantoprazole    Tablet 40 milliGRAM(s) Oral before breakfast  QUEtiapine 100 milliGRAM(s) Oral at bedtime    MEDICATIONS  (PRN):  acetaminophen     Tablet .. 650 milliGRAM(s) Oral every 6 hours PRN Temp greater or equal to 38C (100.4F), Mild Pain (1 - 3), Moderate Pain (4 - 6)      Allergies    sulfa drugs (Other)  Ambien (Other)    Intolerances        REVIEW OF SYSTEMS:  CONSTITUTIONAL: No fever, weight loss, or fatigue  EYES: No eye pain, visual disturbances, or discharge  ENMT:  left ear pain  NECK: No pain or stiffness  BREASTS: No pain, masses, or nipple discharge  RESPIRATORY: No cough, wheezing, chills or hemoptysis; No shortness of breath  CARDIOVASCULAR: No chest pain, palpitations, dizziness, or leg swelling  GASTROINTESTINAL: No abdominal or epigastric pain. No nausea, vomiting, or hematemesis; No diarrhea or constipation. No melena or hematochezia.  GENITOURINARY: No dysuria, frequency, hematuria, or incontinence  NEUROLOGICAL: No headaches, memory loss, loss of strength, numbness, or tremors  SKIN: No itching, burning, rashes, or lesions   LYMPH NODES: No enlarged glands  ENDOCRINE: No heat or cold intolerance; No hair loss  MUSCULOSKELETAL: No joint pain or swelling; No muscle, back, or extremity pain  PSYCHIATRIC: No depression, anxiety, mood swings, or difficulty sleeping  HEME/LYMPH: No easy bruising, or bleeding gums  ALLERGY AND IMMUNOLOGIC: No hives or eczema    Vital Signs Last 24 Hrs  T(C): 36.7 (02 Nov 2023 08:08), Max: 36.9 (02 Nov 2023 01:00)  T(F): 98.1 (02 Nov 2023 08:08), Max: 98.4 (02 Nov 2023 01:00)  HR: 62 (02 Nov 2023 08:08) (62 - 71)  BP: 110/60 (02 Nov 2023 08:08) (110/60 - 116/77)  BP(mean): --  RR: 17 (02 Nov 2023 08:08) (17 - 17)  SpO2: 92% (02 Nov 2023 08:08) (92% - 94%)    Parameters below as of 02 Nov 2023 01:00  Patient On (Oxygen Delivery Method): room air        PHYSICAL EXAM:  GENERAL: NAD, well-groomed, well-developed  HEAD:  Atraumatic, Normocephalic  EYES: EOMI, PERRLA, conjunctiva and sclera clear  ENMT: Moist mucous membranes,   NECK: Supple, No JVD, Normal thyroid  NERVOUS SYSTEM:  Alert & Oriented X3, Good concentration; Motor Strength 5/5 B/L upper and lower extremities; DTRs 2+ intact and symmetric  CHEST/LUNG: Clear to percussion bilaterally; No rales, rhonchi, wheezing, or rubs  HEART: Regular rate and rhythm; No murmurs, rubs, or gallops  ABDOMEN: Soft, Nontender, Nondistended; Bowel sounds present  EXTREMITIES:  2+ Peripheral Pulses, No clubbing, cyanosis, or edema  LYMPH: No lymphadenopathy noted  SKIN: No rashes or lesions    LABS:                        11.0   5.80  )-----------( 256      ( 02 Nov 2023 06:00 )             36.8     11-02    139  |  104  |  19  ----------------------------<  110<H>  4.4   |  27  |  0.87    Ca    9.9      02 Nov 2023 06:00    TPro  6.9  /  Alb  3.0<L>  /  TBili  0.4  /  DBili  x   /  AST  29  /  ALT  34  /  AlkPhos  127<H>  11-02      Urinalysis Basic - ( 02 Nov 2023 06:00 )    Color: x / Appearance: x / SG: x / pH: x  Gluc: 110 mg/dL / Ketone: x  / Bili: x / Urobili: x   Blood: x / Protein: x / Nitrite: x   Leuk Esterase: x / RBC: x / WBC x   Sq Epi: x / Non Sq Epi: x / Bacteria: x      CAPILLARY BLOOD GLUCOSE            Culture - Urine (collected 10-27-23 @ 05:48)  Source: Clean Catch Clean Catch (Midstream)  Final Report (10-30-23 @ 12:20):    50,000 - 99,000 CFU/mL Escherichia coli  Organism: Escherichia coli (10-30-23 @ 12:20)  Organism: Escherichia coli (10-30-23 @ 12:20)      -  Levofloxacin: S <=0.5      -  Tobramycin: S <=2      -  Nitrofurantoin: S <=32 Should not be used to treat pyelonephritis      -  Aztreonam: S <=4      -  Gentamicin: S <=2      -  Cefazolin: S <=2 For uncomplicated UTI with K. pneumoniae, E. coli, or P. mirablis: SARA <=16 is sensitive and SARA >=32 is resistant. This also predicts results for oral agents cefaclor, cefdinir, cefpodoxime, cefprozil, cefuroxime axetil, cephalexin and locarbef for uncomplicated UTI. Note that some isolates may be susceptible to these agents while testing resistant to cefazolin.      -  Cefepime: S <=2      -  Piperacillin/Tazobactam: S <=8      -  Ciprofloxacin: S <=0.25      -  Imipenem: S <=1      -  Ceftriaxone: S <=1      -  Ampicillin: S <=8 These ampicillin results predict results for amoxicillin      Method Type: SARA      -  Meropenem: S <=1      -  Ampicillin/Sulbactam: S <=4/2      -  Cefoxitin: S <=8      -  Cefuroxime: S <=4      -  Amikacin: S <=16      -  Amoxicillin/Clavulanic Acid: S <=8/4      -  Trimethoprim/Sulfamethoxazole: S <=0.5/9.5      -  Ertapenem: S <=0.5    Culture - Blood (collected 10-27-23 @ 03:42)  Source: .Blood Blood-Peripheral  Final Report (11-01-23 @ 13:00):    No growth at 5 days    Culture - Blood (collected 10-27-23 @ 00:51)  Source: .Blood Blood-Peripheral  Final Report (11-01-23 @ 13:00):    No growth at 5 days        I&O's Summary    01 Nov 2023 07:01  -  02 Nov 2023 07:00  --------------------------------------------------------  IN: 0 mL / OUT: 801 mL / NET: -801 mL        RADIOLOGY & ADDITIONAL TESTS:    Imaging Personally Reviewed:  [x ] YES  [ ] NO    Consultant(s) Notes Reviewed:  [x ] YES  [ ] NO    Care Discussed with Consultants/Other Providers [x ] YES  [ ] NO

## 2023-11-08 NOTE — PROGRESS NOTE ADULT - TIME BILLING
I have discussed care plan with patient and HCP,expressed understanding of problems treatment and their effect and side effects, alternatives in detail,I have asked if they have any questions and concerns and appropriately addressed them to best of my ability  Reviewed all diagonostic tests, lab results and drug drug interactions, and medications

## 2023-11-08 NOTE — CASE MANAGEMENT PROGRESS NOTE - NSCMPROGRESSNOTE_GEN_ALL_CORE
RN/CM has faxed the following DC paperwork as requested to NEW assisted living facility formerly [facility] to fax [phone]: [phone] long-term form, Mental Health Eval form, PT report, COVID swab within 24-72H of DC (if indicated) and CXR report. CM then received call from leadership of assisted living facility, Shala Rivera that since pt is being considered a NEW assisted living facility placement despite pt already being in their system for [facility], MD has to send ALL meds to pharmacy: [facility] [phone] ; and if home care indicated, [facility]. Phone: [phone]. CM has referred case to aforementioned home care agency and confirmed that pt has been accepted with start of care on 11/09/2023. CM made hospital MD aware of need for all meds to be sent to [facility] pharmacy. DC transport arranged for 1:30 PM today with [facility] [phone] [facility]  made aware. Staff on unit apprised re: all above.   RN/CM has faxed the following DC paperwork as requested to NEW assisted living facility [facility] to fax [phone]: [phone] jail form, Mental Health Eval form, PT report, COVID swab within 24-72H of DC (if indicated) and CXR report. CM then received call from leadership of assisted living facility, Shala Rivera that since pt is being considered a NEW assisted living facility placement despite pt already being in their system for [facility], MD has to send ALL meds to pharmacy: [facility] [phone] ; and if home care indicated, [facility]. Phone: [phone]. CM has referred case to aforementioned home care agency and confirmed that pt has been accepted with start of care on 11/09/2023. CM made hospital MD aware of need for all meds to be sent to [facility] pharmacy. DC transport arranged for 1:30 PM today with [facility] [phone] [facility] living  made aware. Pt aware of transition to assisted living facility today and is agreeable. Staff on unit apprised re: all above.   RN/CM has faxed the following DC paperwork as requested to NEW assisted living facility [facility] to fax [phone]: [phone] [facility] form, Mental Health Eval form, PT report, COVID swab within 24-72H of DC (if indicated) and CXR report. CM then received call from leadership of assisted living facility, Shala Rivera that since pt is being considered a NEW assisted living facility placement despite pt already being in their system for [facility], MD has to send ALL meds to pharmacy: [facility] [phone] ; and if home care indicated, preferred home care agency is [facility]. Phone: [phone]. CM has referred case to aforementioned home care agency and confirmed that pt has been accepted with start of care on 11/09/2023. CM made hospital MD aware of need for all [facility]s [facility]. DC transport arranged for 1:30 PM today with [facility] [phone] [facility] living  made aware. Pt aware of transition to assisted living facility today and is agreeable. Pt has NO next of kin or designated caregiver to review IMM with as pt has cognitive impairment and very forgetful, hence this CM has discussed DC plan with assisted living facility staff as they will be providing and overseeing pt's care. Staff on unit apprised re: all above.

## 2023-11-08 NOTE — PROGRESS NOTE ADULT - NUTRITIONAL ASSESSMENT
This patient has been assessed with a concern for Malnutrition and has been determined to have a diagnosis/diagnoses of Morbid obesity (BMI > 40).    This patient is being managed with:   Diet Regular-  Entered: Oct 27 2023  2:38AM  
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
## 2023-11-08 NOTE — PROGRESS NOTE ADULT - REASON FOR ADMISSION
aggressive behaviour

## 2023-11-08 NOTE — PROGRESS NOTE ADULT - PROVIDER SPECIALTY LIST ADULT
Cardiology
Infectious Disease
Neurology
Pulmonology
Cardiology
Cardiology
Critical Care
Infectious Disease
Internal Medicine
Neurology
Neurology
Pulmonology
Cardiology
Infectious Disease
Internal Medicine
Internal Medicine
Neurology
Pulmonology
Internal Medicine
Internal Medicine
Pulmonology
Cardiology
Cardiology
Internal Medicine

## 2023-11-13 PROCEDURE — 85027 COMPLETE CBC AUTOMATED: CPT

## 2023-11-13 PROCEDURE — 83735 ASSAY OF MAGNESIUM: CPT

## 2023-11-13 PROCEDURE — 70450 CT HEAD/BRAIN W/O DYE: CPT | Mod: MA

## 2023-11-13 PROCEDURE — 99285 EMERGENCY DEPT VISIT HI MDM: CPT

## 2023-11-13 PROCEDURE — 82728 ASSAY OF FERRITIN: CPT

## 2023-11-13 PROCEDURE — 87186 SC STD MICRODIL/AGAR DIL: CPT

## 2023-11-13 PROCEDURE — 80061 LIPID PANEL: CPT

## 2023-11-13 PROCEDURE — 82607 VITAMIN B-12: CPT

## 2023-11-13 PROCEDURE — 87641 MR-STAPH DNA AMP PROBE: CPT

## 2023-11-13 PROCEDURE — 80053 COMPREHEN METABOLIC PANEL: CPT

## 2023-11-13 PROCEDURE — 87086 URINE CULTURE/COLONY COUNT: CPT

## 2023-11-13 PROCEDURE — 84436 ASSAY OF TOTAL THYROXINE: CPT

## 2023-11-13 PROCEDURE — 86803 HEPATITIS C AB TEST: CPT

## 2023-11-13 PROCEDURE — 83550 IRON BINDING TEST: CPT

## 2023-11-13 PROCEDURE — 84443 ASSAY THYROID STIM HORMONE: CPT

## 2023-11-13 PROCEDURE — 85730 THROMBOPLASTIN TIME PARTIAL: CPT

## 2023-11-13 PROCEDURE — 84484 ASSAY OF TROPONIN QUANT: CPT

## 2023-11-13 PROCEDURE — 97116 GAIT TRAINING THERAPY: CPT

## 2023-11-13 PROCEDURE — 87640 STAPH A DNA AMP PROBE: CPT

## 2023-11-13 PROCEDURE — 82140 ASSAY OF AMMONIA: CPT

## 2023-11-13 PROCEDURE — 87040 BLOOD CULTURE FOR BACTERIA: CPT

## 2023-11-13 PROCEDURE — 83540 ASSAY OF IRON: CPT

## 2023-11-13 PROCEDURE — 96361 HYDRATE IV INFUSION ADD-ON: CPT

## 2023-11-13 PROCEDURE — 84145 PROCALCITONIN (PCT): CPT

## 2023-11-13 PROCEDURE — 97110 THERAPEUTIC EXERCISES: CPT

## 2023-11-13 PROCEDURE — 82533 TOTAL CORTISOL: CPT

## 2023-11-13 PROCEDURE — 85610 PROTHROMBIN TIME: CPT

## 2023-11-13 PROCEDURE — 83605 ASSAY OF LACTIC ACID: CPT

## 2023-11-13 PROCEDURE — 85025 COMPLETE CBC W/AUTO DIFF WBC: CPT

## 2023-11-13 PROCEDURE — 93005 ELECTROCARDIOGRAM TRACING: CPT

## 2023-11-13 PROCEDURE — 70220 X-RAY EXAM OF SINUSES: CPT

## 2023-11-13 PROCEDURE — 80178 ASSAY OF LITHIUM: CPT

## 2023-11-13 PROCEDURE — 81001 URINALYSIS AUTO W/SCOPE: CPT

## 2023-11-13 PROCEDURE — 97161 PT EVAL LOW COMPLEX 20 MIN: CPT

## 2023-11-13 PROCEDURE — 71045 X-RAY EXAM CHEST 1 VIEW: CPT

## 2023-11-13 PROCEDURE — 84100 ASSAY OF PHOSPHORUS: CPT

## 2023-11-13 PROCEDURE — 84480 ASSAY TRIIODOTHYRONINE (T3): CPT

## 2023-11-13 PROCEDURE — 87637 SARSCOV2&INF A&B&RSV AMP PRB: CPT

## 2023-11-13 PROCEDURE — 96365 THER/PROPH/DIAG IV INF INIT: CPT

## 2023-11-13 PROCEDURE — 97530 THERAPEUTIC ACTIVITIES: CPT

## 2023-11-13 PROCEDURE — 87635 SARS-COV-2 COVID-19 AMP PRB: CPT

## 2023-11-13 PROCEDURE — 36415 COLL VENOUS BLD VENIPUNCTURE: CPT

## 2023-11-13 PROCEDURE — 73562 X-RAY EXAM OF KNEE 3: CPT

## 2024-02-08 NOTE — ED PROVIDER NOTE - NSTIMEPROVIDERCAREINITIATE_GEN_ER
2/8/2024     1:39 PM 2/1/2024    12:11 PM 1/24/2024     8:00 AM 1/24/2024     4:57 AM 1/23/2024    11:53 PM 1/23/2024     8:37 PM 1/23/2024     4:10 PM   TXP PINA INTERROGATIONS   Type HeartMate3 HeartMate3        Flow 3.6 3.7        Speed 5000 5000        PI 7.6 6.7        Power ([place]) 3.5 3.4        LSL 4600 [id number]        Pulsatility No Pulse No Pulse No Pulse Intermittent pulse No Pulse Pulse Intermittent pulse   }   26-Oct-2023 23:12

## 2024-10-28 ENCOUNTER — INPATIENT (INPATIENT)
Facility: HOSPITAL | Age: 69
LOS: 6 days | Discharge: NURSING, REHAB OR LONG-TERM CARE | DRG: 556 | End: 2024-11-04
Attending: INTERNAL MEDICINE | Admitting: HOSPITALIST
Payer: MEDICARE

## 2024-10-28 VITALS
DIASTOLIC BLOOD PRESSURE: 90 MMHG | WEIGHT: 199.96 LBS | RESPIRATION RATE: 17 BRPM | OXYGEN SATURATION: 100 % | HEART RATE: 68 BPM | TEMPERATURE: 98 F | SYSTOLIC BLOOD PRESSURE: 168 MMHG

## 2024-10-28 PROBLEM — F31.9 BIPOLAR DISORDER, UNSPECIFIED: Chronic | Status: ACTIVE | Noted: 2023-10-26

## 2024-10-28 PROBLEM — F32.9 MAJOR DEPRESSIVE DISORDER, SINGLE EPISODE, UNSPECIFIED: Chronic | Status: ACTIVE | Noted: 2023-10-26

## 2024-10-28 PROBLEM — I10 ESSENTIAL (PRIMARY) HYPERTENSION: Chronic | Status: ACTIVE | Noted: 2023-10-26

## 2024-10-28 PROBLEM — E78.5 HYPERLIPIDEMIA, UNSPECIFIED: Chronic | Status: ACTIVE | Noted: 2023-10-26

## 2024-10-28 LAB
ALBUMIN SERPL ELPH-MCNC: 3.4 G/DL — SIGNIFICANT CHANGE UP (ref 3.3–5)
ALP SERPL-CCNC: 110 U/L — SIGNIFICANT CHANGE UP (ref 40–120)
ALT FLD-CCNC: 28 U/L — SIGNIFICANT CHANGE UP (ref 10–45)
ANION GAP SERPL CALC-SCNC: 7 MMOL/L — SIGNIFICANT CHANGE UP (ref 5–17)
AST SERPL-CCNC: 35 U/L — SIGNIFICANT CHANGE UP (ref 10–40)
BASOPHILS # BLD AUTO: 0.07 K/UL — SIGNIFICANT CHANGE UP (ref 0–0.2)
BASOPHILS NFR BLD AUTO: 1 % — SIGNIFICANT CHANGE UP (ref 0–2)
BILIRUB SERPL-MCNC: 1.3 MG/DL — HIGH (ref 0.2–1.2)
BUN SERPL-MCNC: 9 MG/DL — SIGNIFICANT CHANGE UP (ref 7–23)
CALCIUM SERPL-MCNC: 9.6 MG/DL — SIGNIFICANT CHANGE UP (ref 8.4–10.5)
CHLORIDE SERPL-SCNC: 101 MMOL/L — SIGNIFICANT CHANGE UP (ref 96–108)
CO2 SERPL-SCNC: 31 MMOL/L — SIGNIFICANT CHANGE UP (ref 22–31)
CREAT SERPL-MCNC: 1.05 MG/DL — SIGNIFICANT CHANGE UP (ref 0.5–1.3)
EGFR: 57 ML/MIN/1.73M2 — LOW
EOSINOPHIL # BLD AUTO: 0.29 K/UL — SIGNIFICANT CHANGE UP (ref 0–0.5)
EOSINOPHIL NFR BLD AUTO: 4.2 % — SIGNIFICANT CHANGE UP (ref 0–6)
GLUCOSE SERPL-MCNC: 108 MG/DL — HIGH (ref 70–99)
HCT VFR BLD CALC: 34.2 % — LOW (ref 34.5–45)
HGB BLD-MCNC: 10.7 G/DL — LOW (ref 11.5–15.5)
IMM GRANULOCYTES NFR BLD AUTO: 0.4 % — SIGNIFICANT CHANGE UP (ref 0–0.9)
LYMPHOCYTES # BLD AUTO: 1.29 K/UL — SIGNIFICANT CHANGE UP (ref 1–3.3)
LYMPHOCYTES # BLD AUTO: 18.5 % — SIGNIFICANT CHANGE UP (ref 13–44)
MCHC RBC-ENTMCNC: 29.6 PG — SIGNIFICANT CHANGE UP (ref 27–34)
MCHC RBC-ENTMCNC: 31.3 GM/DL — LOW (ref 32–36)
MCV RBC AUTO: 94.7 FL — SIGNIFICANT CHANGE UP (ref 80–100)
MONOCYTES # BLD AUTO: 0.5 K/UL — SIGNIFICANT CHANGE UP (ref 0–0.9)
MONOCYTES NFR BLD AUTO: 7.2 % — SIGNIFICANT CHANGE UP (ref 2–14)
NEUTROPHILS # BLD AUTO: 4.78 K/UL — SIGNIFICANT CHANGE UP (ref 1.8–7.4)
NEUTROPHILS NFR BLD AUTO: 68.7 % — SIGNIFICANT CHANGE UP (ref 43–77)
NRBC # BLD: 0 /100 WBCS — SIGNIFICANT CHANGE UP (ref 0–0)
PLATELET # BLD AUTO: 307 K/UL — SIGNIFICANT CHANGE UP (ref 150–400)
POTASSIUM SERPL-MCNC: 3.8 MMOL/L — SIGNIFICANT CHANGE UP (ref 3.5–5.3)
POTASSIUM SERPL-SCNC: 3.8 MMOL/L — SIGNIFICANT CHANGE UP (ref 3.5–5.3)
PROT SERPL-MCNC: 7.5 G/DL — SIGNIFICANT CHANGE UP (ref 6–8.3)
RBC # BLD: 3.61 M/UL — LOW (ref 3.8–5.2)
RBC # FLD: 13.8 % — SIGNIFICANT CHANGE UP (ref 10.3–14.5)
SODIUM SERPL-SCNC: 139 MMOL/L — SIGNIFICANT CHANGE UP (ref 135–145)
WBC # BLD: 6.96 K/UL — SIGNIFICANT CHANGE UP (ref 3.8–10.5)
WBC # FLD AUTO: 6.96 K/UL — SIGNIFICANT CHANGE UP (ref 3.8–10.5)

## 2024-10-28 PROCEDURE — 99285 EMERGENCY DEPT VISIT HI MDM: CPT

## 2024-10-28 RX ORDER — FLUOXETINE HCL 10 MG
60 CAPSULE ORAL DAILY
Refills: 0 | Status: DISCONTINUED | OUTPATIENT
Start: 2024-10-28 | End: 2024-11-04

## 2024-10-28 RX ORDER — QUETIAPINE FUMARATE 200 MG/1
200 TABLET ORAL AT BEDTIME
Refills: 0 | Status: DISCONTINUED | OUTPATIENT
Start: 2024-10-28 | End: 2024-11-04

## 2024-10-28 RX ORDER — GABAPENTIN 300 MG/1
300 CAPSULE ORAL THREE TIMES A DAY
Refills: 0 | Status: DISCONTINUED | OUTPATIENT
Start: 2024-10-28 | End: 2024-11-04

## 2024-10-28 RX ORDER — CLONAZEPAM 1 MG
0.5 TABLET ORAL
Refills: 0 | Status: DISCONTINUED | OUTPATIENT
Start: 2024-10-28 | End: 2024-11-03

## 2024-10-28 RX ORDER — ACETAMINOPHEN 500 MG
650 TABLET ORAL EVERY 6 HOURS
Refills: 0 | Status: DISCONTINUED | OUTPATIENT
Start: 2024-10-28 | End: 2024-11-04

## 2024-10-28 RX ORDER — LITHIUM CARBONATE 300 MG
300 CAPSULE ORAL
Refills: 0 | Status: DISCONTINUED | OUTPATIENT
Start: 2024-10-28 | End: 2024-11-04

## 2024-10-28 RX ORDER — ACETAMINOPHEN 500 MG
650 TABLET ORAL ONCE
Refills: 0 | Status: COMPLETED | OUTPATIENT
Start: 2024-10-28 | End: 2024-10-28

## 2024-10-28 RX ORDER — CHOLECALCIFEROL (VITAMIN D3) 625 MCG
2000 CAPSULE ORAL DAILY
Refills: 0 | Status: DISCONTINUED | OUTPATIENT
Start: 2024-10-28 | End: 2024-11-04

## 2024-10-28 RX ORDER — HEPARIN SODIUM 10000 [USP'U]/ML
5000 INJECTION INTRAVENOUS; SUBCUTANEOUS EVERY 8 HOURS
Refills: 0 | Status: DISCONTINUED | OUTPATIENT
Start: 2024-10-28 | End: 2024-11-04

## 2024-10-28 RX ORDER — LISINOPRIL 40 MG
5 TABLET ORAL DAILY
Refills: 0 | Status: DISCONTINUED | OUTPATIENT
Start: 2024-10-28 | End: 2024-11-04

## 2024-10-28 RX ORDER — CLOPIDOGREL 75 MG/1
75 TABLET ORAL DAILY
Refills: 0 | Status: DISCONTINUED | OUTPATIENT
Start: 2024-10-28 | End: 2024-11-04

## 2024-10-28 RX ADMIN — QUETIAPINE FUMARATE 200 MILLIGRAM(S): 200 TABLET ORAL at 21:39

## 2024-10-28 RX ADMIN — HEPARIN SODIUM 5000 UNIT(S): 10000 INJECTION INTRAVENOUS; SUBCUTANEOUS at 21:39

## 2024-10-28 RX ADMIN — Medication 300 MILLIGRAM(S): at 17:04

## 2024-10-28 RX ADMIN — GABAPENTIN 300 MILLIGRAM(S): 300 CAPSULE ORAL at 21:39

## 2024-10-28 RX ADMIN — Medication 650 MILLIGRAM(S): at 12:48

## 2024-10-28 RX ADMIN — Medication 650 MILLIGRAM(S): at 18:42

## 2024-10-28 RX ADMIN — Medication 5 MILLIGRAM(S): at 18:43

## 2024-10-28 RX ADMIN — Medication 40 MILLIGRAM(S): at 21:39

## 2024-10-28 RX ADMIN — Medication 0.5 MILLIGRAM(S): at 17:04

## 2024-10-28 RX ADMIN — Medication 650 MILLIGRAM(S): at 11:48

## 2024-10-28 NOTE — ED PROVIDER NOTE - CLINICAL SUMMARY MEDICAL DECISION MAKING FREE TEXT BOX
pt w/ hip effusion, inability to ambulate 2/2 recent fall  will place in observation for PT and MRI to r/o occult hip fracture

## 2024-10-28 NOTE — ED ADULT NURSE NOTE - BEFORE THE ILLNESS OR INJURY
Problem: Pain:  Goal: Pain level will decrease  Description: Pain level will decrease  5/18/2020 1029 by Leonel Grace RN  Outcome: Met This Shift     Problem: Pain:  Goal: Control of acute pain  Description: Control of acute pain  5/18/2020 1029 by Leonel Grace RN  Outcome: Met This Shift     Problem:  Bowel Function - Altered:  Goal: Active bowel sounds  Description: Active bowel sounds  5/18/2020 1029 by Leonel Grace RN  Outcome: Met This Shift     Problem: Infection - Risk of, Surgical Site:  Goal: Demonstration of wound healing without infection will improve  Description: Demonstration of wound healing without infection will improve  5/18/2020 1029 by Leonel Grace RN  Outcome: Met This Shift     Problem: Infection - Risk of, Surgical Site:  Goal: Ability to maintain appropriate glucose levels will improve to within specified parameters  Description: Ability to maintain appropriate glucose levels will improve to within specified parameters  5/18/2020 1029 by Leonel Grace RN  Outcome: Met This Shift     Problem: Safety/Fall Precautions  Goal: Knowledge - personal safety  Outcome: Met This Shift Yes

## 2024-10-28 NOTE — ED ADULT NURSE REASSESSMENT NOTE - NS ED NURSE REASSESS COMMENT FT1
Patient resting in stretcher. Denies any pain. Awaiting bed placement. Report given to ED hold nurse.

## 2024-10-28 NOTE — PATIENT PROFILE ADULT - FALL HARM RISK - HARM RISK INTERVENTIONS
Assistance with ambulation/Assistance OOB with selected safe patient handling equipment/Communicate Risk of Fall with Harm to all staff/Discuss with provider need for PT consult/Monitor gait and stability/Reinforce activity limits and safety measures with patient and family/Tailored Fall Risk Interventions/Visual Cue: Yellow wristband and red socks/Bed in lowest position, wheels locked, appropriate side rails in place/Call bell, personal items and telephone in reach/Instruct patient to call for assistance before getting out of bed or chair/Non-slip footwear when patient is out of bed/[place] to call system/Physically safe environment - no spills, clutter or unnecessary equipment/Purposeful Proactive Rounding/Room/bathroom lighting operational, light cord in reach

## 2024-10-28 NOTE — ED ADULT TRIAGE NOTE - CHIEF COMPLAINT QUOTE
Patient BIB EMS from [facility] for complaints of left hip pain, s/p fall this AM while walking with a walker. Patient denies LOC or blood thinners.

## 2024-10-28 NOTE — H&P ADULT - HISTORY OF PRESENT ILLNESS
70yo female w/ hx of HTN, HLD, Bipolar D/o, CVA, presented to the ED from assisted s/p fall leading to Left hip pain. Pt states she using her walker when it slipped from her hand. She fell backwards without injury to her head. No LOC. She developed pain in her left hip and was unable to ambulate. Pt. denies chest pain, palpitations, sob, dyspnea, previous falls, syncope, dizziness, lightheadedness, blurry vision, numbness or tingling

## 2024-10-28 NOTE — ED PROVIDER NOTE - ATTENDING CONTRIBUTION TO CARE
I personally evaluated the patient. I reviewed the Resident’s or Physician Assistant’s note (as assigned above), and agree with the findings and plan except as documented in my note.    pt w/ hip effusion, inability to ambulate 2/2 recent fall  will place in observation for PT and MRI to r/o occult hip fracture

## 2024-10-28 NOTE — ED ADULT NURSE NOTE - OBJECTIVE STATEMENT
Patient brought in by EMS from [facility] after a fall and patient complaint of L hip pain. Denies hitting her head or any LOC

## 2024-10-28 NOTE — ED ADULT NURSE NOTE - CHIEF COMPLAINT QUOTE
Patient BIB EMS from [facility] for complaints of left hip pain, s/p fall this AM while walking with a walker. Patient denies LOC or blood thinners.

## 2024-10-28 NOTE — ED PROVIDER NOTE - PHYSICAL EXAMINATION
VITAL SIGNS: I have reviewed nursing notes and confirm.  CONSTITUTIONAL: non-toxic, NAD. Poor historian  SKIN: Warm dry, normal skin turgor  HEAD: NCAT  EYES: EOMI, PERRLA, no scleral icterus  ENT: Moist mucous membranes, normal pharynx with no erythema or exudates. Left nasal drainage present. Infrequent dry cough.   NECK: Supple; non tender. Full ROM. No cervical LAD  CARD: RRR, no murmurs, rubs or gallops  RESP: clear to ausculation b/l.  No rales, rhonchi, or wheezing.  ABD: soft, + BS, non-tender, non-distended, no rebound or guarding.  EXT: Reduced ROM left ankle, no pedal edema, no calf tenderness. Left hip, groin tenderness.   NEURO: Normal sensory. CN II-XII intact. Cerebellar testing normal. Left under toe numbness reported.  PSYCH: Cooperative, appropriate.

## 2024-10-28 NOTE — ED PROVIDER NOTE - IN ACCORDANCE WITH [place] STATE LAW, WE OFFER EVERY PATIENT A HEPATITIS C TEST. WOULD YOU LIKE TO BE TESTED TODAY?
Chief complaint:   Chief Complaint   Patient presents with   • Physical       Vitals:  Visit Vitals  /64   Pulse 66   Ht 5' 4\" (1.626 m)   Wt 54.4 kg   BMI 20.60 kg/m²       HISTORY OF PRESENT ILLNESS     HPI    This is a 59 year old female who presents today for a complete physical exam. She feels well.    Other significant issues:  Patient Active Problem List   Diagnosis   • ILD (interstitial lung disease) (CMS/HCC)   • Anatomical narrow angle of both eyes   • Age-related nuclear cataract of both eyes   • Age-related osteoporosis without current pathological fracture       PREVENTIVE HEALTH:    Diet:  well balanced  Exercise:  regular  Self exam:  monthly  Advance directive:  not discussed    Tobacco history:   reports that she has never smoked. She has never used smokeless tobacco.  Alcohol history:   reports current alcohol use of about 3.0 standard drinks of alcohol per week.    Last lipid testing:  elevated    CHOLESTEROL (mg/dL)   Date Value   10/30/2018 241 (H)   07/25/2017 250 (H)     CALCULATED LDL (mg/dL)   Date Value   10/30/2018 120   07/25/2017 130 (H)     HDL (mg/dL)   Date Value   10/30/2018 103   07/25/2017 103     TRIGLYCERIDE (mg/dL)   Date Value   10/30/2018 89   07/25/2017 83       Last glucose:  normal    Glucose (mg/dL)   Date Value   12/13/2018 74   10/30/2018 87       Immunizations:  TDaP/Td:  up to date  Pneumococcal:  up to date  Influenza:  due  Zoster:  due    Immunization History   Administered Date(s) Administered   • Influenza, Unspecified Formulation 10/17/2012, 11/05/2013, 10/06/2015, 10/16/2018   • Influenza, injectable, quadrivalent 10/12/2017   • Influenza, seasonal, injectable, trivalent 11/10/2014, 10/04/2016   • Pneumococcal Conjugate 13 valent 05/14/2014   • Pneumococcal polysaccharide, adult, 23 valent 09/24/2014   • Td:Adult type tetanus/diphtheria 01/01/2006   • Tdap 07/12/2016   Pended Date(s) Pended   • Influenza, injectable, quadrivalent 11/12/2019       Health  Maintenance:  Colonoscopy:  up to date  DEXA scan:  due and last exam showed osteoporosis  PAP smear:  up to date hx neg colposcopy many years ago. Neg HPV  Mammogram:  due    Health Maintenance Summary     Shingles Vaccine (1 of 2)  Overdue since 3/8/2008    Influenza Vaccine (1)  Overdue since 9/1/2019    Depression Screening (Yearly)  Next due on 11/12/2020    Breast Cancer Screening (Every 2 Years)  Order placed this encounter    Cervical Cancer Screening (Every 3 Years)  Next due on 10/30/2021    Colorectal Cancer Screening-Colonoscopy (Every 10 Years)  Next due on 6/5/2024    DTaP/Tdap/Td Vaccine (2 - Td)  Next due on 7/12/2026    Pneumococcal Vaccine 0-64   Aged Out    Hepatitis C Screening   Completed    Meningococcal Vaccine   Aged Out          Patient Care Team:  Jo Sanchez DO as PCP - General (Internal Medicine)  Syed Mejía OD as Referring Provider (Optometrist - [facility])      Other significant problems:  Patient Active Problem List    Diagnosis Date Noted   • Age-related osteoporosis without current pathological fracture 11/12/2019     Priority: Low   • Anatomical narrow angle of both eyes 02/28/2018     Priority: Low   • Age-related nuclear cataract of both eyes 02/28/2018     Priority: Low   • ILD (interstitial lung disease) (CMS/HCC) 07/01/2015     Priority: Low       PAST MEDICAL, FAMILY AND SOCIAL HISTORY     Medications:     Boniva 150 montly  No current facility-administered medications for this visit.       Allergies:  ALLERGIES:   Allergen Reactions   • Levaquin HIVES   • Amoxicillin VOMITING       Past Medical  History/Surgeries:  Past Medical History:   Diagnosis Date   • Abnormal Pap smear of cervix 9/11    negative HPV   • ASCUS favor benign 05/07/2014    & 2012   • Chronic sinusitis    • Depression    • Interstitial pneumonitis (CMS/HCC)     nonspecific, treated with 6 mo of prednisone   • Lung nodule     right       Past Surgical History:   Procedure  Laterality Date   • Appendectomy  01/01/1974   • Bronchoscopy  3/11/2014    R thoracoscopy with lung biopsy   • Colonoscopy diagnostic  06/05/2014    normal, repeat 10 yrs   • D and c  01/01/1990    after delivery   • Harper tooth extraction  01/01/1976       Family History:  Family History   Problem Relation Age of Onset   • Diabetes Father    • Hypertension Father    • Hyperlipidemia Father    • Heart Father         assumed as cause of death   • Cancer, Breast Mother 75   • Heart disease Mother 55        arrythmia with ablation   • Hypertension Mother    • Hyperlipidemia Mother    • Thyroid Mother    • Psychiatry Mother    • Hyperlipidemia Brother    • Hypertension Brother    • Psychiatry Child        Social History:  Social History     Tobacco Use   • Smoking status: Never Smoker   • Smokeless tobacco: Never Used   Substance Use Topics   • Alcohol use: Yes     Alcohol/week: 3.0 standard drinks     Types: 3 Standard drinks or equivalent per week     Frequency: Monthly or less       REVIEW OF SYSTEMS     Review of Systems   All other systems reviewed and are negative.      PHYSICAL EXAM     Physical Exam   Constitutional: She is oriented to person, place, and time. She appears well-developed and well-nourished.  Non-toxic appearance. No distress.   HENT:   Head: Normocephalic and atraumatic.   Right Ear: Tympanic membrane, external ear and ear canal normal.   Left Ear: Tympanic membrane, external ear and ear canal normal.   Nose: Nose normal. No mucosal edema or rhinorrhea.   Mouth/Throat: Oropharynx is clear and moist and mucous membranes are normal. No oral lesions. No oropharyngeal exudate or posterior oropharyngeal erythema.   Eyes: Pupils are equal, round, and reactive to light. Conjunctivae, EOM and lids are normal. Right eye exhibits no discharge. Left eye exhibits no discharge. No scleral icterus.   Neck: Trachea normal. Neck supple. No JVD present. Carotid bruit is not present. No tracheal deviation  present. No thyroid mass and no thyromegaly present.   Cardiovascular: Normal rate, regular rhythm, normal heart sounds and intact distal pulses. Exam reveals no gallop and no friction rub.   No murmur heard.  Pulmonary/Chest: Effort normal and breath sounds normal. No respiratory distress. She has no wheezes. She has no rales. Chest wall is not dull to percussion. Right breast exhibits no mass, no nipple discharge, no skin change and no tenderness. Left breast exhibits inverted nipple. Left breast exhibits no mass, no nipple discharge, no skin change and no tenderness.   Abdominal: Soft. Normal appearance and bowel sounds are normal. She exhibits no distension and no mass. There is no splenomegaly or hepatomegaly. There is no tenderness. There is no rigidity, no rebound and no guarding. Musculoskeletal: Normal range of motion.         General: No tenderness or edema.     Lymphadenopathy:     She has no cervical adenopathy.     She has no axillary adenopathy.        Right: No supraclavicular adenopathy present.        Left: No supraclavicular adenopathy present.   Neurological: She is alert and oriented to person, place, and time. She has normal strength and normal reflexes. She is not disoriented. No cranial nerve deficit or sensory deficit. Coordination and gait normal.   Skin: Skin is warm and dry. No rash noted. She is not diaphoretic. No cyanosis or erythema. No pallor. Nails show no clubbing.   Psychiatric: She has a normal mood and affect. Her behavior is normal. Thought content normal.   Vitals reviewed.      ASSESSMENT/PLAN     ASSESSMENT:  1. Routine general medical examination at a health care facility    2. Continue yearly mammogram   3 OP- on boniva- due for dexa       PLAN:  Orders Placed This Encounter   • Mammo Screening Bilateral   • INFLUENZA QUADRIVALENT SPLIT 0.5 ML VACC, IM   • CBC with Automated Differential   • COMPREHENSIVE METABOLIC PANEL   • Lipid Panel With Reflex   • Thyroid Stimulating  Hormone Reflex   • BD Dexa Axial Skeleton       Return in about 1 year (around 11/12/2020) for physical.         Previously negative

## 2024-10-28 NOTE — H&P ADULT - ASSESSMENT
70yo female w/ hx of HTN, HLD, Bipolar D/o, CVA, presented to the ED from [facility] s/p fall leading to Left hip pain, found to have left hip moderate effusion    #Left hip Effusion  #Fall  -Xray and CT results noted  -Follow up with MRI of the hip to rule out fracture  -Bedrest for now  -PT evaluation pending results  -Ortho consulted  -Pain control  -VTE ppx w/ HSQ    #HTN  #HLD  -Statin  -Lisinopril    #Bipolar d/o  -Lithium  -Klonopin    #Hx of CVA  -Plavix/Statin    #VTE ppx  -HSQ    No family member or Emergency contact per pt

## 2024-10-28 NOTE — ED PROVIDER NOTE - OBJECTIVE STATEMENT
Pt. is a 69 year old female with PMH: MDD, HTN HLD, Bipolar disorder that was BIBA from assisted living s/p witnessed mechanical fall with walker c/o left hip/groin pain. Pt. reports that she did not hit her head, no LOC and takes a blood thinner for previous mini-strokes - hx: plavix for mini-strokes in pt. history. Pt. denies chest pain, palpitations, sob, dyspnea, previous falls, syncope, dizziness, lightheadedness, blurry vision, numbness or tingling.

## 2024-10-29 LAB
ANION GAP SERPL CALC-SCNC: 4 MMOL/L — LOW (ref 5–17)
BUN SERPL-MCNC: 12 MG/DL — SIGNIFICANT CHANGE UP (ref 7–23)
CALCIUM SERPL-MCNC: 9.4 MG/DL — SIGNIFICANT CHANGE UP (ref 8.4–10.5)
CHLORIDE SERPL-SCNC: 103 MMOL/L — SIGNIFICANT CHANGE UP (ref 96–108)
CO2 SERPL-SCNC: 31 MMOL/L — SIGNIFICANT CHANGE UP (ref 22–31)
CREAT SERPL-MCNC: 1.08 MG/DL — SIGNIFICANT CHANGE UP (ref 0.5–1.3)
EGFR: 55 ML/MIN/1.73M2 — LOW
GLUCOSE SERPL-MCNC: 125 MG/DL — HIGH (ref 70–99)
HCT VFR BLD CALC: 30.7 % — LOW (ref 34.5–45)
HGB BLD-MCNC: 9.5 G/DL — LOW (ref 11.5–15.5)
MCHC RBC-ENTMCNC: 29.7 PG — SIGNIFICANT CHANGE UP (ref 27–34)
MCHC RBC-ENTMCNC: 30.9 GM/DL — LOW (ref 32–36)
MCV RBC AUTO: 95.9 FL — SIGNIFICANT CHANGE UP (ref 80–100)
NRBC # BLD: 0 /100 WBCS — SIGNIFICANT CHANGE UP (ref 0–0)
PLATELET # BLD AUTO: 260 K/UL — SIGNIFICANT CHANGE UP (ref 150–400)
POTASSIUM SERPL-MCNC: 3.7 MMOL/L — SIGNIFICANT CHANGE UP (ref 3.5–5.3)
POTASSIUM SERPL-SCNC: 3.7 MMOL/L — SIGNIFICANT CHANGE UP (ref 3.5–5.3)
RBC # BLD: 3.2 M/UL — LOW (ref 3.8–5.2)
RBC # FLD: 14.1 % — SIGNIFICANT CHANGE UP (ref 10.3–14.5)
SODIUM SERPL-SCNC: 138 MMOL/L — SIGNIFICANT CHANGE UP (ref 135–145)
WBC # BLD: 3.82 K/UL — SIGNIFICANT CHANGE UP (ref 3.8–10.5)
WBC # FLD AUTO: 3.82 K/UL — SIGNIFICANT CHANGE UP (ref 3.8–10.5)

## 2024-10-29 PROCEDURE — 73721 MRI JNT OF LWR EXTRE W/O DYE: CPT | Mod: 26,LT

## 2024-10-29 RX ORDER — LORAZEPAM 2 MG
1 TABLET ORAL ONCE
Refills: 0 | Status: DISCONTINUED | OUTPATIENT
Start: 2024-10-29 | End: 2024-10-29

## 2024-10-29 RX ORDER — SODIUM CHLORIDE 9 MG/ML
500 INJECTION, SOLUTION INTRAMUSCULAR; INTRAVENOUS; SUBCUTANEOUS ONCE
Refills: 0 | Status: COMPLETED | OUTPATIENT
Start: 2024-10-29 | End: 2024-10-29

## 2024-10-29 RX ADMIN — Medication 1 MILLIGRAM(S): at 08:40

## 2024-10-29 RX ADMIN — CLOPIDOGREL 75 MILLIGRAM(S): 75 TABLET ORAL at 11:33

## 2024-10-29 RX ADMIN — GABAPENTIN 300 MILLIGRAM(S): 300 CAPSULE ORAL at 14:55

## 2024-10-29 RX ADMIN — Medication 0.5 MILLIGRAM(S): at 17:04

## 2024-10-29 RX ADMIN — Medication 300 MILLIGRAM(S): at 17:04

## 2024-10-29 RX ADMIN — Medication 5 MILLIGRAM(S): at 05:21

## 2024-10-29 RX ADMIN — Medication 0.5 MILLIGRAM(S): at 05:20

## 2024-10-29 RX ADMIN — Medication 60 MILLIGRAM(S): at 11:33

## 2024-10-29 RX ADMIN — HEPARIN SODIUM 5000 UNIT(S): 10000 INJECTION INTRAVENOUS; SUBCUTANEOUS at 21:07

## 2024-10-29 RX ADMIN — QUETIAPINE FUMARATE 200 MILLIGRAM(S): 200 TABLET ORAL at 21:07

## 2024-10-29 RX ADMIN — HEPARIN SODIUM 5000 UNIT(S): 10000 INJECTION INTRAVENOUS; SUBCUTANEOUS at 05:21

## 2024-10-29 RX ADMIN — GABAPENTIN 300 MILLIGRAM(S): 300 CAPSULE ORAL at 05:20

## 2024-10-29 RX ADMIN — HEPARIN SODIUM 5000 UNIT(S): 10000 INJECTION INTRAVENOUS; SUBCUTANEOUS at 14:56

## 2024-10-29 RX ADMIN — Medication 40 MILLIGRAM(S): at 21:07

## 2024-10-29 RX ADMIN — Medication 2000 UNIT(S): at 11:33

## 2024-10-29 RX ADMIN — SODIUM CHLORIDE 1000 MILLILITER(S): 9 INJECTION, SOLUTION INTRAMUSCULAR; INTRAVENOUS; SUBCUTANEOUS at 19:48

## 2024-10-29 RX ADMIN — GABAPENTIN 300 MILLIGRAM(S): 300 CAPSULE ORAL at 21:07

## 2024-10-29 RX ADMIN — Medication 300 MILLIGRAM(S): at 05:20

## 2024-10-29 NOTE — DIETITIAN INITIAL EVALUATION ADULT - NSFNSPHYEXAMSKINFT_GEN_A_CORE
Pressure Injury 1: Left:, heel, Stage II  Pressure Injury 2: none, none  Pressure Injury 3: none, none  Pressure Injury 4: none, none  Pressure Injury 5: none, none  Pressure Injury 6: none, none  Pressure Injury 7: none, none  Pressure Injury 8: none, none  Pressure Injury 9: none, none  Pressure Injury 10: none, none  Pressure Injury 11: none, none

## 2024-10-29 NOTE — DIETITIAN INITIAL EVALUATION ADULT - PERTINENT MEDS FT
MEDICATIONS  (STANDING):  atorvastatin 40 milliGRAM(s) Oral at bedtime  cholecalciferol 2000 Unit(s) Oral daily  clonazePAM  Tablet 0.5 milliGRAM(s) Oral two times a day  clopidogrel Tablet 75 milliGRAM(s) Oral daily  FLUoxetine 60 milliGRAM(s) Oral daily  gabapentin 300 milliGRAM(s) Oral three times a day  heparin   Injectable 5000 Unit(s) SubCutaneous every 8 hours  lisinopril 5 milliGRAM(s) Oral daily  lithium 300 milliGRAM(s) Oral two times a day  QUEtiapine 200 milliGRAM(s) Oral at bedtime    MEDICATIONS  (PRN):  acetaminophen     Tablet .. 650 milliGRAM(s) Oral every 6 hours PRN Temp greater or equal to 38C (100.4F), Mild Pain (1 - 3)

## 2024-10-29 NOTE — DIETITIAN INITIAL EVALUATION ADULT - PERTINENT LABORATORY DATA
10-29    138  |  103  |  12  ----------------------------<  125[H]  3.7   |  31  |  1.08    Ca    9.4      29 Oct 2024 07:26    TPro  7.5  /  Alb  3.4  /  TBili  1.3[H]  /  DBili  x   /  AST  35  /  ALT  28  /  AlkPhos  110  10-28

## 2024-10-29 NOTE — DIETITIAN INITIAL EVALUATION ADULT - OTHER INFO
Patient with fair appetite, consuming 51-75% of meals at this time per nursing flowsheets. Denies any chewing/swallowing difficulties. Food preferences obtained and noted on patients file with nutrition office. Electrolytes stable at this time. Education provided on consuming balanced meals.

## 2024-10-30 ENCOUNTER — TRANSCRIPTION ENCOUNTER (OUTPATIENT)
Age: 69
End: 2024-10-30

## 2024-10-30 DIAGNOSIS — R26.2 DIFFICULTY IN WALKING, NOT ELSEWHERE CLASSIFIED: ICD-10-CM

## 2024-10-30 LAB
ALBUMIN SERPL ELPH-MCNC: 2.9 G/DL — LOW (ref 3.3–5)
ALP SERPL-CCNC: 84 U/L — SIGNIFICANT CHANGE UP (ref 40–120)
ALT FLD-CCNC: 27 U/L — SIGNIFICANT CHANGE UP (ref 10–45)
ANION GAP SERPL CALC-SCNC: 7 MMOL/L — SIGNIFICANT CHANGE UP (ref 5–17)
AST SERPL-CCNC: 30 U/L — SIGNIFICANT CHANGE UP (ref 10–40)
BILIRUB SERPL-MCNC: 0.8 MG/DL — SIGNIFICANT CHANGE UP (ref 0.2–1.2)
BUN SERPL-MCNC: 17 MG/DL — SIGNIFICANT CHANGE UP (ref 7–23)
CALCIUM SERPL-MCNC: 9.2 MG/DL — SIGNIFICANT CHANGE UP (ref 8.4–10.5)
CHLORIDE SERPL-SCNC: 105 MMOL/L — SIGNIFICANT CHANGE UP (ref 96–108)
CO2 SERPL-SCNC: 29 MMOL/L — SIGNIFICANT CHANGE UP (ref 22–31)
CREAT SERPL-MCNC: 1.16 MG/DL — SIGNIFICANT CHANGE UP (ref 0.5–1.3)
EGFR: 50 ML/MIN/1.73M2 — LOW
GLUCOSE SERPL-MCNC: 125 MG/DL — HIGH (ref 70–99)
HCT VFR BLD CALC: 29.9 % — LOW (ref 34.5–45)
HGB BLD-MCNC: 9.5 G/DL — LOW (ref 11.5–15.5)
MCHC RBC-ENTMCNC: 30 PG — SIGNIFICANT CHANGE UP (ref 27–34)
MCHC RBC-ENTMCNC: 31.8 G/DL — LOW (ref 32–36)
MCV RBC AUTO: 94.3 FL — SIGNIFICANT CHANGE UP (ref 80–100)
NRBC # BLD: 0 /100 WBCS — SIGNIFICANT CHANGE UP (ref 0–0)
PLATELET # BLD AUTO: 292 K/UL — SIGNIFICANT CHANGE UP (ref 150–400)
POTASSIUM SERPL-MCNC: 4.1 MMOL/L — SIGNIFICANT CHANGE UP (ref 3.5–5.3)
POTASSIUM SERPL-SCNC: 4.1 MMOL/L — SIGNIFICANT CHANGE UP (ref 3.5–5.3)
PROT SERPL-MCNC: 6.5 G/DL — SIGNIFICANT CHANGE UP (ref 6–8.3)
RBC # BLD: 3.17 M/UL — LOW (ref 3.8–5.2)
RBC # FLD: 14 % — SIGNIFICANT CHANGE UP (ref 10.3–14.5)
SODIUM SERPL-SCNC: 141 MMOL/L — SIGNIFICANT CHANGE UP (ref 135–145)
WBC # BLD: 4.59 K/UL — SIGNIFICANT CHANGE UP (ref 3.8–10.5)
WBC # FLD AUTO: 4.59 K/UL — SIGNIFICANT CHANGE UP (ref 3.8–10.5)

## 2024-10-30 RX ADMIN — HEPARIN SODIUM 5000 UNIT(S): 10000 INJECTION INTRAVENOUS; SUBCUTANEOUS at 05:20

## 2024-10-30 RX ADMIN — GABAPENTIN 300 MILLIGRAM(S): 300 CAPSULE ORAL at 21:50

## 2024-10-30 RX ADMIN — HEPARIN SODIUM 5000 UNIT(S): 10000 INJECTION INTRAVENOUS; SUBCUTANEOUS at 21:50

## 2024-10-30 RX ADMIN — Medication 40 MILLIGRAM(S): at 21:50

## 2024-10-30 RX ADMIN — Medication 0.5 MILLIGRAM(S): at 17:36

## 2024-10-30 RX ADMIN — Medication 60 MILLIGRAM(S): at 11:52

## 2024-10-30 RX ADMIN — GABAPENTIN 300 MILLIGRAM(S): 300 CAPSULE ORAL at 05:20

## 2024-10-30 RX ADMIN — Medication 2000 UNIT(S): at 11:51

## 2024-10-30 RX ADMIN — QUETIAPINE FUMARATE 200 MILLIGRAM(S): 200 TABLET ORAL at 21:50

## 2024-10-30 RX ADMIN — Medication 300 MILLIGRAM(S): at 17:36

## 2024-10-30 RX ADMIN — Medication 0.5 MILLIGRAM(S): at 05:20

## 2024-10-30 RX ADMIN — Medication 300 MILLIGRAM(S): at 05:20

## 2024-10-30 RX ADMIN — HEPARIN SODIUM 5000 UNIT(S): 10000 INJECTION INTRAVENOUS; SUBCUTANEOUS at 13:01

## 2024-10-30 RX ADMIN — CLOPIDOGREL 75 MILLIGRAM(S): 75 TABLET ORAL at 11:52

## 2024-10-30 RX ADMIN — GABAPENTIN 300 MILLIGRAM(S): 300 CAPSULE ORAL at 13:01

## 2024-10-30 NOTE — PHYSICAL THERAPY INITIAL EVALUATION ADULT - ADDITIONAL COMMENTS
Pt is a poor historian unable to provide name of facility where she lives or living situation. As per medical chart pt is from [facility]. Pt reports she ambulates with a rollator at baseline.

## 2024-10-30 NOTE — CONSULT NOTE ADULT - ASSESSMENT
69 year old female  hx of HTN, HLD, Bipolar D/o, CVA, presented to the ED from [facility] s/p fall leading to Left hip pain. Pt states she using her walker when it slipped from her hand. She fell backwards without injury to her head. No LOC.    W/U evaluation  MRI of the left hip demonstrates no acute fracture.  Moderate effusion with debris, potentially reactive from moderate   osteoarthrosis.    Discussed with Dr Joyner   Plan:   PT  WBAT with walker             DVT prophylaxis             pain management             ice pack to left hip             NSAIDS for effusion and inflammation

## 2024-10-30 NOTE — PHYSICAL THERAPY INITIAL EVALUATION ADULT - PERTINENT HX OF CURRENT PROBLEM, REHAB EVAL
68yo female w/ hx of HTN, HLD, Bipolar D/o, CVA, presented to the ED from skilled nursing s/p fall leading to Left hip pain, found to have left hip moderate effusion    #Left hip Effusion  #Fall  -Xray and CT results noted  - MRI no frx noted, just effusion

## 2024-10-30 NOTE — DISCHARGE NOTE PROVIDER - CARE PROVIDERS DIRECT ADDRESSES
[email or web address]

## 2024-10-30 NOTE — DISCHARGE NOTE PROVIDER - HOSPITAL COURSE
HPI:  70yo female w/ hx of HTN, HLD, Bipolar D/o, CVA, presented to the ED from LEX s/p fall leading to Left hip pain. Pt states she using her walker when it slipped from her hand. She fell backwards without injury to her head. No LOC. She developed pain in her left hip and was unable to ambulate. Pt. denies chest pain, palpitations, sob, dyspnea, previous falls, syncope, dizziness, lightheadedness, blurry vision, numbness or tingling (28 Oct 2024 14:31)       pt was admitted forLeft hip Effusion and fall   Xray and CT results noted with no fracture, MRI was done and showed no frx . Pt is pending PT and ortho eval today     HPI:  70yo female w/ hx of HTN, HLD, Bipolar D/o, CVA, presented to the ED from LEX s/p fall leading to Left hip pain. Pt states she using her walker when it slipped from her hand. She fell backwards without injury to her head. No LOC. She developed pain in her left hip and was unable to ambulate. Pt. denies chest pain, palpitations, sob, dyspnea, previous falls, syncope, dizziness, lightheadedness, blurry vision, numbness or tingling (28 Oct 2024 14:31)      Patient was admitted to the hospital for Left hip Effusion and fall   Xray and CT results noted with no fracture, MRI was done and showed moderate effusion with debris likely reactive in setting of moderate osteoarthrosis.  Ortho saw the patient, recommends  WBAT with walker, dvt prophylaxis, pain management, Ice paks as needed.  Physical therapy evaluated the patient as well, recommends ASHWIN.  Medically cleared.      HPI:  68yo female w/ hx of HTN, HLD, Bipolar D/o, CVA, presented to the ED from LEX s/p fall leading to Left hip pain. Pt states she using her walker when it slipped from her hand. She fell backwards without injury to her head. No LOC. She developed pain in her left hip and was unable to ambulate. Pt. denies chest pain, palpitations, sob, dyspnea, previous falls, syncope, dizziness, lightheadedness, blurry vision, numbness or tingling (28 Oct 2024 14:31)    HOSPITAL COURSE:  Patient was admitted to the hospital for Left hip Effusion and fall   Xray and CT results noted with no fracture, MRI was done and showed moderate effusion with debris likely reactive in setting of moderate osteoarthrosis.  Ortho saw the patient, recommends  WBAT with walker, dvt prophylaxis, pain management, Ice paks as needed.  Physical therapy evaluated the patient as well, recommends ASHWIN.  Medically cleared.     PHYSICAL EXAM:    Vital Signs Last 24 Hrs  T(C): 36.6 (02 Nov 2024 05:48), Max: 36.6 (02 Nov 2024 05:48)  T(F): 97.9 (02 Nov 2024 05:48), Max: 97.9 (02 Nov 2024 05:48)  HR: 67 (02 Nov 2024 05:48) (67 - 69)  BP: 126/71 (02 Nov 2024 05:48) (98/63 - 126/71)  BP(mean): --  RR: 17 (02 Nov 2024 05:48) (16 - 17)  SpO2: 98% (02 Nov 2024 05:48) (95% - 98%)    Parameters below as of 01 Nov 2024 20:00  Patient On (Oxygen Delivery Method): room air      GENERAL: Not in distress. Alert   HEENT: AT/NC. clear conjuctiva, MMM.   no pallor or icterus  CARDIOVASCULAR: RRR S1, S2. No murmur/rubs/gallop  LUNGS: BLAE+, no rales, no wheezing, no rhonchi.    ABDOMEN: ND. Soft,  NT, no guarding / rebound / rigidity. BS normoactive. No CVA tenderness.    BACK: No spine tenderness.  EXTREMITIES: no edema. no leg or calf TP.  SKIN: no rash.   NEUROLOGIC: AAO*3.grossly intact  PSYCHIATRIC: Calm.  No agitation.    You were admitted for   You were diagnosed with   You were treated with   You were prescribed the following new medications:    You will need to follow up with your primary care physician.    Source of Infection:  Antibiotic / Last Day:    Palliative Care / Advanced Care Planning  Code Status:  Patient/Family agreeable to Hospice/Palliative (Y/N)?  Summary of Goals of Care Conversation:    Discharging Provider:  Dr. Cici Wilcox  Contact Info: [phone] - Please call with any questions or concerns.    Outpatient Provider:     Signout given to  SNF Provider:           HPI:  68yo female w/ hx of HTN, HLD, Bipolar D/o, CVA, presented to the ED from LEX s/p fall leading to Left hip pain. Pt states she using her walker when it slipped from her hand. She fell backwards without injury to her head. No LOC. She developed pain in her left hip and was unable to ambulate. Pt. denies chest pain, palpitations, sob, dyspnea, previous falls, syncope, dizziness, lightheadedness, blurry vision, numbness or tingling (28 Oct 2024 14:31)    HOSPITAL COURSE:  Patient was admitted to the hospital for Left hip Effusion and fall   Xray and CT results noted with no fracture, MRI was done and showed moderate effusion with debris likely reactive in setting of moderate osteoarthrosis.  Ortho saw the patient, recommends  WBAT with walker, dvt prophylaxis, pain management, Ice paks as needed.  Physical therapy evaluated the patient as well, recommends ASHWIN.  Medically cleared.     PHYSICAL EXAM:    Vital Signs Last 24 Hrs  T(C): 36.6 (02 Nov 2024 05:48), Max: 36.6 (02 Nov 2024 05:48)  T(F): 97.9 (02 Nov 2024 05:48), Max: 97.9 (02 Nov 2024 05:48)  HR: 67 (02 Nov 2024 05:48) (67 - 69)  BP: 126/71 (02 Nov 2024 05:48) (98/63 - 126/71)  BP(mean): --  RR: 17 (02 Nov 2024 05:48) (16 - 17)  SpO2: 98% (02 Nov 2024 05:48) (95% - 98%)    Parameters below as of 01 Nov 2024 20:00  Patient On (Oxygen Delivery Method): room air      GENERAL: Not in distress. Alert   HEENT: AT/NC. clear conjuctiva, MMM.   no pallor or icterus  CARDIOVASCULAR: RRR S1, S2. No murmur/rubs/gallop  LUNGS: BLAE+, no rales, no wheezing, no rhonchi.    ABDOMEN: ND. Soft,  NT, no guarding / rebound / rigidity. BS normoactive. No CVA tenderness.    BACK: No spine tenderness.  EXTREMITIES: no edema. no leg or calf TP.  SKIN: no rash.   NEUROLOGIC: AAO*3.grossly intact  PSYCHIATRIC: Calm.  No agitation.    You were admitted for   You were diagnosed with   You were treated with   You were prescribed the following new medications:    You will need to follow up with your primary care physician.    Source of Infection:  Antibiotic / Last Day:    Palliative Care / Advanced Care Planning  Code Status:  Patient/Family agreeable to Hospice/Palliative (Y/N)?  Summary of Goals of Care Conversation:    Discharging Provider:   Contact Info:  Please call with any questions or concerns.    Outpatient Provider:     Signout given to  SNF Provider:           HPI:  70yo female w/ hx of HTN, HLD, Bipolar D/o, CVA, presented to the ED from LEX s/p fall leading to Left hip pain. Pt states she using her walker when it slipped from her hand. She fell backwards without injury to her head. No LOC. She developed pain in her left hip and was unable to ambulate. Pt. denies chest pain, palpitations, sob, dyspnea, previous falls, syncope, dizziness, lightheadedness, blurry vision, numbness or tingling (28 Oct 2024 14:31)    HOSPITAL COURSE:  Patient was admitted to the hospital for Left hip Effusion and fall   Xray and CT results noted with no fracture, MRI was done and showed moderate effusion with debris likely reactive in setting of moderate osteoarthrosis.  Ortho saw the patient, recommends  WBAT with walker, dvt prophylaxis, pain management, Ice paks as needed.  Physical therapy evaluated the patient as well, recommends ASHWIN.     Notes dry cough on 11/2. Chest x ray unremarkable. No leukocytosis or fever. Started on Mucinex.     Optimized for ASHWIN.     PHYSICAL EXAM:    Vital Signs Last 24 Hrs  T(C): 36.6 (02 Nov 2024 05:48), Max: 36.6 (02 Nov 2024 05:48)  T(F): 97.9 (02 Nov 2024 05:48), Max: 97.9 (02 Nov 2024 05:48)  HR: 67 (02 Nov 2024 05:48) (67 - 69)  BP: 126/71 (02 Nov 2024 05:48) (98/63 - 126/71)  BP(mean): --  RR: 17 (02 Nov 2024 05:48) (16 - 17)  SpO2: 98% (02 Nov 2024 05:48) (95% - 98%)    Parameters below as of 01 Nov 2024 20:00  Patient On (Oxygen Delivery Method): room air      GENERAL: Not in distress. Alert   HEENT: AT/NC. clear conjuctiva, MMM.   no pallor or icterus  CARDIOVASCULAR: RRR S1, S2. No murmur/rubs/gallop  LUNGS: BLAE+, no rales, no wheezing, no rhonchi.    ABDOMEN: ND. Soft,  NT, no guarding / rebound / rigidity. BS normoactive. No CVA tenderness.    BACK: No spine tenderness.  EXTREMITIES: no edema. no leg or calf TP.  SKIN: no rash.   NEUROLOGIC: AAO*3.grossly intact  PSYCHIATRIC: Calm.  No agitation.    You were admitted for   You were diagnosed with   You were treated with   You were prescribed the following new medications:    You will need to follow up with your primary care physician.    Source of Infection:  Antibiotic / Last Day:    Palliative Care / Advanced Care Planning  Code Status:  Patient/Family agreeable to Hospice/Palliative (Y/N)?  Summary of Goals of Care Conversation:    Discharging Provider:   Contact Info:  Please call with any questions or concerns.    Outpatient Provider:     Signout given to  SNF Provider:           HPI:  70yo female w/ hx of HTN, HLD, Bipolar D/o, CVA, presented to the ED from LEX s/p fall leading to Left hip pain. Pt states she using her walker when it slipped from her hand. She fell backwards without injury to her head. No LOC. She developed pain in her left hip and was unable to ambulate. Pt. denies chest pain, palpitations, sob, dyspnea, previous falls, syncope, dizziness, lightheadedness, blurry vision, numbness or tingling (28 Oct 2024 14:31)    HOSPITAL COURSE:  Patient was admitted to the hospital for Left hip Effusion and fall   Xray and CT results noted with no fracture, MRI was done and showed moderate effusion with debris likely reactive in setting of moderate osteoarthrosis.  Ortho saw the patient, recommends  WBAT with walker, dvt prophylaxis, pain management, Ice paks as needed.  Physical therapy evaluated the patient as well, recommends ASHWIN.     Notes dry cough on 11/2. Chest x ray unremarkable. No leukocytosis or fever. RVP neg. Started on Mucinex. flonase, Duoneb and steroid. ex-smoker. no h/o BA or COPD. needs PFT and pulmonary consult outpatient.     Optimized for ASHWIN.     PHYSICAL EXAM:    Vital Signs Last 24 Hrs  Vital Signs Last 24 Hrs  T(C): 36.8 (03 Nov 2024 05:21), Max: 36.8 (02 Nov 2024 19:30)  T(F): 98.2 (03 Nov 2024 05:21), Max: 98.2 (02 Nov 2024 19:30)  HR: 77 (03 Nov 2024 05:21) (63 - 79)  BP: 132/72 (03 Nov 2024 05:21) (106/61 - 132/72)  BP(mean): --  RR: 16 (03 Nov 2024 05:21) (16 - 17)  SpO2: 96% (03 Nov 2024 05:21) (92% - 97%)    Parameters below as of 03 Nov 2024 05:21  Patient On (Oxygen Delivery Method): room air    GENERAL: Not in distress. Alert   HEENT: AT/NC. clear conjuctiva, MMM.   no pallor or icterus  CARDIOVASCULAR: RRR S1, S2. No murmur/rubs/gallop  LUNGS: BLAE+, no rales, + wheezing but improving, no rhonchi.    ABDOMEN: ND. Soft,  NT, no guarding / rebound / rigidity. BS normoactive. No CVA tenderness.    BACK: No spine tenderness.  EXTREMITIES: no edema. no leg or calf TP.  SKIN: no rash.   NEUROLOGIC: AAO*3.grossly intact  PSYCHIATRIC: Calm.  No agitation.    You were admitted for pain in her left hip and was unable to ambulate.  MRI negative for fracture. orthopedics saw. continue pain meds and therapy. see Primary MD in 1 week and orthopedic doctor in 2 weeks of discharge rehab. You develop cough and wheezing while in hospital. chest xray did not show any pneumonia. viral panel was  negative. started on mucinex, prednisone and duoneb. needs lung function test and to see lung doctor in 2 weeks after discharge from rehab  You were diagnosed with Left Hip pain, arthritis, effusion, acute bronchitis  You were treated with paun meds, and below meds for cough  You were prescribed the following new medications: mucinex, prednisone and duoneb. flonase    You will need to follow up with your primary care physician and lung doctor    Source of Infection:  Antibiotic / Last Day: none    Palliative Care / Advanced Care Planning  Code Status: FULL CODE  Patient/Family agreeable to Hospice/Palliative (Y/N)? N  Summary of Goals of Care Conversation: Going back to [facility] with previous level of functioning    Discharging Provider: Ruslan Spangler  Contact Info: [phone]. Please call with any questions or concerns.    Signout given to  SNF Provider:           HPI:  68yo female w/ hx of HTN, HLD, Bipolar D/o, CVA, presented to the ED from LEX s/p fall leading to Left hip pain. Pt states she using her walker when it slipped from her hand. She fell backwards without injury to her head. No LOC. She developed pain in her left hip and was unable to ambulate. Pt. denies chest pain, palpitations, sob, dyspnea, previous falls, syncope, dizziness, lightheadedness, blurry vision, numbness or tingling (28 Oct 2024 14:31)    HOSPITAL COURSE:  Patient was admitted to the hospital for Left hip Effusion and fall   Xray and CT results noted with no fracture, MRI was done and showed moderate effusion with debris likely reactive in setting of moderate osteoarthrosis.  Ortho saw the patient, recommends  WBAT with walker, dvt prophylaxis, pain management, Ice paks as needed.  Physical therapy evaluated the patient as well, recommends ASHWIN.     Notes dry cough on 11/2. Chest x ray unremarkable. No leukocytosis or fever. RVP neg. Started on Mucinex. flonase, Duoneb and steroid. ex-smoker. no h/o BA or COPD. needs PFT and pulmonary consult outpatient.     Optimized for ASHWIN.     PHYSICAL EXAM:    Vital Signs Last 24 Hrs  Vital Signs Last 24 Hrs  T(C): 36.8 (03 Nov 2024 05:21), Max: 36.8 (02 Nov 2024 19:30)  T(F): 98.2 (03 Nov 2024 05:21), Max: 98.2 (02 Nov 2024 19:30)  HR: 77 (03 Nov 2024 05:21) (63 - 79)  BP: 132/72 (03 Nov 2024 05:21) (106/61 - 132/72)  BP(mean): --  RR: 16 (03 Nov 2024 05:21) (16 - 17)  SpO2: 96% (03 Nov 2024 05:21) (92% - 97%)    Parameters below as of 03 Nov 2024 05:21  Patient On (Oxygen Delivery Method): room air    GENERAL: Not in distress. Alert   HEENT: AT/NC. clear conjuctiva, MMM.   no pallor or icterus  CARDIOVASCULAR: RRR S1, S2. No murmur/rubs/gallop  LUNGS: BLAE+, no rales, + wheezing but improving, no rhonchi.    ABDOMEN: ND. Soft,  NT, no guarding / rebound / rigidity. BS normoactive. No CVA tenderness.    BACK: No spine tenderness.  EXTREMITIES: no edema. no leg or calf TP.  SKIN: no rash.   NEUROLOGIC: AAO*3.grossly intact  PSYCHIATRIC: Calm.  No agitation.    You were admitted for pain in her left hip and was unable to ambulate.  MRI negative for fracture. orthopedics saw. continue pain meds and therapy. see Primary MD in 1 week and orthopedic doctor in 2 weeks of discharge rehab. You develop cough and wheezing while in hospital. chest xray did not show any pneumonia. viral panel was  negative. started on mucinex, prednisone and duoneb. needs lung function test and to see lung doctor in 2 weeks after discharge from rehab  You were diagnosed with Left Hip pain, arthritis, effusion, acute bronchitis  You were treated with paun meds, and below meds for cough  You were prescribed the following new medications: mucinex, prednisone and duoneb. flonase    You will need to follow up with your primary care physician and lung doctor and orthopedic doctor    Source of Infection:  Antibiotic / Last Day: none    Palliative Care / Advanced Care Planning  Code Status: FULL CODE  Patient/Family agreeable to Hospice/Palliative (Y/N)? N  Summary of Goals of Care Conversation: Going back to [facility] with previous level of functioning    Discharging Provider: Ruslan Spangler  Contact Info: [phone]. Please call with any questions or concerns.    Signout given to  SNF Provider:

## 2024-10-30 NOTE — DISCHARGE NOTE PROVIDER - NSDCFUSCHEDAPPT_GEN_ALL_CORE_FT
Gabe Carroll  [facility] Physician [facility]  ORTHOSURG [address] [facility]  Scheduled Appointment: 11/12/2024

## 2024-10-30 NOTE — DISCHARGE NOTE PROVIDER - CARE PROVIDER_API CALL
Jane Joyner  Orthopaedic Surgery  [address] [place] [id number]  Phone: [phone]  Fax: [phone]  Follow Up Time:    Jane Joyner  Orthopaedic Surgery  [address] [place] [address]  Phone: [phone]  Fax: [phone]  Follow Up Time:     Hannah Feliciano  Pulmonary Disease  [address] [facility], [facility] [address]  [place] [address]  Phone: [phone]  Fax: [phone]  Follow Up Time:

## 2024-10-30 NOTE — DISCHARGE NOTE PROVIDER - NSDCCPCAREPLAN_GEN_ALL_CORE_FT
PRINCIPAL DISCHARGE DIAGNOSIS  Diagnosis: Unable to ambulate  Assessment and Plan of Treatment: you had an MRI done of your left hip that showed fluid in your hip likely from osteoarthritis.  Ortho provider saw you and recommend weight bearing as tolerated with walker, pain mangement, ice paks as needed.  you are medically cleared for ASHWIN.     PRINCIPAL DISCHARGE DIAGNOSIS  Diagnosis: Unable to ambulate  Assessment and Plan of Treatment: you had an MRI done of your left hip that showed fluid in your hip likely from osteoarthritis.  Ortho provider saw you and recommend weight bearing as tolerated with walker, pain mangement, ice paks as needed.  you are medically cleared for ASHWIN.      SECONDARY DISCHARGE DIAGNOSES  Diagnosis: Cough  Assessment and Plan of Treatment: dry cough is likely due to brochitis. no fever. while count was normal. take medicine and nasal spray as prescribed. chest xray done. looks clear. no pneumonia. see primary MD in rehab     PRINCIPAL DISCHARGE DIAGNOSIS  Diagnosis: Unable to ambulate  Assessment and Plan of Treatment: you had an MRI done of your left hip that showed fluid in your hip likely from osteoarthritis.  Ortho provider saw you and recommend weight bearing as tolerated with walker, pain mangement, ice paks as needed.  you are medically cleared for ASHWIN.      SECONDARY DISCHARGE DIAGNOSES  Diagnosis: Cough  Assessment and Plan of Treatment: dry cough is likely due to brochitis. no fever. while count was normal. take medicine and nasal spray as prescribed. chest xray done. looks clear. no pneumonia. see primary MD in rehab, see lung doctor after coming out of rehab     PRINCIPAL DISCHARGE DIAGNOSIS  Diagnosis: Unable to ambulate  Assessment and Plan of Treatment: You were admitted for pain in her left hip and was unable to ambulate.  MRI negative for fracture. orthopedics saw. continue pain meds, ice pack and therapy at [facility]. see Primary MD in 1 week and orthopedic doctor in 2 weeks of discharge  from rehab. You develop cough and wheezing while in hospital. chest xray did not show any pneumonia. viral panel was  negative. started on mucinex, prednisone and duoneb. needs lung function test and to see lung doctor in 2 weeks after discharge from rehab  You were diagnosed with Left Hip pain, arthritis, effusion, acute bronchitis  You were treated with paun meds, and below meds for cough  You were prescribed the following new medications: mucinex, prednisone and duoneb. flonase  You will need to follow up with your primary care physician and lung doctor and orthopedic doctor      SECONDARY DISCHARGE DIAGNOSES  Diagnosis: Cough  Assessment and Plan of Treatment: dry cough is likely due to brochitis. no fever. while count was normal. take medicine and nasal spray as prescribed. chest xray done. looks clear. no pneumonia. see primary MD in rehab, see lung doctor after coming out of rehab

## 2024-10-30 NOTE — DISCHARGE NOTE PROVIDER - ATTENDING DISCHARGE PHYSICAL EXAMINATION:
GENERAL: Not in distress. Alert   HEENT: AT/NC. clear conjuctiva, MMM.   no pallor or icterus  CARDIOVASCULAR: RRR S1, S2. No murmur/rubs/gallop  LUNGS: BLAE+, no rales, no wheezing, no rhonchi.    ABDOMEN: ND. Soft,  NT, no guarding / rebound / rigidity. BS normoactive. No CVA tenderness.    BACK: No spine tenderness.  EXTREMITIES: no edema. no leg or calf TP.  SKIN: no rash.   NEUROLOGIC: AAO*3.grossly intact  PSYCHIATRIC: Calm.  No agitation. GENERAL: Not in distress. Alert   HEENT: AT/NC. clear conjuctiva, MMM.   no pallor or icterus  CARDIOVASCULAR: RRR S1, S2. No murmur/rubs/gallop. less cough. resp not laboured. maintaining sat on RA  LUNGS: BLAE+, no rales, Scattered basal wheezing better than yesterday, no rhonchi.    ABDOMEN: ND. Soft,  NT, no guarding / rebound / rigidity. BS normoactive. No CVA tenderness.    BACK: No spine tenderness.  EXTREMITIES: no edema. no leg or calf TP.  SKIN: no rash. left heal pressure injury, blister  NEUROLOGIC: AAO*3.grossly intact  PSYCHIATRIC: Calm.  No agitation.

## 2024-10-30 NOTE — PHYSICAL THERAPY INITIAL EVALUATION ADULT - MD/RN NOTIFIED
yes acetaminophen 325 mg oral tablet: 2 tab(s) orally every 6 hours, As needed, Temp greater or equal to 38C (100.4F), Moderate Pain (4 - 6)  atorvastatin 40 mg oral tablet: 1 tab(s) orally once a day  Carafate 1 g/10 mL oral suspension: 10 milliliter(s) orally 4 times a day (before meals and at bedtime)  ferrous sulfate 325 mg (65 mg elemental iron) oral tablet: 1 tab(s) orally once a day  Gunslinger type brace: Apply topically to affected area once a day MDD:gunslinger type brace at all time  Lasix 20 mg oral tablet: 1 tab(s) orally once a day  potassium chloride 20 mEq oral granule, extended release:   Ventolin 5 mg/mL (0.5%) inhalation solution: 0.5 milliliter(s) inhaled every 6 hours, As Needed    **NOTE: At home, she uses ventolin 90mcg hfa inhaler 2 puffs every 6 hours as needed** atorvastatin 40 mg oral tablet: 1 tab(s) orally once a day  calcium carbonate 1250 mg (500 mg elemental calcium) oral tablet: 1 tab(s) orally once a day  ferrous sulfate 325 mg (65 mg elemental iron) oral tablet: 1 tab(s) orally once a day  Gunslinger type brace: Apply topically to affected area once a day MDD:gunslinger type brace at all time  Lasix 20 mg oral tablet: 1 tab(s) orally once a day  lidocaine 5% topical film: Apply topically to affected area once a day  melatonin 5 mg oral tablet: 1 tab(s) orally once a day (at bedtime)  topical therapy: Left arm wound - cleanse with NS, pat dry, apply adaptic, cover with aquacel, then abd pads, keep in place with the sling: topical therapy: Left arm wound - cleanse with NS, pat dry, apply adaptic, cover with aquacel, then abd pads, keep in place with the sling  Change the aquacel and abd pads every three days or pRN for saturation, irrigate through the adaptic and don&#x27;t remove for 6 days    Ventolin 5 mg/mL (0.5%) inhalation solution: 0.5 milliliter(s) inhaled every 6 hours, As Needed    **NOTE: At home, she uses ventolin 90mcg hfa inhaler 2 puffs every 6 hours as needed** atorvastatin 40 mg oral tablet: 1 tab(s) orally once a day  calcium carbonate 1250 mg (500 mg elemental calcium) oral tablet: 1 tab(s) orally once a day  ferrous sulfate 325 mg (65 mg elemental iron) oral tablet: 1 tab(s) orally once a day  Lasix 20 mg oral tablet: 1 tab(s) orally once a day  lidocaine 5% topical film: Apply topically to affected area once a day  melatonin 5 mg oral tablet: 1 tab(s) orally once a day (at bedtime)  topical therapy: Left arm wound - cleanse with NS, pat dry, apply adaptic, cover with aquacel, then abd pads, keep in place with the sling: topical therapy: Left arm wound - cleanse with NS, pat dry, apply adaptic, cover with aquacel, then abd pads, keep in place with the sling  Change the aquacel and abd pads every three days or pRN for saturation, irrigate through the adaptic and don&#x27;t remove for 6 days    Ventolin 5 mg/mL (0.5%) inhalation solution: 0.5 milliliter(s) inhaled every 6 hours, As Needed    **NOTE: At home, she uses ventolin 90mcg hfa inhaler 2 puffs every 6 hours as needed** acetaminophen 325 mg oral tablet: 3 tab(s) orally 1 to 2 times a day, As Needed   atorvastatin 40 mg oral tablet: 1 tab(s) orally once a day  calcium carbonate 1250 mg (500 mg elemental calcium) oral tablet: 1 tab(s) orally once a day  cholecalciferol 1000 intl units (25 mcg) oral capsule: 25 microgram(s) orally once a day   ferrous sulfate 325 mg (65 mg elemental iron) oral tablet: 1 tab(s) orally once a day  Lasix 20 mg oral tablet: 1 tab(s) orally once a day  lidocaine 5% topical film: Apply topically to affected area once a day  melatonin 5 mg oral tablet: 1 tab(s) orally once a day (at bedtime)  topical therapy: Left arm wound - cleanse with NS, pat dry, apply adaptic, cover with aquacel, then abd pads, keep in place with the sling: topical therapy: Left arm wound - cleanse with NS, pat dry, apply adaptic, cover with aquacel, then abd pads, keep in place with the sling  Change the aquacel and abd pads every three days or pRN for saturation, irrigate through the adaptic and don&#x27;t remove for 6 days    Ventolin 5 mg/mL (0.5%) inhalation solution: 0.5 milliliter(s) inhaled every 6 hours, As Needed    **NOTE: At home, she uses ventolin 90mcg hfa inhaler 2 puffs every 6 hours as needed**

## 2024-10-30 NOTE — DISCHARGE NOTE PROVIDER - NSDCMRMEDTOKEN_GEN_ALL_CORE_FT
atorvastatin 40 mg oral tablet: 1 tab(s) orally once a day (at bedtime)  clopidogrel 75 mg oral tablet: 1 tab(s) orally once a day  FLUoxetine 60 mg oral tablet: 1 tab(s) orally once a day MDD: 1  gabapentin 300 mg oral capsule: 1 cap(s) orally 3 times a day  KlonoPIN 0.5 mg oral tablet: 1 tab(s) orally 2 times a day MDD: 2  lisinopril 5 mg oral tablet: 1 tab(s) orally once a day  lithium 300 mg oral capsule: 1 cap(s) orally 2 times a day MDD: 2  Seroquel 200 mg oral tablet: 1 tab(s) orally once a day (at bedtime)  Vitamin D3 125 mcg (5000 intl units) oral tablet, disintegratin tab(s) orally once a day   ascorbic acid 500 mg oral tablet: 1 tab(s) orally once a day  atorvastatin 40 mg oral tablet: 1 tab(s) orally once a day (at bedtime)  clopidogrel 75 mg oral tablet: 1 tab(s) orally once a day  FLUoxetine 60 mg oral tablet: 1 tab(s) orally once a day MDD: 1  gabapentin 300 mg oral capsule: 1 cap(s) orally 3 times a day  guaiFENesin 600 mg oral tablet, extended release: 1 tab(s) orally every 12 hours  KlonoPIN 0.5 mg oral tablet: 1 tab(s) orally 2 times a day MDD: 2  lisinopril 5 mg oral tablet: 1 tab(s) orally once a day  lithium 300 mg oral capsule: 1 cap(s) orally 2 times a day MDD: 2  Multiple Vitamins oral tablet: 1 tab(s) orally once a day  Seroquel 200 mg oral tablet: 1 tab(s) orally once a day (at bedtime)  sodium chloride 0.65% nasal spray: 1 spray(s) nasal 4 times a day as needed for  congestion  Vitamin D3 125 mcg (5000 intl units) oral tablet, disintegratin tab(s) orally once a day   albuterol 90 mcg/inh inhalation aerosol: 2 puff(s) inhaled every 4 hours as needed for  bronchospasm  ascorbic acid 500 mg oral tablet: 1 tab(s) orally once a day  atorvastatin 40 mg oral tablet: 1 tab(s) orally once a day (at bedtime)  clopidogrel 75 mg oral tablet: 1 tab(s) orally once a day  FLUoxetine 60 mg oral tablet: 1 tab(s) orally once a day MDD: 1  fluticasone 50 mcg/inh nasal spray: 1 spray(s) nasal 2 times a day  gabapentin 300 mg oral capsule: 1 cap(s) orally 3 times a day  guaiFENesin 600 mg oral tablet, extended release: 1 tab(s) orally every 12 hours  ipratropium-albuterol 0.5 mg-2.5 mg/3 mL inhalation solution: 3 milliliter(s) inhaled every 6 hours  KlonoPIN 0.5 mg oral tablet: 1 tab(s) orally 2 times a day MDD: 2  lisinopril 5 mg oral tablet: 1 tab(s) orally once a day  lithium 300 mg oral capsule: 1 cap(s) orally 2 times a day MDD: 2  Multiple Vitamins oral tablet: 1 tab(s) orally once a day  predniSONE 20 mg oral tablet: 2 tab(s) orally once a day  Seroquel 200 mg oral tablet: 1 tab(s) orally once a day (at bedtime)  sodium chloride 0.65% nasal spray: 1 spray(s) nasal 4 times a day as needed for  congestion  Vitamin D3 125 mcg (5000 intl units) oral tablet, disintegratin tab(s) orally once a day

## 2024-10-30 NOTE — DISCHARGE NOTE PROVIDER - PROVIDER TOKENS
PROVIDER:[TOKEN:[[id number]]] PROVIDER:[TOKEN:[[id number]]],PROVIDER:[TOKEN:[[id number]]]

## 2024-10-30 NOTE — PHYSICAL THERAPY INITIAL EVALUATION ADULT - GAIT TRAINING, PT EVAL
In 1-3 therapy sessions pt will be able to ambulate 25 ft with min A and with appropriate assistive device.

## 2024-10-30 NOTE — CONSULT NOTE ADULT - SUBJECTIVE AND OBJECTIVE BOX
HPI:  68yo female w/ hx of HTN, HLD, Bipolar D/o, CVA, presented to the ED from [facility] s/p fall leading to Left hip pain. Pt states she using her walker when it slipped from her hand. She fell backwards without injury to her head. No LOC. She developed pain in her left hip and was unable to ambulate. Pt. denies chest pain, palpitations, sob, dyspnea, previous falls, syncope, dizziness, lightheadedness, blurry vision, numbness or tingling (28 Oct 2024 14:31)    Ortho called to evaluate Left hip pain.    Patient is resting comfortably , states that left knee pain improving.    She is able to range both legs equally .   She has been getting OOB with PT.  Dr Joyner is ortho on call, discussed MRI of left hip for recommendations.      < from: MR Hip No Cont, Left (10.29.24 @ 09:59) >  CEDURE DATE:  10/29/2024          INTERPRETATION:  MRI of the left hip    TECHNIQUE: Multiplanar multisequence MRI of the left hip.    INDICATION: Concern for fracture status post fall with effusion.    COMPARISON: CT of the pelvis performed 10/28/2028    FINDINGS:    Osseous structures: There is no acute fracture or abnormal bone marrow   edema pattern.    Left hip joint: Alignment is normal. There is a moderate-sized joint   effusion with debris.    Cartilage: There is intermediate to high-grade chondromalacia at the   superior humeral head and opposing acetabulum without full-thickness loss.    Labrum: There is chronic degeneration of the labrum with a tear at the   anterior superior labrum, nondisplaced (series 4, image 24).    Tendons and muscles: There is generalized fatty atrophy of the muscle   bellies surrounding the left hip, preferentially affecting the gluteus   estelita muscle. There is mild tendinosis at the hamstring tendons,   gluteus medius, and gluteus minimus tendons. There is no acute tendon   tear.    Neurovascular structures: Normal.    IMPRESSION: MRI of the left hip demonstrates no acute fracture.    Moderate effusion with debris, potentially reactive from moderate   osteoarthrosis.    --- End of Report ---        Vital Signs Last 24 Hrs  T(C): 36.7 (30 Oct 2024 05:07), Max: 36.7 (29 Oct 2024 19:01)  T(F): 98.1 (30 Oct 2024 05:07), Max: 98.1 (29 Oct 2024 19:01)  HR: 77 (30 Oct 2024 05:07) (70 - 77)  BP: 107/70 (30 Oct 2024 05:07) (87/54 - 107/70)  RR: 16 (30 Oct 2024 05:07) (15 - 16)  SpO2: 96% (30 Oct 2024 05:07) (93% - 96%)    Parameters below as of 30 Oct 2024 05:07  Patient On (Oxygen Delivery Method): room air    < end of copied text >      PAST MEDICAL & SURGICAL HISTORY:  HTN (hypertension)    HLD (hyperlipidemia)    Bipolar illness    MDD (major depressive disorder)    History of cholecystectomy    MEDICATIONS  (STANDING):  atorvastatin 40 milliGRAM(s) Oral at bedtime  cholecalciferol 2000 Unit(s) Oral daily  clonazePAM  Tablet 0.5 milliGRAM(s) Oral two times a day  clopidogrel Tablet 75 milliGRAM(s) Oral daily  FLUoxetine 60 milliGRAM(s) Oral daily  gabapentin 300 milliGRAM(s) Oral three times a day  heparin   Injectable 5000 Unit(s) SubCutaneous every 8 hours  lisinopril 5 milliGRAM(s) Oral daily  lithium 300 milliGRAM(s) Oral two times a day  QUEtiapine 200 milliGRAM(s) Oral at bedtime    MEDICATIONS  (PRN):  acetaminophen     Tablet .. 650 milliGRAM(s) Oral every 6 hours PRN Temp greater or equal to 38C (100.4F), Mild Pain (1 - 3)    PE:  Alert and oriented X 3  Lungs:  cta   cor:  S1S2   Abd:  soft, obese, +BS , voidint   Ext:   Left hip  tender to touch, + neurovascular intact.,   good passive ROM difficult to actively ROM but equal on both sides  No pain on Right Hip                           9.5    4.59  )-----------( 292      ( 30 Oct 2024 06:15 )             29.9       10-30    141  |  105  |  17  ----------------------------<  125[H]  4.1   |  29  |  1.16    Ca    9.2      30 Oct 2024 06:15    TPro  6.5  /  Alb  2.9[L]  /  TBili  0.8  /  DBili  x   /  AST  30  /  ALT  27  /  AlkPhos  84  10-30

## 2024-10-30 NOTE — DISCHARGE NOTE PROVIDER - DETAILS OF MALNUTRITION DIAGNOSIS/DIAGNOSES
This patient has been assessed with a concern for Malnutrition and was treated during this hospitalization for the following Nutrition diagnosis/diagnoses:     -  10/29/2024: Morbid obesity (BMI > 40)

## 2024-10-31 LAB
ALBUMIN SERPL ELPH-MCNC: 2.6 G/DL — LOW (ref 3.3–5)
ALP SERPL-CCNC: 86 U/L — SIGNIFICANT CHANGE UP (ref 40–120)
ALT FLD-CCNC: 22 U/L — SIGNIFICANT CHANGE UP (ref 10–45)
ANION GAP SERPL CALC-SCNC: 7 MMOL/L — SIGNIFICANT CHANGE UP (ref 5–17)
AST SERPL-CCNC: 28 U/L — SIGNIFICANT CHANGE UP (ref 10–40)
BILIRUB SERPL-MCNC: 0.7 MG/DL — SIGNIFICANT CHANGE UP (ref 0.2–1.2)
BUN SERPL-MCNC: 15 MG/DL — SIGNIFICANT CHANGE UP (ref 7–23)
CALCIUM SERPL-MCNC: 9.3 MG/DL — SIGNIFICANT CHANGE UP (ref 8.4–10.5)
CHLORIDE SERPL-SCNC: 103 MMOL/L — SIGNIFICANT CHANGE UP (ref 96–108)
CO2 SERPL-SCNC: 28 MMOL/L — SIGNIFICANT CHANGE UP (ref 22–31)
CREAT SERPL-MCNC: 1.02 MG/DL — SIGNIFICANT CHANGE UP (ref 0.5–1.3)
EGFR: 59 ML/MIN/1.73M2 — LOW
GLUCOSE SERPL-MCNC: 110 MG/DL — HIGH (ref 70–99)
HCT VFR BLD CALC: 32.1 % — LOW (ref 34.5–45)
HGB BLD-MCNC: 9.8 G/DL — LOW (ref 11.5–15.5)
MCHC RBC-ENTMCNC: 29.5 PG — SIGNIFICANT CHANGE UP (ref 27–34)
MCHC RBC-ENTMCNC: 30.5 G/DL — LOW (ref 32–36)
MCV RBC AUTO: 96.7 FL — SIGNIFICANT CHANGE UP (ref 80–100)
NRBC # BLD: 0 /100 WBCS — SIGNIFICANT CHANGE UP (ref 0–0)
PLATELET # BLD AUTO: 270 K/UL — SIGNIFICANT CHANGE UP (ref 150–400)
POTASSIUM SERPL-MCNC: 4 MMOL/L — SIGNIFICANT CHANGE UP (ref 3.5–5.3)
POTASSIUM SERPL-SCNC: 4 MMOL/L — SIGNIFICANT CHANGE UP (ref 3.5–5.3)
PROT SERPL-MCNC: 6.6 G/DL — SIGNIFICANT CHANGE UP (ref 6–8.3)
RBC # BLD: 3.32 M/UL — LOW (ref 3.8–5.2)
RBC # FLD: 14.3 % — SIGNIFICANT CHANGE UP (ref 10.3–14.5)
SODIUM SERPL-SCNC: 138 MMOL/L — SIGNIFICANT CHANGE UP (ref 135–145)
WBC # BLD: 4.64 K/UL — SIGNIFICANT CHANGE UP (ref 3.8–10.5)
WBC # FLD AUTO: 4.64 K/UL — SIGNIFICANT CHANGE UP (ref 3.8–10.5)

## 2024-10-31 RX ADMIN — Medication 300 MILLIGRAM(S): at 17:58

## 2024-10-31 RX ADMIN — QUETIAPINE FUMARATE 200 MILLIGRAM(S): 200 TABLET ORAL at 21:47

## 2024-10-31 RX ADMIN — Medication 0.5 MILLIGRAM(S): at 17:58

## 2024-10-31 RX ADMIN — GABAPENTIN 300 MILLIGRAM(S): 300 CAPSULE ORAL at 15:27

## 2024-10-31 RX ADMIN — Medication 300 MILLIGRAM(S): at 06:32

## 2024-10-31 RX ADMIN — CLOPIDOGREL 75 MILLIGRAM(S): 75 TABLET ORAL at 11:40

## 2024-10-31 RX ADMIN — Medication 60 MILLIGRAM(S): at 11:40

## 2024-10-31 RX ADMIN — Medication 0.5 MILLIGRAM(S): at 06:30

## 2024-10-31 RX ADMIN — Medication 40 MILLIGRAM(S): at 21:47

## 2024-10-31 RX ADMIN — GABAPENTIN 300 MILLIGRAM(S): 300 CAPSULE ORAL at 21:46

## 2024-10-31 RX ADMIN — HEPARIN SODIUM 5000 UNIT(S): 10000 INJECTION INTRAVENOUS; SUBCUTANEOUS at 15:27

## 2024-10-31 RX ADMIN — GABAPENTIN 300 MILLIGRAM(S): 300 CAPSULE ORAL at 06:31

## 2024-10-31 RX ADMIN — HEPARIN SODIUM 5000 UNIT(S): 10000 INJECTION INTRAVENOUS; SUBCUTANEOUS at 21:50

## 2024-10-31 RX ADMIN — Medication 2000 UNIT(S): at 11:40

## 2024-10-31 NOTE — PROGRESS NOTE ADULT - NS ATTEND AMEND GEN_ALL_CORE FT
Seen and examined. Chart reviewed.   patient is improving clinically.   Agree with above a/p  Edited where ever is necessary    denied complaints except hip and knee pain. exam shows no sign of injury or fracture. MRI note.d no fracture. debris likely due to severe Osteoarthrosis per ortho. WBAT. PT noted. awaiting three midnight for [facility] approval by insurance. move with assists. fall precautions. meticulous oral hygiene.

## 2024-11-01 RX ORDER — ASCORBIC ACID 500 MG
500 TABLET ORAL DAILY
Refills: 0 | Status: CANCELLED | OUTPATIENT
Start: 2024-11-01 | End: 2024-11-04

## 2024-11-01 RX ORDER — B-COMPLEX WITH VITAMIN C
1 VIAL (ML) INJECTION DAILY
Refills: 0 | Status: CANCELLED | OUTPATIENT
Start: 2024-11-01 | End: 2024-11-04

## 2024-11-01 RX ADMIN — Medication 0.5 MILLIGRAM(S): at 06:40

## 2024-11-01 RX ADMIN — Medication 300 MILLIGRAM(S): at 17:07

## 2024-11-01 RX ADMIN — HEPARIN SODIUM 5000 UNIT(S): 10000 INJECTION INTRAVENOUS; SUBCUTANEOUS at 13:15

## 2024-11-01 RX ADMIN — Medication 2000 UNIT(S): at 13:15

## 2024-11-01 RX ADMIN — HEPARIN SODIUM 5000 UNIT(S): 10000 INJECTION INTRAVENOUS; SUBCUTANEOUS at 21:47

## 2024-11-01 RX ADMIN — Medication 0.5 MILLIGRAM(S): at 17:07

## 2024-11-01 RX ADMIN — GABAPENTIN 300 MILLIGRAM(S): 300 CAPSULE ORAL at 06:40

## 2024-11-01 RX ADMIN — Medication 60 MILLIGRAM(S): at 13:14

## 2024-11-01 RX ADMIN — GABAPENTIN 300 MILLIGRAM(S): 300 CAPSULE ORAL at 13:15

## 2024-11-01 RX ADMIN — Medication 300 MILLIGRAM(S): at 06:41

## 2024-11-01 RX ADMIN — CLOPIDOGREL 75 MILLIGRAM(S): 75 TABLET ORAL at 12:20

## 2024-11-01 RX ADMIN — GABAPENTIN 300 MILLIGRAM(S): 300 CAPSULE ORAL at 21:47

## 2024-11-01 RX ADMIN — HEPARIN SODIUM 5000 UNIT(S): 10000 INJECTION INTRAVENOUS; SUBCUTANEOUS at 06:40

## 2024-11-01 NOTE — PROGRESS NOTE ADULT - NS ATTEND AMEND GEN_ALL_CORE FT
Seen and examined. Chart reviewed.   patient is improving clinically.   Agree with above a/p  Edited where ever is necessary    pain is better controlled. for DC to [facility] on sunday for 3 midnight rules

## 2024-11-02 LAB
ANION GAP SERPL CALC-SCNC: 6 MMOL/L — SIGNIFICANT CHANGE UP (ref 5–17)
BASOPHILS # BLD AUTO: 0.05 K/UL — SIGNIFICANT CHANGE UP (ref 0–0.2)
BASOPHILS NFR BLD AUTO: 0.9 % — SIGNIFICANT CHANGE UP (ref 0–2)
BUN SERPL-MCNC: 14 MG/DL — SIGNIFICANT CHANGE UP (ref 7–23)
CALCIUM SERPL-MCNC: 9.4 MG/DL — SIGNIFICANT CHANGE UP (ref 8.4–10.5)
CHLORIDE SERPL-SCNC: 105 MMOL/L — SIGNIFICANT CHANGE UP (ref 96–108)
CO2 SERPL-SCNC: 30 MMOL/L — SIGNIFICANT CHANGE UP (ref 22–31)
CREAT SERPL-MCNC: 0.97 MG/DL — SIGNIFICANT CHANGE UP (ref 0.5–1.3)
EGFR: 62 ML/MIN/1.73M2 — SIGNIFICANT CHANGE UP
EOSINOPHIL # BLD AUTO: 0.28 K/UL — SIGNIFICANT CHANGE UP (ref 0–0.5)
EOSINOPHIL NFR BLD AUTO: 5 % — SIGNIFICANT CHANGE UP (ref 0–6)
FLUAV AG NPH QL: SIGNIFICANT CHANGE UP
FLUBV AG NPH QL: SIGNIFICANT CHANGE UP
GLUCOSE SERPL-MCNC: 177 MG/DL — HIGH (ref 70–99)
HCT VFR BLD CALC: 31.3 % — LOW (ref 34.5–45)
HGB BLD-MCNC: 10 G/DL — LOW (ref 11.5–15.5)
IMM GRANULOCYTES NFR BLD AUTO: 0.4 % — SIGNIFICANT CHANGE UP (ref 0–0.9)
LYMPHOCYTES # BLD AUTO: 1.41 K/UL — SIGNIFICANT CHANGE UP (ref 1–3.3)
LYMPHOCYTES # BLD AUTO: 25 % — SIGNIFICANT CHANGE UP (ref 13–44)
MAGNESIUM SERPL-MCNC: 1.9 MG/DL — SIGNIFICANT CHANGE UP (ref 1.6–2.6)
MCHC RBC-ENTMCNC: 30 PG — SIGNIFICANT CHANGE UP (ref 27–34)
MCHC RBC-ENTMCNC: 31.9 G/DL — LOW (ref 32–36)
MCV RBC AUTO: 94 FL — SIGNIFICANT CHANGE UP (ref 80–100)
MONOCYTES # BLD AUTO: 0.41 K/UL — SIGNIFICANT CHANGE UP (ref 0–0.9)
MONOCYTES NFR BLD AUTO: 7.3 % — SIGNIFICANT CHANGE UP (ref 2–14)
NEUTROPHILS # BLD AUTO: 3.46 K/UL — SIGNIFICANT CHANGE UP (ref 1.8–7.4)
NEUTROPHILS NFR BLD AUTO: 61.4 % — SIGNIFICANT CHANGE UP (ref 43–77)
NRBC # BLD: 0 /100 WBCS — SIGNIFICANT CHANGE UP (ref 0–0)
PLATELET # BLD AUTO: 298 K/UL — SIGNIFICANT CHANGE UP (ref 150–400)
POTASSIUM SERPL-MCNC: 3.7 MMOL/L — SIGNIFICANT CHANGE UP (ref 3.5–5.3)
POTASSIUM SERPL-SCNC: 3.7 MMOL/L — SIGNIFICANT CHANGE UP (ref 3.5–5.3)
PROCALCITONIN SERPL-MCNC: 0.12 NG/ML — HIGH
RBC # BLD: 3.33 M/UL — LOW (ref 3.8–5.2)
RBC # FLD: 14.2 % — SIGNIFICANT CHANGE UP (ref 10.3–14.5)
RSV RNA NPH QL NAA+NON-PROBE: SIGNIFICANT CHANGE UP
SARS-COV-2 RNA SPEC QL NAA+PROBE: SIGNIFICANT CHANGE UP
SODIUM SERPL-SCNC: 141 MMOL/L — SIGNIFICANT CHANGE UP (ref 135–145)
WBC # BLD: 5.63 K/UL — SIGNIFICANT CHANGE UP (ref 3.8–10.5)
WBC # FLD AUTO: 5.63 K/UL — SIGNIFICANT CHANGE UP (ref 3.8–10.5)

## 2024-11-02 PROCEDURE — 71045 X-RAY EXAM CHEST 1 VIEW: CPT | Mod: 26

## 2024-11-02 RX ORDER — CALCIUM CARBONATE 215(500)MG
1 TABLET,CHEWABLE ORAL
Qty: 0 | Refills: 0 | DISCHARGE
Start: 2024-11-02

## 2024-11-02 RX ORDER — GUAIFENESIN 100 MG/5ML
600 LIQUID ORAL EVERY 12 HOURS
Refills: 0 | Status: DISCONTINUED | OUTPATIENT
Start: 2024-11-02 | End: 2024-11-04

## 2024-11-02 RX ORDER — IPRATROPIUM BROMIDE AND ALBUTEROL SULFATE .5; 2.5 MG/3ML; MG/3ML
3 SOLUTION RESPIRATORY (INHALATION) ONCE
Refills: 0 | Status: COMPLETED | OUTPATIENT
Start: 2024-11-02 | End: 2024-11-02

## 2024-11-02 RX ORDER — IPRATROPIUM BROMIDE AND ALBUTEROL SULFATE .5; 2.5 MG/3ML; MG/3ML
3 SOLUTION RESPIRATORY (INHALATION) EVERY 6 HOURS
Refills: 0 | Status: DISCONTINUED | OUTPATIENT
Start: 2024-11-02 | End: 2024-11-04

## 2024-11-02 RX ORDER — GUAIFENESIN 100 MG/5ML
1 LIQUID ORAL
Qty: 0 | Refills: 0 | DISCHARGE
Start: 2024-11-02

## 2024-11-02 RX ORDER — CALCIUM CARBONATE 215(500)MG
1 TABLET,CHEWABLE ORAL EVERY 6 HOURS
Refills: 0 | Status: DISCONTINUED | OUTPATIENT
Start: 2024-11-02 | End: 2024-11-04

## 2024-11-02 RX ORDER — B-COMPLEX WITH VITAMIN C
1 VIAL (ML) INJECTION
Qty: 0 | Refills: 0 | DISCHARGE
Start: 2024-11-02

## 2024-11-02 RX ORDER — ASCORBIC ACID 500 MG
1 TABLET ORAL
Qty: 0 | Refills: 0 | DISCHARGE
Start: 2024-11-02

## 2024-11-02 RX ORDER — ALBUTEROL 90 MCG
2 AEROSOL (GRAM) INHALATION EVERY 4 HOURS
Refills: 0 | Status: DISCONTINUED | OUTPATIENT
Start: 2024-11-02 | End: 2024-11-04

## 2024-11-02 RX ADMIN — GABAPENTIN 300 MILLIGRAM(S): 300 CAPSULE ORAL at 13:11

## 2024-11-02 RX ADMIN — HEPARIN SODIUM 5000 UNIT(S): 10000 INJECTION INTRAVENOUS; SUBCUTANEOUS at 13:11

## 2024-11-02 RX ADMIN — GUAIFENESIN 600 MILLIGRAM(S): 100 LIQUID ORAL at 17:35

## 2024-11-02 RX ADMIN — Medication 300 MILLIGRAM(S): at 17:35

## 2024-11-02 RX ADMIN — IPRATROPIUM BROMIDE AND ALBUTEROL SULFATE 3 MILLILITER(S): .5; 2.5 SOLUTION RESPIRATORY (INHALATION) at 11:27

## 2024-11-02 RX ADMIN — Medication 5 MILLIGRAM(S): at 05:24

## 2024-11-02 RX ADMIN — Medication 300 MILLIGRAM(S): at 05:24

## 2024-11-02 RX ADMIN — Medication 0.5 MILLIGRAM(S): at 05:24

## 2024-11-02 RX ADMIN — HEPARIN SODIUM 5000 UNIT(S): 10000 INJECTION INTRAVENOUS; SUBCUTANEOUS at 22:00

## 2024-11-02 RX ADMIN — IPRATROPIUM BROMIDE AND ALBUTEROL SULFATE 3 MILLILITER(S): .5; 2.5 SOLUTION RESPIRATORY (INHALATION) at 21:22

## 2024-11-02 RX ADMIN — CLOPIDOGREL 75 MILLIGRAM(S): 75 TABLET ORAL at 12:05

## 2024-11-02 RX ADMIN — Medication 40 MILLIGRAM(S): at 22:00

## 2024-11-02 RX ADMIN — Medication 60 MILLIGRAM(S): at 12:06

## 2024-11-02 RX ADMIN — Medication 2000 UNIT(S): at 12:06

## 2024-11-02 RX ADMIN — IPRATROPIUM BROMIDE AND ALBUTEROL SULFATE 3 MILLILITER(S): .5; 2.5 SOLUTION RESPIRATORY (INHALATION) at 15:33

## 2024-11-02 RX ADMIN — GABAPENTIN 300 MILLIGRAM(S): 300 CAPSULE ORAL at 05:24

## 2024-11-02 RX ADMIN — HEPARIN SODIUM 5000 UNIT(S): 10000 INJECTION INTRAVENOUS; SUBCUTANEOUS at 05:24

## 2024-11-02 RX ADMIN — GABAPENTIN 300 MILLIGRAM(S): 300 CAPSULE ORAL at 22:00

## 2024-11-02 RX ADMIN — QUETIAPINE FUMARATE 200 MILLIGRAM(S): 200 TABLET ORAL at 22:00

## 2024-11-02 RX ADMIN — Medication 0.5 MILLIGRAM(S): at 17:35

## 2024-11-02 RX ADMIN — Medication 40 MILLIGRAM(S): at 13:11

## 2024-11-02 NOTE — PROGRESS NOTE ADULT - NS ATTEND AMEND GEN_ALL_CORE FT
Seen and examined. Chart reviewed.   patient is improving clinically.   Agree with above a/p  Edited where ever is necessary    reported dry cough. pain is better controlled. denied fever/chills/CP/SOB/palpitation/dizziness/abdominal pian/nausea/vomiting/diarrhoea/constipation/dysuria/leg or calf pain/headaches. all other ROS neg    Vital Signs Last 24 Hrs  T(C): 36.6 (02 Nov 2024 05:48), Max: 36.6 (02 Nov 2024 05:48)  T(F): 97.9 (02 Nov 2024 05:48), Max: 97.9 (02 Nov 2024 05:48)  HR: 67 (02 Nov 2024 05:48) (67 - 69)  BP: 126/71 (02 Nov 2024 05:48) (98/63 - 126/71)  BP(mean): --  RR: 17 (02 Nov 2024 05:48) (16 - 17)  SpO2: 98% (02 Nov 2024 05:48) (95% - 98%)    Parameters below as of 01 Nov 2024 20:00  Patient On (Oxygen Delivery Method): room air    GENERAL: Not in distress. Alert   HEENT: AT/NC. clear conjuctiva, MMM.   no pallor or icterus  CARDIOVASCULAR: RRR S1, S2. No murmur/rubs/gallop  LUNGS: BLAE+, no rales, no wheezing, no rhonchi.    ABDOMEN: ND. Soft,  NT, no guarding / rebound / rigidity. BS normoactive. No CVA tenderness.    BACK: No spine tenderness.  EXTREMITIES: no edema. no leg or calf TP.  SKIN: no rash.   NEUROLOGIC: AAO*3.grossly intact  PSYCHIATRIC: Calm.  No agitation.    A/p:  #Left hip Effusion, likely reactive in s/o moderate osteoarthrosis  #Fall  - MRI no frx noted, just effusion, likely reactive in setting of osteoarthrosis  - Ortho appreciated, WBAT with walker, DVT prophylaxis, pain management, Ice paks as needed, NSAIDs  - PT recommends [facility].     Cough  - likely bronchitis vs PND  - afebrile  - CXR reviewed by me. looks clean  - mucinex  - flonase  - monitor cbc, temp. . can be monitored in [facility]    stable for DC to [facility] today    Current comorbidities, plan of care, return precautions, fall preventions discussed with the patient. verbalized understanding and agreed with the plan. All questions were answered at his/her satisfaction. Seen and examined. Chart reviewed.   patient is improving clinically.   Agree with above a/p  Edited where ever is necessary    reported dry cough since this am, wheezing. + chronic nasal discharge due ot nasal poplyp, not worsening, hip pain is better controlled. reported blister over left heel. denied fever/chills/CP/SOB/palpitation/dizziness/abdominal pian/nausea/vomiting/diarrhoea/constipation/dysuria/leg or calf pain/headaches. all other ROS neg. never smoked Cig. no h/o BA or Ahronic lung disease    Vital Signs Last 24 Hrs  T(C): 36.6 (02 Nov 2024 05:48), Max: 36.6 (02 Nov 2024 05:48)  T(F): 97.9 (02 Nov 2024 05:48), Max: 97.9 (02 Nov 2024 05:48)  HR: 67 (02 Nov 2024 05:48) (67 - 69)  BP: 126/71 (02 Nov 2024 05:48) (98/63 - 126/71)  BP(mean): --  RR: 17 (02 Nov 2024 05:48) (16 - 17)  SpO2: 98% (02 Nov 2024 05:48) (95% - 98%)    Parameters below as of 01 Nov 2024 20:00  Patient On (Oxygen Delivery Method): room air    GENERAL: Not in distress. Alert   HEENT: AT/NC. clear conjuctiva, MMM.   no pallor or icterus. no rhinorrhoea  CARDIOVASCULAR: RRR S1, S2. No murmur/rubs/gallop  LUNGS: BLAE+, no rales, + wheezing, no rhonchi.  cough with prolonged expiraiton  ABDOMEN: ND. Soft,  NT, no guarding / rebound / rigidity. BS normoactive. No CVA tenderness.    BACK: No spine tenderness.  EXTREMITIES: no edema. no leg or calf TP.  SKIN: no rash. blister with slightly serosanguinous collection. no sign of infection.   NEUROLOGIC: AAO*3.grossly intact  PSYCHIATRIC: Calm.  No agitation.    A/p:  #Left hip Effusion, likely reactive in s/o moderate osteoarthrosis  #Fall  - MRI no frx noted, just effusion, likely reactive in setting of osteoarthrosis  - Ortho appreciated, WBAT with walker, DVT prophylaxis, pain management, Ice paks as needed, NSAIDs  - PT recommends [facility].     Cough  - likely bronchitis, r/o PNA,   - afebrile  - CXR reviewed by me. looks clean  - Mucinex  - saline nasal spray. duoneb, prednisone 40 mg daily for 3 days  - check CBC, BMP. Mg    left heal blister. likely pressure injury  - offload pressure  - moitor for infection    Dispo:  - hold DC for [facility] today. watch for 24 hrs. reassess in am and DC if stable      Current comorbidities, plan of care, return precautions, fall preventions discussed with the patient. verbalized understanding and agreed with the plan. All questions were answered at his/her satisfaction.

## 2024-11-03 LAB
A1C WITH ESTIMATED AVERAGE GLUCOSE RESULT: 6.1 % — HIGH (ref 4–5.6)
ANION GAP SERPL CALC-SCNC: 8 MMOL/L — SIGNIFICANT CHANGE UP (ref 5–17)
BUN SERPL-MCNC: 14 MG/DL — SIGNIFICANT CHANGE UP (ref 7–23)
CALCIUM SERPL-MCNC: 10 MG/DL — SIGNIFICANT CHANGE UP (ref 8.4–10.5)
CHLORIDE SERPL-SCNC: 104 MMOL/L — SIGNIFICANT CHANGE UP (ref 96–108)
CO2 SERPL-SCNC: 29 MMOL/L — SIGNIFICANT CHANGE UP (ref 22–31)
CREAT SERPL-MCNC: 1.07 MG/DL — SIGNIFICANT CHANGE UP (ref 0.5–1.3)
EGFR: 56 ML/MIN/1.73M2 — LOW
ESTIMATED AVERAGE GLUCOSE: 128 MG/DL — HIGH (ref 68–114)
GLUCOSE SERPL-MCNC: 144 MG/DL — HIGH (ref 70–99)
HCT VFR BLD CALC: 34.3 % — LOW (ref 34.5–45)
HGB BLD-MCNC: 10.4 G/DL — LOW (ref 11.5–15.5)
MCHC RBC-ENTMCNC: 29.3 PG — SIGNIFICANT CHANGE UP (ref 27–34)
MCHC RBC-ENTMCNC: 30.3 G/DL — LOW (ref 32–36)
MCV RBC AUTO: 96.6 FL — SIGNIFICANT CHANGE UP (ref 80–100)
NRBC # BLD: 0 /100 WBCS — SIGNIFICANT CHANGE UP (ref 0–0)
PLATELET # BLD AUTO: 341 K/UL — SIGNIFICANT CHANGE UP (ref 150–400)
POTASSIUM SERPL-MCNC: 4.5 MMOL/L — SIGNIFICANT CHANGE UP (ref 3.5–5.3)
POTASSIUM SERPL-SCNC: 4.5 MMOL/L — SIGNIFICANT CHANGE UP (ref 3.5–5.3)
RBC # BLD: 3.55 M/UL — LOW (ref 3.8–5.2)
RBC # FLD: 14.2 % — SIGNIFICANT CHANGE UP (ref 10.3–14.5)
SODIUM SERPL-SCNC: 141 MMOL/L — SIGNIFICANT CHANGE UP (ref 135–145)
WBC # BLD: 7.69 K/UL — SIGNIFICANT CHANGE UP (ref 3.8–10.5)
WBC # FLD AUTO: 7.69 K/UL — SIGNIFICANT CHANGE UP (ref 3.8–10.5)

## 2024-11-03 RX ORDER — FLUTICASONE PROPIONATE 50 UG/1
1 SPRAY, METERED NASAL
Refills: 0 | Status: DISCONTINUED | OUTPATIENT
Start: 2024-11-03 | End: 2024-11-04

## 2024-11-03 RX ORDER — IPRATROPIUM BROMIDE AND ALBUTEROL SULFATE .5; 2.5 MG/3ML; MG/3ML
3 SOLUTION RESPIRATORY (INHALATION)
Qty: 0 | Refills: 0 | DISCHARGE
Start: 2024-11-03

## 2024-11-03 RX ORDER — CLONAZEPAM 1 MG
0.5 TABLET ORAL
Refills: 0 | Status: DISCONTINUED | OUTPATIENT
Start: 2024-11-03 | End: 2024-11-04

## 2024-11-03 RX ORDER — ALBUTEROL 90 MCG
2 AEROSOL (GRAM) INHALATION
Qty: 1 | Refills: 0
Start: 2024-11-03

## 2024-11-03 RX ORDER — FLUTICASONE PROPIONATE 50 UG/1
1 SPRAY, METERED NASAL
Qty: 0 | Refills: 0 | DISCHARGE
Start: 2024-11-03

## 2024-11-03 RX ADMIN — IPRATROPIUM BROMIDE AND ALBUTEROL SULFATE 3 MILLILITER(S): .5; 2.5 SOLUTION RESPIRATORY (INHALATION) at 08:30

## 2024-11-03 RX ADMIN — GABAPENTIN 300 MILLIGRAM(S): 300 CAPSULE ORAL at 05:36

## 2024-11-03 RX ADMIN — QUETIAPINE FUMARATE 200 MILLIGRAM(S): 200 TABLET ORAL at 21:14

## 2024-11-03 RX ADMIN — Medication 300 MILLIGRAM(S): at 17:13

## 2024-11-03 RX ADMIN — HEPARIN SODIUM 5000 UNIT(S): 10000 INJECTION INTRAVENOUS; SUBCUTANEOUS at 13:17

## 2024-11-03 RX ADMIN — FLUTICASONE PROPIONATE 1 SPRAY(S): 50 SPRAY, METERED NASAL at 17:12

## 2024-11-03 RX ADMIN — HEPARIN SODIUM 5000 UNIT(S): 10000 INJECTION INTRAVENOUS; SUBCUTANEOUS at 05:37

## 2024-11-03 RX ADMIN — Medication 40 MILLIGRAM(S): at 21:14

## 2024-11-03 RX ADMIN — Medication 0.5 MILLIGRAM(S): at 17:14

## 2024-11-03 RX ADMIN — Medication 40 MILLIGRAM(S): at 05:37

## 2024-11-03 RX ADMIN — GABAPENTIN 300 MILLIGRAM(S): 300 CAPSULE ORAL at 13:18

## 2024-11-03 RX ADMIN — GABAPENTIN 300 MILLIGRAM(S): 300 CAPSULE ORAL at 21:14

## 2024-11-03 RX ADMIN — IPRATROPIUM BROMIDE AND ALBUTEROL SULFATE 3 MILLILITER(S): .5; 2.5 SOLUTION RESPIRATORY (INHALATION) at 15:32

## 2024-11-03 RX ADMIN — Medication 2000 UNIT(S): at 11:10

## 2024-11-03 RX ADMIN — Medication 60 MILLIGRAM(S): at 11:11

## 2024-11-03 RX ADMIN — HEPARIN SODIUM 5000 UNIT(S): 10000 INJECTION INTRAVENOUS; SUBCUTANEOUS at 21:14

## 2024-11-03 RX ADMIN — CLOPIDOGREL 75 MILLIGRAM(S): 75 TABLET ORAL at 11:10

## 2024-11-03 RX ADMIN — Medication 5 MILLIGRAM(S): at 05:38

## 2024-11-03 RX ADMIN — IPRATROPIUM BROMIDE AND ALBUTEROL SULFATE 3 MILLILITER(S): .5; 2.5 SOLUTION RESPIRATORY (INHALATION) at 03:39

## 2024-11-03 RX ADMIN — Medication 0.5 MILLIGRAM(S): at 05:37

## 2024-11-03 RX ADMIN — GUAIFENESIN 600 MILLIGRAM(S): 100 LIQUID ORAL at 05:38

## 2024-11-03 RX ADMIN — GUAIFENESIN 600 MILLIGRAM(S): 100 LIQUID ORAL at 17:13

## 2024-11-03 RX ADMIN — Medication 300 MILLIGRAM(S): at 05:38

## 2024-11-03 NOTE — CHART NOTE - NSCHARTNOTEFT_GEN_A_CORE
NUTRITION Follow Up Note    SOURCE: Patient [X]  Medical Record [X]  Nursing Staff [X]  Family Member []    DIET: Diet, Regular (10-28-24 @ 14:27) [Active]    Average recorded intakes: >75% of meals over length of stay per nursing flow sheets. Denies any chewing/swallowing difficulties. Food preferences obtained and noted on patients file with nutrition office. Electrolytes stable at this time.    EDEMA: no edema noted    LAST BM: 29-Oct-2024    SKIN: stage II pressure injury @ left heel    WEIGHT TRENDS:  Weight in k.7 (28 Oct 2024 22:30)      PERTINENT MEDICATIONS: MEDICATIONS  (STANDING):  atorvastatin 40 milliGRAM(s) Oral at bedtime  cholecalciferol 2000 Unit(s) Oral daily  clonazePAM  Tablet 0.5 milliGRAM(s) Oral two times a day  clopidogrel Tablet 75 milliGRAM(s) Oral daily  FLUoxetine 60 milliGRAM(s) Oral daily  gabapentin 300 milliGRAM(s) Oral three times a day  heparin   Injectable 5000 Unit(s) SubCutaneous every 8 hours  lisinopril 5 milliGRAM(s) Oral daily  lithium 300 milliGRAM(s) Oral two times a day  QUEtiapine 200 milliGRAM(s) Oral at bedtime    MEDICATIONS  (PRN):  acetaminophen     Tablet .. 650 milliGRAM(s) Oral every 6 hours PRN Temp greater or equal to 38C (100.4F), Mild Pain (1 - 3)      PERTINENT LABS:  10- Na138 mmol/L Glu 110 mg/dL[H] K+ 4.0 mmol/L Cr  1.02 mg/dL BUN 15 mg/dL 10- Alb 2.6 g/dL[L]        ESTIMATED NEEDS:   [X] No Change Since Previous Assessment    PREVIOUS NUTRITION DIAGNOSIS:  1. Overweight/Obesity  2. Increased Nutrient Needs    NUTRITION DIAGNOSIS IS [X] Ongoing     NEW NUTRITION DIAGNOSIS: [X] Not Applicable    INTERVENTIONS:   1. Recommend MVI, ascorbic acid (500 mg), & zinc sulfate (220 mg) to promote wound healing   2. Monitor daily PO intakes, GI tolerance, labs, weights, skin integrity, & BM regularity     MONITORING & EVALUATION:   1. Weights   2. PO intakes   3. Skin integrity   4. Tolerance to diet prescription   5. Labs & POCT  6. Follow up (per protocol)    Registered Dietitian/Nutritionist Remains Available.  Parvez Gerber RD, CDN    Contact: [phone] or via [place] TEAMS
Nurse reached out pt BP 89/55 MAP 66. 500 mL bolus of IVF given. Repeat /64 MAP maintaining at 76
Pt feeling better today.  Optimized for DC  Physical exam in DC summary
Patient refusing discharge. Multiple attempts to persuade patient that it is in her best interest to leave, but she still refuses.

## 2024-11-03 NOTE — DISCHARGE NOTE NURSING/CASE MANAGEMENT/SOCIAL WORK - PATIENT PORTAL LINK FT
You can access the FollowMyHealth Patient Portal offered by [facility] by registering at the following website: [email or web address]. By joining [employer]’s FollowMyHealth portal, you will also be able to view your health information using other applications (apps) compatible with our system.

## 2024-11-03 NOTE — DISCHARGE NOTE NURSING/CASE MANAGEMENT/SOCIAL WORK - FINANCIAL ASSISTANCE
[facility] provides services at a reduced cost to those who are determined to be eligible through [facility]’s financial assistance program. Information regarding [facility]’s financial assistance program can be found by going to [email or web address] or by calling [phone].

## 2024-11-04 VITALS
DIASTOLIC BLOOD PRESSURE: 86 MMHG | OXYGEN SATURATION: 99 % | HEART RATE: 68 BPM | SYSTOLIC BLOOD PRESSURE: 147 MMHG | RESPIRATION RATE: 16 BRPM | TEMPERATURE: 98 F

## 2024-11-04 RX ADMIN — HEPARIN SODIUM 5000 UNIT(S): 10000 INJECTION INTRAVENOUS; SUBCUTANEOUS at 05:17

## 2024-11-04 RX ADMIN — IPRATROPIUM BROMIDE AND ALBUTEROL SULFATE 3 MILLILITER(S): .5; 2.5 SOLUTION RESPIRATORY (INHALATION) at 10:06

## 2024-11-04 RX ADMIN — Medication 5 MILLIGRAM(S): at 05:16

## 2024-11-04 RX ADMIN — FLUTICASONE PROPIONATE 1 SPRAY(S): 50 SPRAY, METERED NASAL at 05:17

## 2024-11-04 RX ADMIN — Medication 40 MILLIGRAM(S): at 05:17

## 2024-11-04 RX ADMIN — GUAIFENESIN 600 MILLIGRAM(S): 100 LIQUID ORAL at 05:16

## 2024-11-04 RX ADMIN — Medication 300 MILLIGRAM(S): at 05:16

## 2024-11-04 RX ADMIN — Medication 0.5 MILLIGRAM(S): at 05:16

## 2024-11-04 RX ADMIN — GABAPENTIN 300 MILLIGRAM(S): 300 CAPSULE ORAL at 05:16

## 2024-11-04 NOTE — PROGRESS NOTE ADULT - ASSESSMENT
68yo female w/ hx of HTN, HLD, Bipolar D/o, CVA, presented to the ED from [facility] s/p fall leading to Left hip pain, found to have left hip moderate effusion    #Left hip Effusion, likely reactive in s/o moderate osteoarthrosis  #Fall  -Xray and CT results noted  - MRI no frx noted, just effusion, likely reactive in setting of osteoarthrosis  - Ortho appreciated, WBAT with walker, DVT prophylaxis, pain management, Ice paks as needed, NSAIDs  - PT recommends [facility]    -VTE ppx w/ HSQ    #HTN  #HLD  -Statin  -Lisinopril    #Bipolar d/o  -Lithium  -Klonopin    #Hx of CVA  -Plavix/Statin    #VTE ppx  -HSQ    No family member or Emergency contact per pt 
70yo female w/ hx of HTN, HLD, Bipolar D/o, CVA, presented to the ED from [facility] s/p fall leading to Left hip pain, found to have left hip moderate effusion    #Left hip Effusion, likely reactive in s/o moderate osteoarthrosis  #Fall  -Xray and CT results noted  - MRI no frx noted, just effusion, likely reactive in setting of osteoarthrosis  - Ortho appreciated, WBAT with walker, DVT prophylaxis, pain management, Ice paks as needed, NSAIDs  - PT recommends [facility]    -VTE ppx w/ HSQ    #Cough  -chest x ray unremarkable  -nasal spray  -mucinex    #HTN  #HLD  -Statin  -Lisinopril    #Bipolar d/o  -Lithium  -Klonopin    #Hx of CVA  -Plavix/Statin    #VTE ppx  -HSQ    No family member or Emergency contact per pt 
70yo female w/ hx of HTN, HLD, Bipolar D/o, CVA, [facility] the ED [facility] s/p fall leading to Left hip pain, found to have left hip moderate effusion    #Left hip Effusion, likely reactive in s/o moderate osteoarthrosis  #Fall  -Xray and CT results noted  - MRI no frx noted, just effusion, likely reactive in setting of osteoarthrosis  - Ortho appreciated, WBAT with walker, DVT prophylaxis, pain management, Ice paks as needed, NSAIDs  - PT recommends [facility]    -VTE ppx w/ HSQ    #Cough  -FU chest x ray  -nasal spray  -mucinex    #HTN  #HLD  -Statin  -Lisinopril    #Bipolar d/o  -Lithium  -Klonopin    #Hx of CVA  -Plavix/Statin    #VTE ppx  -HSQ    No family member or Emergency contact per pt 
68yo female w/ hx of HTN, HLD, Bipolar D/o, CVA, presented [facility] the ED from residential s/p fall leading to Left hip pain, found to have left hip moderate effusion    #Left hip Effusion  #Fall  -Xray and CT results noted  - MRI no frx noted, just effusion  -PT evaluation today  -Ortho consulted    -VTE ppx w/ HSQ    #HTN  #HLD  -Statin  -Lisinopril    #Bipolar d/o  -Lithium  -Klonopin    #Hx of CVA  -Plavix/Statin    #VTE ppx  -HSQ    No family member or Emergency contact per pt     
70yo female w/ hx of HTN, HLD, Bipolar D/o, CVA, presented [facility] the ED from skilled [facility] s/p fall leading to Left hip pain, found to have left hip moderate effusion    #Left hip Effusion, likely reactive in s/o moderate osteoarthrosis  #Fall  -Xray and CT results noted  - MRI no frx noted, just effusion, likely reactive in setting of osteoarthrosis  - Ortho appreciated, WBAT with walker, DVT prophylaxis, pain management, Ice paks as needed, NSAIDs  - PT recommends [facility]    -VTE ppx w/ HSQ    #Cough  -chest x ray unremarkable  -nasal spray  -mucinex    #HTN  #HLD  -Statin  -Lisinopril    #Bipolar d/o  -Lithium  -Klonopin    #Hx of CVA  -Plavix/Statin    #VTE ppx  -HSQ    No family member or Emergency contact per pt 
68yo female w/ hx of HTN, HLD, Bipolar D/o, CVA, presented to the ED from [facility] s/p fall leading to Left hip pain, found to have left hip moderate effusion    #Left hip Effusion, likely reactive in s/o moderate osteoarthrosis  #Fall  -Xray and CT results noted  - MRI no frx noted, just effusion, likely reactive in setting of osteoarthrosis  - Ortho appreciated, WBAT with walker, DVT prophylaxis, pain management, Ice paks as needed, NSAIDs  - PT recommends [facility]    -VTE ppx w/ HSQ    #HTN  #HLD  -Statin  -Lisinopril    #Bipolar d/o  -Lithium  -Klonopin    #Hx of CVA  -Plavix/Statin    #VTE ppx  -HSQ    No family member or Emergency contact per pt 
68yo female w/ hx of HTN, HLD, Bipolar D/o, CVA, presented [facility] the ED from shelter s/p fall leading to Left hip pain, found to have left hip moderate effusion    #Left hip Effusion  #Fall  -Xray and CT results noted  -Follow up with MRI of the hip to rule out fracture  -Bedrest for now  -PT evaluation pending results  -Ortho consulted  -Pain control  -VTE ppx w/ HSQ    #HTN  #HLD  -Statin  -Lisinopril    #Bipolar d/o  -Lithium  -Klonopin    #Hx of CVA  -Plavix/Statin    #VTE ppx  -HSQ    No family member or Emergency contact per pt

## 2024-11-04 NOTE — PROGRESS NOTE ADULT - REASON FOR ADMISSION
fall, left hip pain
No

## 2024-11-04 NOTE — PROGRESS NOTE ADULT - NUTRITIONAL ASSESSMENT
This patient has been assessed with a concern for Malnutrition and has been determined to have a diagnosis/diagnoses of Morbid obesity (BMI > 40).    This patient is being managed with:   Diet Regular-  Entered: Oct 28 2024  2:25PM  

## 2024-11-04 NOTE — PROGRESS NOTE ADULT - SUBJECTIVE AND OBJECTIVE BOX
PROGRESS NOTE:     Patient is a 69y old  Female who presents with a chief complaint of fall, left hip pain (28 Oct 2024 14:31)      SUBJECTIVE / OVERNIGHT EVENTS:  pt was seen adn evaluated  no complains offered       ADDITIONAL REVIEW OF SYSTEMS: as per HPI    MEDICATIONS  (STANDING):  atorvastatin 40 milliGRAM(s) Oral at bedtime  cholecalciferol 2000 Unit(s) Oral daily  clonazePAM  Tablet 0.5 milliGRAM(s) Oral two times a day  clopidogrel Tablet 75 milliGRAM(s) Oral daily  FLUoxetine 60 milliGRAM(s) Oral daily  gabapentin 300 milliGRAM(s) Oral three times a day  heparin   Injectable 5000 Unit(s) SubCutaneous every 8 hours  lisinopril 5 milliGRAM(s) Oral daily  lithium 300 milliGRAM(s) Oral two times a day  QUEtiapine 200 milliGRAM(s) Oral at bedtime    MEDICATIONS  (PRN):  acetaminophen     Tablet .. 650 milliGRAM(s) Oral every 6 hours PRN Temp greater or equal to 38C (100.4F), Mild Pain (1 - 3)      CAPILLARY BLOOD GLUCOSE        I&O's Summary    28 Oct 2024 07:01  -  29 Oct 2024 07:00  --------------------------------------------------------  IN: 0 mL / OUT: 475 mL / NET: -475 mL    29 Oct 2024 07:01  -  29 Oct 2024 11:43  --------------------------------------------------------  IN: 420 mL / OUT: 0 mL / NET: 420 mL        PHYSICAL EXAM:  Vital Signs Last 24 Hrs  T(C): 36.6 (29 Oct 2024 04:44), Max: 37.1 (28 Oct 2024 15:26)  T(F): 97.9 (29 Oct 2024 04:44), Max: 98.7 (28 Oct 2024 15:26)  HR: 73 (29 Oct 2024 04:44) (68 - 73)  BP: 113/68 (29 Oct 2024 04:44) (101/67 - 130/64)  BP(mean): --  RR: 18 (29 Oct 2024 04:44) (18 - 18)  SpO2: 95% (29 Oct 2024 04:44) (95% - 98%)    Parameters below as of 29 Oct 2024 04:44  Patient On (Oxygen Delivery Method): room air      Constitutional	well-groomed; no distress; obese  Eyes	PERRL; EOMI; conjunctiva clear  ENMT	no gross abnormalities  Respiratory	clear to auscultation bilaterally; no wheezes; no rales; no rhonchi  Cardiovascular	regular rate and rhythm; S1 S2 present; no gallops; no rub; no murmur  Gastrointestinal	soft; nontender; nondistended; normal active bowel sounds  Neurological	cranial nerves II-XII intact; sensation intact  Skin	warm and dry; color normal; no rashes; no ulcers  Musculoskeletal	no joint swelling; no joint erythema; no joint warmth; no calf tenderness; decreased ROM due to pain  Psychiatric	normal affect; alert and oriented x3; normal behavior      LABS:                        9.5    3.82  )-----------( 260      ( 29 Oct 2024 07:26 )             30.7     10-29    138  |  103  |  12  ----------------------------<  125[H]  3.7   |  31  |  1.08    Ca    9.4      29 Oct 2024 07:26    TPro  7.5  /  Alb  3.4  /  TBili  1.3[H]  /  DBili  x   /  AST  35  /  ALT  28  /  AlkPhos  110  10-28          Urinalysis Basic - ( 29 Oct 2024 07:26 )    Color: x / Appearance: x / SG: x / pH: x  Gluc: 125 mg/dL / Ketone: x  / Bili: x / Urobili: x   Blood: x / Protein: x / Nitrite: x   Leuk Esterase: x / RBC: x / WBC x   Sq Epi: x / Non Sq Epi: x / Bacteria: x          RADIOLOGY & ADDITIONAL TESTS:  Results Reviewed: yes  Imaging Personally Reviewed: yes  Electrocardiogram Personally Reviewed: yes    COORDINATION OF CARE:  Care Discussed with Consultants/Other Providers [Y/N]: yes  Prior or Outpatient Records Reviewed [Y/N]:yes  
Patient is a 69y old  Female who presents with a chief complaint of fall, left hip pain      Patient seen and examined at bedside.    ALLERGIES:  sulfa drugs (Other)  Ambien (Other)    MEDICATIONS  (STANDING):  albuterol    90 MICROgram(s) HFA Inhaler 2 Puff(s) Inhalation every 4 hours  albuterol/ipratropium for Nebulization 3 milliLiter(s) Nebulizer every 6 hours  atorvastatin 40 milliGRAM(s) Oral at bedtime  cholecalciferol 2000 Unit(s) Oral daily  clonazePAM  Tablet 0.5 milliGRAM(s) Oral two times a day  clopidogrel Tablet 75 milliGRAM(s) Oral daily  FLUoxetine 60 milliGRAM(s) Oral daily  fluticasone propionate 50 MICROgram(s)/spray Nasal Spray 1 Spray(s) Both Nostrils two times a day  gabapentin 300 milliGRAM(s) Oral three times a day  guaiFENesin  milliGRAM(s) Oral every 12 hours  heparin   Injectable 5000 Unit(s) SubCutaneous every 8 hours  lisinopril 5 milliGRAM(s) Oral daily  lithium 300 milliGRAM(s) Oral two times a day  predniSONE   Tablet 40 milliGRAM(s) Oral daily  QUEtiapine 200 milliGRAM(s) Oral at bedtime    MEDICATIONS  (PRN):  acetaminophen     Tablet .. 650 milliGRAM(s) Oral every 6 hours PRN Temp greater or equal to 38C (100.4F), Mild Pain (1 - 3)  sodium chloride 0.65% Nasal 1 Spray(s) Both Nostrils every 6 hours PRN Nasal Congestion    Vital Signs  T(F): 97.8 (04 Nov 2024 05:15), Max: 98.5 (03 Nov 2024 14:29)  HR: 68 (04 Nov 2024 05:15) (68 - 86)  BP: 147/86 (04 Nov 2024 05:15) (110/69 - 147/86)  RR: 16 (04 Nov 2024 05:15) (16 - 16)  SpO2: 99% (04 Nov 2024 05:15) (95% - 99%)  I&O's Summary    03 Nov 2024 07:01  -  04 Nov 2024 07:00  --------------------------------------------------------  IN: 240 mL / OUT: 2300 mL / NET: -2060 mL    04 Nov 2024 07:01  -  04 Nov 2024 10:09  --------------------------------------------------------  IN: 680 mL / OUT: 0 mL / NET: 680 mL      PHYSICAL EXAM:  General: NAD, A/O x 3, some confusion  ENT: MMM, no oral thrush   Neck: Supple, No JVD  Lungs: Clear to auscultation bilaterally, non labored breathing  Cardio: RRR, S1/S2, No murmurs  Abdomen: Soft, Nontender, Nondistended; Bowel sounds present  Extremities: No calf tenderness, No pitting edema    LABS:                        10.4   7.69  )-----------( 341      ( 03 Nov 2024 06:24 )             34.3     11-03    141  |  104  |  14  ----------------------------<  144  4.5   |  29  |  1.07    Ca    10.0      03 Nov 2024 06:24  Mg     1.9     11-02                                  Urinalysis Basic - ( 03 Nov 2024 06:24 )    Color: x / Appearance: x / SG: x / pH: x  Gluc: 144 mg/dL / Ketone: x  / Bili: x / Urobili: x   Blood: x / Protein: x / Nitrite: x   Leuk Esterase: x / RBC: x / WBC x   Sq Epi: x / Non Sq Epi: x / Bacteria: x            RADIOLOGY & ADDITIONAL TESTS:  < from: Xray Chest 1 View- PORTABLE-Urgent (Xray Chest 1 View- PORTABLE-Urgent .) (11.02.24 @ 09:22) >  IMPRESSION:  No active parenchymal disease in the chest.        --- End of Report ---            PATRICK HERRERA MD; Attending Radiologist  This document has been electronically signed. Nov 3 2024 12:51PM    < end of copied text >    Care Discussed with Consultants/Other Providers:   
Patient is a 69y old  Female who presents with a chief complaint of fall, left hip pain (30 Oct 2024 17:59)    Patient seen and examined at bedside.  no acute overnight events    ALLERGIES:  sulfa drugs (Other)  Ambien (Other)        Vital Signs Last 24 Hrs  T(F): 97.6 (31 Oct 2024 05:06), Max: 98.5 (30 Oct 2024 19:07)  HR: 67 (31 Oct 2024 05:06) (67 - 71)  BP: 110/71 (31 Oct 2024 05:06) (102/67 - 110/71)  RR: 16 (31 Oct 2024 05:06) (16 - 16)  SpO2: 96% (31 Oct 2024 05:06) (94% - 96%)  I&O's Summary    30 Oct 2024 07:01  -  31 Oct 2024 07:00  --------------------------------------------------------  IN: 1360 mL / OUT: 1500 mL / NET: -140 mL    31 Oct 2024 07:01  -  31 Oct 2024 11:00  --------------------------------------------------------  IN: 760 mL / OUT: 0 mL / NET: 760 mL      MEDICATIONS:  acetaminophen     Tablet .. 650 milliGRAM(s) Oral every 6 hours PRN  atorvastatin 40 milliGRAM(s) Oral at bedtime  cholecalciferol 2000 Unit(s) Oral daily  clonazePAM  Tablet 0.5 milliGRAM(s) Oral two times a day  clopidogrel Tablet 75 milliGRAM(s) Oral daily  FLUoxetine 60 milliGRAM(s) Oral daily  gabapentin 300 milliGRAM(s) Oral three times a day  heparin   Injectable 5000 Unit(s) SubCutaneous every 8 hours  lisinopril 5 milliGRAM(s) Oral daily  lithium 300 milliGRAM(s) Oral two times a day  QUEtiapine 200 milliGRAM(s) Oral at bedtime      PHYSICAL EXAM:  General: NAD, Alert, obese  ENT: MMM, no thrush  Neck: Supple, No JVD  Lungs: Clear to auscultation bilaterally, non labored, good air entry  Cardio: RRR, S1/S2, No murmurs  Abdomen: Soft, Nontender, Nondistended; Bowel sounds present  Extremities: No cyanosis, No edema    LABS:                        9.8    4.64  )-----------( 270      ( 31 Oct 2024 08:14 )             32.1     10-31    138  |  103  |  15  ----------------------------<  110  4.0   |  28  |  1.02    Ca    9.3      31 Oct 2024 08:14    TPro  6.6  /  Alb  2.6  /  TBili  0.7  /  DBili  x   /  AST  28  /  ALT  22  /  AlkPhos  86  10-31                                Urinalysis Basic - ( 31 Oct 2024 08:14 )    Color: x / Appearance: x / SG: x / pH: x  Gluc: 110 mg/dL / Ketone: x  / Bili: x / Urobili: x   Blood: x / Protein: x / Nitrite: x   Leuk Esterase: x / RBC: x / WBC x   Sq Epi: x / Non Sq Epi: x / Bacteria: x            RADIOLOGY & ADDITIONAL TESTS:    Care Discussed with Consultants/Other Providers:   
Patient is a 69y old  Female who presents with a chief complaint of fall, left hip pain       Patient seen and examined at bedside. c/o cough overnight, otherwise denies complaints.     ALLERGIES:  sulfa drugs (Other)  Ambien (Other)    MEDICATIONS  (STANDING):  atorvastatin 40 milliGRAM(s) Oral at bedtime  cholecalciferol 2000 Unit(s) Oral daily  clonazePAM  Tablet 0.5 milliGRAM(s) Oral two times a day  clopidogrel Tablet 75 milliGRAM(s) Oral daily  FLUoxetine 60 milliGRAM(s) Oral daily  gabapentin 300 milliGRAM(s) Oral three times a day  heparin   Injectable 5000 Unit(s) SubCutaneous every 8 hours  lisinopril 5 milliGRAM(s) Oral daily  lithium 300 milliGRAM(s) Oral two times a day  QUEtiapine 200 milliGRAM(s) Oral at bedtime    MEDICATIONS  (PRN):  acetaminophen     Tablet .. 650 milliGRAM(s) Oral every 6 hours PRN Temp greater or equal to 38C (100.4F), Mild Pain (1 - 3)    Vital Signs Last 24 Hrs  T(F): 97.9 (02 Nov 2024 05:48), Max: 97.9 (02 Nov 2024 05:48)  HR: 67 (02 Nov 2024 05:48) (67 - 69)  BP: 126/71 (02 Nov 2024 05:48) (98/63 - 126/71)  RR: 17 (02 Nov 2024 05:48) (16 - 17)  SpO2: 98% (02 Nov 2024 05:48) (95% - 98%)  I&O's Summary    01 Nov 2024 07:01  -  02 Nov 2024 07:00  --------------------------------------------------------  IN: 840 mL / OUT: 1800 mL / NET: -960 mL      PHYSICAL EXAM:  General: NAD, A/O x 3, some confusion  ENT: MMM, no oral thrush   Neck: Supple, No JVD  Lungs: Clear to auscultation bilaterally, non labored breathing  Cardio: RRR, S1/S2, No murmurs  Abdomen: Soft, Nontender, Nondistended; Bowel sounds present  Extremities: No calf tenderness, No pitting edema    LABS:                        9.8    4.64  )-----------( 270      ( 31 Oct 2024 08:14 )             32.1     10-31    138  |  103  |  15  ----------------------------<  110  4.0   |  28  |  1.02    Ca    9.3      31 Oct 2024 08:14    TPro  6.6  /  Alb  2.6  /  TBili  0.7  /  DBili  x   /  AST  28  /  ALT  22  /  AlkPhos  86  10-31                                Urinalysis Basic - ( 31 Oct 2024 08:14 )    Color: x / Appearance: x / SG: x / pH: x  Gluc: 110 mg/dL / Ketone: x  / Bili: x / Urobili: x   Blood: x / Protein: x / Nitrite: x   Leuk Esterase: x / RBC: x / WBC x   Sq Epi: x / Non Sq Epi: x / Bacteria: x            RADIOLOGY & ADDITIONAL TESTS:    Care Discussed with Consultants/Other Providers:   
Patient is a 69y old  Female who presents with a chief complaint of fall, left hip pain       Patient seen and examined at bedside. States she feels well and is ready to go to the rehab today.     ALLERGIES:  sulfa drugs (Other)  Ambien (Other)    MEDICATIONS  (STANDING):  albuterol    90 MICROgram(s) HFA Inhaler 2 Puff(s) Inhalation every 4 hours  albuterol/ipratropium for Nebulization 3 milliLiter(s) Nebulizer every 6 hours  atorvastatin 40 milliGRAM(s) Oral at bedtime  cholecalciferol 2000 Unit(s) Oral daily  clonazePAM  Tablet 0.5 milliGRAM(s) Oral two times a day  clopidogrel Tablet 75 milliGRAM(s) Oral daily  FLUoxetine 60 milliGRAM(s) Oral daily  fluticasone propionate 50 MICROgram(s)/spray Nasal Spray 1 Spray(s) Both Nostrils two times a day  gabapentin 300 milliGRAM(s) Oral three times a day  guaiFENesin  milliGRAM(s) Oral every 12 hours  heparin   Injectable 5000 Unit(s) SubCutaneous every 8 hours  lisinopril 5 milliGRAM(s) Oral daily  lithium 300 milliGRAM(s) Oral two times a day  predniSONE   Tablet 40 milliGRAM(s) Oral daily  QUEtiapine 200 milliGRAM(s) Oral at bedtime    MEDICATIONS  (PRN):  acetaminophen     Tablet .. 650 milliGRAM(s) Oral every 6 hours PRN Temp greater or equal to 38C (100.4F), Mild Pain (1 - 3)  sodium chloride 0.65% Nasal 1 Spray(s) Both Nostrils every 6 hours PRN Nasal Congestion    Vital Signs Last 24 Hrs  T(F): 97.8 (04 Nov 2024 05:15), Max: 98.5 (03 Nov 2024 14:29)  HR: 68 (04 Nov 2024 05:15) (68 - 86)  BP: 147/86 (04 Nov 2024 05:15) (110/69 - 147/86)  RR: 16 (04 Nov 2024 05:15) (16 - 16)  SpO2: 99% (04 Nov 2024 05:15) (95% - 99%)  I&O's Summary    03 Nov 2024 07:01  -  04 Nov 2024 07:00  --------------------------------------------------------  IN: 240 mL / OUT: 2300 mL / NET: -2060 mL      PHYSICAL EXAM:  General: NAD, A/O x 3, some confusion  ENT: MMM, no oral thrush   Neck: Supple, No JVD  Lungs: Clear to auscultation bilaterally, non labored breathing  Cardio: RRR, S1/S2, No murmurs  Abdomen: Soft, Nontender, Nondistended; Bowel sounds present  Extremities: No calf tenderness, No pitting edema    LABS:                        10.4   7.69  )-----------( 341      ( 03 Nov 2024 06:24 )             34.3     11-03    141  |  104  |  14  ----------------------------<  144  4.5   |  29  |  1.07    Ca    10.0      03 Nov 2024 06:24  Mg     1.9     11-02                                  Urinalysis Basic - ( 03 Nov 2024 06:24 )    Color: x / Appearance: x / SG: x / pH: x  Gluc: 144 mg/dL / Ketone: x  / Bili: x / Urobili: x   Blood: x / Protein: x / Nitrite: x   Leuk Esterase: x / RBC: x / WBC x   Sq Epi: x / Non Sq Epi: x / Bacteria: x            RADIOLOGY & ADDITIONAL TESTS:    Care Discussed with Consultants/Other Providers:   
Patient is a 69y old  Female who presents with a chief complaint of fall, left hip pain (31 Oct 2024 10:59)    Patient seen and examined at bedside.  no acute overnight events    ALLERGIES:  sulfa drugs (Other)  Ambien (Other)        Vital Signs Last 24 Hrs  T(F): 98.6 (01 Nov 2024 07:50), Max: 98.6 (01 Nov 2024 07:50)  HR: 65 (01 Nov 2024 07:50) (65 - 69)  BP: 129/77 (01 Nov 2024 07:50) (100/63 - 129/77)  RR: 16 (01 Nov 2024 07:50) (16 - 17)  SpO2: 96% (01 Nov 2024 07:50) (94% - 96%)  I&O's Summary    31 Oct 2024 07:01  -  01 Nov 2024 07:00  --------------------------------------------------------  IN: 1240 mL / OUT: 2000 mL / NET: -760 mL      MEDICATIONS:  acetaminophen     Tablet .. 650 milliGRAM(s) Oral every 6 hours PRN  atorvastatin 40 milliGRAM(s) Oral at bedtime  cholecalciferol 2000 Unit(s) Oral daily  clonazePAM  Tablet 0.5 milliGRAM(s) Oral two times a day  clopidogrel Tablet 75 milliGRAM(s) Oral daily  FLUoxetine 60 milliGRAM(s) Oral daily  gabapentin 300 milliGRAM(s) Oral three times a day  heparin   Injectable 5000 Unit(s) SubCutaneous every 8 hours  lisinopril 5 milliGRAM(s) Oral daily  lithium 300 milliGRAM(s) Oral two times a day  QUEtiapine 200 milliGRAM(s) Oral at bedtime      PHYSICAL EXAM:  General: NAD, Alert, obese  ENT: MMM, no thrush  Neck: Supple, No JVD  Lungs: Clear to auscultation bilaterally, non labored, good air entry  Cardio: RRR, S1/S2, No murmurs  Abdomen: Soft, Nontender, Nondistended; Bowel sounds present  Extremities: No cyanosis, No edema    LABS:                        9.8    4.64  )-----------( 270      ( 31 Oct 2024 08:14 )             32.1     10-31    138  |  103  |  15  ----------------------------<  110  4.0   |  28  |  1.02    Ca    9.3      31 Oct 2024 08:14    TPro  6.6  /  Alb  2.6  /  TBili  0.7  /  DBili  x   /  AST  28  /  ALT  22  /  AlkPhos  86  10-31                                Urinalysis Basic - ( 31 Oct 2024 08:14 )    Color: x / Appearance: x / SG: x / pH: x  Gluc: 110 mg/dL / Ketone: x  / Bili: x / Urobili: x   Blood: x / Protein: x / Nitrite: x   Leuk Esterase: x / RBC: x / WBC x   Sq Epi: x / Non Sq Epi: x / Bacteria: x            RADIOLOGY & ADDITIONAL TESTS:    Care Discussed with Consultants/Other Providers:   
PROGRESS NOTE:     Patient is a 69y old  Female who presents with a chief complaint of fall, left hip pain (29 Oct 2024 12:44)      SUBJECTIVE / OVERNIGHT EVENTS: pt was seen and evaluated today   she was resting comfortably  discussed MRI results    PT eval pending and ortho eval discussed the above with the patient     ADDITIONAL REVIEW OF SYSTEMS:    MEDICATIONS  (STANDING):  atorvastatin 40 milliGRAM(s) Oral at bedtime  cholecalciferol 2000 Unit(s) Oral daily  clonazePAM  Tablet 0.5 milliGRAM(s) Oral two times a day  clopidogrel Tablet 75 milliGRAM(s) Oral daily  FLUoxetine 60 milliGRAM(s) Oral daily  gabapentin 300 milliGRAM(s) Oral three times a day  heparin   Injectable 5000 Unit(s) SubCutaneous every 8 hours  lisinopril 5 milliGRAM(s) Oral daily  lithium 300 milliGRAM(s) Oral two times a day  QUEtiapine 200 milliGRAM(s) Oral at bedtime    MEDICATIONS  (PRN):  acetaminophen     Tablet .. 650 milliGRAM(s) Oral every 6 hours PRN Temp greater or equal to 38C (100.4F), Mild Pain (1 - 3)      CAPILLARY BLOOD GLUCOSE        I&O's Summary    29 Oct 2024 07:01  -  30 Oct 2024 07:00  --------------------------------------------------------  IN: 1040 mL / OUT: 1550 mL / NET: -510 mL    30 Oct 2024 07:01  -  30 Oct 2024 11:05  --------------------------------------------------------  IN: 760 mL / OUT: 0 mL / NET: 760 mL        PHYSICAL EXAM:  Vital Signs Last 24 Hrs  T(C): 36.7 (30 Oct 2024 05:07), Max: 36.7 (29 Oct 2024 12:29)  T(F): 98.1 (30 Oct 2024 05:07), Max: 98.1 (29 Oct 2024 19:01)  HR: 77 (30 Oct 2024 05:07) (69 - 77)  BP: 107/70 (30 Oct 2024 05:07) (87/54 - 107/70)  BP(mean): --  RR: 16 (30 Oct 2024 05:07) (15 - 17)  SpO2: 96% (30 Oct 2024 05:07) (93% - 96%)    Parameters below as of 30 Oct 2024 05:07  Patient On (Oxygen Delivery Method): room air      Constitutional	well-groomed; no distress; obese  Eyes	PERRL; EOMI; conjunctiva clear  ENMT	no gross abnormalities  Respiratory	clear to auscultation bilaterally; no wheezes; no rales; no rhonchi  Cardiovascular	regular rate and rhythm; S1 S2 present; no gallops; no rub; no murmur  Gastrointestinal	soft; nontender; nondistended; normal active bowel sounds  Neurological	cranial nerves II-XII intact; sensation intact  Skin	warm and dry; color normal; no rashes; no ulcers  Musculoskeletal	no joint swelling; no joint erythema; no joint warmth; no calf tenderness; decreased ROM due to pain  Psychiatric	normal affect; alert and oriented x3; normal behavior      LABS:                        9.5    4.59  )-----------( 292      ( 30 Oct 2024 06:15 )             29.9     10-30    141  |  105  |  17  ----------------------------<  125[H]  4.1   |  29  |  1.16    Ca    9.2      30 Oct 2024 06:15    TPro  6.5  /  Alb  2.9[L]  /  TBili  0.8  /  DBili  x   /  AST  30  /  ALT  27  /  AlkPhos  84  10-30          Urinalysis Basic - ( 30 Oct 2024 06:15 )    Color: x / Appearance: x / SG: x / pH: x  Gluc: 125 mg/dL / Ketone: x  / Bili: x / Urobili: x   Blood: x / Protein: x / Nitrite: x   Leuk Esterase: x / RBC: x / WBC x   Sq Epi: x / Non Sq Epi: x / Bacteria: x          discussed during IDR

## 2024-11-12 ENCOUNTER — APPOINTMENT (OUTPATIENT)
Dept: ORTHOPEDIC SURGERY | Facility: CLINIC | Age: 69
End: 2024-11-12

## 2024-11-12 NOTE — DISCHARGE NOTE PROVIDER - DISCHARGE DATE
As mentioned, the cause for your pain is likely muscular pain. Other causes are much less likely, but if you develop any difficulty urinating, loss of control of your bowel or bladder, numbness or weakness, or any significant increases in pain return to the emergency department. Please see the general information below not only on pain, but also on exercises and on the medications provided. Do not take the medications provided before going to work, driving, or operating any equipment. Doing so can be very dangerous. As an alternative to the prescribed medications, you may also take 600 mg of ibuprofen every 6 hours as needed. It is better to take this with food. Also please stay well hydrated while taking ibuprofen.You may also take 500-1000 mg of Tylenol every 4 hours as needed. Please be aware that there is Tylenol in Vicodin and you cannot take more than 4000 mg in one day.      Back Pain [Acute Or Chronic]    Back pain is usually caused by an injury to the muscles or ligaments of the spine. Sometimes the disks that separate each bone in the spine may bulge and cause pain by pressing on a nearby nerve. Back pain may also appear after a sudden twisting/bending force (such as in a car accident), after a simple awkward movement, or lifting something heavy with poor body positioning. In either case, muscle spasm is often present and adds to the pain.  Acute back pain usually gets better in one to two weeks. Back pain related to disk disease, arthritis in the spinal joints or spinal stenosis (narrowing of the spinal canal) can become chronic and last for months or years.  Unless you had a physical injury (for example, a car accident or fall) X-rays are usually not ordered for the initial evaluation of back pain. If pain continues and does not respond to medical treatment, x-rays and other tests may be performed at a later time.  Home Care:  You may need to stay in bed the first few days. But, as soon as possible,  begin sitting or walking to avoid problems with prolonged bed rest (muscle weakness, worsening back stiffness and pain, blood clots in the legs).  When in bed, try to find a position of comfort. A firm mattress is best. Try lying flat on your back with pillows under your knees. You can also try lying on your side with your knees bent up towards your chest and a pillow between your knees.  Avoid prolonged sitting. This puts more stress on the lower back than standing or walking.  During the first two days after injury, apply an ICE PACK to the painful area for 20 minutes every 2-4 hours. This will reduce swelling and pain. HEAT (hot shower, hot bath or heating pad) works well for muscle spasm. You can start with ice, then switch to heat after two days. Some patients feel best alternating ice and heat treatments. Use the one method that feels the best to you.  You may use acetaminophen (Tylenol) or ibuprofen (Motrin, Advil) to control pain, unless another pain medicine was prescribed. [NOTE: If you have chronic liver or kidney disease or ever had a stomach ulcer or GI bleeding, talk with your doctor before using these medicines.]  Be aware of safe lifting methods and do not lift anything over 15 pounds until all the pain is gone.  Follow Up  with your doctor or this facility if your symptoms do not start to improve after one week. Physical therapy may be needed.  [NOTE: If X-rays were taken, they will be reviewed by a radiologist. You will be notified of any new findings that may affect your care.]  Get Prompt Medical Attention  if any of the following occur:  Pain becomes worse or spreads to your legs  Weakness or numbness in one or both legs  Loss of bowel or bladder control  Numbness in the groin or genital area  © 1104-0762 [employer]. [address] [place] [address], [place] [address]. All rights reserved. This information is not intended as a substitute for professional medical care. Always follow your  healthcare professional's instructions.          Exercises To Strengthen Your Lower Back  Strong lower-back and abdominal muscles work together to support your spine. These exercises will help strengthen the muscles of the lower back. It is important that you begin exercising slowly and increase levels gradually.  Always begin any exercise program with stretching. If you feel pain while doing any of these exercises, stop and talk to your doctor about a more specific exercise program that suits your condition better.  Low Back Stretch  Lie on your back with your knees bent and both feet on the ground.    Slowly raise your left knee to your chest as you flatten your lower back against the floor. Hold for 5 seconds.  Relax and repeat the exercise with your right knee.  Do 10 of these exercises for each leg.  Repeat hugging both knees to your chest at the same time.  Building Lower Back Strength  Kneeling Lumbar Extension: Begin on your hands and knees. Simultaneously raise and straighten your right arm and left leg until they are parallel to the ground. Hold for 2 seconds and come back slowly to a starting position. Repeat with left arm and right leg, alternating 10 times.  Prone Lumbar Extension: Lie face down, arms extended overhead, palms on the floor. Simultaneously raise your right arm and left leg as high as comfortably possible. Hold for 10 seconds and slowly return to start. Repeat with left arm and right leg, alternating 10 times. Gradually build up to 20 times. (Advanced: Repeat this exercise raising both arms and both legs a few inches off the floor at the same time. Hold for 5 seconds and release.)  Pelvic Tilt: Lie on the floor on your back with your knees bent at 90 degrees. Your feet should be flat on the floor. Inhale, exhale, then slowly contract your abdominal muscles bringing your navel toward your spine. Let your pelvis rock back until your lower back is flat on the floor. Hold for 10 seconds while  breathing smoothly.  Abdominal Crunch: Perform a Pelvic Tilt (above) flattening your lower back against the floor. Holding the tension in your abdominal muscles, take another breath and raise your shoulder blades off the ground (this is not a full sit-up). Keep your head in line with your body (don’t bend your neck forward). Hold for 2 seconds, then slowly lower.  © 5925-0070 [employer]. [address] [place] [address], [place] [address]. All rights reserved. This information is not intended as a substitute for professional medical care. Always follow your healthcare professional's instructions.      MEDICATION: HYDROCODONE + ACETAMINOPHEN   Hydrocodone/acetaminophen (brand names: Vicodin, LorTab, Anexsia, Norco) is a narcotic pain medicine This medicine contains Tylenol (acetaminophen).  DIRECTIONS FOR USE:  If this medicine upsets your stomach, take it with food or milk. Pain medication should be taken only if needed at the times prescribed. If you are not having pain, do not take the medicine unless you are advised to do so by your doctor.  Do not take more than 4 grams (4,000 mg) of acetaminophen (Tylenol) per 24 hours.  WHAT TO WATCH FOR:  POSSIBLE SIDE EFFECTS: Dizziness, drowsiness (Take a smaller dose; for example, break the pill in half or take it less often). Constipation (Drink lots of liquids, use small doses of a mild laxative like milk of magnesia, as needed). Nausea, vomiting (Take with food or milk). Difficulty passing urine  (Stop the medicine and contact your doctor).  ALLERGIC REACTION: Rash, itching, swelling, trouble breathing or swallowing (Contact your doctor or return to this facility promptly).  MEDICAL CONDITIONS: Before starting this medicine, be sure your doctor knows if you have any of the following conditions:  Prostate enlargement  Lung disease (asthma, COPD)  Allergy to aspirin or other salicylates  Intestinal obstruction or severe constipation  Liver or kidney disease  History  of substance abuse  Pregnancy or breastfeeding  DRUG INTERACTIONS: This drug may cause increased side effects when taken with alcohol, muscle relaxants, sedatives, tricyclic antidepressants, MAO inhibitors other pain medicines, other products containing acetaminophen (Tylenol), isoniazid (INH), ritonavir.  WARNINGS:  Do not drive, ride a bicycle, or operate dangerous equipment while taking this medicine until you know how it will affect you.  Prolonged use of this medicine can be habit forming and may lead to addiction.  [NOTE: This information topic may not include all directions, precautions, medical conditions, drug/food interactions, and warnings for this drug. Check with your doctor, nurse, or pharmacist for any questions that you may have.]  © 2000-2012 [facility], [address] [place] [address], [place] [address]. All rights reserved. This information is not intended as a substitute for professional medical care. Always follow your healthcare professional's instructions.    Tizanidine: Patient drug information   Access [employer] here.   Copyright 6425-4589 [employer] All rights reserved.   (For additional information see \"Tizanidine: Drug information\" and see \"Tizanidine: Pediatric drug information\")  Brand Names: US   Zanaflex  Brand Names: Garrick   GEN-TiZANidine;   MYLAN-TiZANidine [DSC];   PAL-TiZANidine  What is this drug used for?    It is used to relax muscles.    It may be given to you for other reasons. Talk with the doctor.  What do I need to tell my doctor BEFORE I take this drug?    If you have an allergy to tizanidine or any other part of this drug.    If you are allergic to any drugs like this one, any other drugs, foods, or other substances. Tell your doctor about the allergy and what signs you had, like rash; hives; itching; shortness of breath; wheezing; cough; swelling of face, lips, tongue, or throat; or any other signs.    If you are taking any of these drugs: Ciprofloxacin or  fluvoxamine.   This is not a list of all drugs or health problems that interact with this drug.   Tell your doctor and pharmacist about all of your drugs (prescription or OTC, natural products, vitamins) and health problems. You must check to make sure that it is safe for you to take this drug with all of your drugs and health problems. Do not start, stop, or change the dose of any drug without checking with your doctor.  What are some things I need to know or do while I take this drug?    Tell all of your health care providers that you take this drug. This includes your doctors, nurses, pharmacists, and dentists.    Avoid driving and doing other tasks or actions that call for you to be alert until you see how this drug affects you.    To lower the chance of feeling dizzy or passing out, rise slowly if you have been sitting or lying down. Be careful going up and down stairs.    Do not stop taking this drug all of a sudden without calling your doctor. You may have a greater risk of side effects. If you need to stop this drug, you will want to slowly stop it as ordered by your doctor.    Have blood work checked as you have been told by the doctor. Talk with the doctor.    Do not change from the capsule to the tablet or from the tablet to the capsule.    Talk with your doctor before you drink alcohol or use other drugs and natural products that slow your actions.    If you are 65 or older, use this drug with care. You could have more side effects.    Tell your doctor if you are pregnant or plan on getting pregnant. You will need to talk about the benefits and risks of using this drug while you are pregnant.    Tell your doctor if you are breast-feeding. You will need to talk about any risks to your baby.  What are some side effects that I need to call my doctor about right away?   WARNING/CAUTION: Even though it may be rare, some people may have very bad and sometimes deadly side effects when taking a drug. Tell your  doctor or get medical help right away if you have any of the following signs or symptoms that may be related to a very bad side effect:    Signs of an allergic reaction, like rash; hives; itching; red, swollen, blistered, or peeling skin with or without fever; wheezing; tightness in the chest or throat; trouble breathing, swallowing, or talking; unusual hoarseness; or swelling of the mouth, face, lips, tongue, or throat.    Signs of liver problems like dark urine, feeling tired, not hungry, upset stomach or stomach pain, light-colored stools, throwing up, or yellow skin or eyes.    Signs of a urinary tract infection (UTI) like blood in the urine, burning or pain when passing urine, feeling the need to pass urine often or right away, fever, lower stomach pain, or pelvic pain.    Very bad dizziness or passing out.    Feeling confused.    Hallucinations (seeing or hearing things that are not there).    Mood changes.    Change in the way you act.    Slow heartbeat.    Fever or chills.    Sore throat.  What are some other side effects of this drug?   All drugs may cause side effects. However, many people have no side effects or only have minor side effects. Call your doctor or get medical help if any of these side effects or any other side effects bother you or do not go away:    Dry mouth.    Feeling sleepy.    Feeling tired or weak.    Dizziness.   These are not all of the side effects that may occur. If you have questions about side effects, call your doctor. Call your doctor for medical advice about side effects.   You may report side effects to your national health agency.  How is this drug best taken?   Use this drug as ordered by your doctor. Read all information given to you. Follow all instructions closely.    Take with or without food but take the same way each time. Always take with food or always take on an empty stomach.  What do I do if I miss a dose?    If you take this drug on a regular basis, take a  missed dose as soon as you think about it.    If it is close to the time for your next dose, skip the missed dose and go back to your normal time.    Do not take 2 doses at the same time or extra doses.    Many times this drug is taken on an as needed basis. Do not take more often than told by the doctor.  How do I store and/or throw out this drug?    Store at room temperature.    Protect from light.    Store in a dry place. Do not store in a bathroom.    Keep all drugs in a safe place. Keep all drugs out of the reach of children and pets.    Throw away unused or  drugs. Do not flush down a toilet or pour down a drain unless you are told to do so. Check with your pharmacist if you have questions about the best way to throw out drugs. There may be drug take-back programs in your area.  General drug facts    If your symptoms or health problems do not get better or if they become worse, call your doctor.    Do not share your drugs with others and do not take anyone else's drugs.    Keep a list of all your drugs (prescription, natural products, vitamins, OTC) with you. Give this list to your doctor.    Talk with the doctor before starting any new drug, including prescription or OTC, natural products, or vitamins.    Some drugs may have another patient information leaflet. If you have any questions about this drug, please talk with your doctor, nurse, pharmacist, or other health care provider.    If you think there has been an overdose, call your poison control center or get medical care right away. Be ready to tell or show what was taken, how much, and when it happened.     31-Oct-2024 02-Nov-2024 03-Nov-2024 04-Nov-2024

## 2024-11-21 NOTE — ED ADULT NURSE NOTE - NURSING ED PRESSURE ULCER AREA 1
Met with the patient at the bedside. Explained the role of case management/discharge planning. Patient verbalized understanding. Provided contact information and discharge planning packet. Patient resides with his brother and was independent PTA. Patient was recently diagnosed with gastric lymphoma and had a mediport place outpt on 11/20/24 for initiation of chemotherapy. Patient admitted with a elevated creatine and MARYSOL. Will remain available to patient and assist with discharge planning. 
midline

## 2024-11-26 PROCEDURE — 94640 AIRWAY INHALATION TREATMENT: CPT

## 2024-11-26 PROCEDURE — 83036 HEMOGLOBIN GLYCOSYLATED A1C: CPT

## 2024-11-26 PROCEDURE — 87637 SARSCOV2&INF A&B&RSV AMP PRB: CPT

## 2024-11-26 PROCEDURE — 84145 PROCALCITONIN (PCT): CPT

## 2024-11-26 PROCEDURE — 80053 COMPREHEN METABOLIC PANEL: CPT

## 2024-11-26 PROCEDURE — G0378: CPT

## 2024-11-26 PROCEDURE — 83735 ASSAY OF MAGNESIUM: CPT

## 2024-11-26 PROCEDURE — 73610 X-RAY EXAM OF ANKLE: CPT

## 2024-11-26 PROCEDURE — 96374 THER/PROPH/DIAG INJ IV PUSH: CPT

## 2024-11-26 PROCEDURE — 85025 COMPLETE CBC W/AUTO DIFF WBC: CPT

## 2024-11-26 PROCEDURE — 97162 PT EVAL MOD COMPLEX 30 MIN: CPT

## 2024-11-26 PROCEDURE — 36415 COLL VENOUS BLD VENIPUNCTURE: CPT

## 2024-11-26 PROCEDURE — 72125 CT NECK SPINE W/O DYE: CPT | Mod: MC

## 2024-11-26 PROCEDURE — 97530 THERAPEUTIC ACTIVITIES: CPT

## 2024-11-26 PROCEDURE — 73502 X-RAY EXAM HIP UNI 2-3 VIEWS: CPT

## 2024-11-26 PROCEDURE — 71045 X-RAY EXAM CHEST 1 VIEW: CPT

## 2024-11-26 PROCEDURE — 73721 MRI JNT OF LWR EXTRE W/O DYE: CPT

## 2024-11-26 PROCEDURE — 99285 EMERGENCY DEPT VISIT HI MDM: CPT

## 2024-11-26 PROCEDURE — 94760 N-INVAS EAR/PLS OXIMETRY 1: CPT

## 2024-11-26 PROCEDURE — 85027 COMPLETE CBC AUTOMATED: CPT

## 2024-11-26 PROCEDURE — 72192 CT PELVIS W/O DYE: CPT | Mod: MC

## 2024-11-26 PROCEDURE — 93005 ELECTROCARDIOGRAM TRACING: CPT

## 2024-11-26 PROCEDURE — 80048 BASIC METABOLIC PNL TOTAL CA: CPT

## 2024-11-26 PROCEDURE — 70450 CT HEAD/BRAIN W/O DYE: CPT | Mod: MC

## 2025-01-30 ENCOUNTER — INPATIENT (INPATIENT)
Facility: HOSPITAL | Age: 70
LOS: 11 days | Discharge: NURSING, REHAB OR LONG-TERM CARE | DRG: 871 | End: 2025-02-11
Attending: HOSPITALIST | Admitting: INTERNAL MEDICINE
Payer: MEDICARE

## 2025-01-30 VITALS
OXYGEN SATURATION: 94 % | HEART RATE: 67 BPM | WEIGHT: 216.93 LBS | TEMPERATURE: 100 F | HEIGHT: 63 IN | RESPIRATION RATE: 20 BRPM | DIASTOLIC BLOOD PRESSURE: 48 MMHG | SYSTOLIC BLOOD PRESSURE: 83 MMHG

## 2025-01-30 DIAGNOSIS — A41.9 SEPSIS, UNSPECIFIED ORGANISM: ICD-10-CM

## 2025-01-30 LAB
ALBUMIN SERPL ELPH-MCNC: 3.1 G/DL — LOW (ref 3.3–5)
ALP SERPL-CCNC: 161 U/L — HIGH (ref 40–120)
ALT FLD-CCNC: 89 U/L — HIGH (ref 10–45)
ANION GAP SERPL CALC-SCNC: 5 MMOL/L — SIGNIFICANT CHANGE UP (ref 5–17)
APPEARANCE UR: ABNORMAL
APTT BLD: 27.8 SEC — SIGNIFICANT CHANGE UP (ref 24.5–35.6)
AST SERPL-CCNC: 75 U/L — HIGH (ref 10–40)
BACTERIA # UR AUTO: ABNORMAL /HPF
BASOPHILS # BLD AUTO: 0.05 K/UL — SIGNIFICANT CHANGE UP (ref 0–0.2)
BASOPHILS NFR BLD AUTO: 0.3 % — SIGNIFICANT CHANGE UP (ref 0–2)
BILIRUB SERPL-MCNC: 1.2 MG/DL — SIGNIFICANT CHANGE UP (ref 0.2–1.2)
BILIRUB UR-MCNC: ABNORMAL
BUN SERPL-MCNC: 32 MG/DL — HIGH (ref 7–23)
CALCIUM SERPL-MCNC: 9.2 MG/DL — SIGNIFICANT CHANGE UP (ref 8.4–10.5)
CHLORIDE SERPL-SCNC: 107 MMOL/L — SIGNIFICANT CHANGE UP (ref 96–108)
CO2 SERPL-SCNC: 29 MMOL/L — SIGNIFICANT CHANGE UP (ref 22–31)
COLOR SPEC: SIGNIFICANT CHANGE UP
CREAT SERPL-MCNC: 2.12 MG/DL — HIGH (ref 0.5–1.3)
DIFF PNL FLD: ABNORMAL
EGFR: 24 ML/MIN/1.73M2 — LOW
EOSINOPHIL # BLD AUTO: 0.02 K/UL — SIGNIFICANT CHANGE UP (ref 0–0.5)
EOSINOPHIL NFR BLD AUTO: 0.1 % — SIGNIFICANT CHANGE UP (ref 0–6)
EPI CELLS # UR: PRESENT
FLUAV AG NPH QL: SIGNIFICANT CHANGE UP
FLUBV AG NPH QL: SIGNIFICANT CHANGE UP
GLUCOSE BLDC GLUCOMTR-MCNC: 125 MG/DL — HIGH (ref 70–99)
GLUCOSE SERPL-MCNC: 128 MG/DL — HIGH (ref 70–99)
GLUCOSE UR QL: NEGATIVE MG/DL — SIGNIFICANT CHANGE UP
HCT VFR BLD CALC: 34.3 % — LOW (ref 34.5–45)
HGB BLD-MCNC: 10.9 G/DL — LOW (ref 11.5–15.5)
IMM GRANULOCYTES NFR BLD AUTO: 0.4 % — SIGNIFICANT CHANGE UP (ref 0–0.9)
INR BLD: 1.09 RATIO — SIGNIFICANT CHANGE UP (ref 0.85–1.16)
KETONES UR-MCNC: ABNORMAL MG/DL
LACTATE SERPL-SCNC: 1.3 MMOL/L — SIGNIFICANT CHANGE UP (ref 0.7–2)
LEUKOCYTE ESTERASE UR-ACNC: ABNORMAL
LYMPHOCYTES # BLD AUTO: 0.81 K/UL — LOW (ref 1–3.3)
LYMPHOCYTES # BLD AUTO: 5 % — LOW (ref 13–44)
MCHC RBC-ENTMCNC: 30.8 PG — SIGNIFICANT CHANGE UP (ref 27–34)
MCHC RBC-ENTMCNC: 31.8 G/DL — LOW (ref 32–36)
MCV RBC AUTO: 96.9 FL — SIGNIFICANT CHANGE UP (ref 80–100)
MONOCYTES # BLD AUTO: 0.99 K/UL — HIGH (ref 0–0.9)
MONOCYTES NFR BLD AUTO: 6.1 % — SIGNIFICANT CHANGE UP (ref 2–14)
NEUTROPHILS # BLD AUTO: 14.23 K/UL — HIGH (ref 1.8–7.4)
NEUTROPHILS NFR BLD AUTO: 88.1 % — HIGH (ref 43–77)
NITRITE UR-MCNC: NEGATIVE — SIGNIFICANT CHANGE UP
NRBC # BLD: 0 /100 WBCS — SIGNIFICANT CHANGE UP (ref 0–0)
NRBC BLD-RTO: 0 /100 WBCS — SIGNIFICANT CHANGE UP (ref 0–0)
PH UR: 5 — SIGNIFICANT CHANGE UP (ref 5–8)
PLATELET # BLD AUTO: 241 K/UL — SIGNIFICANT CHANGE UP (ref 150–400)
POTASSIUM SERPL-MCNC: 4.4 MMOL/L — SIGNIFICANT CHANGE UP (ref 3.5–5.3)
POTASSIUM SERPL-SCNC: 4.4 MMOL/L — SIGNIFICANT CHANGE UP (ref 3.5–5.3)
PROT SERPL-MCNC: 6.5 G/DL — SIGNIFICANT CHANGE UP (ref 6–8.3)
PROT UR-MCNC: 100 MG/DL
PROTHROM AB SERPL-ACNC: 12.9 SEC — SIGNIFICANT CHANGE UP (ref 9.9–13.4)
RBC # BLD: 3.54 M/UL — LOW (ref 3.8–5.2)
RBC # FLD: 14.3 % — SIGNIFICANT CHANGE UP (ref 10.3–14.5)
RBC CASTS # UR COMP ASSIST: 20 /HPF — HIGH (ref 0–4)
RSV RNA NPH QL NAA+NON-PROBE: SIGNIFICANT CHANGE UP
SARS-COV-2 RNA SPEC QL NAA+PROBE: SIGNIFICANT CHANGE UP
SODIUM SERPL-SCNC: 141 MMOL/L — SIGNIFICANT CHANGE UP (ref 135–145)
SP GR SPEC: 1.02 — SIGNIFICANT CHANGE UP (ref 1–1.03)
UROBILINOGEN FLD QL: 1 MG/DL — SIGNIFICANT CHANGE UP (ref 0.2–1)
WBC # BLD: 16.17 K/UL — HIGH (ref 3.8–10.5)
WBC # FLD AUTO: 16.17 K/UL — HIGH (ref 3.8–10.5)
WBC UR QL: 20 /HPF — HIGH (ref 0–5)

## 2025-01-30 PROCEDURE — 71045 X-RAY EXAM CHEST 1 VIEW: CPT | Mod: 26

## 2025-01-30 PROCEDURE — 71250 CT THORAX DX C-: CPT | Mod: 26

## 2025-01-30 PROCEDURE — 70450 CT HEAD/BRAIN W/O DYE: CPT | Mod: 26

## 2025-01-30 PROCEDURE — 74176 CT ABD & PELVIS W/O CONTRAST: CPT | Mod: 26

## 2025-01-30 PROCEDURE — 99291 CRITICAL CARE FIRST HOUR: CPT

## 2025-01-30 PROCEDURE — 93010 ELECTROCARDIOGRAM REPORT: CPT

## 2025-01-30 RX ORDER — CEFTRIAXONE 250 MG/1
1000 INJECTION, POWDER, FOR SOLUTION INTRAMUSCULAR; INTRAVENOUS ONCE
Refills: 0 | Status: COMPLETED | OUTPATIENT
Start: 2025-01-30 | End: 2025-01-30

## 2025-01-30 RX ORDER — PIPERACILLIN SODIUM AND TAZOBACTAM SODIUM 2; 250 G/50ML; MG/50ML
3.38 INJECTION, POWDER, FOR SOLUTION INTRAVENOUS EVERY 12 HOURS
Refills: 0 | Status: COMPLETED | OUTPATIENT
Start: 2025-01-31 | End: 2025-02-06

## 2025-01-30 RX ORDER — PIPERACILLIN SODIUM AND TAZOBACTAM SODIUM 2; 250 G/50ML; MG/50ML
3.38 INJECTION, POWDER, FOR SOLUTION INTRAVENOUS ONCE
Refills: 0 | Status: COMPLETED | OUTPATIENT
Start: 2025-01-31 | End: 2025-01-31

## 2025-01-30 RX ORDER — PANTOPRAZOLE 20 MG/1
40 TABLET, DELAYED RELEASE ORAL ONCE
Refills: 0 | Status: COMPLETED | OUTPATIENT
Start: 2025-01-30 | End: 2025-01-31

## 2025-01-30 RX ORDER — SODIUM CHLORIDE 9 G/ML
1000 INJECTION, SOLUTION INTRAVENOUS
Refills: 0 | Status: DISCONTINUED | OUTPATIENT
Start: 2025-01-30 | End: 2025-02-02

## 2025-01-30 RX ORDER — BACTERIOSTATIC SODIUM CHLORIDE 0.9 %
1600 VIAL (ML) INJECTION ONCE
Refills: 0 | Status: COMPLETED | OUTPATIENT
Start: 2025-01-30 | End: 2025-01-30

## 2025-01-30 RX ORDER — PIPERACILLIN SODIUM AND TAZOBACTAM SODIUM 2; 250 G/50ML; MG/50ML
3.38 INJECTION, POWDER, FOR SOLUTION INTRAVENOUS ONCE
Refills: 0 | Status: COMPLETED | OUTPATIENT
Start: 2025-01-30 | End: 2025-01-30

## 2025-01-30 RX ORDER — ACETAMINOPHEN 160 MG/5ML
1000 SUSPENSION ORAL ONCE
Refills: 0 | Status: COMPLETED | OUTPATIENT
Start: 2025-01-30 | End: 2025-01-30

## 2025-01-30 RX ORDER — AZITHROMYCIN DIHYDRATE 500 MG/1
500 TABLET, FILM COATED ORAL ONCE
Refills: 0 | Status: COMPLETED | OUTPATIENT
Start: 2025-01-30 | End: 2025-01-30

## 2025-01-30 RX ORDER — NOREPINEPHRINE BITARTRATE 1 MG/ML
0.05 INJECTION, SOLUTION, CONCENTRATE INTRAVENOUS
Qty: 8 | Refills: 0 | Status: DISCONTINUED | OUTPATIENT
Start: 2025-01-30 | End: 2025-02-01

## 2025-01-30 RX ORDER — BACTERIOSTATIC SODIUM CHLORIDE 0.9 %
1000 VIAL (ML) INJECTION ONCE
Refills: 0 | Status: COMPLETED | OUTPATIENT
Start: 2025-01-30 | End: 2025-01-30

## 2025-01-30 RX ORDER — ANTISEPTIC SURGICAL SCRUB 0.04 MG/ML
1 SOLUTION TOPICAL
Refills: 0 | Status: DISCONTINUED | OUTPATIENT
Start: 2025-01-30 | End: 2025-02-07

## 2025-01-30 RX ADMIN — Medication 1000 MILLILITER(S): at 20:49

## 2025-01-30 RX ADMIN — CEFTRIAXONE 100 MILLIGRAM(S): 250 INJECTION, POWDER, FOR SOLUTION INTRAMUSCULAR; INTRAVENOUS at 19:13

## 2025-01-30 RX ADMIN — Medication 1600 MILLILITER(S): at 18:43

## 2025-01-30 RX ADMIN — Medication 50 MILLIGRAM(S): at 22:50

## 2025-01-30 RX ADMIN — ACETAMINOPHEN 400 MILLIGRAM(S): 160 SUSPENSION ORAL at 19:13

## 2025-01-30 RX ADMIN — PIPERACILLIN SODIUM AND TAZOBACTAM SODIUM 200 GRAM(S): 2; 250 INJECTION, POWDER, FOR SOLUTION INTRAVENOUS at 22:40

## 2025-01-30 RX ADMIN — AZITHROMYCIN DIHYDRATE 255 MILLIGRAM(S): 500 TABLET, FILM COATED ORAL at 19:25

## 2025-01-30 RX ADMIN — NOREPINEPHRINE BITARTRATE 9.23 MICROGRAM(S)/KG/MIN: 1 INJECTION, SOLUTION, CONCENTRATE INTRAVENOUS at 22:16

## 2025-01-30 NOTE — ED PROVIDER NOTE - SHIFT CHANGE DETAILS
F/U Lab, UA, and XR results. Patient likely to be admitted for sepsis. Hypotension, WBC 16K, lethargy, pending lactate, urine, xr and labs.   Patient signed out to incoming physician.  All decisions regarding the progression of care will be made at their discretion. F/U Lab, UA results. Patient likely to be admitted for sepsis. Hypotension, WBC 16K, lethargy, pending lactate, urine, and labs.   Patient signed out to incoming physician.  All decisions regarding the progression of care will be made at their discretion.

## 2025-01-30 NOTE — ED PROVIDER NOTE - CLINICAL SUMMARY MEDICAL DECISION MAKING FREE TEXT BOX
69-year-old female was brought in by EMS for altered mental status and lethargy with noted fever of 103 by facility.  Per EMS, patient is on antibiotic however they are unclear on why.  Skilled nursing form is blank.  Patient is full code and has no family.  Past medical history according to previous chart from October 2024 states that patient has hypertension, hyperlipidemia, bipolar disorder, CVA.  Patient is arousable however not providing proper history.  Does not know why she is here today.  Exam as stated. Plan for labs with cultures, urine, cxr, reassess. Fluids per IBW and early goal directed therapy considering sepsis. Abx ordered. 69-year-old female was brought in by EMS for altered mental status and lethargy with noted fever of 103 by facility.  Per EMS, patient is on antibiotic however they are unclear on why.  Skilled nursing form is blank.  Patient is full code and has no family.  Past medical history according to previous chart from October 2024 states that patient has hypertension, hyperlipidemia, bipolar disorder, CVA.  Patient is arousable however not providing proper history.  Does not know why she is here today.  Exam as stated. Plan for labs with cultures, urine, cxr, reassess. Fluids per IBW and early goal directed therapy considering sepsis. Abx ordered.    Ed - received s/o from Dr Galvin. Pt found to have UTI and PNA RLL infiltrate? Has been fluid responsive with MAP 65. New GURDEEP. More IVF ordered. In setting of borderline BP, consulted ICU and medicine Dr Plascencia aware.  Found to have elevated LFTs t hat are new. Nonttp, nondistended. Plan for CT abd pelvis and chest   Pending ICU eval and dispo, will continue to monitor and ivf 69-year-old female was brought in by EMS for altered mental status and lethargy with noted fever of 103 by facility.  Per EMS, patient is on antibiotic however they are unclear on why.  Skilled nursing form is blank.  Patient is full code and has no family.  Past medical history according to previous chart from October 2024 states that patient has hypertension, hyperlipidemia, bipolar disorder, CVA.  Patient is arousable however not providing proper history.  Does not know why she is here today.  Exam as stated. Plan for labs with cultures, urine, cxr, reassess. Fluids per IBW and early goal directed therapy considering sepsis. Abx ordered.    Ed - received s/o from Dr Galvin. Pt found to have UTI and PNA RLL infiltrate? Has been fluid responsive with MAP 65. New GUDREEP. More IVF ordered. In setting of borderline BP, consulted ICU and medicine Dr Plascencia aware.  Found to have elevated LFTs t hat are new. Nonttp, nondistended. Plan for CT abd pelvis and chest   Pending ICU eval and dispo, will continue to monitor and ivf    ICU KAROLYN peguero, sp 3L MAP ~65. Will start peripheral pressors and admit to ICU. Pt full code as per ppwk from [facility]  Pending CTr > ICU will follow

## 2025-01-30 NOTE — H&P ADULT - NSHPPHYSICALEXAM_GEN_ALL_CORE
T(C): 37.2 (01-30-25 @ 21:30), Max: 38.6 (01-30-25 @ 18:37)  HR: 55 (01-30-25 @ 22:03) (55 - 76)  BP: 84/53 (01-30-25 @ 22:03) (70/30 - 90/54)  RR: 14 (01-30-25 @ 22:03) (14 - 20)  SpO2: 95% (01-30-25 @ 22:03) (93% - 96%)    CONSTITUTIONAL: Obese female, ill appearing   RESP: No respiratory distress, CTA  CV: RRR  GI: Soft, NT, ND  : Lo with minimal output   SKIN: No rashes or ulcers noted  NEURO: Somnolent, arouses to voice but quickly falls back asleep, not responding to questions

## 2025-01-30 NOTE — ED PROVIDER NOTE - PHYSICAL EXAMINATION
Vitals: I have reviewed the patients vital signs  General: lethargic. Food residue on corner of mouth and upper shirt (?emesis?)  HEENT: Atraumatic, normocephalic, airway patent  Eyes: EOMI, PERRL  Neck: no tracheal deviation  Chest/Lungs: no trauma, symmetric chest rise, decreased effort and b/l breath sounds, no rales or crackles.   Heart: Regular rate and rhythm  Neuro: A&O x 1, lethargic.   Skin: no cyanosis, no jaundice

## 2025-01-30 NOTE — H&P ADULT - HISTORY OF PRESENT ILLNESS
69 year old female with a PMH of MDD, HTN, HLD, bipolar disorder, CVA who presented to the ED from [facility] with lethargy and fever.  In the ED, workup revealed a leukocytosis, GURDEEP, transaminitis, and UA +.  Received 2600 cc IVF and remained hypotensive prompting an ICU consult.

## 2025-01-30 NOTE — ED ADULT TRIAGE NOTE - CHIEF COMPLAINT QUOTE
Pt BIB EMS [facility] from [facility] for fever 105 according staff, BP 83/48.  Full code.  Hx of cerebral infarction

## 2025-01-30 NOTE — H&P ADULT - NSHPLABSRESULTS_GEN_ALL_CORE
10.9   16.17 )-----------( 241      ( 30 Jan 2025 18:30 )             34.3       01-30    141  |  107  |  32[H]  ----------------------------<  128[H]  4.4   |  29  |  2.12[H]    Ca    9.2      30 Jan 2025 18:30    TPro  6.5  /  Alb  3.1[L]  /  TBili  1.2  /  DBili  x   /  AST  75[H]  /  ALT  89[H]  /  AlkPhos  161[H]  01-30

## 2025-01-30 NOTE — ED ADULT NURSE NOTE - NSICDXPASTMEDICALHX_GEN_ALL_CORE_FT
PAST MEDICAL HISTORY:  Bipolar illness     Cerebral infarction     Effusion, left hip     Fall     History of TIAs     HLD (hyperlipidemia)     HTN (hypertension)     Hyperlipidemia     Hypertension     MDD (major depressive disorder)     Muscle wasting     Pressure-induced deep tissue damage of left heel

## 2025-01-30 NOTE — ED ADULT NURSE REASSESSMENT NOTE - NS ED NURSE REASSESS COMMENT FT1
Received report from Jillian SOFIA. Patient received A&Ox0 with IV access x3. Pt offering no complaints and is in no acute distress at this time.  Received on 6LNC and noted to be hypotensive. MD at bedside for evaluation. Patient continues to be hypotensive, IVF infusing as ordered. Respirations even and unlabored.  Stretcher in lowest position, wheels locked, side rails up and call light in reach.

## 2025-01-30 NOTE — ED PROVIDER NOTE - IV ALTEPLASE DOOR HIDDEN
Writer called and spoke with Joseph and informed him he may come with Rosaura to her appointment.  Joseph thanked writer for calling.   show

## 2025-01-30 NOTE — ED PROVIDER NOTE - OBJECTIVE STATEMENT
69-year-old female was brought in by EMS for altered mental status and lethargy with noted fever of 103 by facility.  Per EMS, patient is on antibiotic however they are unclear on why.  Skilled nursing form is blank.  Patient is full code and has no family.  Past medical history according to previous chart from October 2024 states that patient has hypertension, hyperlipidemia, bipolar disorder, CVA.  Patient is arousable however not providing proper history.  Does not know why she is here today.

## 2025-01-30 NOTE — PROVIDER CONTACT NOTE (EICU) - ASSESSMENT
septic shock iso UTI, PNA, enterocolitis?  -F/u lithium level.  -GURDEEP, CTM UOP.   -Zosyn and azithromycin for now for atypical and gi coverage. F/u culture data.  -Wean levophed as tolerated.    Discussed with ICU PA who assessed patient at bedside,.

## 2025-01-30 NOTE — ED ADULT NURSE REASSESSMENT NOTE - NS ED NURSE REASSESS COMMENT FT1
ICU KAROLYN Fonseca contacted for decrease in HR at this time. Patient remains on cardiac monitor. No additional changes in assessment at this time.

## 2025-01-30 NOTE — ED PROVIDER NOTE - CARE PLAN
Principal Discharge DX:	Sepsis   1 Principal Discharge DX:	Sepsis  Secondary Diagnosis:	Acute UTI  Secondary Diagnosis:	Pneumonia

## 2025-01-30 NOTE — H&P ADULT - ASSESSMENT
69 year old female with a PMH of MDD, HTN, HLD, bipolar disorder, CVA who presented to the ED from [facility] with lethargy and fever    Problem list:  Septic shock  UTI  GURDEEP  Transaminitis    Neuro: Suspect metabolic encephalopathy in the setting of sepsis.  Ordered for CTH to ensure no intracranial pathology.  Holding home psych meds given lethargy.  Lithium level pending.   CV: Hypotensive despite 2600 cc IVF.  Initiate levophed.  Titrate to maintain MAP >65.  Check TTE.   Pulm: Saturating well on nasal cannula.  Titrate fiO2 to maintain spO2 >92%.   GI: NPO pending improvement in mental status.  Protonix for stress ulcer prophylaxis.  Trend LFTs.   Renal: GURDEEP.  Lo placed in ED. Oliguric.  Monitor renal function and UOP.  Replete electrolytes prn.  Maintenance IVF w/ LR.     Heme: SCDs for DVT ppx.  Resume home Plavix and order chemical DVT ppx if CTH negative.    ID: Received CTX and azithro in the ED.  CT C/A/P concerning for pneumonia and possible enterocolitis.  Will escalate coverage to Zosyn.  Send MRSA swab, urine legionella/strep pneumo ag.  F/u blood and ucx sent.  Prior ucx with pansensitive E. coli.   Endocrine: q6 hour fingersticks while NPO.  Keep euglycemic.  Check TSH.  Initiate stress dose steroids.     Dispo: Admit to ICU.  Case discussed w/ e-ICU attending Dr. Hussein.    55 minutes of critical care time spent assessing presenting problems of acute illness, which pose high probability of life threatening deterioration or end organ damage/dysfunction, as well as medical decision making including initiating plan of care, reviewing data, reviewing radiologic exams, discussing with multidisciplinary team,  discussing goals of care with patient/family, and writing this note.  Non-inclusive of procedures performed.  Date of entry of this note is equal to the date of services rendered.

## 2025-01-30 NOTE — ED ADULT NURSE REASSESSMENT NOTE - NS ED NURSE REASSESS COMMENT FT1
Lo placed at this time as per MD order. 2 RNs at bedside during procedure. Sterile field maintained. 50ccs of clear cullen output obtained. Samples collected and sent as ordered. Comfort measures provided.

## 2025-01-30 NOTE — PROVIDER CONTACT NOTE (EICU) - BACKGROUND
69 year old female with a PMH of MDD, HTN, HLD, bipolar disorder, CVA who presented to the ED from [facility] with lethargy and fever.  In the ED, workup revealed a leukocytosis, GURDEEP, transaminitis, and UA +.  Received 2600 cc IVF and remained hypotensive prompting an ICU consult. Started on levophed.  CT scan with multifocal pneumonia and enterocolitis.

## 2025-01-31 ENCOUNTER — RESULT REVIEW (OUTPATIENT)
Age: 70
End: 2025-01-31

## 2025-01-31 DIAGNOSIS — F31.9 BIPOLAR DISORDER, UNSPECIFIED: ICD-10-CM

## 2025-01-31 LAB
ALBUMIN SERPL ELPH-MCNC: 2.9 G/DL — LOW (ref 3.3–5)
ALP SERPL-CCNC: 159 U/L — HIGH (ref 40–120)
ALT FLD-CCNC: 157 U/L — HIGH (ref 10–45)
ANION GAP SERPL CALC-SCNC: 10 MMOL/L — SIGNIFICANT CHANGE UP (ref 5–17)
ANION GAP SERPL CALC-SCNC: 11 MMOL/L — SIGNIFICANT CHANGE UP (ref 5–17)
ANION GAP SERPL CALC-SCNC: 12 MMOL/L — SIGNIFICANT CHANGE UP (ref 5–17)
AST SERPL-CCNC: 164 U/L — HIGH (ref 10–40)
BILIRUB SERPL-MCNC: 1 MG/DL — SIGNIFICANT CHANGE UP (ref 0.2–1.2)
BUN SERPL-MCNC: 16 MG/DL — SIGNIFICANT CHANGE UP (ref 7–23)
BUN SERPL-MCNC: 21 MG/DL — SIGNIFICANT CHANGE UP (ref 7–23)
BUN SERPL-MCNC: 27 MG/DL — HIGH (ref 7–23)
CALCIUM SERPL-MCNC: 8.2 MG/DL — LOW (ref 8.4–10.5)
CALCIUM SERPL-MCNC: 8.7 MG/DL — SIGNIFICANT CHANGE UP (ref 8.4–10.5)
CALCIUM SERPL-MCNC: 8.8 MG/DL — SIGNIFICANT CHANGE UP (ref 8.4–10.5)
CHLORIDE SERPL-SCNC: 105 MMOL/L — SIGNIFICANT CHANGE UP (ref 96–108)
CHLORIDE SERPL-SCNC: 108 MMOL/L — SIGNIFICANT CHANGE UP (ref 96–108)
CHLORIDE SERPL-SCNC: 108 MMOL/L — SIGNIFICANT CHANGE UP (ref 96–108)
CO2 SERPL-SCNC: 19 MMOL/L — LOW (ref 22–31)
CO2 SERPL-SCNC: 22 MMOL/L — SIGNIFICANT CHANGE UP (ref 22–31)
CO2 SERPL-SCNC: 23 MMOL/L — SIGNIFICANT CHANGE UP (ref 22–31)
CREAT SERPL-MCNC: 0.93 MG/DL — SIGNIFICANT CHANGE UP (ref 0.5–1.3)
CREAT SERPL-MCNC: 1.2 MG/DL — SIGNIFICANT CHANGE UP (ref 0.5–1.3)
CREAT SERPL-MCNC: 1.53 MG/DL — HIGH (ref 0.5–1.3)
EGFR: 36 ML/MIN/1.73M2 — LOW
EGFR: 48 ML/MIN/1.73M2 — LOW
EGFR: 66 ML/MIN/1.73M2 — SIGNIFICANT CHANGE UP
GLUCOSE BLDC GLUCOMTR-MCNC: 114 MG/DL — HIGH (ref 70–99)
GLUCOSE BLDC GLUCOMTR-MCNC: 130 MG/DL — HIGH (ref 70–99)
GLUCOSE BLDC GLUCOMTR-MCNC: 136 MG/DL — HIGH (ref 70–99)
GLUCOSE BLDC GLUCOMTR-MCNC: 149 MG/DL — HIGH (ref 70–99)
GLUCOSE BLDC GLUCOMTR-MCNC: 151 MG/DL — HIGH (ref 70–99)
GLUCOSE SERPL-MCNC: 113 MG/DL — HIGH (ref 70–99)
GLUCOSE SERPL-MCNC: 120 MG/DL — HIGH (ref 70–99)
GLUCOSE SERPL-MCNC: 232 MG/DL — HIGH (ref 70–99)
HCT VFR BLD CALC: 33.4 % — LOW (ref 34.5–45)
HGB BLD-MCNC: 10.4 G/DL — LOW (ref 11.5–15.5)
LEGIONELLA AG UR QL: NEGATIVE — SIGNIFICANT CHANGE UP
LITHIUM SERPL-MCNC: 2.72 MMOL/L — CRITICAL HIGH (ref 0.6–1.2)
MAGNESIUM SERPL-MCNC: 1.4 MG/DL — LOW (ref 1.6–2.6)
MAGNESIUM SERPL-MCNC: 1.7 MG/DL — SIGNIFICANT CHANGE UP (ref 1.6–2.6)
MAGNESIUM SERPL-MCNC: 2.2 MG/DL — SIGNIFICANT CHANGE UP (ref 1.6–2.6)
MCHC RBC-ENTMCNC: 30.8 PG — SIGNIFICANT CHANGE UP (ref 27–34)
MCHC RBC-ENTMCNC: 31.1 G/DL — LOW (ref 32–36)
MCV RBC AUTO: 98.8 FL — SIGNIFICANT CHANGE UP (ref 80–100)
MRSA PCR RESULT.: SIGNIFICANT CHANGE UP
NRBC # BLD: 0 /100 WBCS — SIGNIFICANT CHANGE UP (ref 0–0)
NRBC BLD-RTO: 0 /100 WBCS — SIGNIFICANT CHANGE UP (ref 0–0)
PHOSPHATE SERPL-MCNC: 2.1 MG/DL — LOW (ref 2.5–4.5)
PHOSPHATE SERPL-MCNC: 3.2 MG/DL — SIGNIFICANT CHANGE UP (ref 2.5–4.5)
PHOSPHATE SERPL-MCNC: 3.6 MG/DL — SIGNIFICANT CHANGE UP (ref 2.5–4.5)
PLATELET # BLD AUTO: 234 K/UL — SIGNIFICANT CHANGE UP (ref 150–400)
POTASSIUM SERPL-MCNC: 3.9 MMOL/L — SIGNIFICANT CHANGE UP (ref 3.5–5.3)
POTASSIUM SERPL-MCNC: 4 MMOL/L — SIGNIFICANT CHANGE UP (ref 3.5–5.3)
POTASSIUM SERPL-MCNC: 4 MMOL/L — SIGNIFICANT CHANGE UP (ref 3.5–5.3)
POTASSIUM SERPL-SCNC: 3.9 MMOL/L — SIGNIFICANT CHANGE UP (ref 3.5–5.3)
POTASSIUM SERPL-SCNC: 4 MMOL/L — SIGNIFICANT CHANGE UP (ref 3.5–5.3)
POTASSIUM SERPL-SCNC: 4 MMOL/L — SIGNIFICANT CHANGE UP (ref 3.5–5.3)
PROT SERPL-MCNC: 6.5 G/DL — SIGNIFICANT CHANGE UP (ref 6–8.3)
RBC # BLD: 3.38 M/UL — LOW (ref 3.8–5.2)
RBC # FLD: 14.5 % — SIGNIFICANT CHANGE UP (ref 10.3–14.5)
S AUREUS DNA NOSE QL NAA+PROBE: SIGNIFICANT CHANGE UP
S PNEUM AG UR QL: NEGATIVE — SIGNIFICANT CHANGE UP
SODIUM SERPL-SCNC: 139 MMOL/L — SIGNIFICANT CHANGE UP (ref 135–145)
SODIUM SERPL-SCNC: 139 MMOL/L — SIGNIFICANT CHANGE UP (ref 135–145)
SODIUM SERPL-SCNC: 140 MMOL/L — SIGNIFICANT CHANGE UP (ref 135–145)
T4 AB SER-ACNC: 5.3 UG/DL — SIGNIFICANT CHANGE UP (ref 4.6–12)
TSH SERPL-MCNC: 0.88 UIU/ML — SIGNIFICANT CHANGE UP (ref 0.36–3.74)
WBC # BLD: 17.68 K/UL — HIGH (ref 3.8–10.5)
WBC # FLD AUTO: 17.68 K/UL — HIGH (ref 3.8–10.5)

## 2025-01-31 PROCEDURE — 93010 ELECTROCARDIOGRAM REPORT: CPT

## 2025-01-31 PROCEDURE — 90792 PSYCH DIAG EVAL W/MED SRVCS: CPT

## 2025-01-31 PROCEDURE — 93306 TTE W/DOPPLER COMPLETE: CPT | Mod: 26

## 2025-01-31 RX ORDER — DOCUSATE SODIUM 100 MG
1 CAPSULE ORAL
Refills: 0 | DISCHARGE

## 2025-01-31 RX ORDER — SENNOSIDES 8.6 MG
2 TABLET ORAL
Refills: 0 | DISCHARGE

## 2025-01-31 RX ORDER — POTASSIUM PHOSPHATE, MONOBASIC POTASSIUM PHOSPHATE, DIBASIC 236; 224 MG/ML; MG/ML
30 INJECTION, SOLUTION INTRAVENOUS ONCE
Refills: 0 | Status: COMPLETED | OUTPATIENT
Start: 2025-01-31 | End: 2025-01-31

## 2025-01-31 RX ORDER — QUETIAPINE FUMARATE 300 MG/1
200 TABLET ORAL AT BEDTIME
Refills: 0 | Status: DISCONTINUED | OUTPATIENT
Start: 2025-01-31 | End: 2025-02-04

## 2025-01-31 RX ORDER — FLUOXETINE HCL 10 MG
60 CAPSULE ORAL DAILY
Refills: 0 | Status: DISCONTINUED | OUTPATIENT
Start: 2025-02-01 | End: 2025-02-04

## 2025-01-31 RX ORDER — ATORVASTATIN CALCIUM 80 MG/1
40 TABLET, FILM COATED ORAL AT BEDTIME
Refills: 0 | Status: DISCONTINUED | OUTPATIENT
Start: 2025-01-31 | End: 2025-01-31

## 2025-01-31 RX ORDER — BISACODYL 5 MG
1 TABLET, DELAYED RELEASE (ENTERIC COATED) ORAL
Refills: 0 | DISCHARGE

## 2025-01-31 RX ORDER — SENNOSIDES 8.6 MG
2 TABLET ORAL AT BEDTIME
Refills: 0 | Status: DISCONTINUED | OUTPATIENT
Start: 2025-01-31 | End: 2025-02-11

## 2025-01-31 RX ORDER — HEPARIN SODIUM,PORCINE 10000/ML
5000 VIAL (ML) INJECTION EVERY 8 HOURS
Refills: 0 | Status: DISCONTINUED | OUTPATIENT
Start: 2025-01-31 | End: 2025-02-07

## 2025-01-31 RX ORDER — MAGNESIUM SULFATE 0.8 MEQ/ML
2 AMPUL (ML) INJECTION ONCE
Refills: 0 | Status: COMPLETED | OUTPATIENT
Start: 2025-01-31 | End: 2025-01-31

## 2025-01-31 RX ORDER — MAGNESIUM SULFATE 0.8 MEQ/ML
2 AMPUL (ML) INJECTION ONCE
Refills: 0 | Status: DISCONTINUED | OUTPATIENT
Start: 2025-01-31 | End: 2025-01-31

## 2025-01-31 RX ORDER — CLONAZEPAM 2 MG
0.5 TABLET ORAL
Refills: 0 | Status: DISCONTINUED | OUTPATIENT
Start: 2025-01-31 | End: 2025-02-04

## 2025-01-31 RX ORDER — SODIUM CHLORIDE 9 G/ML
500 INJECTION, SOLUTION INTRAVENOUS ONCE
Refills: 0 | Status: COMPLETED | OUTPATIENT
Start: 2025-01-31 | End: 2025-01-31

## 2025-01-31 RX ORDER — MAGNESIUM SULFATE 0.8 MEQ/ML
4 AMPUL (ML) INJECTION ONCE
Refills: 0 | Status: DISCONTINUED | OUTPATIENT
Start: 2025-01-31 | End: 2025-01-31

## 2025-01-31 RX ADMIN — PANTOPRAZOLE 40 MILLIGRAM(S): 20 TABLET, DELAYED RELEASE ORAL at 05:05

## 2025-01-31 RX ADMIN — PIPERACILLIN SODIUM AND TAZOBACTAM SODIUM 25 GRAM(S): 2; 250 INJECTION, POWDER, FOR SOLUTION INTRAVENOUS at 23:16

## 2025-01-31 RX ADMIN — Medication 50 MILLIGRAM(S): at 05:05

## 2025-01-31 RX ADMIN — Medication 50 MILLIGRAM(S): at 17:49

## 2025-01-31 RX ADMIN — SODIUM CHLORIDE 75 MILLILITER(S): 9 INJECTION, SOLUTION INTRAVENOUS at 01:50

## 2025-01-31 RX ADMIN — Medication 25 GRAM(S): at 17:58

## 2025-01-31 RX ADMIN — Medication 5000 UNIT(S): at 21:07

## 2025-01-31 RX ADMIN — ANTISEPTIC SURGICAL SCRUB 1 APPLICATION(S): 0.04 SOLUTION TOPICAL at 05:05

## 2025-01-31 RX ADMIN — Medication 25 GRAM(S): at 15:41

## 2025-01-31 RX ADMIN — PIPERACILLIN SODIUM AND TAZOBACTAM SODIUM 25 GRAM(S): 2; 250 INJECTION, POWDER, FOR SOLUTION INTRAVENOUS at 11:16

## 2025-01-31 RX ADMIN — Medication 2 TABLET(S): at 21:19

## 2025-01-31 RX ADMIN — Medication 5000 UNIT(S): at 13:39

## 2025-01-31 RX ADMIN — Medication 50 MILLIGRAM(S): at 23:16

## 2025-01-31 RX ADMIN — SODIUM CHLORIDE 100 MILLILITER(S): 9 INJECTION, SOLUTION INTRAVENOUS at 11:17

## 2025-01-31 RX ADMIN — SODIUM CHLORIDE 500 MILLILITER(S): 9 INJECTION, SOLUTION INTRAVENOUS at 10:10

## 2025-01-31 RX ADMIN — SODIUM CHLORIDE 100 MILLILITER(S): 9 INJECTION, SOLUTION INTRAVENOUS at 19:39

## 2025-01-31 RX ADMIN — Medication 50 MILLIGRAM(S): at 11:40

## 2025-01-31 RX ADMIN — PIPERACILLIN SODIUM AND TAZOBACTAM SODIUM 25 GRAM(S): 2; 250 INJECTION, POWDER, FOR SOLUTION INTRAVENOUS at 01:50

## 2025-01-31 RX ADMIN — POTASSIUM PHOSPHATE, MONOBASIC POTASSIUM PHOSPHATE, DIBASIC 83.33 MILLIMOLE(S): 236; 224 INJECTION, SOLUTION INTRAVENOUS at 16:27

## 2025-01-31 NOTE — DIETITIAN INITIAL EVALUATION ADULT - PERTINENT LABORATORY DATA
01-31    139  |  105  |  27[H]  ----------------------------<  232[H]  4.0   |  23  |  1.53[H]    Ca    8.2[L]      31 Jan 2025 05:45  Phos  3.2     01-31  Mg     1.7     01-31    TPro  6.5  /  Alb  2.9[L]  /  TBili  1.0  /  DBili  x   /  AST  164[H]  /  ALT  157[H]  /  AlkPhos  159[H]  01-31  POCT Blood Glucose.: 151 mg/dL (01-31-25 @ 05:08)  A1C with Estimated Average Glucose Result: 6.1 % (11-02-24 @ 19:10)

## 2025-01-31 NOTE — DIETITIAN INITIAL EVALUATION ADULT - DIET TYPE
as diet is advanced add Consistent Carbohydrate restriction due to patient noted pre-DM (a1c6.1%) as per EMR review.

## 2025-01-31 NOTE — BH CONSULTATION LIAISON ASSESSMENT NOTE - CURRENT MEDICATION
MEDICATIONS  (STANDING):  chlorhexidine 2% Cloths 1 Application(s) Topical <User Schedule>  clonazePAM  Tablet 0.5 milliGRAM(s) Oral two times a day  clopidogrel Tablet 75 milliGRAM(s) Oral daily  heparin   Injectable 5000 Unit(s) SubCutaneous every 8 hours  hydrocortisone sodium succinate Injectable 50 milliGRAM(s) IV Push every 6 hours  lactated ringers. 1000 milliLiter(s) (100 mL/Hr) IV Continuous <Continuous>  magnesium sulfate  IVPB 2 Gram(s) IV Intermittent once  norepinephrine Infusion 0.05 MICROgram(s)/kG/Min (9.23 mL/Hr) IV Continuous <Continuous>  piperacillin/tazobactam IVPB.. 3.375 Gram(s) IV Intermittent every 12 hours  QUEtiapine 200 milliGRAM(s) Oral at bedtime  senna 2 Tablet(s) Oral at bedtime    MEDICATIONS  (PRN):

## 2025-01-31 NOTE — BH CONSULTATION LIAISON ASSESSMENT NOTE - ADDITIONAL DETAILS ALL
Pt became tearful, but denied ever having self harmed, no recent inpatient psychiatric care within [facility].

## 2025-01-31 NOTE — PROGRESS NOTE ADULT - SUBJECTIVE AND OBJECTIVE BOX
Addendum to H&P/ICU Consult note  - Patient seen and examined today    ICU CONSULT  Location of Patient : [facility]1 0010 06 ( [facility]U1)      Initial HPI on admission:  HPI:  69 year old female with a PMH of MDD, HTN, HLD, bipolar disorder, CVA who presented to the ED from [facility] with lethargy and fever.  In the ED, workup revealed a leukocytosis, GURDEEP, transaminitis, and UA +.  Received 2600 cc IVF and remained hypotensive prompting an ICU consult.  (2025 22:08)      BRIEF HOSPITAL COURSE:   patient seen and examined this am  she is alert, confused, unable to provide history or ros.      PAST MEDICAL & SURGICAL HISTORY:  HTN (hypertension)  HLD (hyperlipidemia)  Bipolar illness  MDD (major depressive disorder)  Effusion, left hip  Fall  Muscle wasting  History of TIAs  Cerebral infarction  Pressure-induced deep tissue damage of left heel  Hypertension  Hyperlipidemia  History of cholecystectomy    Allergies    Ambien (Other)  sulfa drugs (Other)    Intolerances      FAMILY HISTORY:  FH: osteoporosis (Mother)    Due to current medical status patient unable to provide medical, social, family history.  History obtained from available medical record.       Medications:  MEDICATIONS  (STANDING):  chlorhexidine 2% Cloths 1 Application(s) Topical <User Schedule>  hydrocortisone sodium succinate Injectable 50 milliGRAM(s) IV Push four times a day  lactated ringers. 1000 milliLiter(s) (75 mL/Hr) IV Continuous <Continuous>  norepinephrine Infusion 0.05 MICROgram(s)/kG/Min (9.23 mL/Hr) IV Continuous <Continuous>  piperacillin/tazobactam IVPB.. 3.375 Gram(s) IV Intermittent every 12 hours    MEDICATIONS  (PRN):      Antibiotics History  azithromycin  IVPB 500 milliGRAM(s) IV Intermittent once, 25 @ 18:26, Stop order after: 1 Doses  cefTRIAXone   IVPB 1000 milliGRAM(s) IV Intermittent once, 25 @ 18:26, Stop order after: 1 Doses  cefTRIAXone   IVPB 1000 milliGRAM(s) IV Intermittent every 24 hours, 25 @ 00:00, Stop order after: 3 Days  piperacillin/tazobactam IVPB. 3.375 Gram(s) IV Intermittent once, 25 @ 22:35, Stop order after: 1 Doses  piperacillin/tazobactam IVPB.- 3.375 Gram(s) IV Intermittent once, 25 @ 02:00  piperacillin/tazobactam IVPB.. 3.375 Gram(s) IV Intermittent every 12 hours, 25 @ 12:00, Stop order after: 7 Days      Heme Medications       GI Medications        Home Medications:  Last Order Reconciliation Date: Not Done  albuterol 90 mcg/inh inhalation aerosol: 2 puff(s) inhaled every 4 hours as needed for  bronchospasm (24)  ascorbic acid 500 mg oral tablet: 1 tab(s) orally once a day (24)  atorvastatin 40 mg oral tablet: 1 tab(s) orally once a day (at bedtime) (10-28-24)  clopidogrel 75 mg oral tablet: 1 tab(s) orally once a day (10-28-24)  FLUoxetine 60 mg oral tablet: 1 tab(s) orally once a day MDD: 1 (10-28-24)  fluticasone 50 mcg/inh nasal spray: 1 spray(s) nasal 2 times a day (24)  gabapentin 300 mg oral capsule: 1 cap(s) orally 3 times a day (10-28-24)  guaiFENesin 600 mg oral tablet, extended release: 1 tab(s) orally every 12 hours (24)  ipratropium-albuterol 0.5 mg-2.5 mg/3 mL inhalation solution: 3 milliliter(s) inhaled every 6 hours (24)  KlonoPIN 0.5 mg oral tablet: 1 tab(s) orally 2 times a day MDD: 2 (10-28-24)  lisinopril 5 mg oral tablet: 1 tab(s) orally once a day (10-28-24)  lithium 300 mg oral capsule: 1 cap(s) orally 2 times a day MDD: 2 (10-28-24)  Multiple Vitamins oral tablet: 1 tab(s) orally once a day (24)  predniSONE 20 mg oral tablet: 2 tab(s) orally once a day (24)  Seroquel 200 mg oral tablet: 1 tab(s) orally once a day (at bedtime) (10-28-24)  sodium chloride 0.65% nasal spray: 1 spray(s) nasal 4 times a day as needed for  congestion (24)  Vitamin D3 125 mcg (5000 intl units) oral tablet, disintegratin tab(s) orally once a day (10-28-24)        LABS:                        10.4   17.68 )-----------( 234      ( 2025 05:45 )             33.4         139  |  105  |  27[H]  ----------------------------<  232[H]  4.0   |  23  |  1.53[H]    Ca    8.2[L]      2025 05:45  Phos  3.2       Mg     1.7         TPro  6.5  /  Alb  2.9[L]  /  TBili  1.0  /  DBili  x   /  AST  164[H]  /  ALT  157[H]  /  AlkPhos  159[H]       PT/INR - ( 2025 18:30 )   PT: 12.9 sec;   INR: 1.09 ratio    PTT - ( 2025 18:30 )  PTT:27.8 sec      Urinalysis with Rflx Culture (25 @ 19:50)   Urine Appearance: Turbid  Color: Dark Yellow  Specific Gravity: 1.024  pH Urine: 5.0  Protein, Urine: 100 mg/dL  Glucose Qualitative, Urine: Negative mg/dL  Ketone - Urine: Trace mg/dL  Blood, Urine: Large  Bilirubin: Small  Urobilinogen: 1.0 mg/dL  Leukocyte Esterase Concentration: Moderate  Nitrite: Negative  Bacteria: Moderate /HPF  Squamous Epithelial Cells: Present  Red Blood Cell - Urine: 20 /HPF  White Blood Cell - Urine: 20 /HPF  COVID  25 @ 18:30  COVID -   NotDetec  24 @ 11:30  COVID -   NotDetec     Procalcitonin Trend  24 @ 11:26   -   0.12[H]  10-27-23 @ 12:19   -   0.09  10-27-23 @ 05:30   -   0.12[H]    WBC Trend  25 @ 05:45   -  17.68[H]  25 @ 18:30   -  16.17[H]    H/H Trend  25 @ 05:45   -   10.4[L]/ 33.4[L]  25 @ 18:30   -   10.9[L]/ 34.3[L]     Platelet Trend  25 @ 05:45   -  234  25 @ 18:30   -  241    Trend Sodium  25 @ 05:45   -  139  25 @ 18:30   -  141    Trend Potassium  25 @ 05:45   -  4.0  25 @ 18:30   -  4.4    Trend Bun/Cr  25 @ 05:45  BUN/CR -  27[H] / 1.53[H]  25 @ 18:30  BUN/CR -  32[H] / 2.12[H]    Lactic Acid Trend  25 @ 18:30   -   1.3       Trend AST/ALT/ALK Phos/Bili  25 @ 05:45   164[H]/157[H]/159[H]/1.0  25 @ 18:30   75[H]/89[H]/161[H]/1.2  10-31-24 @ 08:14   28/22/86/0.7  10-30-24 @ 06:15   30/27/84/0.8  10-28-24 @ 12:55   35/28/110/1.3[H]  23 @ 06:30   21/34/105/0.4  23 @ 07:56   34/39/121[H]/0.4  23 @ 06:00   29/34/127[H]/0.4  23 @ 06:30   35/42/121[H]/0.4       Ammonia Trend  10-27-23 @ 05:30   -   17      Amylase / Lipase Trend      Albumin Trend  25 @ 05:45   -   2.9[L]  25 @ 18:30   -   3.1[L]  10-31-24 @ 08:14   -   2.6[L]  10-30-24 @ 06:15   -   2.9[L]  10-28-24 @ 12:55   -   3.4  23 @ 06:30   -   3.1[L]      PTT - PT - INR Trend  25 @ 18:30   -   27.8 - 12.9 - 1.09  10-27-23 @ 00:50   -   26.9 - 11.1 - 0.99    Glucose Trend  25 @ 05:45   -  232[H] -- --  25 @ 05:08   -  -- -- 151[H]  25 @ 00:34   -  -- -- 149[H]  25 @ 22:22   -  -- -- 125[H]  25 @ 18:30   -  128[H] -- --    A1C with Estimated Average Glucose Result: 6.1 % *H* [4.0 - 5.6] (24 @ 19:10)      Lithium Level, Serum (25 @ 18:30)   Lithium Level, Serum: 2.72: Test Repeated         RADIOLOGY  CXR:      CT:  < from: CT Head No Cont (25 @ 23:18) >  COMPARISON: 10/28/2024.    FINDINGS: Artifact degrades images of the posterior fossa/lower brain.   There is no obvious acute intracranial hemorrhage, mass effect or midline   shift, given the extent of artifact. Nonspecific mild periventricular and   subcortical white matter hypodensities likely represent microvascular   ischemic changes. There is stable cerebral volume loss. Partially empty   sella.    There is no depressed skull fracture. Persistent sinus mucosal thickening   with opacification of the left ethmoid and maxillary sinuses. Persistent   heterogenous structure at the left nasal cavity protruding into the left   maxillary sinus. Visualized tympanomastoid region is unremarkable.    IMPRESSION:    No obvious acute intracranial hemorrhage or mass effect. If clinically   indicated, short-term follow-up or MRI may be obtained for further   evaluation.    --- End of Report ---    < end of copied text >  ACC: [id number] EXAM:  CT ABDOMEN AND PELVIS   ORDERED BY:  WILMER WILLIS     ACC: [id number] EXAM:  CT CHEST   ORDERED BY:  WILMER WILLIS     PROCEDURE DATE:  2025          INTERPRETATION:  CLINICAL INDICATION: sepsis, pna?    PROCEDURE: CT of the chest, abdomen and pelvis was performed without   intravenous contrast. Coronal and sagittal reconstruction images were   obtained.    CONTRAST/COMPLICATIONS:  IV Contrast: NONE  Oral Contrast: NONE  Complications: NA.    COMPARISON: 10/28/2024. 10/29/2024.    FINDINGS: Evaluation of the thoracic, abdominal and pelvic organs and   vasculature is limited without intravenous contrast. Patient's   respiratory motion and arms degrades images.    CHEST:    LUNGS AND AIRWAYS: Patent central airways. Patchy opacities at bilateral   lower lobes. Question small opacities at the right middle lobe.   Atelectasis.  PLEURA: No pleural effusion or pneumothorax.  HEART: Normal size. No pericardial effusion.  VESSELS: Atherosclerosis. Normal caliber ofthe thoracic aorta.  MEDIASTINUM AND ALEKSANDER: Subcentimeter lymph nodes.  CHEST WALL AND LOWER NECK: Unremarkable.    ABDOMEN/PELVIS:    LIVER: Unremarkable.  BILE DUCTS/GALLBLADDER: No intrahepatic biliary dilatation. Prominent   common bile duct, reflecting postcholecystectomy state.  PANCREAS: Diffuse fatty atrophy.  SPLEEN: Unremarkable.    ADRENALS: Unremarkable.  KIDNEYS/URETERS: Bilateral perinephric stranding without   hydroureteronephrosis.  BLADDER: Collapsed around a Lo catheter.  REPRODUCTIVE ORGANS: Unremarkable.    BOWEL: Small hiatal hernia. No bowel obstruction. Unremarkable appendix.   Colon diverticulosis. Fluid-filled small bowel loops and right colon.  PERITONEUM: No organized fluid collection or free air.  VESSELS: Atherosclerosis. Normal caliber of the abdominal aorta.  RETROPERITONEUM/LYMPH NODE: No lymphadenopathy.  ABDOMINAL WALL/SOFT TISSUES: Small fat-containing umbilical hernia. Small   left hip joint effusion again noted.  BONES: Rightward curvature and degenerative changes of the spine.    IMPRESSION:    Suspect pneumonia. Recommend follow-up to resolution to exclude neoplasm,   following completion of treatment.    Nonspecific fluid-filled small bowel loops and right colon without   significant inflammatory change. Recommend clinical correlation to assess   enterocolitis.    Additional findings as described.    --- End of Report ---      ECHO:      VITALS:  T(C): 37.2 (25 @ 05:00), Max: 38.6 (25 @ 18:37)  T(F): 98.9 (25 @ 05:00), Max: 101.4 (25 @ 18:37)  HR: 82 (25 @ 06:30) (43 - 100)  BP: 115/62 (25 @ 06:30) (70/30 - 127/58)  BP(mean): 77 (25 @ 06:30) (46 - 88)  ABP: --  ABP(mean): --  RR: 20 (25 @ 06:30) (12 - 25)  SpO2: 100% (25 @ 06:30) (93% - 100%)  CVP(mm Hg): --  CVP(cm H2O): --    Ins and Outs     25 @ 07:01  -  25 @ 07:00  --------------------------------------------------------  IN: 592.3 mL / OUT: 525 mL / NET: 67.3 mL        Height (cm): 160 (25 @ 18:15)  Weight (kg): 97.1 (25 @ 23:45)  BMI (kg/m2): 37.9 (25 @ 23:45)        I&O's Detail    2025 07:01  -  2025 07:00  --------------------------------------------------------  IN:    Lactated Ringers: 525 mL    Norepinephrine: 67.3 mL  Total IN: 592.3 mL    OUT:    Indwelling Catheter - Urethral (mL): 525 mL  Total OUT: 525 mL    Total NET: 67.3 mL          Addendum to H&P/ICU Consult note  - Patient seen and examined today    ICU CONSULT  Location of Patient : [facility] 0010 06 ( [facility]U1)      Initial HPI on admission:  HPI:  69 year old female with a PMH of MDD, HTN, HLD, bipolar disorder, CVA who presented to the ED from [facility] with lethargy and fever.  In the ED, workup revealed a leukocytosis, GURDEEP, transaminitis, and UA +.  Received 2600 cc IVF and remained hypotensive prompting an ICU consult.  (2025 22:08)      BRIEF HOSPITAL COURSE:   patient seen and examined this am  she is alert, confused, unable to provide history or ros.      PAST MEDICAL & SURGICAL HISTORY:  HTN (hypertension)  HLD (hyperlipidemia)  Bipolar illness  MDD (major depressive disorder)  Effusion, left hip  Fall  Muscle wasting  History of TIAs  Cerebral infarction  Pressure-induced deep tissue damage of left heel  Hypertension  Hyperlipidemia  History of cholecystectomy    Allergies    Ambien (Other)  sulfa drugs (Other)    Intolerances      FAMILY HISTORY:  FH: osteoporosis (Mother)    Due to current medical status patient unable to provide medical, social, family history.  History obtained from available medical record.       Medications:  MEDICATIONS  (STANDING):  chlorhexidine 2% Cloths 1 Application(s) Topical <User Schedule>  hydrocortisone sodium succinate Injectable 50 milliGRAM(s) IV Push four times a day  lactated ringers. 1000 milliLiter(s) (75 mL/Hr) IV Continuous <Continuous>  norepinephrine Infusion 0.05 MICROgram(s)/kG/Min (9.23 mL/Hr) IV Continuous <Continuous>  piperacillin/tazobactam IVPB.. 3.375 Gram(s) IV Intermittent every 12 hours    MEDICATIONS  (PRN):      Antibiotics History  azithromycin  IVPB 500 milliGRAM(s) IV Intermittent once, 25 @ 18:26, Stop order after: 1 Doses  cefTRIAXone   IVPB 1000 milliGRAM(s) IV Intermittent once, 25 @ 18:26, Stop order after: 1 Doses  cefTRIAXone   IVPB 1000 milliGRAM(s) IV Intermittent every 24 hours, 25 @ 00:00, Stop order after: 3 Days  piperacillin/tazobactam IVPB. 3.375 Gram(s) IV Intermittent once, 25 @ 22:35, Stop order after: 1 Doses  piperacillin/tazobactam IVPB.- 3.375 Gram(s) IV Intermittent once, 25 @ 02:00  piperacillin/tazobactam IVPB.. 3.375 Gram(s) IV Intermittent every 12 hours, 25 @ 12:00, Stop order after: 7 Days      Heme Medications       GI Medications        Home Medications:  Last Order Reconciliation Date: Not Done  albuterol 90 mcg/inh inhalation aerosol: 2 puff(s) inhaled every 4 hours as needed for  bronchospasm (24)  ascorbic acid 500 mg oral tablet: 1 tab(s) orally once a day (24)  atorvastatin 40 mg oral tablet: 1 tab(s) orally once a day (at bedtime) (10-28-24)  clopidogrel 75 mg oral tablet: 1 tab(s) orally once a day (10-28-24)  FLUoxetine 60 mg oral tablet: 1 tab(s) orally once a day MDD: 1 (10-28-24)  fluticasone 50 mcg/inh nasal spray: 1 spray(s) nasal 2 times a day (24)  gabapentin 300 mg oral capsule: 1 cap(s) orally 3 times a day (10-28-24)  guaiFENesin 600 mg oral tablet, extended release: 1 tab(s) orally every 12 hours (24)  ipratropium-albuterol 0.5 mg-2.5 mg/3 mL inhalation solution: 3 milliliter(s) inhaled every 6 hours (24)  KlonoPIN 0.5 mg oral tablet: 1 tab(s) orally 2 times a day MDD: 2 (10-28-24)  lisinopril 5 mg oral tablet: 1 tab(s) orally once a day (10-28-24)  lithium 300 mg oral capsule: 1 cap(s) orally 2 times a day MDD: 2 (10-28-24)  Multiple Vitamins oral tablet: 1 tab(s) orally once a day (24)  predniSONE 20 mg oral tablet: 2 tab(s) orally once a day (24)  Seroquel 200 mg oral tablet: 1 tab(s) orally once a day (at bedtime) (10-28-24)  sodium chloride 0.65% nasal spray: 1 spray(s) nasal 4 times a day as needed for  congestion (24)  Vitamin D3 125 mcg (5000 intl units) oral tablet, disintegratin tab(s) orally once a day (10-28-24)        LABS:                        10.4   17.68 )-----------( 234      ( 2025 05:45 )             33.4         139  |  105  |  27[H]  ----------------------------<  232[H]  4.0   |  23  |  1.53[H]    Ca    8.2[L]      2025 05:45  Phos  3.2       Mg     1.7         TPro  6.5  /  Alb  2.9[L]  /  TBili  1.0  /  DBili  x   /  AST  164[H]  /  ALT  157[H]  /  AlkPhos  159[H]       PT/INR - ( 2025 18:30 )   PT: 12.9 sec;   INR: 1.09 ratio    PTT - ( 2025 18:30 )  PTT:27.8 sec      Urinalysis with Rflx Culture (25 @ 19:50)   Urine Appearance: Turbid  Color: Dark Yellow  Specific Gravity: 1.024  pH Urine: 5.0  Protein, Urine: 100 mg/dL  Glucose Qualitative, Urine: Negative mg/dL  Ketone - Urine: Trace mg/dL  Blood, Urine: Large  Bilirubin: Small  Urobilinogen: 1.0 mg/dL  Leukocyte Esterase Concentration: Moderate  Nitrite: Negative  Bacteria: Moderate /HPF  Squamous Epithelial Cells: Present  Red Blood Cell - Urine: 20 /HPF  White Blood Cell - Urine: 20 /HPF  COVID  25 @ 18:30  COVID -   NotDetec  24 @ 11:30  COVID -   NotDetec     Procalcitonin Trend  24 @ 11:26   -   0.12[H]  10-27-23 @ 12:19   -   0.09  10-27-23 @ 05:30   -   0.12[H]    WBC Trend  25 @ 05:45   -  17.68[H]  25 @ 18:30   -  16.17[H]    H/H Trend  25 @ 05:45   -   10.4[L]/ 33.4[L]  25 @ 18:30   -   10.9[L]/ 34.3[L]     Platelet Trend  25 @ 05:45   -  234  25 @ 18:30   -  241    Trend Sodium  25 @ 05:45   -  139  25 @ 18:30   -  141    Trend Potassium  25 @ 05:45   -  4.0  25 @ 18:30   -  4.4    Trend Bun/Cr  25 @ 05:45  BUN/CR -  27[H] / 1.53[H]  25 @ 18:30  BUN/CR -  32[H] / 2.12[H]  24 @ 06:24  BUN/CR -  14 / 1.07  24 @ 11:26  BUN/CR -  14 / 0.97  10-31-24 @ 08:14  BUN/CR -  15 / 1.02  10-30-24 @ 06:15  BUN/CR -  17 / 1.16  10-29-24 @ 07:26  BUN/CR -  12 / 1.08  10-28-24 @ 12:55  BUN/CR -  9 / 1.05  23 @ 06:30  BUN/CR -  22 / 0.94  23 @ 07:56  BUN/CR -  17 / 0.89  23 @ 06:00  BUN/CR -  19 / 0.87  23 @ 06:30  BUN/CR -  15 / 1.14      Lactic Acid Trend  25 @ 18:30   -   1.3       Trend AST/ALT/ALK Phos/Bili  25 @ 05:45   164[H]/157[H]/159[H]/1.0  25 @ 18:30   75[H]/89[H]/161[H]/1.2  10-31-24 @ 08:14   28/22/86/0.7  10-30-24 @ 06:15   30/27/84/0.8  10-28-24 @ 12:55   35/28/110/1.3[H]  23 @ 06:30   21/34/105/0.4  23 @ 07:56   34/39/121[H]/0.4  23 @ 06:00   29/34/127[H]/0.4  23 @ 06:30   35/42/121[H]/0.4       Ammonia Trend  10-27-23 @ 05:30   -   17         Albumin Trend  25 @ 05:45   -   2.9[L]  25 @ 18:30   -   3.1[L]  10-31-24 @ 08:14   -   2.6[L]  10-30-24 @ 06:15   -   2.9[L]  10-28-24 @ 12:55   -   3.4  23 @ 06:30   -   3.1[L]      PTT - PT - INR Trend  25 @ 18:30   -   27.8 - 12.9 - 1.09  10-27-23 @ 00:50   -   26.9 - 11.1 - 0.99    Glucose Trend  25 @ 05:45   -  232[H] -- --  25 @ 05:08   -  -- -- 151[H]  25 @ 00:34   -  -- -- 149[H]  25 @ 22:22   -  -- -- 125[H]  25 @ 18:30   -  128[H] -- --    A1C with Estimated Average Glucose Result: 6.1 % *H* [4.0 - 5.6] (24 @ 19:10)      Lithium Level, Serum (25 @ 18:30)   Lithium Level, Serum: 2.72: Test Repeated         RADIOLOGY  CXR:      CT:  < from: CT Head No Cont (25 @ 23:18) >  COMPARISON: 10/28/2024.    FINDINGS: Artifact degrades images of the posterior fossa/lower brain.   There is no obvious acute intracranial hemorrhage, mass effect or midline   shift, given the extent of artifact. Nonspecific mild periventricular and   subcortical white matter hypodensities likely represent microvascular   ischemic changes. There is stable cerebral volume loss. Partially empty   sella.    There is no depressed skull fracture. Persistent sinus mucosal thickening   with opacification of the left ethmoid and maxillary sinuses. Persistent   heterogenous structure at the left nasal cavity protruding into the left   maxillary sinus. Visualized tympanomastoid region is unremarkable.    IMPRESSION:    No obvious acute intracranial hemorrhage or mass effect. If clinically   indicated, short-term follow-up or MRI may be obtained for further   evaluation.    --- End of Report ---    < end of copied text >  ACC: [id number] EXAM:  CT ABDOMEN AND PELVIS   ORDERED BY:  WILMER WILLIS     ACC: [id number] EXAM:  CT CHEST   ORDERED BY:  WILMER WILLIS     PROCEDURE DATE:  2025          INTERPRETATION:  CLINICAL INDICATION: sepsis, pna?    PROCEDURE: CT of the chest, abdomen and pelvis was performed without   intravenous contrast. Coronal and sagittal reconstruction images were   obtained.    CONTRAST/COMPLICATIONS:  IV Contrast: NONE  Oral Contrast: NONE  Complications: NA.    COMPARISON: 10/28/2024. 10/29/2024.    FINDINGS: Evaluation of the thoracic, abdominal and pelvic organs and   vasculature is limited without intravenous contrast. Patient's   respiratory motion and arms degrades images.    CHEST:    LUNGS AND AIRWAYS: Patent central airways. Patchy opacities at bilateral   lower lobes. Question small opacities at the right middle lobe.   Atelectasis.  PLEURA: No pleural effusion or pneumothorax.  HEART: Normal size. No pericardial effusion.  VESSELS: Atherosclerosis. Normal caliber ofthe thoracic aorta.  MEDIASTINUM AND ALEKSANDER: Subcentimeter lymph nodes.  CHEST WALL AND LOWER NECK: Unremarkable.    ABDOMEN/PELVIS:    LIVER: Unremarkable.  BILE DUCTS/GALLBLADDER: No intrahepatic biliary dilatation. Prominent   common bile duct, reflecting postcholecystectomy state.  PANCREAS: Diffuse fatty atrophy.  SPLEEN: Unremarkable.    ADRENALS: Unremarkable.  KIDNEYS/URETERS: Bilateral perinephric stranding without   hydroureteronephrosis.  BLADDER: Collapsed around a Lo catheter.  REPRODUCTIVE ORGANS: Unremarkable.    BOWEL: Small hiatal hernia. No bowel obstruction. Unremarkable appendix.   Colon diverticulosis. Fluid-filled small bowel loops and right colon.  PERITONEUM: No organized fluid collection or free air.  VESSELS: Atherosclerosis. Normal caliber of the abdominal aorta.  RETROPERITONEUM/LYMPH NODE: No lymphadenopathy.  ABDOMINAL WALL/SOFT TISSUES: Small fat-containing umbilical hernia. Small   left hip joint effusion again noted.  BONES: Rightward curvature and degenerative changes of the spine.    IMPRESSION:    Suspect pneumonia. Recommend follow-up to resolution to exclude neoplasm,   following completion of treatment.    Nonspecific fluid-filled small bowel loops and right colon without   significant inflammatory change. Recommend clinical correlation to assess   enterocolitis.    Additional findings as described.    --- End of Report ---      ECHO:      VITALS:  T(C): 37.2 (25 @ 05:00), Max: 38.6 (25 @ 18:37)  T(F): 98.9 (25 @ 05:00), Max: 101.4 (25 @ 18:37)  HR: 82 (25 06:30) (43 - 100)  BP: 115/62 (25 @ 06:30) (70/30 - 127/58)  BP(mean): 77 (25 @ 06:30) (46 - 88)  ABP: --  ABP(mean): --  RR: 20 (25 @ 06:30) (12 - 25)  SpO2: 100% (25 06:30) (93% - 100%)  CVP(mm Hg): --  CVP(cm H2O): --    Ins and Outs     25 @ 07:01  -  25 @ 07:00  --------------------------------------------------------  IN: 592.3 mL / OUT: 525 mL / NET: 67.3 mL        Height (cm): 160 (25 @ 18:15)  Weight (kg): 97.1 (25 @ 23:45)  BMI (kg/m2): 37.9 (25 @ 23:45)        I&O's Detail    2025 07:01  -  2025 07:00  --------------------------------------------------------  IN:    Lactated Ringers: 525 mL    Norepinephrine: 67.3 mL  Total IN: 592.3 mL    OUT:    Indwelling Catheter - Urethral (mL): 525 mL  Total OUT: 525 mL    Total NET: 67.3 mL

## 2025-01-31 NOTE — BH CONSULTATION LIAISON ASSESSMENT NOTE - OTHER PAST PSYCHIATRIC HISTORY (INCLUDE DETAILS REGARDING ONSET, COURSE OF ILLNESS, INPATIENT/OUTPATIENT TREATMENT)
Pt seen by C/L service while hospitalized at [facility], at the time the concern was capacity.   Pt was only on Klonopin. In terms of chart review, pt has had slow increase noted in BUN/Creatinine.

## 2025-01-31 NOTE — PROGRESS NOTE ADULT - ASSESSMENT
69F PMHx MDD, HTN, HLD, bipolar disorder, CVA who presented to the ER from [facility] with lethargy and fever. Admitted to MICU with:  Assessment:  1. Septic Shock  2. UTI  3. ?PNA  4. Enterocolitis  5. GURDEEP  6. Transaminitis    Plan:  NEURO:   -CT head negative for acute intracranial pathology.   -Prozac QD, Klonopin BID, Seroquel QHS. 1:1 monitoring. Psych following.   -Monitor mental status closely, avoid neurosuppresants.   -Serial neurologic assessments.     CV:   -Remains in vasopressor-dependent shock state with low-dose Levophed infusion - actively titrating to maintain goal MAP >65 monitoring end points of organ perfusion. Albumin 25% 100cc IVPBx1 ordered tonight to facilitate weaning off IV vasopressor therapy.   -Keep K~4 and Mg>2 for optimal arrhythmia suppression.  -Official TTE performed, results pending.     PULM:   -Utilize supplemental O2 PRN to maintain goal SpO2 >88%.   -Incentive spirometry, Chest PT/Pulmonary toilet, HOB >30'.     GI:  -NPO.  -Elevated transaminases, possibly ischemic hepatitis iso shock state. Trend pulse liver function QD. Check Hepatitis panel in AM.     RENAL:   -Renal function currently with GURDEEP, possibly pre-renal iso dehydration versus ATN iso shock state. Improved.   -trend lytes/Scr daily with BMP  -I's and O's, goal UOP 0.5 cc/kg/hr  -renal dose meds and avoid nephrotoxins   -Gentle mIVF with LR 100cc/h.   -Lithium level initially elevated. Repeat level sent out to core lab, results pending. Nephrology consultation pending.     ENDO:   -Aggressive glycemic control to limit FS glucose to <180mg/dl.     ID:   -Afebrile. UA positive. Cx sent - pending. Empiric Zosyn course.   -ABX use and/or discontinuation based on discussion with ID in conjunction with clinical features, culture data, and judicious procalcitonin monitoring.    HEME:   -VTE ppx with [facility].     GOC: Full Code.

## 2025-01-31 NOTE — DIETITIAN INITIAL EVALUATION ADULT - PERTINENT MEDS FT
MEDICATIONS  (STANDING):  atorvastatin 40 milliGRAM(s) Oral at bedtime  chlorhexidine 2% Cloths 1 Application(s) Topical <User Schedule>  clonazePAM  Tablet 0.5 milliGRAM(s) Oral two times a day  clopidogrel Tablet 75 milliGRAM(s) Oral daily  heparin   Injectable 5000 Unit(s) SubCutaneous every 8 hours  hydrocortisone sodium succinate Injectable 50 milliGRAM(s) IV Push four times a day  lactated ringers. 1000 milliLiter(s) (100 mL/Hr) IV Continuous <Continuous>  norepinephrine Infusion 0.05 MICROgram(s)/kG/Min (9.23 mL/Hr) IV Continuous <Continuous>  piperacillin/tazobactam IVPB.. 3.375 Gram(s) IV Intermittent every 12 hours  QUEtiapine 200 milliGRAM(s) Oral at bedtime  senna 2 Tablet(s) Oral at bedtime    MEDICATIONS  (PRN):

## 2025-01-31 NOTE — PHARMACOTHERAPY INTERVENTION NOTE - COMMENTS
Known drug interaction of Li with ACE-I.     Although patient has taken lisinopril for >1y and cause of Li toxicity is unknown at this time. Would recommend not restarting ACE-I, ARB, loop diuretics or NSAIDs
1/30 - Li serum 2.72    Presenting with AMS, confusion, and delirium are all known toxicities of Li once level is >2.5    C/w fluids and repeat level tomorrow AM    Discussed with CCU team
Med recc confirmed via facility medical record

## 2025-01-31 NOTE — BH CONSULTATION LIAISON ASSESSMENT NOTE - NSBHCHARTREVIEWLAB_PSY_A_CORE FT
01-31    140  |  108  |  21  ----------------------------<  120[H]  3.9   |  22  |  1.20    Ca    8.7      31 Jan 2025 12:54  Phos  2.1     01-31  Mg     1.4     01-31    TPro  6.5  /  Alb  2.9[L]  /  TBili  1.0  /  DBili  x   /  AST  164[H]  /  ALT  157[H]  /  AlkPhos  159[H]  01-31                          10.4   17.68 )-----------( 234      ( 31 Jan 2025 05:45 )             33.4     Lithium level 2.72

## 2025-01-31 NOTE — DIETITIAN INITIAL EVALUATION ADULT - ORAL INTAKE PTA/DIET HISTORY
as per transfer info from nursing facility patient receives a pureed diet with mild thickened liquids

## 2025-01-31 NOTE — BH CONSULTATION LIAISON ASSESSMENT NOTE - NSBHCHARTREVIEWINVESTIGATE_PSY_A_CORE FT
< from: 12 Lead ECG (01.30.25 @ 18:26) >    Ventricular Rate 68 BPM    Atrial Rate 68 BPM    P-R Interval 158 ms    QRS Duration 84 ms    Q-T Interval 436 ms    QTC Calculation(Bazett) 463 ms    P Axis 32 degrees    R Axis 19 degrees    T Axis 54 degrees    Diagnosis Line Sinus rhythm with fusion complexes  Nonspecific T wave abnormality  Abnormal ECG  When compared with ECG of 28-OCT-2024 14:29,  fusion complexes are now present  Confirmed by JOSE RAMON GARCIA ([id number]) on 1/31/2025 11:09:02 AM    < end of copied text >    < from: CT Head No Cont (01.30.25 @ 23:18) >    ACC: [id number] EXAM:  CT BRAIN   ORDERED BY:  GURMEET KARIMI     PROCEDURE DATE:  01/30/2025          INTERPRETATION:  CLINICAL INDICATION: ams    TECHNIQUE: CT axial images of the head were obtained without intravenous   contrast. Computer-reconstructed coronal and sagittal images were   obtained.    CONTRAST:  IV Contrast: NONE  Complications: NA.    COMPARISON: 10/28/2024.    FINDINGS: Artifact degrades images of the posterior fossa/lower brain.   There is no obvious acute intracranial hemorrhage, mass effect or midline   shift, given the extent of artifact. Nonspecific mild periventricular and   subcortical white matter hypodensities likely represent microvascular   ischemic changes. There is stable cerebral volume loss. Partially empty   sella.    There is no depressed skull fracture. Persistent sinus mucosal thickening   with opacification of the left ethmoid and maxillary sinuses. Persistent   heterogenous structure at the left nasal cavity protruding into the left   maxillary sinus. Visualized tympanomastoid region is unremarkable.    IMPRESSION:    No obvious acute intracranial hemorrhage or mass effect. If clinically   indicated, short-term follow-up or MRI may be obtained for further   evaluation.    --- End of Report ---            EDEN SUAREZ MD; Attending Radiologist  This document has been electronically signed. Jan 31 2025 12:25AM    < end of copied text >

## 2025-01-31 NOTE — BH CONSULTATION LIAISON ASSESSMENT NOTE - SUMMARY
69 year old female with a PMH of MDD, HTN, HLD, bipolar disorder, CVA who presented to the ED from [facility] with lethargy and fever.  Psychiatry asked to see patient due to lithium toxicity and medication management recommendations, pt with no family contacts, and writer unable to reach anyone at [facility] for past psychiatric history.   Plan of care discussed with ICU attending, and would address agitation due to it affecting her ability to be safely cared for in the ICU setting.

## 2025-01-31 NOTE — BH CONSULTATION LIAISON ASSESSMENT NOTE - NSBHCHARTREVIEWVS_PSY_A_CORE FT
Vital Signs Last 24 Hrs  T(C): 36.1 (31 Jan 2025 15:30), Max: 38.6 (30 Jan 2025 18:37)  T(F): 97 (31 Jan 2025 15:30), Max: 101.4 (30 Jan 2025 18:37)  HR: 61 (31 Jan 2025 16:15) (43 - 100)  BP: 140/96 (31 Jan 2025 16:15) (59/38 - 200/177)  BP(mean): 111 (31 Jan 2025 16:15) (43 - 187)  RR: 13 (31 Jan 2025 16:15) (12 - 30)  SpO2: 97% (31 Jan 2025 16:15) (92% - 100%)    Parameters below as of 31 Jan 2025 15:00  Patient On (Oxygen Delivery Method): nasal cannula  O2 Flow (L/min): 5

## 2025-01-31 NOTE — BH CONSULTATION LIAISON ASSESSMENT NOTE - DIFFERENTIAL
Delirium due to multiple factors vs delirium due to Lithium toxicity.   Pt with Bipolar disorder, poly pharmacy.

## 2025-01-31 NOTE — PROGRESS NOTE ADULT - SUBJECTIVE AND OBJECTIVE BOX
Patient is a 69y old  Female who presents with a chief complaint of Sepsis (31 Jan 2025 10:31)    BRIEF HOSPITAL COURSE:   69F PMHx MDD, HTN, HLD, bipolar disorder, CVA who presented to the ER from [facility] with lethargy and fever. Admitted to MICU with Septic Shock, UTI, ?PNA, Enterocolitis, GURDEEP, Transaminitis.     Events last 24 hours:   Remains in vasopressor-dependent shock state with low-dose Levophed Infusion.   Renal indices improving, transaminases slightly uptrending, FS wnl, afebrile.     PAST MEDICAL & SURGICAL HISTORY:  HTN (hypertension)  HLD (hyperlipidemia)  Bipolar illness  MDD (major depressive disorder)  Effusion, left hip  Fall  Muscle wasting  History of TIAs  Cerebral infarction  Pressure-induced deep tissue damage of left heel  Hypertension  Hyperlipidemia  History of cholecystectomy    Review of Systems:  Due to mentation, subjective information was not able to be obtained from the patient. History was obtained, to the extent possible, from review of the chart and collateral sources of information.     Medications:  piperacillin/tazobactam IVPB.. 3.375 Gram(s) IV Intermittent every 12 hours  norepinephrine Infusion 0.05 MICROgram(s)/kG/Min IV Continuous <Continuous>  clonazePAM  Tablet 0.5 milliGRAM(s) Oral two times a day  QUEtiapine 200 milliGRAM(s) Oral at bedtime  clopidogrel Tablet 75 milliGRAM(s) Oral daily  heparin   Injectable 5000 Unit(s) SubCutaneous every 8 hours  senna 2 Tablet(s) Oral at bedtime  hydrocortisone sodium succinate Injectable 50 milliGRAM(s) IV Push every 6 hours  lactated ringers. 1000 milliLiter(s) IV Continuous <Continuous>  chlorhexidine 2% Cloths 1 Application(s) Topical <User Schedule>    ICU Vital Signs Last 24 Hrs  T(C): 36.1 (31 Jan 2025 15:30), Max: 37.3 (30 Jan 2025 23:45)  T(F): 97 (31 Jan 2025 15:30), Max: 99.2 (30 Jan 2025 23:45)  HR: 63 (31 Jan 2025 18:00) (43 - 100)  BP: 93/45 (31 Jan 2025 18:00) (59/38 - 200/177)  BP(mean): 58 (31 Jan 2025 18:00) (43 - 187)  ABP: --  ABP(mean): --  RR: 13 (31 Jan 2025 18:00) (11 - 30)  SpO2: 100% (31 Jan 2025 18:00) (92% - 100%)  O2 Parameters below as of 31 Jan 2025 18:00  Patient On (Oxygen Delivery Method): nasal cannula  O2 Flow (L/min): 5    I&O's Detail  30 Jan 2025 07:01  -  31 Jan 2025 07:00  --------------------------------------------------------  IN:    Lactated Ringers: 600 mL    Norepinephrine: 70.9 mL  Total IN: 670.9 mL  OUT:    Indwelling Catheter - Urethral (mL): 525 mL  Total OUT: 525 mL  Total NET: 145.9 mL    31 Jan 2025 07:01  -  31 Jan 2025 18:40  --------------------------------------------------------  IN:    Lactated Ringers: 1075 mL    Lactated Ringers Bolus: 500 mL    Norepinephrine: 18 mL  Total IN: 1593 mL  OUT:    Indwelling Catheter - Urethral (mL): 300 mL    Voided (mL): 200 mL  Total OUT: 500 mL  Total NET: 1093 mL    LABS:                      10.4   17.68 )-----------( 234      ( 31 Jan 2025 05:45 )             33.4     01-31  140  |  108  |  21  ----------------------------<  120[H]  3.9   |  22  |  1.20  Ca    8.7      31 Jan 2025 12:54  Phos  2.1     01-31  Mg     1.4     01-31  TPro  6.5  /  Alb  2.9[L]  /  TBili  1.0  /  DBili  x   /  AST  164[H]  /  ALT  157[H]  /  AlkPhos  159[H]  01-31    CAPILLARY BLOOD GLUCOSE  POCT Blood Glucose.: 130 mg/dL (31 Jan 2025 17:18)    PT/INR - ( 30 Jan 2025 18:30 )   PT: 12.9 sec;   INR: 1.09 ratio    PTT - ( 30 Jan 2025 18:30 )  PTT:27.8 sec    Urinalysis Basic - ( 31 Jan 2025 12:54 )  Color: x / Appearance: x / SG: x / pH: x  Gluc: 120 mg/dL / Ketone: x  / Bili: x / Urobili: x   Blood: x / Protein: x / Nitrite: x   Leuk Esterase: x / RBC: x / WBC x   Sq Epi: x / Non Sq Epi: x / Bacteria: x    CULTURES:    Physical Examination:  General: Intermittently agitated.   HEENT: PERRL.  NECK: Supple.   PULM: Clear to auscultation bilaterally.  CVS: s1/s2.  ABD: Soft, nondistended, nontender, normoactive bowel sounds.  EXT: No edema, nontender.  SKIN: Warm.    RADIOLOGY:   < from: CT Head No Cont (01.30.25 @ 23:18) >  ACC: [id number] EXAM:  CT BRAIN   ORDERED BY:  GURMEET KARIMI   PROCEDURE DATE:  01/30/2025    IMPRESSION:  No obvious acute intracranial hemorrhage or mass effect. If clinically   indicated, short-term follow-up or MRI may be obtained for further   evaluation.    CRITICAL CARE TIME SPENT:  35 minutes of critical care time spent providing medical care for patient's acute illness/conditions that impairs at least one vital organ system and/or poses a high risk of imminent or life threatening deterioration in the patient's condition. It includes time spent evaluating and treating the patient's acute illness as well as time spent reviewing labs, radiology, discussing goals of care with patient and/or patient's family, and discussing the case with a multidisciplinary team, in an effort to prevent further life threatening deterioration or end organ damage. This time is independent of any procedures performed.    Date of entry of this note is equal to the date of services rendered.

## 2025-01-31 NOTE — BH CONSULTATION LIAISON ASSESSMENT NOTE - RISK ASSESSMENT
Pt at risk to harm others due to agitation and mood lability.   Pt did not appear to be an acute or long term risk of suicidality due to highly supervised setting and no recent history of psychiatric hospitalization that could be obtained.

## 2025-01-31 NOTE — BH CONSULTATION LIAISON ASSESSMENT NOTE - HPI (INCLUDE ILLNESS QUALITY, SEVERITY, DURATION, TIMING, CONTEXT, MODIFYING FACTORS, ASSOCIATED SIGNS AND SYMPTOMS)
HPI:  69 year old female with a PMH of MDD, HTN, HLD, bipolar disorder, CVA who presented to the ED from [facility] with lethargy and fever.  In the ED, workup revealed a leukocytosis, GURDEEP, transaminitis, and UA +.  Received 2600 cc IVF and remained hypotensive prompting an ICU consult.  (30 Jan 2025 22:08)    Psychiatry C/L Note: Pt seen and evaluated today, pt is a poor historian and has no emergency contacts to obtain collateral, at one point lived at [facility], and now is at [facility] for long term care. Pt had polypharmacy for her mood disorder, and has been on Lithium 300 mg bid, Klonopin 0.5 mg  bid, Prozac 60 mg ,   Gabapentin 300 mg tid, Quetiapine 200 mg at bedtime. Pt today noted to have elevated Lithium level, pt endorse bouts of diarrhea, but could not speak to other signs of Lithium toxicity, on evaluation her mood is labile, she is combative, attempts to scratch, spit at and curse staff that attempt to care for her , earlier in the day described as lethargic. Pt is also on pressors for hypotension and at times is bradycardic as well.   Plan of care would be to hold Lithium , repeat level, consult nephrology as needed for recommendations for IVF management or concerns regarding metabolism , elimination of Lithium. Pt however due to agitation will need at least prn medications for her combativeness without compromising her airway, blood pressure and level of consciousness.

## 2025-01-31 NOTE — DIETITIAN INITIAL EVALUATION ADULT - OTHER INFO
Supplies for condom catheters and 4leg bags per month.  Follow-up with Urology.   69 year old female with a PMH of MDD, HTN, HLD, bipolar disorder, CVA who presented to the ED from [facility] with lethargy and fever. workup revealed a leukocytosis, GURDEEP, transaminitis, and UA +2600 cc IVF and remained hypotensive admitted to CCU , Patient NPO with LR @ 100ml/hr , Levophed noted , IV Zosyn provided ,Labs /POCT reviewed , most recent A1c 6.1% (11/2024) Unable to obtain diet / weigh hx as patient with AMS , Skin intact , No edema , unable to conduct NFPE as per not receptive , suggest SLP eval due to patient noted on altered consistency diet PTA .

## 2025-01-31 NOTE — PATIENT PROFILE ADULT - FALL HARM RISK - HARM RISK INTERVENTIONS
Assistance with ambulation/Assistance OOB with selected safe patient handling equipment/Communicate Risk of Fall with Harm to all staff/Discuss with provider need for PT consult/Monitor gait and stability/Reinforce activity limits and safety measures with patient and family/Tailored Fall Risk Interventions/Visual Cue: Yellow wristband and red socks/Bed in lowest position, wheels locked, appropriate side rails in place/Call bell, personal items and telephone in reach/Instruct patient to call for assistance before getting out of bed or chair/Non-slip footwear when patient is out of bed/Sieper to call system/Physically safe environment - no spills, clutter or unnecessary equipment/Purposeful Proactive Rounding/Room/bathroom lighting operational, light cord in reach

## 2025-01-31 NOTE — BH CONSULTATION LIAISON ASSESSMENT NOTE - NSBHCONSULTRECOMMENDOTHER_PSY_A_CORE FT
Consider standing Abilify 5 mg daily and monitor response, defer a Depakote trial due to elevated LFT's.  Would also defer use of Gabapentin or nephrotoxic agents, renal consult, repeat Lithium for trend.   When more clinically stable will be better able to address cognition and consider use of cognitive enhancing agent like Namenda.     STop Quetiapine ( due to risk of hypotension), Stop Lithium.   Stop Prozac, due to agitation, and mood lability, stop Gabapentin due to renal concerns.   Continue Klonopin to avoid complication of benzodiazepine withdrawal. or convert to equivalent dose of  Alprazolam due to increased LFT's.

## 2025-01-31 NOTE — BH CONSULTATION LIAISON ASSESSMENT NOTE - DESCRIPTION
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

## 2025-01-31 NOTE — BH CONSULTATION LIAISON ASSESSMENT NOTE - OTHER
speech largely incoherent.  not tested.  Peers cooperative with the undersigned, but if IV flushed or oxygen cannula adjusted, pt swearing and attempting to hit staff, had to be redirected for routine care.  unknown

## 2025-02-01 LAB
ANION GAP SERPL CALC-SCNC: 11 MMOL/L — SIGNIFICANT CHANGE UP (ref 5–17)
ANION GAP SERPL CALC-SCNC: 9 MMOL/L — SIGNIFICANT CHANGE UP (ref 5–17)
BUN SERPL-MCNC: 14 MG/DL — SIGNIFICANT CHANGE UP (ref 7–23)
BUN SERPL-MCNC: 16 MG/DL — SIGNIFICANT CHANGE UP (ref 7–23)
CALCIUM SERPL-MCNC: 9.3 MG/DL — SIGNIFICANT CHANGE UP (ref 8.4–10.5)
CALCIUM SERPL-MCNC: 9.5 MG/DL — SIGNIFICANT CHANGE UP (ref 8.4–10.5)
CHLORIDE SERPL-SCNC: 107 MMOL/L — SIGNIFICANT CHANGE UP (ref 96–108)
CHLORIDE SERPL-SCNC: 109 MMOL/L — HIGH (ref 96–108)
CO2 SERPL-SCNC: 23 MMOL/L — SIGNIFICANT CHANGE UP (ref 22–31)
CO2 SERPL-SCNC: 24 MMOL/L — SIGNIFICANT CHANGE UP (ref 22–31)
CREAT SERPL-MCNC: 1.05 MG/DL — SIGNIFICANT CHANGE UP (ref 0.5–1.3)
CREAT SERPL-MCNC: 1.05 MG/DL — SIGNIFICANT CHANGE UP (ref 0.5–1.3)
EGFR: 57 ML/MIN/1.73M2 — LOW
EGFR: 57 ML/MIN/1.73M2 — LOW
GLUCOSE BLDC GLUCOMTR-MCNC: 156 MG/DL — HIGH (ref 70–99)
GLUCOSE BLDC GLUCOMTR-MCNC: 165 MG/DL — HIGH (ref 70–99)
GLUCOSE BLDC GLUCOMTR-MCNC: 178 MG/DL — HIGH (ref 70–99)
GLUCOSE BLDC GLUCOMTR-MCNC: 182 MG/DL — HIGH (ref 70–99)
GLUCOSE SERPL-MCNC: 138 MG/DL — HIGH (ref 70–99)
GLUCOSE SERPL-MCNC: 156 MG/DL — HIGH (ref 70–99)
HCT VFR BLD CALC: 33.5 % — LOW (ref 34.5–45)
HGB BLD-MCNC: 10.4 G/DL — LOW (ref 11.5–15.5)
LITHIUM SERPL-MCNC: 1.51 MMOL/L — CRITICAL HIGH (ref 0.6–1.2)
LITHIUM SERPL-MCNC: 2.22 MMOL/L — CRITICAL HIGH (ref 0.6–1.2)
MAGNESIUM SERPL-MCNC: 2.1 MG/DL — SIGNIFICANT CHANGE UP (ref 1.6–2.6)
MAGNESIUM SERPL-MCNC: 2.4 MG/DL — SIGNIFICANT CHANGE UP (ref 1.6–2.6)
MCHC RBC-ENTMCNC: 30.4 PG — SIGNIFICANT CHANGE UP (ref 27–34)
MCHC RBC-ENTMCNC: 31 G/DL — LOW (ref 32–36)
MCV RBC AUTO: 98 FL — SIGNIFICANT CHANGE UP (ref 80–100)
NRBC # BLD: 0 /100 WBCS — SIGNIFICANT CHANGE UP (ref 0–0)
NRBC BLD-RTO: 0 /100 WBCS — SIGNIFICANT CHANGE UP (ref 0–0)
PHOSPHATE SERPL-MCNC: 2.9 MG/DL — SIGNIFICANT CHANGE UP (ref 2.5–4.5)
PHOSPHATE SERPL-MCNC: 3.5 MG/DL — SIGNIFICANT CHANGE UP (ref 2.5–4.5)
PLATELET # BLD AUTO: 235 K/UL — SIGNIFICANT CHANGE UP (ref 150–400)
POTASSIUM SERPL-MCNC: 3.5 MMOL/L — SIGNIFICANT CHANGE UP (ref 3.5–5.3)
POTASSIUM SERPL-MCNC: 4.3 MMOL/L — SIGNIFICANT CHANGE UP (ref 3.5–5.3)
POTASSIUM SERPL-SCNC: 3.5 MMOL/L — SIGNIFICANT CHANGE UP (ref 3.5–5.3)
POTASSIUM SERPL-SCNC: 4.3 MMOL/L — SIGNIFICANT CHANGE UP (ref 3.5–5.3)
RBC # BLD: 3.42 M/UL — LOW (ref 3.8–5.2)
RBC # FLD: 14.4 % — SIGNIFICANT CHANGE UP (ref 10.3–14.5)
SODIUM SERPL-SCNC: 141 MMOL/L — SIGNIFICANT CHANGE UP (ref 135–145)
SODIUM SERPL-SCNC: 142 MMOL/L — SIGNIFICANT CHANGE UP (ref 135–145)
WBC # BLD: 11.95 K/UL — HIGH (ref 3.8–10.5)
WBC # FLD AUTO: 11.95 K/UL — HIGH (ref 3.8–10.5)

## 2025-02-01 PROCEDURE — 71045 X-RAY EXAM CHEST 1 VIEW: CPT | Mod: 26

## 2025-02-01 PROCEDURE — 99232 SBSQ HOSP IP/OBS MODERATE 35: CPT

## 2025-02-01 PROCEDURE — 93010 ELECTROCARDIOGRAM REPORT: CPT

## 2025-02-01 RX ORDER — ASCORBIC ACID 500 MG/ML
500 VIAL (ML) INJECTION DAILY
Refills: 0 | Status: DISCONTINUED | OUTPATIENT
Start: 2025-02-01 | End: 2025-02-11

## 2025-02-01 RX ORDER — ALBUMIN HUMAN 50 G/1000ML
100 SOLUTION INTRAVENOUS ONCE
Refills: 0 | Status: COMPLETED | OUTPATIENT
Start: 2025-02-01 | End: 2025-02-01

## 2025-02-01 RX ORDER — ALBUMIN HUMAN 50 G/1000ML
100 SOLUTION INTRAVENOUS EVERY 4 HOURS
Refills: 0 | Status: COMPLETED | OUTPATIENT
Start: 2025-02-01 | End: 2025-02-01

## 2025-02-01 RX ORDER — CETIRIZINE HCL 10 MG
10 TABLET ORAL DAILY
Refills: 0 | Status: DISCONTINUED | OUTPATIENT
Start: 2025-02-01 | End: 2025-02-11

## 2025-02-01 RX ADMIN — ALBUMIN HUMAN 50 MILLILITER(S): 50 SOLUTION INTRAVENOUS at 09:11

## 2025-02-01 RX ADMIN — ANTISEPTIC SURGICAL SCRUB 1 APPLICATION(S): 0.04 SOLUTION TOPICAL at 05:53

## 2025-02-01 RX ADMIN — Medication 50 MILLIGRAM(S): at 17:23

## 2025-02-01 RX ADMIN — PIPERACILLIN SODIUM AND TAZOBACTAM SODIUM 25 GRAM(S): 2; 250 INJECTION, POWDER, FOR SOLUTION INTRAVENOUS at 23:06

## 2025-02-01 RX ADMIN — Medication 50 MILLIGRAM(S): at 23:05

## 2025-02-01 RX ADMIN — Medication 75 MILLIGRAM(S): at 11:13

## 2025-02-01 RX ADMIN — Medication 5000 UNIT(S): at 13:18

## 2025-02-01 RX ADMIN — Medication 50 MILLIGRAM(S): at 05:04

## 2025-02-01 RX ADMIN — Medication 5000 UNIT(S): at 11:23

## 2025-02-01 RX ADMIN — Medication 500 MILLIGRAM(S): at 11:23

## 2025-02-01 RX ADMIN — Medication 5000 UNIT(S): at 05:04

## 2025-02-01 RX ADMIN — Medication 50 MILLIGRAM(S): at 11:11

## 2025-02-01 RX ADMIN — ALBUMIN HUMAN 50 MILLILITER(S): 50 SOLUTION INTRAVENOUS at 13:18

## 2025-02-01 RX ADMIN — ALBUMIN HUMAN 50 MILLILITER(S): 50 SOLUTION INTRAVENOUS at 06:01

## 2025-02-01 RX ADMIN — QUETIAPINE FUMARATE 200 MILLIGRAM(S): 300 TABLET ORAL at 21:02

## 2025-02-01 RX ADMIN — Medication 40 MILLIGRAM(S): at 08:38

## 2025-02-01 RX ADMIN — PIPERACILLIN SODIUM AND TAZOBACTAM SODIUM 25 GRAM(S): 2; 250 INJECTION, POWDER, FOR SOLUTION INTRAVENOUS at 12:48

## 2025-02-01 RX ADMIN — Medication 5000 UNIT(S): at 21:02

## 2025-02-01 RX ADMIN — Medication 2 TABLET(S): at 21:02

## 2025-02-01 RX ADMIN — Medication 60 MILLIGRAM(S): at 11:12

## 2025-02-01 RX ADMIN — Medication 0.5 MILLIGRAM(S): at 17:25

## 2025-02-01 RX ADMIN — Medication 10 MILLIGRAM(S): at 11:12

## 2025-02-01 RX ADMIN — SODIUM CHLORIDE 100 MILLILITER(S): 9 INJECTION, SOLUTION INTRAVENOUS at 04:27

## 2025-02-01 NOTE — PROGRESS NOTE ADULT - SUBJECTIVE AND OBJECTIVE BOX
INTERVAL HPI/OVERNIGHT EVENTS:       Antimicrobial:  piperacillin/tazobactam IVPB.. 3.375 Gram(s) IV Intermittent every 12 hours    Cardiovascular:  norepinephrine Infusion 0.05 MICROgram(s)/kG/Min IV Continuous <Continuous>    Pulmonary:  cetirizine 10 milliGRAM(s) Oral daily    Hematalogic:  clopidogrel Tablet 75 milliGRAM(s) Oral daily  heparin   Injectable 5000 Unit(s) SubCutaneous every 8 hours    Other:  ascorbic acid 500 milliGRAM(s) Oral daily  chlorhexidine 2% Cloths 1 Application(s) Topical <User Schedule>  cholecalciferol 5000 Unit(s) Oral daily  clonazePAM  Tablet 0.5 milliGRAM(s) Oral two times a day  FLUoxetine Solution 60 milliGRAM(s) Oral daily  hydrocortisone sodium succinate Injectable 50 milliGRAM(s) IV Push every 6 hours  lactated ringers. 1000 milliLiter(s) IV Continuous <Continuous>  QUEtiapine 200 milliGRAM(s) Oral at bedtime  senna 2 Tablet(s) Oral at bedtime      Drug Dosing Weight  Height (cm): 160 (30 Jan 2025 18:15)  Weight (kg): 97.1 (30 Jan 2025 23:45)  BMI (kg/m2): 37.9 (30 Jan 2025 23:45)  BSA (m2): 1.99 (30 Jan 2025 23:45)    CENTRAL LINE: [ ] YES [ ] NO  LOCATION:   DATE INSERTED:    ARAUJO: [ ] YES [ ] NO    DATE INSERTED:    A-LINE:  [ ] YES [ ] NO  LOCATION:   DATE INSERTED:    PMH/Social Hx/Fam Hx -reviewed admission note, no change since admission  PAST MEDICAL & SURGICAL HISTORY:  HTN (hypertension)      HLD (hyperlipidemia)      Bipolar illness      MDD (major depressive disorder)      Effusion, left hip      Fall      Muscle wasting      History of TIAs      Cerebral infarction      Pressure-induced deep tissue damage of left heel      Hypertension      Hyperlipidemia      History of cholecystectomy          T(C): 37.1 (02-01-25 @ 11:51), Max: 37.1 (02-01-25 @ 11:51)  HR: 69 (02-01-25 @ 14:00)  BP: 103/55 (02-01-25 @ 14:00)  BP(mean): 71 (02-01-25 @ 14:00)  ABP: --  ABP(mean): --  RR: 18 (02-01-25 @ 14:00)  SpO2: 92% (02-01-25 @ 14:00)  Wt(kg): --          01-31 @ 07:01  -  02-01 @ 07:00  --------------------------------------------------------  IN: 2912.5 mL / OUT: 1350 mL / NET: 1562.5 mL            PHYSICAL EXAM:    GENERAL: No signs of distress, comfortable  HEAD: Atraumatic, Normocephalic  EYES: EOMI, PERRLA  ENMT: No erythema, exudates, or enlargement, Moist mucous membranes  NECK: Supple, normal appearance, No JVD; [  ] central line (if applicable)  CHEST/LUNG: No chest deformity, fair bilateral air entry; No rales, rhonchi, wheezing; crackles  HEART: Regular rate and rhythm; No murmurs, rubs, or gallops;   ABDOMEN: Soft, Nontender, Nondistended; Bowel sounds present  EXTREMITIES:  + Peripheral Pulses, No clubbing, cyanosis, or edema  NERVOUS SYSTEM: awake and alert   LYMPH: No lymphadenopathy noted  SKIN: No rashes or lesions; good turgor, warm, dry      LABS:  CBC Full  -  ( 31 Jan 2025 05:45 )  WBC Count : 17.68 K/uL  RBC Count : 3.38 M/uL  Hemoglobin : 10.4 g/dL  Hematocrit : 33.4 %  Platelet Count - Automated : 234 K/uL  Mean Cell Volume : 98.8 fl  Mean Cell Hemoglobin : 30.8 pg  Mean Cell Hemoglobin Concentration : 31.1 g/dL  Auto Neutrophil # : x  Auto Lymphocyte # : x  Auto Monocyte # : x  Auto Eosinophil # : x  Auto Basophil # : x  Auto Neutrophil % : x  Auto Lymphocyte % : x  Auto Monocyte % : x  Auto Eosinophil % : x  Auto Basophil % : x    02-01    141  |  109[H]  |  16  ----------------------------<  138[H]  4.3   |  23  |  1.05    Ca    9.3      01 Feb 2025 05:00  Phos  3.5     02-01  Mg     2.4     02-01    TPro  6.5  /  Alb  2.9[L]  /  TBili  1.0  /  DBili  x   /  AST  164[H]  /  ALT  157[H]  /  AlkPhos  159[H]  01-31    PT/INR - ( 30 Jan 2025 18:30 )   PT: 12.9 sec;   INR: 1.09 ratio         PTT - ( 30 Jan 2025 18:30 )  PTT:27.8 sec  Urinalysis Basic - ( 01 Feb 2025 05:00 )    Color: x / Appearance: x / SG: x / pH: x  Gluc: 138 mg/dL / Ketone: x  / Bili: x / Urobili: x   Blood: x / Protein: x / Nitrite: x   Leuk Esterase: x / RBC: x / WBC x   Sq Epi: x / Non Sq Epi: x / Bacteria: x      Culture Results:   10,000 - 49,000 CFU/mL Escherichia coli (01-30 @ 19:50)  Culture Results:   No growth at 24 hours (01-30 @ 18:40)  Culture Results:   No growth at 24 hours (01-30 @ 18:30)      RADIOLOGY & ADDITIONAL STUDIES REVIEWED     < from: CT Chest No Cont (01.30.25 @ 21:11) >  IMPRESSION:    Suspect pneumonia. Recommend follow-up to resolution to exclude neoplasm,   following completion of treatment.    Nonspecific fluid-filled small bowel loops and right colon without   significant inflammatory change. Recommend clinical correlation to assess   enterocolitis.      < end of copied text >      IMPRESSION:  PAST MEDICAL & SURGICAL HISTORY:  HTN (hypertension)      HLD (hyperlipidemia)      Bipolar illness      MDD (major depressive disorder)      Effusion, left hip      Fall      Muscle wasting      History of TIAs      Cerebral infarction      Pressure-induced deep tissue damage of left heel      Hypertension      Hyperlipidemia      History of cholecystectomy       p/w       IMP: This is a 69 year old woman  with  MDD, HTN, HLD, bipolar disorder, CVA who presented to the ED from [facility] with lethargy and fever.  In the ED, workup revealed a leukocytosis, GURDEEP, transaminitis, and UA +.  Received 2600 cc IVF and remained hypotensive prompting an ICU consult.  (30 Jan 2025 22:08) Pat admitted to icu for septic shock due to PNA .      ASSESSMENT     - Septic shock   - PNA probable  bacterial   - UTI   - Acute hypoxic resp failure   - AMS / Encephalopathy   - HTN HLD   - GURDEEP resolved   - Elevated LFT   - Elevated lithium serum level     Plan     - Continue O2 supp as needed to maintain sat >90%  - Duoneb Q6h   - Diet   - Hemodynamic support   - Taper vaso-active agents to maintain MAP>65   - IV antibx   - F/U cultures   - Hold lithium   - Repeat Li level pending   - Trend LFT   - DVT / PPI

## 2025-02-01 NOTE — PROGRESS NOTE ADULT - SUBJECTIVE AND OBJECTIVE BOX
F/U Note:    69y Female admitted with underlying psych Hx, AMS/fever septic shock 2/2 UTI and PNA, required pressors    Interval Hx:  -off pressors today  -mental status/agitation improved s/p psych re-starting medication regimen    Vital Signs Last 24 Hrs  T(C): 36.4 (01 Feb 2025 20:00), Max: 37.1 (01 Feb 2025 11:51)  T(F): 97.5 (01 Feb 2025 20:00), Max: 98.7 (01 Feb 2025 11:51)  HR: 64 (01 Feb 2025 20:00) (45 - 90)  BP: 127/60 (01 Feb 2025 20:00) (77/66 - 146/102)  BP(mean): 81 (01 Feb 2025 20:00) (58 - 116)  RR: 19 (01 Feb 2025 20:00) (10 - 26)  SpO2: 95% (01 Feb 2025 20:00) (92% - 100%)    Parameters below as of 01 Feb 2025 20:00  Patient On (Oxygen Delivery Method): nasal cannula  O2 Flow (L/min): 2                              10.4   11.95 )-----------( 235      ( 01 Feb 2025 14:45 )             33.5         02-01    142  |  107  |  14  ----------------------------<  156[H]  3.5   |  24  |  1.05    Ca    9.5      01 Feb 2025 14:45  Phos  2.9     02-01  Mg     2.1     02-01    TPro  6.5  /  Alb  2.9[L]  /  TBili  1.0  /  DBili  x   /  AST  164[H]  /  ALT  157[H]  /  AlkPhos  159[H]  01-31        NEURO: no headaches, blurry vision, tremors, depression, anxity  CV: no chest pain, palpitations, murmurs, orthopnea  Resp: no shortness of breath, cough, wheeze, sputum production  GI: no stomach pain,nausea, vomitting, flatulence, hematemesis, hematochezia  PV: no swelling of extremities, no hair loss, no coolness to extremities  ENDO: no polydypsia, polyphagia, polyuria, weight loss, night sweats          NEURO: awake and alert  CV: (+) S1/S2, rrr, no mrg  RESP: CTA b/l  GI: soft, non tender

## 2025-02-01 NOTE — PROGRESS NOTE ADULT - ASSESSMENT
PLAN:    -weaned off pressors today, dat goal MAP 65-70  -is on stress dose steroids, will eed to wean slowly  -re-started on psych regimen with help of psych team, will maitnain regiemn as seems to be helping  -on course of zosyn will need to complete  -AM labs  -DVt prophylaxis          TIME SPENT:  35 minutes of  time spent providing medical care for patient's acute illness/conditions. It includes time spent evaluating and treating the patient's acute illness as well as time spent reviewing labs, radiology, discussing goals of care with patient and/or patient's family, and discussing the case with a multidisciplinary team. This time is independent of any procedures performed.    DATE OF ENTRY OF THIS NOTE IS EQUAL TO THE DATE OF SERVICES RENDERED

## 2025-02-01 NOTE — SWALLOW BEDSIDE ASSESSMENT ADULT - H & P REVIEW
COVID-19 swab obtained and sent to lab. Pt sitting comfortably in NAD. Will D/C and patient will F/U with results. yes

## 2025-02-01 NOTE — SWALLOW BEDSIDE ASSESSMENT ADULT - SWALLOW EVAL: DIAGNOSIS
Patient presents with evidence of acute on chronic oropharyngeal dysphagia. Patient accepted trials of ice chips, mildly thick liquids and pureed solids. Disoriented to utensils due to altered mentation; however, this improved across the course of the feeding task. Adequate anterior containment with munching pattern noted with pureed solids. Mild post prandial oral residue appreciated with pureed solids, improved with use of liquid wash. Pharyngeal motor response appreciated via digital palpation with wet vocal quality given ice chips and initial cup sip of mildly thick liquids. Recommend initiation of baseline diet textures with consderation for instrumental evaluation due to clinical presentation, declined respiratory status and imaging findings of PNA.

## 2025-02-01 NOTE — SWALLOW BEDSIDE ASSESSMENT ADULT - SWALLOW EVAL: RECOMMENDED FEEDING/EATING TECHNIQUES
alternate food with liquid/crush medication (when feasible)/oral hygiene/position upright (90 degrees)

## 2025-02-01 NOTE — SWALLOW BEDSIDE ASSESSMENT ADULT - COMMENTS
Patient unknown to this service from prior admissions. Per RN, swallowing screening completed with patient passing without overt signs of aspiration. Discussed concerns with utilizing swallowing screening for this patient, as she consumes modified textures at baseline.   WBC: 17.68  CT Head 1/30: "No obvious acute intracranial hemorrhage or mass effect. If clinically indicated, short-term follow-up or MRI may be obtained for further evaluation."  CT Abdomen/Pelvis 1/30: "Suspect pneumonia. Recommend follow-up to resolution to exclude neoplasm, following completion of treatment. Nonspecific fluid-filled small bowel loops and right colon without significant inflammatory change. Recommend clinical correlation to assess enterocolitis. Additional findings as described."  XR Chest 1/30: "Bilateral lower lobe infiltrates."

## 2025-02-01 NOTE — SWALLOW BEDSIDE ASSESSMENT ADULT - SLP GENERAL OBSERVATIONS
Patient received asleep, easily aroused to voice. 1:1 PCA present. Vitals monitored and remained stable throughout this encounter. Difficulty following directives due to altered mentation. Communication tangential and confabulatory at times.

## 2025-02-01 NOTE — BH CONSULTATION LIAISON PROGRESS NOTE - OTHER
speech largely incoherent.  not tested.  cooperative with the undersigned, but if IV flushed or oxygen cannula adjusted, pt swearing and attempting to hit staff, had to be redirected for routine care.

## 2025-02-01 NOTE — SWALLOW BEDSIDE ASSESSMENT ADULT - PHARYNGEAL PHASE
Within functional limits given first sip/Wet vocal quality post oral intake Wet vocal quality post oral intake

## 2025-02-01 NOTE — BH CONSULTATION LIAISON PROGRESS NOTE - NSBHCONSULTRECOMMENDOTHER_PSY_A_CORE FT
Consider standing Abilify 5 mg daily and monitor response, defer a Depakote trial due to elevated LFT's.  Would also defer use of Gabapentin or nephrotoxic agents, renal consult, repeat Lithium for trend.   When more clinically stable will be better able to address cognition and consider use of cognitive enhancing agent like Namenda.     STop Quetiapine ( due to risk of hypotension), Stop Lithium.   Stop Prozac, due to agitation, and mood lability, stop Gabapentin due to renal concerns.   Continue Klonopin to avoid complication of benzodiazepine withdrawal. or convert to equivalent dose of  Alprazolam due to increased LFT's.  Consider standing Abilify 5 mg daily and monitor response, defer a Depakote trial due to elevated LFT's.  Would also defer use of Gabapentin or nephrotoxic agents, renal consult, repeat Lithium for trend.   When more clinically stable will be better able to address cognition and consider use of cognitive enhancing agent like Namenda.       D/C  Prozac,  Gabapentin , Lithium , Seroquel    Continue Klonopin to avoid complication of benzodiazepine withdrawal. or convert to equivalent dose of  Alprazolam due to increased LFT's.

## 2025-02-01 NOTE — BH CONSULTATION LIAISON PROGRESS NOTE - NSBHFUPINTERVALCCFT_PSY_A_CORE
Patient is still sedated , able to awake , answers only her name, does not know where she is or her .

## 2025-02-01 NOTE — BH CONSULTATION LIAISON PROGRESS NOTE - NSBHFUPINTERVALHXFT_PSY_A_CORE
69 year old female with a PMH of MDD, HTN, HLD, bipolar disorder, CVA who presented to the ED from [facility] with lethargy and fever.  Psychiatry asked to see patient due to lithium toxicity and medication management recommendations, pt with no family contacts, and writer unable to reach anyone at [facility] for past psychiatric history.

## 2025-02-02 LAB
ANION GAP SERPL CALC-SCNC: 10 MMOL/L — SIGNIFICANT CHANGE UP (ref 5–17)
BUN SERPL-MCNC: 15 MG/DL — SIGNIFICANT CHANGE UP (ref 7–23)
CALCIUM SERPL-MCNC: 9.5 MG/DL — SIGNIFICANT CHANGE UP (ref 8.4–10.5)
CHLORIDE SERPL-SCNC: 109 MMOL/L — HIGH (ref 96–108)
CO2 SERPL-SCNC: 25 MMOL/L — SIGNIFICANT CHANGE UP (ref 22–31)
CREAT SERPL-MCNC: 1.02 MG/DL — SIGNIFICANT CHANGE UP (ref 0.5–1.3)
EGFR: 59 ML/MIN/1.73M2 — LOW
GLUCOSE BLDC GLUCOMTR-MCNC: 156 MG/DL — HIGH (ref 70–99)
GLUCOSE BLDC GLUCOMTR-MCNC: 162 MG/DL — HIGH (ref 70–99)
GLUCOSE BLDC GLUCOMTR-MCNC: 162 MG/DL — HIGH (ref 70–99)
GLUCOSE BLDC GLUCOMTR-MCNC: 166 MG/DL — HIGH (ref 70–99)
GLUCOSE SERPL-MCNC: 157 MG/DL — HIGH (ref 70–99)
HAV IGM SER-ACNC: SIGNIFICANT CHANGE UP
HBV CORE IGM SER-ACNC: SIGNIFICANT CHANGE UP
HBV SURFACE AG SER-ACNC: SIGNIFICANT CHANGE UP
HCT VFR BLD CALC: 30.3 % — LOW (ref 34.5–45)
HCV AB S/CO SERPL IA: 0.09 S/CO — SIGNIFICANT CHANGE UP (ref 0–0.99)
HCV AB SERPL-IMP: SIGNIFICANT CHANGE UP
HGB BLD-MCNC: 9.8 G/DL — LOW (ref 11.5–15.5)
MAGNESIUM SERPL-MCNC: 1.9 MG/DL — SIGNIFICANT CHANGE UP (ref 1.6–2.6)
MCHC RBC-ENTMCNC: 30.8 PG — SIGNIFICANT CHANGE UP (ref 27–34)
MCHC RBC-ENTMCNC: 32.3 G/DL — SIGNIFICANT CHANGE UP (ref 32–36)
MCV RBC AUTO: 95.3 FL — SIGNIFICANT CHANGE UP (ref 80–100)
NRBC # BLD: 0 /100 WBCS — SIGNIFICANT CHANGE UP (ref 0–0)
NRBC BLD-RTO: 0 /100 WBCS — SIGNIFICANT CHANGE UP (ref 0–0)
PHOSPHATE SERPL-MCNC: 2.9 MG/DL — SIGNIFICANT CHANGE UP (ref 2.5–4.5)
PLATELET # BLD AUTO: 184 K/UL — SIGNIFICANT CHANGE UP (ref 150–400)
POTASSIUM SERPL-MCNC: 3.8 MMOL/L — SIGNIFICANT CHANGE UP (ref 3.5–5.3)
POTASSIUM SERPL-SCNC: 3.8 MMOL/L — SIGNIFICANT CHANGE UP (ref 3.5–5.3)
RBC # BLD: 3.18 M/UL — LOW (ref 3.8–5.2)
RBC # FLD: 14.4 % — SIGNIFICANT CHANGE UP (ref 10.3–14.5)
SODIUM SERPL-SCNC: 144 MMOL/L — SIGNIFICANT CHANGE UP (ref 135–145)
WBC # BLD: 7.97 K/UL — SIGNIFICANT CHANGE UP (ref 3.8–10.5)
WBC # FLD AUTO: 7.97 K/UL — SIGNIFICANT CHANGE UP (ref 3.8–10.5)

## 2025-02-02 RX ORDER — SODIUM CHLORIDE 9 G/ML
1000 INJECTION, SOLUTION INTRAVENOUS
Refills: 0 | Status: DISCONTINUED | OUTPATIENT
Start: 2025-02-02 | End: 2025-02-03

## 2025-02-02 RX ADMIN — Medication 0.5 MILLIGRAM(S): at 17:44

## 2025-02-02 RX ADMIN — ANTISEPTIC SURGICAL SCRUB 1 APPLICATION(S): 0.04 SOLUTION TOPICAL at 05:13

## 2025-02-02 RX ADMIN — Medication 10 MILLIGRAM(S): at 17:44

## 2025-02-02 RX ADMIN — SODIUM CHLORIDE 100 MILLILITER(S): 9 INJECTION, SOLUTION INTRAVENOUS at 01:06

## 2025-02-02 RX ADMIN — Medication 5000 UNIT(S): at 05:13

## 2025-02-02 RX ADMIN — PIPERACILLIN SODIUM AND TAZOBACTAM SODIUM 25 GRAM(S): 2; 250 INJECTION, POWDER, FOR SOLUTION INTRAVENOUS at 23:04

## 2025-02-02 RX ADMIN — Medication 5000 UNIT(S): at 17:44

## 2025-02-02 RX ADMIN — Medication 50 MILLIGRAM(S): at 23:04

## 2025-02-02 RX ADMIN — Medication 50 MILLIGRAM(S): at 17:44

## 2025-02-02 RX ADMIN — Medication 60 MILLIGRAM(S): at 19:35

## 2025-02-02 RX ADMIN — Medication 50 MILLIGRAM(S): at 05:13

## 2025-02-02 RX ADMIN — PIPERACILLIN SODIUM AND TAZOBACTAM SODIUM 25 GRAM(S): 2; 250 INJECTION, POWDER, FOR SOLUTION INTRAVENOUS at 11:29

## 2025-02-02 RX ADMIN — Medication 500 MILLIGRAM(S): at 17:44

## 2025-02-02 RX ADMIN — Medication 75 MILLIGRAM(S): at 17:43

## 2025-02-02 RX ADMIN — Medication 0.5 MILLIGRAM(S): at 05:12

## 2025-02-02 RX ADMIN — Medication 50 MILLIGRAM(S): at 11:29

## 2025-02-02 RX ADMIN — Medication 5000 UNIT(S): at 22:07

## 2025-02-02 RX ADMIN — Medication 5000 UNIT(S): at 14:27

## 2025-02-02 NOTE — PROGRESS NOTE ADULT - SUBJECTIVE AND OBJECTIVE BOX
Follow-up Critical Care Progress Note  Chief Complaint : Sepsis    Constant observation d/c'd.  Pt. drowsy and confused.        Allergies :Ambien (Other)  sulfa drugs (Other)      PAST MEDICAL & SURGICAL HISTORY:  HTN (hypertension)  HLD (hyperlipidemia)  Bipolar illness  MDD (major depressive disorder)  Effusion, left hip  Fall  Muscle wasting  History of TIAs  Cerebral infarction  Pressure-induced deep tissue damage of left heel  History of cholecystectomy    Medications:  MEDICATIONS  (STANDING):  ascorbic acid 500 milliGRAM(s) Oral daily  cetirizine 10 milliGRAM(s) Oral daily  chlorhexidine 2% Cloths 1 Application(s) Topical <User Schedule>  cholecalciferol 5000 Unit(s) Oral daily  clonazePAM  Tablet 0.5 milliGRAM(s) Oral two times a day  clopidogrel Tablet 75 milliGRAM(s) Oral daily  FLUoxetine Solution 60 milliGRAM(s) Oral daily  heparin   Injectable 5000 Unit(s) SubCutaneous every 8 hours  hydrocortisone sodium succinate Injectable 50 milliGRAM(s) IV Push every 6 hours  lactated ringers. 1000 milliLiter(s) (50 mL/Hr) IV Continuous <Continuous>  piperacillin/tazobactam IVPB.. 3.375 Gram(s) IV Intermittent every 12 hours  QUEtiapine 200 milliGRAM(s) Oral at bedtime  senna 2 Tablet(s) Oral at bedtime    Antibiotics History  azithromycin  IVPB 500 milliGRAM(s) IV Intermittent once, 01-30-25 @ 18:26, Stop order after: 1 Doses  cefTRIAXone   IVPB 1000 milliGRAM(s) IV Intermittent once, 01-30-25 @ 18:26, Stop order after: 1 Doses  cefTRIAXone   IVPB 1000 milliGRAM(s) IV Intermittent every 24 hours, 01-31-25 @ 00:00, Stop order after: 3 Days  piperacillin/tazobactam IVPB. 3.375 Gram(s) IV Intermittent once, 01-30-25 @ 22:35, Stop order after: 1 Doses  piperacillin/tazobactam IVPB.- 3.375 Gram(s) IV Intermittent once, 01-31-25 @ 02:00  piperacillin/tazobactam IVPB.. 3.375 Gram(s) IV Intermittent every 12 hours, 01-31-25 @ 12:00, Stop order after: 7 Days    Heme Medications  clopidogrel Tablet 75 milliGRAM(s) Oral daily, 01-31-25 @ 09:41  heparin   Injectable 5000 Unit(s) SubCutaneous every 8 hours, 01-31-25 @ 09:27    GI Medications  senna 2 Tablet(s) Oral at bedtime, 01-31-25 @ 09:44, Routine    LABS:                        9.8    7.97  )-----------( 184      ( 02 Feb 2025 05:00 )             30.3     02-02    144  |  109[H]  |  15  ----------------------------<  157[H]  3.8   |  25  |  1.02    Ca    9.5      02 Feb 2025 05:00  Phos  2.9     02-02  Mg     1.9     02-02      Urinalysis Basic - ( 02 Feb 2025 05:00 )    Color: x / Appearance: x / SG: x / pH: x  Gluc: 157 mg/dL / Ketone: x  / Bili: x / Urobili: x   Blood: x / Protein: x / Nitrite: x   Leuk Esterase: x / RBC: x / WBC x   Sq Epi: x / Non Sq Epi: x / Bacteria: x    CULTURES:   Culture - Urine (collected 01-30-25 @ 19:50)  Source: Catheterized None  Preliminary Report (02-01-25 @ 08:27):    10,000 - 49,000 CFU/mL Escherichia coli    Culture - Blood (collected 01-30-25 @ 18:40)  Source: .Blood BLOOD  Preliminary Report (02-02-25 @ 01:02):    No growth at 48 Hours    Culture - Blood (collected 01-30-25 @ 18:30)  Source: .Blood BLOOD  Preliminary Report (02-02-25 @ 01:02):  No growth at 48 Hours    CAPILLARY BLOOD GLUCOSE  POCT Blood Glucose.: 162 mg/dL (02 Feb 2025 11:40)    COVID  01-30-25 @ 18:30  COVID -   NotDetec  11-02-24 @ 11:30  COVID -   NotDetec  11-07-23 @ 09:40  COVID -   NotDetec  11-03-23 @ 11:20  COVID -   NotDetec  10-27-23 @ 00:50  COVID -   NotDetec    Procalcitonin Trend  11-02-24 @ 11:26   -   0.12[H]  10-27-23 @ 12:19   -   0.09  10-27-23 @ 05:30   -   0.12[H]    WBC Trend  02-02-25 @ 05:00   -  7.97  02-01-25 @ 14:45   -  11.95[H]  01-31-25 @ 05:45   -  17.68[H]  01-30-25 @ 18:30   -  16.17[H]    H/H Trend  02-02-25 @ 05:00   -   9.8[L]/ 30.3[L]  02-01-25 @ 14:45   -   10.4[L]/ 33.5[L]  01-31-25 @ 05:45   -   10.4[L]/ 33.4[L]  01-30-25 @ 18:30   -   10.9[L]/ 34.3[L]    Platelet Trend  02-02-25 @ 05:00   -  184  02-01-25 @ 14:45   -  235  01-31-25 @ 05:45   -  234  01-30-25 @ 18:30   -  241    Trend Sodium  02-02-25 @ 05:00   -  144  02-01-25 @ 14:45   -  142  02-01-25 @ 05:00   -  141  01-31-25 @ 22:25   -  139  01-31-25 @ 12:54   -  140  01-31-25 @ 05:45   -  139    Trend Potassium  02-02-25 @ 05:00   -  3.8  02-01-25 @ 14:45   -  3.5  02-01-25 @ 05:00   -  4.3  01-31-25 @ 22:25   -  4.0  01-31-25 @ 12:54   -  3.9  01-31-25 @ 05:45   -  4.0    Trend Bun/Cr  02-02-25 @ 05:00  BUN/CR -  15 / 1.02  02-01-25 @ 14:45  BUN/CR -  14 / 1.05  02-01-25 @ 05:00  BUN/CR -  16 / 1.05  01-31-25 @ 22:25  BUN/CR -  16 / 0.93  01-31-25 @ 12:54  BUN/CR -  21 / 1.20  01-31-25 @ 05:45  BUN/CR -  27[H] / 1.53[H]    Lactic Acid Trend  01-30-25 @ 18:30   -   1.3    Trend AST/ALT/ALK Phos/Bili  01-31-25 @ 05:45   164[H]/157[H]/159[H]/1.0  01-30-25 @ 18:30   75[H]/89[H]/161[H]/1.2  10-31-24 @ 08:14   28/22/86/0.7  10-30-24 @ 06:15   30/27/84/0.8  10-28-24 @ 12:55   35/28/110/1.3[H]  11-06-23 @ 06:30   21/34/105/0.4  11-03-23 @ 07:56   34/39/121[H]/0.4  11-02-23 @ 06:00   29/34/127[H]/0.4  11-01-23 @ 06:30   35/42/121[H]/0.4  10-31-23 @ 06:00   48[H]/45/136[H]/0.7  10-30-23 @ 06:30   40/41/137[H]/1.1  10-28-23 @ 11:45   34/29/133[H]/0.7    Ammonia Trend  10-27-23 @ 05:30   -   17    Albumin Trend  01-31-25 @ 05:45   -   2.9[L]  01-30-25 @ 18:30   -   3.1[L]  10-31-24 @ 08:14   -   2.6[L]  10-30-24 @ 06:15   -   2.9[L]  10-28-24 @ 12:55   -   3.4  11-06-23 @ 06:30   -   3.1[L]    PTT - PT - INR Trend  01-30-25 @ 18:30   -   27.8 - 12.9 - 1.09  10-27-23 @ 00:50   -   26.9 - 11.1 - 0.99    Glucose Trend  02-02-25 @ 11:40   -  -- -- 162[H]  02-02-25 @ 05:10   -  -- -- 156[H]  02-02-25 @ 05:00   -  157[H] -- --  02-01-25 @ 23:16   -  -- -- 165[H]  02-01-25 @ 17:17   -  -- -- 178[H]  02-01-25 @ 14:45   -  156[H] -- --  02-01-25 @ 13:45   -  -- -- 182[H]  02-01-25 @ 05:29   -  -- -- 156[H]  02-01-25 @ 05:00   -  138[H] -- --  01-31-25 @ 23:47   -  -- -- 136[H]    A1C with Estimated Average Glucose Result: 6.1 % *H* [4.0 - 5.6] (11-02-24 @ 19:10)    RADIOLOGY  ECHO:  < from: TTE Echo Complete w/o Contrast w/ Doppler (01.31.25 @ 09:38) >  CONCLUSIONS:     1. Left ventricular cavity is normal in size. Left ventricular systolic   function is normal with an ejection fraction visually estimated at 65 to   70 %.   2. Based on visual assessment, the right ventricle appears moderately   enlarged. enlarged right ventricular cavity size, with normal wall   thickness, and normal right ventricular systolic function.   3. Left atrium is dilated.   4. The right atrium is dilated.   5. Mild to moderate tricuspid regurgitation.   6. Estimated pulmonary artery systolic pressure is 61 mmHg.   7. Mild aortic regurgitation.   8. Mild mitral valve stenosis.   9. No left ventricular hypertrophy.  10. Pericardial fat pad is seen anterior to the right ventricle cannot   rule out trivial effusion.  11. Technically difficult image quality.  12. The inferior vena cava is dilated measuring 2.30 cm in diameter,   (dilated >2.1cm) with abnormal inspiratory collapse (abnormal <50%)   consistent with elevated right atrial pressure (  15, range 10-20mmHg).    VITALS:  T(C): 36.3 (02-02-25 @ 04:00), Max: 36.7 (02-01-25 @ 15:00)  T(F): 97.4 (02-02-25 @ 04:00), Max: 98 (02-01-25 @ 15:00)  HR: 64 (02-02-25 @ 13:00) (63 - 75)  BP: 143/72 (02-02-25 @ 13:00) (103/55 - 148/93)  BP(mean): 92 (02-02-25 @ 13:00) (71 - 113)  RR: 18 (02-02-25 @ 13:00) (14 - 27)  SpO2: 100% (02-02-25 @ 13:00) (92% - 100%)    Ins and Outs     02-01-25 @ 07:01  -  02-02-25 @ 07:00  --------------------------------------------------------  IN: 2953.6 mL / OUT: 1950 mL / NET: 1003.6 mL    02-02-25 @ 07:01  -  02-02-25 @ 13:50  --------------------------------------------------------  IN: 575 mL / OUT: 200 mL / NET: 375 mL    Height (cm): 160 (01-30-25 @ 18:15)  Weight (kg): 97.1 (01-30-25 @ 23:45)  BMI (kg/m2): 37.9 (01-30-25 @ 23:45)    I&O's Detail    01 Feb 2025 07:01  -  02 Feb 2025 07:00  --------------------------------------------------------  IN:    Albumin 25%  -  50 mL: 200 mL    IV PiggyBack: 200 mL    Lactated Ringers: 2300 mL    Norepinephrine: 3.6 mL    Oral Fluid: 250 mL  Total IN: 2953.6 mL    OUT:    Voided (mL): 1950 mL  Total OUT: 1950 mL  Total NET: 1003.6 mL      02 Feb 2025 07:01  -  02 Feb 2025 13:50  --------------------------------------------------------  IN:    IV PiggyBack: 75 mL    Lactated Ringers: 300 mL    Lactated Ringers: 200 mL  Total IN: 575 mL    OUT:  Voided (mL): 200 mL  Total OUT: 200 mL  Total NET: 375 mL          Physical Examination:  GENERAL:                 Drowsy, confused  HEENT:                    Pupils equal, reactive to light.  Symmetric. No JVD, Moist MM  PULM:                      Bilateral air entry, Clear to auscultation bilaterally, no significant sputum production, No Rales, No Rhonchi, No Wheezing  CVS:                         S1, S2,  No Murmur  ABD:                         Soft, protuberant, nontender, normoactive bowel sounds,   EXT:                         Edema to left arm, nontender, No Cyanosis or Clubbing   Vascular:                  Warm Extremities, Normal Capillary refill, Normal Distal Pulses  SKIN:                        Warm and well perfused, no rashes noted.   NEURO:                    Drowsy, confused, does not follows commands  PSYC:                        Calm             Follow-up Critical Care Progress Note  Chief Complaint : Sepsis    Constant observation d/c'd.    pt arousable  following commands, verbal  more orientale than prior      Allergies :Ambien (Other)  sulfa drugs (Other)      PAST MEDICAL & SURGICAL HISTORY:  HTN (hypertension)  HLD (hyperlipidemia)  Bipolar illness  MDD (major depressive disorder)  Effusion, left hip  Fall  Muscle wasting  History of TIAs  Cerebral infarction  Pressure-induced deep tissue damage of left heel  History of cholecystectomy    Medications:  MEDICATIONS  (STANDING):  ascorbic acid 500 milliGRAM(s) Oral daily  cetirizine 10 milliGRAM(s) Oral daily  chlorhexidine 2% Cloths 1 Application(s) Topical <User Schedule>  cholecalciferol 5000 Unit(s) Oral daily  clonazePAM  Tablet 0.5 milliGRAM(s) Oral two times a day  clopidogrel Tablet 75 milliGRAM(s) Oral daily  FLUoxetine Solution 60 milliGRAM(s) Oral daily  heparin   Injectable 5000 Unit(s) SubCutaneous every 8 hours  hydrocortisone sodium succinate Injectable 50 milliGRAM(s) IV Push every 6 hours  lactated ringers. 1000 milliLiter(s) (50 mL/Hr) IV Continuous <Continuous>  piperacillin/tazobactam IVPB.. 3.375 Gram(s) IV Intermittent every 12 hours  QUEtiapine 200 milliGRAM(s) Oral at bedtime  senna 2 Tablet(s) Oral at bedtime    Antibiotics History  azithromycin  IVPB 500 milliGRAM(s) IV Intermittent once, 01-30-25 @ 18:26, Stop order after: 1 Doses  cefTRIAXone   IVPB 1000 milliGRAM(s) IV Intermittent once, 01-30-25 @ 18:26, Stop order after: 1 Doses  cefTRIAXone   IVPB 1000 milliGRAM(s) IV Intermittent every 24 hours, 01-31-25 @ 00:00, Stop order after: 3 Days  piperacillin/tazobactam IVPB. 3.375 Gram(s) IV Intermittent once, 01-30-25 @ 22:35, Stop order after: 1 Doses  piperacillin/tazobactam IVPB.- 3.375 Gram(s) IV Intermittent once, 01-31-25 @ 02:00  piperacillin/tazobactam IVPB.. 3.375 Gram(s) IV Intermittent every 12 hours, 01-31-25 @ 12:00, Stop order after: 7 Days    Heme Medications  clopidogrel Tablet 75 milliGRAM(s) Oral daily, 01-31-25 @ 09:41  heparin   Injectable 5000 Unit(s) SubCutaneous every 8 hours, 01-31-25 @ 09:27    GI Medications  senna 2 Tablet(s) Oral at bedtime, 01-31-25 @ 09:44, Routine    LABS:                        9.8    7.97  )-----------( 184      ( 02 Feb 2025 05:00 )             30.3     02-02    144  |  109[H]  |  15  ----------------------------<  157[H]  3.8   |  25  |  1.02    Ca    9.5      02 Feb 2025 05:00  Phos  2.9     02-02  Mg     1.9     02-02      Urinalysis Basic - ( 02 Feb 2025 05:00 )    Color: x / Appearance: x / SG: x / pH: x  Gluc: 157 mg/dL / Ketone: x  / Bili: x / Urobili: x   Blood: x / Protein: x / Nitrite: x   Leuk Esterase: x / RBC: x / WBC x   Sq Epi: x / Non Sq Epi: x / Bacteria: x    CULTURES:   Culture - Urine (collected 01-30-25 @ 19:50)  Source: Catheterized None  Preliminary Report (02-01-25 @ 08:27):    10,000 - 49,000 CFU/mL Escherichia coli    Culture - Blood (collected 01-30-25 @ 18:40)  Source: .Blood BLOOD  Preliminary Report (02-02-25 @ 01:02):    No growth at 48 Hours    Culture - Blood (collected 01-30-25 @ 18:30)  Source: .Blood BLOOD  Preliminary Report (02-02-25 @ 01:02):  No growth at 48 Hours    CAPILLARY BLOOD GLUCOSE  POCT Blood Glucose.: 162 mg/dL (02 Feb 2025 11:40)    COVID  01-30-25 @ 18:30  COVID -   NotDetec  11-02-24 @ 11:30  COVID -   NotDetec  11-07-23 @ 09:40  COVID -   NotDetec  11-03-23 @ 11:20  COVID -   NotDetec  10-27-23 @ 00:50  COVID -   NotDetec    Procalcitonin Trend  11-02-24 @ 11:26   -   0.12[H]  10-27-23 @ 12:19   -   0.09  10-27-23 @ 05:30   -   0.12[H]    WBC Trend  02-02-25 @ 05:00   -  7.97  02-01-25 @ 14:45   -  11.95[H]  01-31-25 @ 05:45   -  17.68[H]  01-30-25 @ 18:30   -  16.17[H]    H/H Trend  02-02-25 @ 05:00   -   9.8[L]/ 30.3[L]  02-01-25 @ 14:45   -   10.4[L]/ 33.5[L]  01-31-25 @ 05:45   -   10.4[L]/ 33.4[L]  01-30-25 @ 18:30   -   10.9[L]/ 34.3[L]    Platelet Trend  02-02-25 @ 05:00   -  184  02-01-25 @ 14:45   -  235  01-31-25 @ 05:45   -  234  01-30-25 @ 18:30   -  241    Trend Sodium  02-02-25 @ 05:00   -  144  02-01-25 @ 14:45   -  142  02-01-25 @ 05:00   -  141  01-31-25 @ 22:25   -  139  01-31-25 @ 12:54   -  140  01-31-25 @ 05:45   -  139    Trend Potassium  02-02-25 @ 05:00   -  3.8  02-01-25 @ 14:45   -  3.5  02-01-25 @ 05:00   -  4.3  01-31-25 @ 22:25   -  4.0  01-31-25 @ 12:54   -  3.9  01-31-25 @ 05:45   -  4.0      Trend Bun/Cr  02-02-25 @ 05:00  BUN/CR -  15 / 1.02  02-01-25 @ 14:45  BUN/CR -  14 / 1.05  02-01-25 @ 05:00  BUN/CR -  16 / 1.05  01-31-25 @ 22:25  BUN/CR -  16 / 0.93  01-31-25 @ 12:54  BUN/CR -  21 / 1.20  01-31-25 @ 05:45  BUN/CR -  27[H] / 1.53[H]  01-30-25 @ 18:30  BUN/CR -  32[H] / 2.12[H]  11-03-24 @ 06:24  BUN/CR -  14 / 1.07  11-02-24 @ 11:26  BUN/CR -  14 / 0.97  10-31-24 @ 08:14  BUN/CR -  15 / 1.02  10-30-24 @ 06:15  BUN/CR -  17 / 1.16  10-29-24 @ 07:26  BUN/CR -  12 / 1.08      Lactic Acid Trend  01-30-25 @ 18:30   -   1.3    Trend AST/ALT/ALK Phos/Bili  01-31-25 @ 05:45   164[H]/157[H]/159[H]/1.0  01-30-25 @ 18:30   75[H]/89[H]/161[H]/1.2  10-31-24 @ 08:14   28/22/86/0.7  10-30-24 @ 06:15   30/27/84/0.8  10-28-24 @ 12:55   35/28/110/1.3[H]  11-06-23 @ 06:30   21/34/105/0.4     Ammonia Trend  10-27-23 @ 05:30   -   17    Albumin Trend  01-31-25 @ 05:45   -   2.9[L]  01-30-25 @ 18:30   -   3.1[L]  10-31-24 @ 08:14   -   2.6[L]  10-30-24 @ 06:15   -   2.9[L]  10-28-24 @ 12:55   -   3.4  11-06-23 @ 06:30   -   3.1[L]    PTT - PT - INR Trend  01-30-25 @ 18:30   -   27.8 - 12.9 - 1.09  10-27-23 @ 00:50   -   26.9 - 11.1 - 0.99    Glucose Trend  02-02-25 @ 11:40   -  -- -- 162[H]  02-02-25 @ 05:10   -  -- -- 156[H]  02-02-25 @ 05:00   -  157[H] -- --  02-01-25 @ 23:16   -  -- -- 165[H]  02-01-25 @ 17:17   -  -- -- 178[H]  02-01-25 @ 14:45   -  156[H] -- --  02-01-25 @ 13:45   -  -- -- 182[H]  02-01-25 @ 05:29   -  -- -- 156[H]  02-01-25 @ 05:00   -  138[H] -- --  01-31-25 @ 23:47   -  -- -- 136[H]    A1C with Estimated Average Glucose Result: 6.1 % *H* [4.0 - 5.6] (11-02-24 @ 19:10)    RADIOLOGY  ECHO:  < from: TTE Echo Complete w/o Contrast w/ Doppler (01.31.25 @ 09:38) >  CONCLUSIONS:     1. Left ventricular cavity is normal in size. Left ventricular systolic   function is normal with an ejection fraction visually estimated at 65 to   70 %.   2. Based on visual assessment, the right ventricle appears moderately   enlarged. enlarged right ventricular cavity size, with normal wall   thickness, and normal right ventricular systolic function.   3. Left atrium is dilated.   4. The right atrium is dilated.   5. Mild to moderate tricuspid regurgitation.   6. Estimated pulmonary artery systolic pressure is 61 mmHg.   7. Mild aortic regurgitation.   8. Mild mitral valve stenosis.   9. No left ventricular hypertrophy.  10. Pericardial fat pad is seen anterior to the right ventricle cannot   rule out trivial effusion.  11. Technically difficult image quality.  12. The inferior vena cava is dilated measuring 2.30 cm in diameter,   (dilated >2.1cm) with abnormal inspiratory collapse (abnormal <50%)   consistent with elevated right atrial pressure (  15, range 10-20mmHg).    VITALS:  T(C): 36.3 (02-02-25 @ 04:00), Max: 36.7 (02-01-25 @ 15:00)  T(F): 97.4 (02-02-25 @ 04:00), Max: 98 (02-01-25 @ 15:00)  HR: 64 (02-02-25 @ 13:00) (63 - 75)  BP: 143/72 (02-02-25 @ 13:00) (103/55 - 148/93)  BP(mean): 92 (02-02-25 @ 13:00) (71 - 113)  RR: 18 (02-02-25 @ 13:00) (14 - 27)  SpO2: 100% (02-02-25 @ 13:00) (92% - 100%)    Ins and Outs     02-01-25 @ 07:01  -  02-02-25 @ 07:00  --------------------------------------------------------  IN: 2953.6 mL / OUT: 1950 mL / NET: 1003.6 mL    02-02-25 @ 07:01  -  02-02-25 @ 13:50  --------------------------------------------------------  IN: 575 mL / OUT: 200 mL / NET: 375 mL    Height (cm): 160 (01-30-25 @ 18:15)  Weight (kg): 97.1 (01-30-25 @ 23:45)  BMI (kg/m2): 37.9 (01-30-25 @ 23:45)    I&O's Detail    01 Feb 2025 07:01  -  02 Feb 2025 07:00  --------------------------------------------------------  IN:    Albumin 25%  -  50 mL: 200 mL    IV PiggyBack: 200 mL    Lactated Ringers: 2300 mL    Norepinephrine: 3.6 mL    Oral Fluid: 250 mL  Total IN: 2953.6 mL    OUT:    Voided (mL): 1950 mL  Total OUT: 1950 mL  Total NET: 1003.6 mL      02 Feb 2025 07:01  -  02 Feb 2025 13:50  --------------------------------------------------------  IN:    IV PiggyBack: 75 mL    Lactated Ringers: 300 mL    Lactated Ringers: 200 mL  Total IN: 575 mL    OUT:  Voided (mL): 200 mL  Total OUT: 200 mL  Total NET: 375 mL          Physical Examination:  GENERAL:                 Alert, confused  HEENT:                    Pupils equal, reactive to light.  Symmetric. No JVD, Moist MM  PULM:                      Bilateral air entry, Clear to auscultation bilaterally, no significant sputum production, No Rales, No Rhonchi, No Wheezing  CVS:                         S1, S2,  No Murmur  ABD:                         Soft, protuberant, nontender, normoactive bowel sounds,   EXT:                         Edema to left arm, nontender, No Cyanosis or Clubbing   Vascular:                  Warm Extremities, Normal Capillary refill, Normal Distal Pulses  SKIN:                        Warm and well perfused, no rashes noted.   NEURO:                    alert, confused, does not follows commands  PSYC:                        Calm

## 2025-02-03 LAB
-  AMOXICILLIN/CLAVULANIC ACID: SIGNIFICANT CHANGE UP
-  AMPICILLIN/SULBACTAM: SIGNIFICANT CHANGE UP
-  AMPICILLIN: SIGNIFICANT CHANGE UP
-  AZTREONAM: SIGNIFICANT CHANGE UP
-  CEFAZOLIN: SIGNIFICANT CHANGE UP
-  CEFEPIME: SIGNIFICANT CHANGE UP
-  CEFOXITIN: SIGNIFICANT CHANGE UP
-  CEFTRIAXONE: SIGNIFICANT CHANGE UP
-  CEFUROXIME: SIGNIFICANT CHANGE UP
-  CIPROFLOXACIN: SIGNIFICANT CHANGE UP
-  ERTAPENEM: SIGNIFICANT CHANGE UP
-  GENTAMICIN: SIGNIFICANT CHANGE UP
-  IMIPENEM: SIGNIFICANT CHANGE UP
-  LEVOFLOXACIN: SIGNIFICANT CHANGE UP
-  MEROPENEM: SIGNIFICANT CHANGE UP
-  NITROFURANTOIN: SIGNIFICANT CHANGE UP
-  PIPERACILLIN/TAZOBACTAM: SIGNIFICANT CHANGE UP
-  TOBRAMYCIN: SIGNIFICANT CHANGE UP
-  TRIMETHOPRIM/SULFAMETHOXAZOLE: SIGNIFICANT CHANGE UP
ALBUMIN SERPL ELPH-MCNC: 3.7 G/DL — SIGNIFICANT CHANGE UP (ref 3.3–5)
ALP SERPL-CCNC: 119 U/L — SIGNIFICANT CHANGE UP (ref 40–120)
ALT FLD-CCNC: 99 U/L — HIGH (ref 10–45)
ANION GAP SERPL CALC-SCNC: 12 MMOL/L — SIGNIFICANT CHANGE UP (ref 5–17)
AST SERPL-CCNC: 30 U/L — SIGNIFICANT CHANGE UP (ref 10–40)
BILIRUB SERPL-MCNC: 1.4 MG/DL — HIGH (ref 0.2–1.2)
BUN SERPL-MCNC: 20 MG/DL — SIGNIFICANT CHANGE UP (ref 7–23)
CALCIUM SERPL-MCNC: 9.9 MG/DL — SIGNIFICANT CHANGE UP (ref 8.4–10.5)
CHLORIDE SERPL-SCNC: 109 MMOL/L — HIGH (ref 96–108)
CO2 SERPL-SCNC: 25 MMOL/L — SIGNIFICANT CHANGE UP (ref 22–31)
CREAT SERPL-MCNC: 0.96 MG/DL — SIGNIFICANT CHANGE UP (ref 0.5–1.3)
CULTURE RESULTS: ABNORMAL
EGFR: 63 ML/MIN/1.73M2 — SIGNIFICANT CHANGE UP
GLUCOSE BLDC GLUCOMTR-MCNC: 136 MG/DL — HIGH (ref 70–99)
GLUCOSE BLDC GLUCOMTR-MCNC: 142 MG/DL — HIGH (ref 70–99)
GLUCOSE BLDC GLUCOMTR-MCNC: 182 MG/DL — HIGH (ref 70–99)
GLUCOSE SERPL-MCNC: 149 MG/DL — HIGH (ref 70–99)
HCT VFR BLD CALC: 30.7 % — LOW (ref 34.5–45)
HGB BLD-MCNC: 9.4 G/DL — LOW (ref 11.5–15.5)
LITHIUM SERPL-MCNC: 0.93 MMOL/L — SIGNIFICANT CHANGE UP (ref 0.6–1.2)
MAGNESIUM SERPL-MCNC: 2 MG/DL — SIGNIFICANT CHANGE UP (ref 1.6–2.6)
MCHC RBC-ENTMCNC: 29.7 PG — SIGNIFICANT CHANGE UP (ref 27–34)
MCHC RBC-ENTMCNC: 30.6 G/DL — LOW (ref 32–36)
MCV RBC AUTO: 97.2 FL — SIGNIFICANT CHANGE UP (ref 80–100)
METHOD TYPE: SIGNIFICANT CHANGE UP
NRBC # BLD: 0 /100 WBCS — SIGNIFICANT CHANGE UP (ref 0–0)
NRBC BLD-RTO: 0 /100 WBCS — SIGNIFICANT CHANGE UP (ref 0–0)
ORGANISM # SPEC MICROSCOPIC CNT: ABNORMAL
ORGANISM # SPEC MICROSCOPIC CNT: SIGNIFICANT CHANGE UP
PHOSPHATE SERPL-MCNC: 2.8 MG/DL — SIGNIFICANT CHANGE UP (ref 2.5–4.5)
PLATELET # BLD AUTO: 186 K/UL — SIGNIFICANT CHANGE UP (ref 150–400)
POTASSIUM SERPL-MCNC: 3.3 MMOL/L — LOW (ref 3.5–5.3)
POTASSIUM SERPL-SCNC: 3.3 MMOL/L — LOW (ref 3.5–5.3)
PROT SERPL-MCNC: 6.6 G/DL — SIGNIFICANT CHANGE UP (ref 6–8.3)
RBC # BLD: 3.16 M/UL — LOW (ref 3.8–5.2)
RBC # FLD: 14.6 % — HIGH (ref 10.3–14.5)
SODIUM SERPL-SCNC: 146 MMOL/L — HIGH (ref 135–145)
SPECIMEN SOURCE: SIGNIFICANT CHANGE UP
WBC # BLD: 7.28 K/UL — SIGNIFICANT CHANGE UP (ref 3.8–10.5)
WBC # FLD AUTO: 7.28 K/UL — SIGNIFICANT CHANGE UP (ref 3.8–10.5)

## 2025-02-03 PROCEDURE — 99233 SBSQ HOSP IP/OBS HIGH 50: CPT

## 2025-02-03 RX ORDER — PANTOPRAZOLE 20 MG/1
40 TABLET, DELAYED RELEASE ORAL
Refills: 0 | Status: COMPLETED | OUTPATIENT
Start: 2025-02-03 | End: 2025-02-06

## 2025-02-03 RX ORDER — ATORVASTATIN CALCIUM 80 MG/1
40 TABLET, FILM COATED ORAL AT BEDTIME
Refills: 0 | Status: DISCONTINUED | OUTPATIENT
Start: 2025-02-03 | End: 2025-02-11

## 2025-02-03 RX ORDER — POTASSIUM CHLORIDE 750 MG/1
40 TABLET, EXTENDED RELEASE ORAL ONCE
Refills: 0 | Status: COMPLETED | OUTPATIENT
Start: 2025-02-03 | End: 2025-02-03

## 2025-02-03 RX ORDER — INSULIN LISPRO 100/ML
VIAL (ML) SUBCUTANEOUS
Refills: 0 | Status: DISCONTINUED | OUTPATIENT
Start: 2025-02-03 | End: 2025-02-11

## 2025-02-03 RX ORDER — INSULIN LISPRO 100/ML
VIAL (ML) SUBCUTANEOUS AT BEDTIME
Refills: 0 | Status: DISCONTINUED | OUTPATIENT
Start: 2025-02-03 | End: 2025-02-11

## 2025-02-03 RX ADMIN — PIPERACILLIN SODIUM AND TAZOBACTAM SODIUM 25 GRAM(S): 2; 250 INJECTION, POWDER, FOR SOLUTION INTRAVENOUS at 13:02

## 2025-02-03 RX ADMIN — Medication 60 MILLIGRAM(S): at 13:02

## 2025-02-03 RX ADMIN — Medication 0.5 MILLIGRAM(S): at 18:02

## 2025-02-03 RX ADMIN — ANTISEPTIC SURGICAL SCRUB 1 APPLICATION(S): 0.04 SOLUTION TOPICAL at 05:15

## 2025-02-03 RX ADMIN — Medication 5000 UNIT(S): at 15:23

## 2025-02-03 RX ADMIN — Medication 5000 UNIT(S): at 13:01

## 2025-02-03 RX ADMIN — Medication 50 MILLIGRAM(S): at 23:05

## 2025-02-03 RX ADMIN — POTASSIUM CHLORIDE 40 MILLIEQUIVALENT(S): 750 TABLET, EXTENDED RELEASE ORAL at 13:01

## 2025-02-03 RX ADMIN — Medication 5000 UNIT(S): at 23:06

## 2025-02-03 RX ADMIN — Medication 75 MILLIGRAM(S): at 13:01

## 2025-02-03 RX ADMIN — Medication 5000 UNIT(S): at 05:04

## 2025-02-03 RX ADMIN — Medication 10 MILLIGRAM(S): at 13:02

## 2025-02-03 RX ADMIN — Medication 2 TABLET(S): at 23:06

## 2025-02-03 RX ADMIN — Medication 50 MILLIGRAM(S): at 05:04

## 2025-02-03 RX ADMIN — ATORVASTATIN CALCIUM 40 MILLIGRAM(S): 80 TABLET, FILM COATED ORAL at 23:06

## 2025-02-03 RX ADMIN — QUETIAPINE FUMARATE 200 MILLIGRAM(S): 300 TABLET ORAL at 23:06

## 2025-02-03 RX ADMIN — Medication 500 MILLIGRAM(S): at 13:01

## 2025-02-03 RX ADMIN — Medication 0.5 MILLIGRAM(S): at 05:04

## 2025-02-03 RX ADMIN — Medication 50 MILLIGRAM(S): at 15:23

## 2025-02-03 NOTE — PROGRESS NOTE ADULT - SUBJECTIVE AND OBJECTIVE BOX
Follow-up Critical Care Progress Note  Chief Complaint : Sepsis      patient seen and examined  comfortable  confused      Allergies :Ambien (Other)  sulfa drugs (Other)      PAST MEDICAL & SURGICAL HISTORY:  HTN (hypertension)    HLD (hyperlipidemia)    Bipolar illness    MDD (major depressive disorder)    Effusion, left hip    Fall    Muscle wasting    History of TIAs    Cerebral infarction    Pressure-induced deep tissue damage of left heel    Hypertension    Hyperlipidemia    History of cholecystectomy        Medications:  MEDICATIONS  (STANDING):  ascorbic acid 500 milliGRAM(s) Oral daily  cetirizine 10 milliGRAM(s) Oral daily  chlorhexidine 2% Cloths 1 Application(s) Topical <User Schedule>  cholecalciferol 5000 Unit(s) Oral daily  clonazePAM  Tablet 0.5 milliGRAM(s) Oral two times a day  clopidogrel Tablet 75 milliGRAM(s) Oral daily  FLUoxetine Solution 60 milliGRAM(s) Oral daily  heparin   Injectable 5000 Unit(s) SubCutaneous every 8 hours  hydrocortisone sodium succinate Injectable 50 milliGRAM(s) IV Push every 6 hours  piperacillin/tazobactam IVPB.. 3.375 Gram(s) IV Intermittent every 12 hours  potassium chloride   Powder 40 milliEquivalent(s) Oral once  QUEtiapine 200 milliGRAM(s) Oral at bedtime  senna 2 Tablet(s) Oral at bedtime    MEDICATIONS  (PRN):      Antibiotics History  azithromycin  IVPB 500 milliGRAM(s) IV Intermittent once, 01-30-25 @ 18:26, Stop order after: 1 Doses  cefTRIAXone   IVPB 1000 milliGRAM(s) IV Intermittent once, 01-30-25 @ 18:26, Stop order after: 1 Doses  cefTRIAXone   IVPB 1000 milliGRAM(s) IV Intermittent every 24 hours, 01-31-25 @ 00:00, Stop order after: 3 Days  piperacillin/tazobactam IVPB. 3.375 Gram(s) IV Intermittent once, 01-30-25 @ 22:35, Stop order after: 1 Doses  piperacillin/tazobactam IVPB.- 3.375 Gram(s) IV Intermittent once, 01-31-25 @ 02:00  piperacillin/tazobactam IVPB.. 3.375 Gram(s) IV Intermittent every 12 hours, 01-31-25 @ 12:00, Stop order after: 7 Days      Heme Medications   clopidogrel Tablet 75 milliGRAM(s) Oral daily, 01-31-25 @ 09:41  heparin   Injectable 5000 Unit(s) SubCutaneous every 8 hours, 01-31-25 @ 09:27      GI Medications  senna 2 Tablet(s) Oral at bedtime, 01-31-25 @ 09:44, Routine      COVID  01-30-25 @ 18:30  COVID -   NotDetec  11-02-24 @ 11:30  COVID -   NotDetec  11-07-23 @ 09:40  COVID -   NotDetec  11-03-23 @ 11:20  COVID -   NotDetec  10-27-23 @ 00:50  COVID -   NotDetec       Procalcitonin Trend  11-02-24 @ 11:26   -   0.12[H]  10-27-23 @ 12:19   -   0.09  10-27-23 @ 05:30   -   0.12[H]    WBC Trend  02-03-25 @ 07:16   -  7.28  02-02-25 @ 05:00   -  7.97  02-01-25 @ 14:45   -  11.95[H]    H/H Trend  02-03-25 @ 07:16   -   9.4[L]/ 30.7[L]  02-02-25 @ 05:00   -   9.8[L]/ 30.3[L]  02-01-25 @ 14:45   -   10.4[L]/ 33.5[L]  01-31-25 @ 05:45   -   10.4[L]/ 33.4[L]  01-30-25 @ 18:30   -   10.9[L]/ 34.3[L]    Stool Occult Blood    Platelet Trend  02-03-25 @ 07:16   -  186  02-02-25 @ 05:00   -  184  02-01-25 @ 14:45   -  235    Trend Sodium  02-03-25 @ 07:16   -  146[H]  02-02-25 @ 05:00   -  144  02-01-25 @ 14:45   -  142  02-01-25 @ 05:00   -  141  01-31-25 @ 22:25   -  139  01-31-25 @ 12:54   -  140    Trend Potassium  02-03-25 @ 07:16   -  3.3[L]  02-02-25 @ 05:00   -  3.8  02-01-25 @ 14:45   -  3.5  02-01-25 @ 05:00   -  4.3  01-31-25 @ 22:25   -  4.0  01-31-25 @ 12:54   -  3.9    Trend Bun/Cr  02-03-25 @ 07:16  BUN/CR -  20 / 0.96  02-02-25 @ 05:00  BUN/CR -  15 / 1.02  02-01-25 @ 14:45  BUN/CR -  14 / 1.05  02-01-25 @ 05:00  BUN/CR -  16 / 1.05  01-31-25 @ 22:25  BUN/CR -  16 / 0.93  01-31-25 @ 12:54  BUN/CR -  21 / 1.20    Lactic Acid Trend  01-30-25 @ 18:30   -   1.3    ABG Trend    Trend AST/ALT/ALK Phos/Bili  02-03-25 @ 07:16   30/99[H]/119/1.4[H]  01-31-25 @ 05:45   164[H]/157[H]/159[H]/1.0  01-30-25 @ 18:30   75[H]/89[H]/161[H]/1.2  10-31-24 @ 08:14   28/22/86/0.7  10-30-24 @ 06:15   30/27/84/0.8  10-28-24 @ 12:55   35/28/110/1.3[H]  11-06-23 @ 06:30   21/34/105/0.4  11-03-23 @ 07:56   34/39/121[H]/0.4  11-02-23 @ 06:00   29/34/127[H]/0.4  11-01-23 @ 06:30   35/42/121[H]/0.4  10-31-23 @ 06:00   48[H]/45/136[H]/0.7  10-30-23 @ 06:30   40/41/137[H]/1.1      Ammonia Trend  10-27-23 @ 05:30   -   17      Amylase / Lipase Trend      Albumin Trend  02-03-25 @ 07:16   -   3.7  01-31-25 @ 05:45   -   2.9[L]  01-30-25 @ 18:30   -   3.1[L]  10-31-24 @ 08:14   -   2.6[L]  10-30-24 @ 06:15   -   2.9[L]  10-28-24 @ 12:55   -   3.4      PTT - PT - INR Trend  01-30-25 @ 18:30   -   27.8 - 12.9 - 1.09  10-27-23 @ 00:50   -   26.9 - 11.1 - 0.99    Glucose Trend  02-03-25 @ 08:40   -  -- -- 142[H]  02-03-25 @ 07:16   -  149[H] -- --  02-02-25 @ 22:08   -  -- -- 162[H]  02-02-25 @ 16:52   -  -- -- 166[H]  02-02-25 @ 11:40   -  -- -- 162[H]  02-02-25 @ 05:10   -  -- -- 156[H]  02-02-25 @ 05:00   -  157[H] -- --  02-01-25 @ 23:16   -  -- -- 165[H]  02-01-25 @ 17:17   -  -- -- 178[H]  02-01-25 @ 14:45   -  156[H] -- --    A1C with Estimated Average Glucose Result: 6.1 % *H* [4.0 - 5.6] (11-02-24 @ 19:10)      LABS:                        9.4    7.28  )-----------( 186      ( 03 Feb 2025 07:16 )             30.7     02-03    146[H]  |  109[H]  |  20  ----------------------------<  149[H]  3.3[L]   |  25  |  0.96    Ca    9.9      03 Feb 2025 07:16  Phos  2.8     02-03  Mg     2.0     02-03    TPro  6.6  /  Alb  3.7  /  TBili  1.4[H]  /  DBili  x   /  AST  30  /  ALT  99[H]  /  AlkPhos  119  02-03                     CULTURES: (if applicable)    Culture - Urine (collected 01-30-25 @ 19:50)  Source: Catheterized None  Final Report (02-03-25 @ 08:18):    10,000 - 49,000 CFU/mL Escherichia coli    10,000 - 49,000 CFU/mL Aerococcus urinae    Isolates of Aerococcus spp. are predictably susceptible to penicillin,    ampicillin, and vancomycin. All isolates are resistant to sulfonamides.  Organism: Escherichia coli (02-03-25 @ 08:18)  Organism: Escherichia coli (02-03-25 @ 08:18)      Method Type: SARA      -  Amoxicillin/Clavulanic Acid: S <=8/4      -  Ampicillin: R >16 These ampicillin results predict results for amoxicillin      -  Ampicillin/Sulbactam: S 8/4      -  Aztreonam: S <=4      -  Cefazolin: S <=2 For uncomplicated UTI with K. pneumoniae, E. coli, or P. mirablis: SARA <=16 is sensitive and SARA >=32 is resistant. This also predicts results for oral agents cefaclor, cefdinir, cefpodoxime, cefprozil, cefuroxime axetil, cephalexin and locarbef for uncomplicated UTI. Note that some isolates may be susceptible to these agents while testing resistant to cefazolin.      -  Cefepime: S <=2      -  Cefoxitin: S <=8      -  Ceftriaxone: S <=1      -  Cefuroxime: S <=4      -  Ciprofloxacin: R >2      -  Ertapenem: S <=0.5      -  Gentamicin: R >8      -  Imipenem: S <=1      -  Levofloxacin: R 4      -  Meropenem: S <=1      -  Nitrofurantoin: S <=32 Should not be used to treat pyelonephritis      -  Piperacillin/Tazobactam: S <=8      -  Tobramycin: R >8      -  Trimethoprim/Sulfamethoxazole: R >2/38    Culture - Blood (collected 01-30-25 @ 18:40)  Source: .Blood BLOOD  Preliminary Report (02-03-25 @ 01:01):    No growth at 72 Hours    Culture - Blood (collected 01-30-25 @ 18:30)  Source: .Blood BLOOD  Preliminary Report (02-03-25 @ 01:01):    No growth at 72 Hours             RADIOLOGY  CXR:    ACC: [id number] EXAM:  XR CHEST PORTABLE ROUTINE 1V   ORDERED BY: JORDANA MEJIA     PROCEDURE DATE:  02/01/2025          INTERPRETATION:  AP chest on February 1, 2025 at 7:27 AM. Concern is   pneumonia.    Right thoracic curve again noted. Heart magnified by technique.   Atelectasis and small infiltration around the left hilum again noted   showing some improvement from January 30.    The right lung shows significantly increased diffuse infiltrate.    IMPRESSION: Some improvement in left lung infiltrate but there is a   diffuse increase advanced right lung infiltrate.    --- End of Report ---      CT:    ECHO:      VITALS:  T(C): 36.4 (02-03-25 @ 05:00), Max: 37.4 (02-02-25 @ 14:00)  T(F): 97.6 (02-03-25 @ 05:00), Max: 99.3 (02-02-25 @ 14:00)  HR: 65 (02-03-25 @ 05:00) (64 - 77)  BP: 168/85 (02-03-25 @ 05:00) (128/70 - 168/85)  BP(mean): 116 (02-02-25 @ 14:00) (92 - 116)  ABP: --  ABP(mean): --  RR: 18 (02-03-25 @ 05:00) (15 - 20)  SpO2: 98% (02-03-25 @ 05:00) (96% - 100%)  CVP(mm Hg): --  CVP(cm H2O): --    Ins and Outs     02-02-25 @ 07:01  -  02-03-25 @ 07:00  --------------------------------------------------------  IN: 600 mL / OUT: 480 mL / NET: 120 mL                I&O's Detail    02 Feb 2025 07:01  -  03 Feb 2025 07:00  --------------------------------------------------------  IN:    IV PiggyBack: 100 mL    Lactated Ringers: 300 mL    Lactated Ringers: 200 mL  Total IN: 600 mL    OUT:    Voided (mL): 480 mL  Total OUT: 480 mL    Total NET: 120 mL              Follow-up Critical Care Progress Note  Chief Complaint : Sepsis      patient seen and examined  comfortable  confused      Allergies :Ambien (Other)  sulfa drugs (Other)      PAST MEDICAL & SURGICAL HISTORY:  HTN (hypertension)  HLD (hyperlipidemia)  Bipolar illness  MDD (major depressive disorder)  Effusion, left hip  Fall  Muscle wasting  History of TIAs  Cerebral infarction  Pressure-induced deep tissue damage of left heel  Hypertension  Hyperlipidemia  History of cholecystectomy        Medications:  MEDICATIONS  (STANDING):  ascorbic acid 500 milliGRAM(s) Oral daily  cetirizine 10 milliGRAM(s) Oral daily  chlorhexidine 2% Cloths 1 Application(s) Topical <User Schedule>  cholecalciferol 5000 Unit(s) Oral daily  clonazePAM  Tablet 0.5 milliGRAM(s) Oral two times a day  clopidogrel Tablet 75 milliGRAM(s) Oral daily  FLUoxetine Solution 60 milliGRAM(s) Oral daily  heparin   Injectable 5000 Unit(s) SubCutaneous every 8 hours  hydrocortisone sodium succinate Injectable 50 milliGRAM(s) IV Push every 6 hours  piperacillin/tazobactam IVPB.. 3.375 Gram(s) IV Intermittent every 12 hours  potassium chloride   Powder 40 milliEquivalent(s) Oral once  QUEtiapine 200 milliGRAM(s) Oral at bedtime  senna 2 Tablet(s) Oral at bedtime    MEDICATIONS  (PRN):      Antibiotics History  azithromycin  IVPB 500 milliGRAM(s) IV Intermittent once, 01-30-25 @ 18:26, Stop order after: 1 Doses  cefTRIAXone   IVPB 1000 milliGRAM(s) IV Intermittent once, 01-30-25 @ 18:26, Stop order after: 1 Doses  cefTRIAXone   IVPB 1000 milliGRAM(s) IV Intermittent every 24 hours, 01-31-25 @ 00:00, Stop order after: 3 Days  piperacillin/tazobactam IVPB. 3.375 Gram(s) IV Intermittent once, 01-30-25 @ 22:35, Stop order after: 1 Doses  piperacillin/tazobactam IVPB.- 3.375 Gram(s) IV Intermittent once, 01-31-25 @ 02:00  piperacillin/tazobactam IVPB.. 3.375 Gram(s) IV Intermittent every 12 hours, 01-31-25 @ 12:00, Stop order after: 7 Days      Heme Medications   clopidogrel Tablet 75 milliGRAM(s) Oral daily, 01-31-25 @ 09:41  heparin   Injectable 5000 Unit(s) SubCutaneous every 8 hours, 01-31-25 @ 09:27      GI Medications  senna 2 Tablet(s) Oral at bedtime, 01-31-25 @ 09:44, Routine      COVID  01-30-25 @ 18:30  COVID -   NotDetec  11-02-24 @ 11:30  COVID -   NotDetec  11-07-23 @ 09:40  COVID -   NotDetec  11-03-23 @ 11:20  COVID -   NotDetec  10-27-23 @ 00:50  COVID -   NotDetec       Procalcitonin Trend  11-02-24 @ 11:26   -   0.12[H]  10-27-23 @ 12:19   -   0.09  10-27-23 @ 05:30   -   0.12[H]    WBC Trend  02-03-25 @ 07:16   -  7.28  02-02-25 @ 05:00   -  7.97  02-01-25 @ 14:45   -  11.95[H]    H/H Trend  02-03-25 @ 07:16   -   9.4[L]/ 30.7[L]  02-02-25 @ 05:00   -   9.8[L]/ 30.3[L]  02-01-25 @ 14:45   -   10.4[L]/ 33.5[L]  01-31-25 @ 05:45   -   10.4[L]/ 33.4[L]  01-30-25 @ 18:30   -   10.9[L]/ 34.3[L]    Stool Occult Blood    Platelet Trend  02-03-25 @ 07:16   -  186  02-02-25 @ 05:00   -  184  02-01-25 @ 14:45   -  235    Trend Sodium  02-03-25 @ 07:16   -  146[H]  02-02-25 @ 05:00   -  144  02-01-25 @ 14:45   -  142  02-01-25 @ 05:00   -  141  01-31-25 @ 22:25   -  139  01-31-25 @ 12:54   -  140    Trend Potassium  02-03-25 @ 07:16   -  3.3[L]  02-02-25 @ 05:00   -  3.8  02-01-25 @ 14:45   -  3.5  02-01-25 @ 05:00   -  4.3  01-31-25 @ 22:25   -  4.0  01-31-25 @ 12:54   -  3.9    Trend Bun/Cr  02-03-25 @ 07:16  BUN/CR -  20 / 0.96  02-02-25 @ 05:00  BUN/CR -  15 / 1.02  02-01-25 @ 14:45  BUN/CR -  14 / 1.05  02-01-25 @ 05:00  BUN/CR -  16 / 1.05  01-31-25 @ 22:25  BUN/CR -  16 / 0.93  01-31-25 @ 12:54  BUN/CR -  21 / 1.20    Lactic Acid Trend  01-30-25 @ 18:30   -   1.3    ABG Trend    Trend AST/ALT/ALK Phos/Bili  02-03-25 @ 07:16   30/99[H]/119/1.4[H]  01-31-25 @ 05:45   164[H]/157[H]/159[H]/1.0  01-30-25 @ 18:30   75[H]/89[H]/161[H]/1.2  10-31-24 @ 08:14   28/22/86/0.7  10-30-24 @ 06:15   30/27/84/0.8  10-28-24 @ 12:55   35/28/110/1.3[H]  11-06-23 @ 06:30   21/34/105/0.4  11-03-23 @ 07:56   34/39/121[H]/0.4     Albumin Trend  02-03-25 @ 07:16   -   3.7  01-31-25 @ 05:45   -   2.9[L]  01-30-25 @ 18:30   -   3.1[L]  10-31-24 @ 08:14   -   2.6[L]  10-30-24 @ 06:15   -   2.9[L]  10-28-24 @ 12:55   -   3.4      PTT - PT - INR Trend  01-30-25 @ 18:30   -   27.8 - 12.9 - 1.09  10-27-23 @ 00:50   -   26.9 - 11.1 - 0.99    Glucose Trend  02-03-25 @ 08:40   -  -- -- 142[H]  02-03-25 @ 07:16   -  149[H] -- --  02-02-25 @ 22:08   -  -- -- 162[H]  02-02-25 @ 16:52   -  -- -- 166[H]  02-02-25 @ 11:40   -  -- -- 162[H]  02-02-25 @ 05:10   -  -- -- 156[H]  02-02-25 @ 05:00   -  157[H] -- --  02-01-25 @ 23:16   -  -- -- 165[H]  02-01-25 @ 17:17   -  -- -- 178[H]  02-01-25 @ 14:45   -  156[H] -- --    A1C with Estimated Average Glucose Result: 6.1 % *H* [4.0 - 5.6] (11-02-24 @ 19:10)      LABS:                        9.4    7.28  )-----------( 186      ( 03 Feb 2025 07:16 )             30.7     02-03    146[H]  |  109[H]  |  20  ----------------------------<  149[H]  3.3[L]   |  25  |  0.96    Ca    9.9      03 Feb 2025 07:16  Phos  2.8     02-03  Mg     2.0     02-03    TPro  6.6  /  Alb  3.7  /  TBili  1.4[H]  /  DBili  x   /  AST  30  /  ALT  99[H]  /  AlkPhos  119  02-03       CULTURES: (if applicable)    Culture - Urine (collected 01-30-25 @ 19:50)  Source: Catheterized None  Final Report (02-03-25 @ 08:18):    10,000 - 49,000 CFU/mL Escherichia coli    10,000 - 49,000 CFU/mL Aerococcus urinae    Isolates of Aerococcus spp. are predictably susceptible to penicillin,    ampicillin, and vancomycin. All isolates are resistant to sulfonamides.  Organism: Escherichia coli (02-03-25 @ 08:18)  Organism: Escherichia coli (02-03-25 @ 08:18)      Method Type: SARA      -  Amoxicillin/Clavulanic Acid: S <=8/4      -  Ampicillin: R >16 These ampicillin results predict results for amoxicillin      -  Ampicillin/Sulbactam: S 8/4      -  Aztreonam: S <=4      -  Cefazolin: S <=2 For uncomplicated UTI with K. pneumoniae, E. coli, or P. mirablis: SARA <=16 is sensitive and SARA >=32 is resistant. This also predicts results for oral agents cefaclor, cefdinir, cefpodoxime, cefprozil, cefuroxime axetil, cephalexin and locarbef for uncomplicated UTI. Note that some isolates may be susceptible to these agents while testing resistant to cefazolin.      -  Cefepime: S <=2      -  Cefoxitin: S <=8      -  Ceftriaxone: S <=1      -  Cefuroxime: S <=4      -  Ciprofloxacin: R >2      -  Ertapenem: S <=0.5      -  Gentamicin: R >8      -  Imipenem: S <=1      -  Levofloxacin: R 4      -  Meropenem: S <=1      -  Nitrofurantoin: S <=32 Should not be used to treat pyelonephritis      -  Piperacillin/Tazobactam: S <=8      -  Tobramycin: R >8      -  Trimethoprim/Sulfamethoxazole: R >2/38    Culture - Blood (collected 01-30-25 @ 18:40)  Source: .Blood BLOOD  Preliminary Report (02-03-25 @ 01:01):    No growth at 72 Hours    Culture - Blood (collected 01-30-25 @ 18:30)  Source: .Blood BLOOD  Preliminary Report (02-03-25 @ 01:01):    No growth at 72 Hours             RADIOLOGY  CXR:    ACC: [id number] EXAM:  XR CHEST PORTABLE ROUTINE 1V   ORDERED BY: JORDANA MEJIA     PROCEDURE DATE:  02/01/2025          INTERPRETATION:  AP chest on February 1, 2025 at 7:27 AM. Concern is   pneumonia.    Right thoracic curve again noted. Heart magnified by technique.   Atelectasis and small infiltration around the left hilum again noted   showing some improvement from January 30.    The right lung shows significantly increased diffuse infiltrate.    IMPRESSION: Some improvement in left lung infiltrate but there is a   diffuse increase advanced right lung infiltrate.    --- End of Report ---      CT:    ECHO:      VITALS:  T(C): 36.4 (02-03-25 @ 05:00), Max: 37.4 (02-02-25 @ 14:00)  T(F): 97.6 (02-03-25 @ 05:00), Max: 99.3 (02-02-25 @ 14:00)  HR: 65 (02-03-25 @ 05:00) (64 - 77)  BP: 168/85 (02-03-25 @ 05:00) (128/70 - 168/85)  BP(mean): 116 (02-02-25 @ 14:00) (92 - 116)  ABP: --  ABP(mean): --  RR: 18 (02-03-25 @ 05:00) (15 - 20)  SpO2: 98% (02-03-25 @ 05:00) (96% - 100%)  CVP(mm Hg): --  CVP(cm H2O): --    Ins and Outs     02-02-25 @ 07:01  -  02-03-25 @ 07:00  --------------------------------------------------------  IN: 600 mL / OUT: 480 mL / NET: 120 mL       I&O's Detail    02 Feb 2025 07:01  -  03 Feb 2025 07:00  --------------------------------------------------------  IN:    IV PiggyBack: 100 mL    Lactated Ringers: 300 mL    Lactated Ringers: 200 mL  Total IN: 600 mL    OUT:    Voided (mL): 480 mL  Total OUT: 480 mL    Total NET: 120 mL

## 2025-02-03 NOTE — PROGRESS NOTE ADULT - ASSESSMENT
Pt. is a 69 year old female with a pmh of HTN, HLD, Bipolar illness MDD,, Fall, History of TIAs, and Cerebral infarction is admitted for  Pt. is a 69 year old female with a pmh of HTN, HLD, Bipolar illness MDD,, Fall, History of TIAs, and Cerebral infarction from [facility] with lethargy and fever admitted to ICU with septic shock now downgraded to medical floors Pt. is a 69 year old female with a pmh of HTN, HLD, Bipolar illness MDD,, Fall, History of TIAs, and Cerebral infarction from [facility] with lethargy and fever admitted to ICU with septic shock now downgraded to medical floors    Septic Shock suspect due to UTI, Enterocolitis, or possible pneumonia based on CT  Metabolic Encephalopathy suspected MF in s/o septic shock and Lithium Toxicity  Downgraded from ICU, off pressors, stable respiratory status  CT A/P/C showed suspected pneumonia, non specific fluid filled small bowel loops and right colon without significant inflammatory change possible enterocolitis  Supplemental oxygen as needed to maintain sats > 92%, wean as tolerated  blood cultures have remained negative, Urine culture with E Coli and Aerocccus, sensitivites reviewed, continue zosyn  supportive care    GURDEEP with normal baseline - resolved  Hypernatremia  Hypokalemia  Creatinine appears to have normalized  Na today 146, will stop IVF  Replete potassium, check Mg in am  Follow BMP    Lithium Toxicity - improving  Bipolar Disorder, MDD  psych appreciated, asked to see due to lithium toxicity  lithium level improving  continue alprazolam 0.25 q 8 hours prn, hold for lethargy or change in respirations  continue haldol 2 mg IM/IVP q 8 hours prn, severe agitation  defer depakote trial due to elevated LFTs, consider standing abilify 5 daily  stopped gabapentin and lithium, thus far continue prozac and seroquel  when more clinically stable plan to address cognition and consider namenda  Psych plans to re evaluate today, follow up recommendations     HTN, High Cholesterol  chronic conditions, stable  TTE during this admission showed normal EF, RV moderately enlarged, mld to mod TR    Prophylactic Measure  heparin         Pt. is a 69 year old female with a pmh of HTN, HLD, Bipolar illness MDD,, Fall, History of TIAs, and Cerebral infarction from [facility] with lethargy and fever admitted to ICU with septic shock now downgraded to medical floors    Septic Shock suspect due to UTI, Enterocolitis, or possible pneumonia based on CT  Metabolic Encephalopathy suspected MF in s/o septic shock and Lithium Toxicity  Downgraded from ICU, off pressors, stable respiratory status  CT A/P/C showed suspected pneumonia, non specific fluid filled small bowel loops and right colon without significant inflammatory change possible enterocolitis  Supplemental oxygen as needed to maintain sats > 92%, wean as tolerated  blood cultures have remained negative, Urine culture with E Coli and Aerocccus, sensitivities reviewed, continue zosyn (Day 4)  taper IV hydrocortisone and start PPI while on steroids    ATN with normal baseline - resolved  Hypernatremia  Hypokalemia  Creatinine appears to have normalized  Na today 146, will stop IVF  Replete potassium, check Mg in am  Follow BMP    Lithium Toxicity - improving  Bipolar Disorder, MDD  psych appreciated, asked to see due to lithium toxicity  lithium level improving  continue alprazolam 0.25 q 8 hours prn, hold for lethargy or change in respirations  continue haldol 2 mg IM/IVP q 8 hours prn, severe agitation  defer depakote trial due to elevated LFTs, consider standing abilify 5 daily  stopped gabapentin and lithium, thus far continue prozac and seroquel  when more clinically stable plan to address cognition and consider namenda  Psych plans to re evaluate today, follow up recommendations     HTN  TTE during this admission showed normal EF, RV moderately enlarged, mld to mod TR  On lisinopril 5mg daily, will when off IV hydrocortisone    HLD, History CVA  Restarting atorvastatin 40mg qhs, continue plavix    Prophylactic Measure  heparin      No  known NOK   Pt. is a 69 year old female with a pmh of HTN, HLD, Bipolar illness MDD,, Fall, History of TIAs, and Cerebral infarction from [facility] with lethargy and fever admitted to ICU with septic shock now downgraded to medical floors    Septic Shock suspect due to UTI, Enterocolitis, and possible pneumonia based on CT  Metabolic Encephalopathy suspected MF in s/o septic shock and Lithium Toxicity  Downgraded from ICU, off pressors, stable respiratory status  CT A/P/C showed suspected pneumonia, non specific fluid filled small bowel loops and right colon without significant inflammatory change possible enterocolitis  Supplemental oxygen as needed to maintain sats > 92%, wean as tolerated  blood cultures have remained negative, Urine culture with E Coli and Aerocccus, sensitivities reviewed, continue zosyn (Day 4)  taper IV hydrocortisone and start PPI while on steroids    ATN with normal baseline - resolved  Hypernatremia  Hypokalemia  Creatinine appears to have normalized  Na today 146, will stop IVF  Replete potassium, check Mg in am  Follow BMP    Lithium Toxicity - improving  Bipolar Disorder, MDD  psych appreciated, asked to see due to lithium toxicity  lithium level improving  continue alprazolam 0.25 q 8 hours prn, hold for lethargy or change in respirations  continue haldol 2 mg IM/IVP q 8 hours prn, severe agitation  defer depakote trial due to elevated LFTs, consider standing abilify 5 daily  stopped gabapentin and lithium, thus far continue prozac and seroquel  when more clinically stable plan to address cognition and consider namenda  Psych plans to re evaluate today, follow up recommendations     HTN  TTE during this admission showed normal EF, RV moderately enlarged, mld to mod TR  On lisinopril 5mg daily, will when off IV hydrocortisone    HLD, History CVA  Restarting atorvastatin 40mg qhs, continue plavix    Prophylactic Measure  heparin      No  known NOK

## 2025-02-03 NOTE — CHART NOTE - NSCHARTNOTEFT_GEN_A_CORE
Nutrition Follow Up Note  Hospital Course (Per Electronic Medical Record):   Source: Medical Record [X] Nursing Staff [X]     Diet: Pureed with moderate thickened liquids     Patient's diet advanced as per SLP recommendations , as per nursing patient consuming minimal < 25% , IV fluids discontinued (2/3) , Labs reviewed , hypokalemia noted, Potassium repletion provided  Klor-Con powder x1 noted , POCT reviewed , most recent A1c (11/24) 6.1% ,? pre DM  , currently receiving Solu-cortef, IV push q 6 hrs ,  continues on 2LNC . Due to poor po , may suggest add Ensure Max QD for an additional 190kcals, 30gms protein , will follow clinical course , patient remains with AMS, ? baseline mental status     Current Weight: no follow up weight      Pertinent Medications: MEDICATIONS  (STANDING):  ascorbic acid 500 milliGRAM(s) Oral daily  cetirizine 10 milliGRAM(s) Oral daily  chlorhexidine 2% Cloths 1 Application(s) Topical <User Schedule>  cholecalciferol 5000 Unit(s) Oral daily  clonazePAM  Tablet 0.5 milliGRAM(s) Oral two times a day  clopidogrel Tablet 75 milliGRAM(s) Oral daily  FLUoxetine Solution 60 milliGRAM(s) Oral daily  heparin   Injectable 5000 Unit(s) SubCutaneous every 8 hours  hydrocortisone sodium succinate Injectable 50 milliGRAM(s) IV Push every 6 hours  piperacillin/tazobactam IVPB.. 3.375 Gram(s) IV Intermittent every 12 hours  potassium chloride   Powder 40 milliEquivalent(s) Oral once  QUEtiapine 200 milliGRAM(s) Oral at bedtime  senna 2 Tablet(s) Oral at bedtime    MEDICATIONS  (PRN):      Pertinent Labs:  02-03 Na146 mmol/L[H] Glu 149 mg/dL[H] K+ 3.3 mmol/L[L] Cr  0.96 mg/dL BUN 20 mg/dL 02-03 Phos 2.8 mg/dL 02-03 Alb 3.7 g/dL  Hgb 9.4g/dl<L>, Hct30.7% <L>   POCT 162,166,162,156      Skin: IAD     Edema: none    Last BM: (2/2) , Senna noted QD     Estimated Needs:   [X] No Change since Previous Assessment  [X] Recalculated:     Previous Nutrition Diagnosis: Inadequate Energy Intake    Nutrition Diagnosis is [X] Ongoing, will add Ensure Max QD for an additional 190kcals, 30gms protein in the setting of poor po intake          New Nutrition Diagnosis: [X] Not Applicable      Interventions:   1. Add Ensure Max QD     Monitoring & Evaluation: will monitor:  [X] Weights   [X] PO Intake   [X] Follow Up (Per Protocol)  [X] Tolerance to Diet Prescription       RD to follow as per Nutrition protocol  Juliet Morataya RDN

## 2025-02-03 NOTE — PROGRESS NOTE ADULT - SUBJECTIVE AND OBJECTIVE BOX
Patient is a 69y old  Female who presents with a chief complaint of Septic Shock (02 Feb 2025 13:49)    Patient seen and examined at bedside.  no acute events overnight    ALLERGIES:  Ambien (Other)  sulfa drugs (Other)        Vital Signs Last 24 Hrs  T(F): 97.6 (03 Feb 2025 05:00), Max: 99.3 (02 Feb 2025 14:00)  HR: 65 (03 Feb 2025 05:00) (63 - 77)  BP: 168/85 (03 Feb 2025 05:00) (115/104 - 168/85)  RR: 18 (03 Feb 2025 05:00) (15 - 21)  SpO2: 98% (03 Feb 2025 05:00) (95% - 100%)  I&O's Summary    02 Feb 2025 07:01  -  03 Feb 2025 07:00  --------------------------------------------------------  IN: 600 mL / OUT: 480 mL / NET: 120 mL      MEDICATIONS:  ascorbic acid 500 milliGRAM(s) Oral daily  cetirizine 10 milliGRAM(s) Oral daily  chlorhexidine 2% Cloths 1 Application(s) Topical <User Schedule>  cholecalciferol 5000 Unit(s) Oral daily  clonazePAM  Tablet 0.5 milliGRAM(s) Oral two times a day  clopidogrel Tablet 75 milliGRAM(s) Oral daily  FLUoxetine Solution 60 milliGRAM(s) Oral daily  heparin   Injectable 5000 Unit(s) SubCutaneous every 8 hours  hydrocortisone sodium succinate Injectable 50 milliGRAM(s) IV Push every 6 hours  lactated ringers. 1000 milliLiter(s) IV Continuous <Continuous>  piperacillin/tazobactam IVPB.. 3.375 Gram(s) IV Intermittent every 12 hours  potassium chloride   Powder 40 milliEquivalent(s) Oral once  QUEtiapine 200 milliGRAM(s) Oral at bedtime  senna 2 Tablet(s) Oral at bedtime      PHYSICAL EXAM:  General: NAD, Alert  ENT: MMM, no thrush  Neck: Supple, No JVD  Lungs: Clear to auscultation bilaterally, non labored, good air entry  Cardio: RRR, S1/S2, No murmurs  Abdomen: Soft, Nontender, Nondistended; Bowel sounds present  Extremities: No cyanosis, No edema    LABS:                        9.4    7.28  )-----------( 186      ( 03 Feb 2025 07:16 )             30.7     02-03    146  |  109  |  20  ----------------------------<  149  3.3   |  25  |  0.96    Ca    9.9      03 Feb 2025 07:16  Phos  2.8     02-03  Mg     2.0     02-03    TPro  6.6  /  Alb  3.7  /  TBili  1.4  /  DBili  x   /  AST  30  /  ALT  99  /  AlkPhos  119  02-03                            POCT Blood Glucose.: 142 mg/dL (03 Feb 2025 08:40)  POCT Blood Glucose.: 162 mg/dL (02 Feb 2025 22:08)  POCT Blood Glucose.: 166 mg/dL (02 Feb 2025 16:52)  POCT Blood Glucose.: 162 mg/dL (02 Feb 2025 11:40)      Urinalysis Basic - ( 03 Feb 2025 07:16 )    Color: x / Appearance: x / SG: x / pH: x  Gluc: 149 mg/dL / Ketone: x  / Bili: x / Urobili: x   Blood: x / Protein: x / Nitrite: x   Leuk Esterase: x / RBC: x / WBC x   Sq Epi: x / Non Sq Epi: x / Bacteria: x        Culture - Urine (collected 30 Jan 2025 19:50)  Source: Catheterized None  Final Report (03 Feb 2025 08:18):    10,000 - 49,000 CFU/mL Escherichia coli    10,000 - 49,000 CFU/mL Aerococcus urinae    Isolates of Aerococcus spp. are predictably susceptible to penicillin,    ampicillin, and vancomycin. All isolates are resistant to sulfonamides.  Organism: Escherichia coli (03 Feb 2025 08:18)  Organism: Escherichia coli (03 Feb 2025 08:18)      Method Type: SARA      -  Amoxicillin/Clavulanic Acid: S <=8/4      -  Ampicillin: R >16 These ampicillin results predict results for amoxicillin      -  Ampicillin/Sulbactam: S 8/4      -  Aztreonam: S <=4      -  Cefazolin: S <=2 For uncomplicated UTI with K. pneumoniae, E. coli, or P. mirablis: SARA <=16 is sensitive and SARA >=32 is resistant. This also predicts results for oral agents cefaclor, cefdinir, cefpodoxime, cefprozil, cefuroxime axetil, cephalexin and locarbef for uncomplicated UTI. Note that some isolates may be susceptible to these agents while testing resistant to cefazolin.      -  Cefepime: S <=2      -  Cefoxitin: S <=8      -  Ceftriaxone: S <=1      -  Cefuroxime: S <=4      -  Ciprofloxacin: R >2      -  Ertapenem: S <=0.5      -  Gentamicin: R >8      -  Imipenem: S <=1      -  Levofloxacin: R 4      -  Meropenem: S <=1      -  Nitrofurantoin: S <=32 Should not be used to treat pyelonephritis      -  Piperacillin/Tazobactam: S <=8      -  Tobramycin: R >8      -  Trimethoprim/Sulfamethoxazole: R >2/38    Culture - Blood (collected 30 Jan 2025 18:40)  Source: .Blood BLOOD  Preliminary Report (03 Feb 2025 01:01):    No growth at 72 Hours    Culture - Blood (collected 30 Jan 2025 18:30)  Source: .Blood BLOOD  Preliminary Report (03 Feb 2025 01:01):    No growth at 72 Hours          RADIOLOGY & ADDITIONAL TESTS:    Care Discussed with Consultants/Other Providers:    Patient is a 69y old  Female who presents with a chief complaint of Septic Shock (02 Feb 2025 13:49)    Patient seen and examined at bedside.  no acute events overnight    ALLERGIES:  Ambien (Other)  sulfa drugs (Other)    Vital Signs Last 24 Hrs  T(F): 97.6 (03 Feb 2025 05:00), Max: 99.3 (02 Feb 2025 14:00)  HR: 65 (03 Feb 2025 05:00) (63 - 77)  BP: 168/85 (03 Feb 2025 05:00) (115/104 - 168/85)  RR: 18 (03 Feb 2025 05:00) (15 - 21)  SpO2: 98% (03 Feb 2025 05:00) (95% - 100%)  I&O's Summary    02 Feb 2025 07:01  -  03 Feb 2025 07:00  --------------------------------------------------------  IN: 600 mL / OUT: 480 mL / NET: 120 mL    MEDICATIONS:  ascorbic acid 500 milliGRAM(s) Oral daily  cetirizine 10 milliGRAM(s) Oral daily  chlorhexidine 2% Cloths 1 Application(s) Topical <User Schedule>  cholecalciferol 5000 Unit(s) Oral daily  clonazePAM  Tablet 0.5 milliGRAM(s) Oral two times a day  clopidogrel Tablet 75 milliGRAM(s) Oral daily  FLUoxetine Solution 60 milliGRAM(s) Oral daily  heparin   Injectable 5000 Unit(s) SubCutaneous every 8 hours  hydrocortisone sodium succinate Injectable 50 milliGRAM(s) IV Push every 6 hours  lactated ringers. 1000 milliLiter(s) IV Continuous <Continuous>  piperacillin/tazobactam IVPB.. 3.375 Gram(s) IV Intermittent every 12 hours  potassium chloride   Powder 40 milliEquivalent(s) Oral once  QUEtiapine 200 milliGRAM(s) Oral at bedtime  senna 2 Tablet(s) Oral at bedtime    PHYSICAL EXAM:  General: NAD, Alert  ENT: MMM, no thrush  Neck: Supple, No JVD  Lungs: Clear to auscultation bilaterally, non labored, good air entry  Cardio: RRR, S1/S2, No murmurs  Abdomen: Soft, Nontender, Nondistended; Bowel sounds present  Extremities: No cyanosis, No edema    LABS:                        9.4    7.28  )-----------( 186      ( 03 Feb 2025 07:16 )             30.7     02-03    146  |  109  |  20  ----------------------------<  149  3.3   |  25  |  0.96    Ca    9.9      03 Feb 2025 07:16  Phos  2.8     02-03  Mg     2.0     02-03    TPro  6.6  /  Alb  3.7  /  TBili  1.4  /  DBili  x   /  AST  30  /  ALT  99  /  AlkPhos  119  02-03    POCT Blood Glucose.: 142 mg/dL (03 Feb 2025 08:40)  POCT Blood Glucose.: 162 mg/dL (02 Feb 2025 22:08)  POCT Blood Glucose.: 166 mg/dL (02 Feb 2025 16:52)  POCT Blood Glucose.: 162 mg/dL (02 Feb 2025 11:40)    Urinalysis Basic - ( 03 Feb 2025 07:16 )  Color: x / Appearance: x / SG: x / pH: x  Gluc: 149 mg/dL / Ketone: x  / Bili: x / Urobili: x   Blood: x / Protein: x / Nitrite: x   Leuk Esterase: x / RBC: x / WBC x   Sq Epi: x / Non Sq Epi: x / Bacteria: x    Culture - Urine (collected 30 Jan 2025 19:50)  Source: Catheterized None  Final Report (03 Feb 2025 08:18):    10,000 - 49,000 CFU/mL Escherichia coli    10,000 - 49,000 CFU/mL Aerococcus urinae    Isolates of Aerococcus spp. are predictably susceptible to penicillin,    ampicillin, and vancomycin. All isolates are resistant to sulfonamides.  Organism: Escherichia coli (03 Feb 2025 08:18)  Organism: Escherichia coli (03 Feb 2025 08:18)      Method Type: SARA      -  Amoxicillin/Clavulanic Acid: S <=8/4      -  Ampicillin: R >16 These ampicillin results predict results for amoxicillin      -  Ampicillin/Sulbactam: S 8/4      -  Aztreonam: S <=4      -  Cefazolin: S <=2 For uncomplicated UTI with K. pneumoniae, E. coli, or P. mirablis: SARA <=16 is sensitive and SARA >=32 is resistant. This also predicts results for oral agents cefaclor, cefdinir, cefpodoxime, cefprozil, cefuroxime axetil, cephalexin and locarbef for uncomplicated UTI. Note that some isolates may be susceptible to these agents while testing resistant to cefazolin.      -  Cefepime: S <=2      -  Cefoxitin: S <=8      -  Ceftriaxone: S <=1      -  Cefuroxime: S <=4      -  Ciprofloxacin: R >2      -  Ertapenem: S <=0.5      -  Gentamicin: R >8      -  Imipenem: S <=1      -  Levofloxacin: R 4      -  Meropenem: S <=1      -  Nitrofurantoin: S <=32 Should not be used to treat pyelonephritis      -  Piperacillin/Tazobactam: S <=8      -  Tobramycin: R >8      -  Trimethoprim/Sulfamethoxazole: R >2/38    Culture - Blood (collected 30 Jan 2025 18:40)  Source: .Blood BLOOD  Preliminary Report (03 Feb 2025 01:01):    No growth at 72 Hours    Culture - Blood (collected 30 Jan 2025 18:30)  Source: .Blood BLOOD  Preliminary Report (03 Feb 2025 01:01):    No growth at 72 Hours    Care Discussed with Consultants/Other Providers: Crit care

## 2025-02-04 LAB
ANION GAP SERPL CALC-SCNC: 11 MMOL/L — SIGNIFICANT CHANGE UP (ref 5–17)
BUN SERPL-MCNC: 21 MG/DL — SIGNIFICANT CHANGE UP (ref 7–23)
CALCIUM SERPL-MCNC: 9.6 MG/DL — SIGNIFICANT CHANGE UP (ref 8.4–10.5)
CHLORIDE SERPL-SCNC: 112 MMOL/L — HIGH (ref 96–108)
CO2 SERPL-SCNC: 26 MMOL/L — SIGNIFICANT CHANGE UP (ref 22–31)
CREAT SERPL-MCNC: 0.93 MG/DL — SIGNIFICANT CHANGE UP (ref 0.5–1.3)
EGFR: 66 ML/MIN/1.73M2 — SIGNIFICANT CHANGE UP
GLUCOSE BLDC GLUCOMTR-MCNC: 151 MG/DL — HIGH (ref 70–99)
GLUCOSE BLDC GLUCOMTR-MCNC: 160 MG/DL — HIGH (ref 70–99)
GLUCOSE BLDC GLUCOMTR-MCNC: 164 MG/DL — HIGH (ref 70–99)
GLUCOSE BLDC GLUCOMTR-MCNC: 170 MG/DL — HIGH (ref 70–99)
GLUCOSE BLDC GLUCOMTR-MCNC: 203 MG/DL — HIGH (ref 70–99)
GLUCOSE SERPL-MCNC: 156 MG/DL — HIGH (ref 70–99)
HCT VFR BLD CALC: 30.8 % — LOW (ref 34.5–45)
HGB BLD-MCNC: 9.9 G/DL — LOW (ref 11.5–15.5)
MCHC RBC-ENTMCNC: 30.7 PG — SIGNIFICANT CHANGE UP (ref 27–34)
MCHC RBC-ENTMCNC: 32.1 G/DL — SIGNIFICANT CHANGE UP (ref 32–36)
MCV RBC AUTO: 95.4 FL — SIGNIFICANT CHANGE UP (ref 80–100)
NRBC # BLD: 0 /100 WBCS — SIGNIFICANT CHANGE UP (ref 0–0)
NRBC BLD-RTO: 0 /100 WBCS — SIGNIFICANT CHANGE UP (ref 0–0)
PLATELET # BLD AUTO: 210 K/UL — SIGNIFICANT CHANGE UP (ref 150–400)
POTASSIUM SERPL-MCNC: 3.3 MMOL/L — LOW (ref 3.5–5.3)
POTASSIUM SERPL-SCNC: 3.3 MMOL/L — LOW (ref 3.5–5.3)
RBC # BLD: 3.23 M/UL — LOW (ref 3.8–5.2)
RBC # FLD: 14.6 % — HIGH (ref 10.3–14.5)
SODIUM SERPL-SCNC: 149 MMOL/L — HIGH (ref 135–145)
WBC # BLD: 6.24 K/UL — SIGNIFICANT CHANGE UP (ref 3.8–10.5)
WBC # FLD AUTO: 6.24 K/UL — SIGNIFICANT CHANGE UP (ref 3.8–10.5)

## 2025-02-04 PROCEDURE — 99232 SBSQ HOSP IP/OBS MODERATE 35: CPT

## 2025-02-04 PROCEDURE — 99233 SBSQ HOSP IP/OBS HIGH 50: CPT

## 2025-02-04 RX ORDER — BACTERIOSTATIC SODIUM CHLORIDE 0.9 %
1000 VIAL (ML) INJECTION
Refills: 0 | Status: DISCONTINUED | OUTPATIENT
Start: 2025-02-04 | End: 2025-02-05

## 2025-02-04 RX ORDER — CLONAZEPAM 2 MG
0.25 TABLET ORAL DAILY
Refills: 0 | Status: DISCONTINUED | OUTPATIENT
Start: 2025-02-05 | End: 2025-02-11

## 2025-02-04 RX ORDER — POTASSIUM CHLORIDE 750 MG/1
20 TABLET, EXTENDED RELEASE ORAL
Refills: 0 | Status: DISCONTINUED | OUTPATIENT
Start: 2025-02-04 | End: 2025-02-04

## 2025-02-04 RX ORDER — CLONAZEPAM 2 MG
0.5 TABLET ORAL AT BEDTIME
Refills: 0 | Status: DISCONTINUED | OUTPATIENT
Start: 2025-02-04 | End: 2025-02-11

## 2025-02-04 RX ORDER — POTASSIUM CHLORIDE 750 MG/1
10 TABLET, EXTENDED RELEASE ORAL
Refills: 0 | Status: COMPLETED | OUTPATIENT
Start: 2025-02-04 | End: 2025-02-04

## 2025-02-04 RX ORDER — ARIPIPRAZOLE 5 MG/1
5 TABLET ORAL DAILY
Refills: 0 | Status: DISCONTINUED | OUTPATIENT
Start: 2025-02-04 | End: 2025-02-11

## 2025-02-04 RX ADMIN — ARIPIPRAZOLE 5 MILLIGRAM(S): 5 TABLET ORAL at 14:26

## 2025-02-04 RX ADMIN — Medication 100 MILLILITER(S): at 22:44

## 2025-02-04 RX ADMIN — Medication 50 MILLIGRAM(S): at 05:52

## 2025-02-04 RX ADMIN — Medication 2 TABLET(S): at 21:05

## 2025-02-04 RX ADMIN — Medication 5000 UNIT(S): at 13:50

## 2025-02-04 RX ADMIN — POTASSIUM CHLORIDE 100 MILLIEQUIVALENT(S): 750 TABLET, EXTENDED RELEASE ORAL at 10:22

## 2025-02-04 RX ADMIN — Medication 2: at 08:58

## 2025-02-04 RX ADMIN — Medication 60 MILLIGRAM(S): at 11:22

## 2025-02-04 RX ADMIN — Medication 2: at 13:49

## 2025-02-04 RX ADMIN — Medication 50 MILLIGRAM(S): at 13:50

## 2025-02-04 RX ADMIN — Medication 0.5 MILLIGRAM(S): at 21:05

## 2025-02-04 RX ADMIN — POTASSIUM CHLORIDE 100 MILLIEQUIVALENT(S): 750 TABLET, EXTENDED RELEASE ORAL at 11:54

## 2025-02-04 RX ADMIN — PANTOPRAZOLE 40 MILLIGRAM(S): 20 TABLET, DELAYED RELEASE ORAL at 06:00

## 2025-02-04 RX ADMIN — Medication 5000 UNIT(S): at 21:05

## 2025-02-04 RX ADMIN — Medication 5000 UNIT(S): at 11:22

## 2025-02-04 RX ADMIN — Medication 75 MILLIGRAM(S): at 11:22

## 2025-02-04 RX ADMIN — PIPERACILLIN SODIUM AND TAZOBACTAM SODIUM 25 GRAM(S): 2; 250 INJECTION, POWDER, FOR SOLUTION INTRAVENOUS at 14:27

## 2025-02-04 RX ADMIN — ANTISEPTIC SURGICAL SCRUB 1 APPLICATION(S): 0.04 SOLUTION TOPICAL at 06:57

## 2025-02-04 RX ADMIN — Medication 2: at 18:22

## 2025-02-04 RX ADMIN — PIPERACILLIN SODIUM AND TAZOBACTAM SODIUM 25 GRAM(S): 2; 250 INJECTION, POWDER, FOR SOLUTION INTRAVENOUS at 00:36

## 2025-02-04 RX ADMIN — Medication 5000 UNIT(S): at 05:53

## 2025-02-04 RX ADMIN — Medication 10 MILLIGRAM(S): at 11:22

## 2025-02-04 RX ADMIN — Medication 500 MILLIGRAM(S): at 11:24

## 2025-02-04 RX ADMIN — Medication 0.5 MILLIGRAM(S): at 05:53

## 2025-02-04 RX ADMIN — POTASSIUM CHLORIDE 100 MILLIEQUIVALENT(S): 750 TABLET, EXTENDED RELEASE ORAL at 13:01

## 2025-02-04 RX ADMIN — Medication 0.5 MILLIGRAM(S): at 18:23

## 2025-02-04 RX ADMIN — ATORVASTATIN CALCIUM 40 MILLIGRAM(S): 80 TABLET, FILM COATED ORAL at 21:05

## 2025-02-04 NOTE — BH CONSULTATION LIAISON PROGRESS NOTE - NSBHCONSULTRECOMMENDOTHER_PSY_A_CORE FT
Consider standing Abilify 5 mg daily and monitor response, defer a Depakote trial due to elevated LFT's.  Would also defer use of Gabapentin or nephrotoxic agents, renal consult, repeat Lithium for trend.   When more clinically stable will be better able to address cognition and consider use of cognitive enhancing agent like Namenda.       D/C  Prozac,  Gabapentin , Lithium , Seroquel    Continue Klonopin to avoid complication of benzodiazepine withdrawal. or convert to equivalent dose of  Alprazolam due to increased LFT's.  - Continue with Abilify 5mg daily. Defer use of any other mood stabilizers or psychotropic medications at this time until baseline mental status can be established or if mental status improves.  - Consider decreasing Klonopin from 0.5mg BID to Klonopin 0.25mg in the morning and 0.5mg in the evening.

## 2025-02-04 NOTE — BH CONSULTATION LIAISON PROGRESS NOTE - NSBHCHARTREVIEWINVESTIGATE_PSY_A_CORE FT
< from: 12 Lead ECG (02.01.25 @ 09:05) >    < end of copied text >    
< from: 12 Lead ECG (01.30.25 @ 18:26) >    Ventricular Rate 68 BPM    Atrial Rate 68 BPM    P-R Interval 158 ms    QRS Duration 84 ms    Q-T Interval 436 ms    QTC Calculation(Bazett) 463 ms    P Axis 32 degrees    R Axis 19 degrees    T Axis 54 degrees    Diagnosis Line Sinus rhythm with fusion complexes  Nonspecific T wave abnormality  Abnormal ECG  When compared with ECG of 28-OCT-2024 14:29,  fusion complexes are now present  Confirmed by JOSE RAMON GARCIA ([id number]) on 1/31/2025 11:09:02 AM    < end of copied text >    < from: CT Head No Cont (01.30.25 @ 23:18) >    ACC: [id number] EXAM:  CT BRAIN   ORDERED BY:  GURMEET KARIMI     PROCEDURE DATE:  01/30/2025          INTERPRETATION:  CLINICAL INDICATION: ams    TECHNIQUE: CT axial images of the head were obtained without intravenous   contrast. Computer-reconstructed coronal and sagittal images were   obtained.    CONTRAST:  IV Contrast: NONE  Complications: NA.    COMPARISON: 10/28/2024.    FINDINGS: Artifact degrades images of the posterior fossa/lower brain.   There is no obvious acute intracranial hemorrhage, mass effect or midline   shift, given the extent of artifact. Nonspecific mild periventricular and   subcortical white matter hypodensities likely represent microvascular   ischemic changes. There is stable cerebral volume loss. Partially empty   sella.    There is no depressed skull fracture. Persistent sinus mucosal thickening   with opacification of the left ethmoid and maxillary sinuses. Persistent   heterogenous structure at the left nasal cavity protruding into the left   maxillary sinus. Visualized tympanomastoid region is unremarkable.    IMPRESSION:    No obvious acute intracranial hemorrhage or mass effect. If clinically   indicated, short-term follow-up or MRI may be obtained for further   evaluation.    --- End of Report ---            EDEN SUAREZ MD; Attending Radiologist  This document has been electronically signed. Jan 31 2025 12:25AM    < end of copied text >

## 2025-02-04 NOTE — PHYSICAL THERAPY INITIAL EVALUATION ADULT - PERTINENT HX OF CURRENT PROBLEM, REHAB EVAL
Pt. is a 69 year old female with a pmh of HTN, HLD, Bipolar illness MDD,, Fall, History of TIAs, and Cerebral infarction from [facility] with lethargy and fever admitted to ICU with septic shock now downgraded to medical floor.

## 2025-02-04 NOTE — PROGRESS NOTE ADULT - ASSESSMENT
Pt. is a 69 year old female with a pmh of HTN, HLD, Bipolar illness MDD,, Fall, History of TIAs, and Cerebral infarction from [facility] with lethargy and fever admitted to ICU with septic shock now downgraded to medical floor.    Septic Shock suspect due to UTI, Enterocolitis, and possible pneumonia based on CT  Metabolic Encephalopathy suspected MF in s/o septic shock and Lithium Toxicity  Downgraded from ICU, off pressors, stable respiratory status  CT A/P/C showed suspected pneumonia, non specific fluid filled small bowel loops and right colon without significant inflammatory change possible enterocolitis  Supplemental oxygen as needed to maintain sats > 92%, wean as tolerated  blood cultures have remained negative, Urine culture with E Coli and Aerocccus, sensitivities reviewed, continue zosyn (Day 5)  taper IV hydrocortisone per Critical care and cont PPI while on steroids    ATN with normal baseline - resolved  Hypernatremia  Hypokalemia  Creatinine appears to have normalized  Na today 149, start on IVF today  Replete potassium, check Mg in am  Follow BMP    Lithium Toxicity - improving  Bipolar Disorder, MDD  Psych note appreciated, asked to see due to lithium toxicity  lithium level improving. rec is to stop Prozac, Seroquel, Gabapentin and lithium  started on Abilify 5 mg. daily and cont Klonopin, no Xanax  when more clinically stable plan to address cognition and consider namenda  Psych to f/u recommendations     HTN  TTE during this admission showed normal EF, RV moderately enlarged, mld to mod TR  Pt was on lisinopril 5mg daily, will restart when off IV hydrocortisone    HLD, History CVA  Restarting atorvastatin 40mg qhs, continue plavix    Prophylactic Measure  heparin    Dispo:  SW/CM following for D/C planning, no Emergency contact listed

## 2025-02-04 NOTE — BH CONSULTATION LIAISON PROGRESS NOTE - NSBHCONSULTMEDAGITATION_PSY_A_CORE FT
Alprazolam 0.25 mg po q 8 h prn, hold for lethargy or change in respirations. 
Alprazolam 0.25 mg po q 8 h prn, hold for lethargy or change in respirations.

## 2025-02-04 NOTE — BH CONSULTATION LIAISON PROGRESS NOTE - NSBHFUPINTERVALHXFT_PSY_A_CORE
Pt. is a 69 year old female with a pmh of HTN, HLD, Bipolar illness MDD,, Fall, History of TIAs, and Cerebral infarction from [facility] with lethargy and fever admitted to ICU with septic shock now downgraded to medical floor.  Psychiatry asked to see patient due to lithium toxicity and medication management recommendations, pt with no family contacts, and writer unable to reach anyone at [facility] for past psychiatric history.     C/L Psychiatry Note:  Chart reviewed and patient evaluated. Per interdisciplinary team, there were no behavioral complaints today regarding the patient and stated she only gets agitated when the staff attempts to care for her. Patient presents AAOx0, asleep in bed. Patient was able to be aroused in response to her name, however was not engaged in the conversion, had a blank stare and speech was very incoherent.    Pt. is a 69 year old female with a pmh of HTN, HLD, Bipolar illness MDD,, Fall, History of TIAs, and Cerebral infarction from [facility] with lethargy and fever admitted to ICU with septic shock now downgraded to medical floor. Psychiatry asked to see patient due to lithium toxicity and medication management recommendations, pt with no family contacts, and writer unable to reach anyone at [facility] for past psychiatric history.     C/L Psychiatry Note:  Chart reviewed and patient evaluated. Per interdisciplinary team, there were no behavioral complaints today regarding the patient and stated she only gets agitated when the staff attempts to care for her. Patient presents AAOx0, asleep in bed. Patient was able to be aroused in response to her name, however was not engaged in the conversion, had an unresponsive gaze, and speech was incoherent and illogical. However, she is calm and cooperative; no overt behavioral disturbances noted.

## 2025-02-04 NOTE — PROGRESS NOTE ADULT - SUBJECTIVE AND OBJECTIVE BOX
Patient is a 69y old  Female who presents with a chief complaint of AMS (03 Feb 2025 11:20)      Patient seen and examined at bedside. No overnight events reported.     ALLERGIES:  Ambien (Other)  sulfa drugs (Other)    MEDICATIONS  (STANDING):  ascorbic acid 500 milliGRAM(s) Oral daily  atorvastatin 40 milliGRAM(s) Oral at bedtime  cetirizine 10 milliGRAM(s) Oral daily  chlorhexidine 2% Cloths 1 Application(s) Topical <User Schedule>  cholecalciferol 5000 Unit(s) Oral daily  clonazePAM  Tablet 0.5 milliGRAM(s) Oral two times a day  clopidogrel Tablet 75 milliGRAM(s) Oral daily  FLUoxetine Solution 60 milliGRAM(s) Oral daily  heparin   Injectable 5000 Unit(s) SubCutaneous every 8 hours  hydrocortisone sodium succinate Injectable 50 milliGRAM(s) IV Push every 8 hours  insulin lispro (ADMELOG) corrective regimen sliding scale   SubCutaneous three times a day before meals  insulin lispro (ADMELOG) corrective regimen sliding scale   SubCutaneous at bedtime  pantoprazole    Tablet 40 milliGRAM(s) Oral before breakfast  piperacillin/tazobactam IVPB.. 3.375 Gram(s) IV Intermittent every 12 hours  potassium chloride  10 mEq/100 mL IVPB 10 milliEquivalent(s) IV Intermittent every 1 hour  QUEtiapine 200 milliGRAM(s) Oral at bedtime  senna 2 Tablet(s) Oral at bedtime  sodium chloride 0.9%. 1000 milliLiter(s) (100 mL/Hr) IV Continuous <Continuous>    MEDICATIONS  (PRN):    Vital Signs Last 24 Hrs  T(F): 98.4 (04 Feb 2025 06:45), Max: 98.7 (03 Feb 2025 20:00)  HR: 66 (04 Feb 2025 06:45) (59 - 82)  BP: 146/55 (04 Feb 2025 06:45) (146/55 - 149/78)  RR: 19 (04 Feb 2025 06:45) (19 - 23)  SpO2: 97% (04 Feb 2025 06:45) (95% - 97%)  I&O's Summary    03 Feb 2025 07:01  -  04 Feb 2025 07:00  --------------------------------------------------------  IN: 0 mL / OUT: 550 mL / NET: -550 mL      PHYSICAL EXAM:  General: NAD, she mumbles.   ENT: poor dentition, no thrush  Neck: Supple, No JVD  Lungs: Clear to auscultation bilaterally, good air entry, non-labored breathing  Cardio: RRR, S1/S2, No murmur  Abdomen: Soft, Nontender, Nondistended; Bowel sounds present  Extremities: No calf tenderness, No cyanosis, No pitting edema  Neuro: alert, nonfocal    LABS:                        9.9    6.24  )-----------( 210      ( 04 Feb 2025 07:10 )             30.8     02-04    149  |  112  |  21  ----------------------------<  156  3.3   |  26  |  0.93    Ca    9.6      04 Feb 2025 07:10  Phos  2.8     02-03  Mg     2.0     02-03    TPro  6.6  /  Alb  3.7  /  TBili  1.4  /  DBili  x   /  AST  30  /  ALT  99  /  AlkPhos  119  02-03                                  POCT Blood Glucose.: 164 mg/dL (04 Feb 2025 08:29)  POCT Blood Glucose.: 182 mg/dL (03 Feb 2025 23:04)  POCT Blood Glucose.: 136 mg/dL (03 Feb 2025 12:09)      Urinalysis Basic - ( 04 Feb 2025 07:10 )    Color: x / Appearance: x / SG: x / pH: x  Gluc: 156 mg/dL / Ketone: x  / Bili: x / Urobili: x   Blood: x / Protein: x / Nitrite: x   Leuk Esterase: x / RBC: x / WBC x   Sq Epi: x / Non Sq Epi: x / Bacteria: x        Culture - Urine (collected 30 Jan 2025 19:50)  Source: Catheterized None  Final Report (03 Feb 2025 08:18):    10,000 - 49,000 CFU/mL Escherichia coli    10,000 - 49,000 CFU/mL Aerococcus urinae    Isolates of Aerococcus spp. are predictably susceptible to penicillin,    ampicillin, and vancomycin. All isolates are resistant to sulfonamides.  Organism: Escherichia coli (03 Feb 2025 08:18)  Organism: Escherichia coli (03 Feb 2025 08:18)      Method Type: SARA      -  Amoxicillin/Clavulanic Acid: S <=8/4      -  Ampicillin: R >16 These ampicillin results predict results for amoxicillin      -  Ampicillin/Sulbactam: S 8/4      -  Aztreonam: S <=4      -  Cefazolin: S <=2 For uncomplicated UTI with K. pneumoniae, E. coli, or P. mirablis: SARA <=16 is sensitive and SARA >=32 is resistant. This also predicts results for oral agents cefaclor, cefdinir, cefpodoxime, cefprozil, cefuroxime axetil, cephalexin and locarbef for uncomplicated UTI. Note that some isolates may be susceptible to these agents while testing resistant to cefazolin.      -  Cefepime: S <=2      -  Cefoxitin: S <=8      -  Ceftriaxone: S <=1      -  Cefuroxime: S <=4      -  Ciprofloxacin: R >2      -  Ertapenem: S <=0.5      -  Gentamicin: R >8      -  Imipenem: S <=1      -  Levofloxacin: R 4      -  Meropenem: S <=1      -  Nitrofurantoin: S <=32 Should not be used to treat pyelonephritis      -  Piperacillin/Tazobactam: S <=8      -  Tobramycin: R >8      -  Trimethoprim/Sulfamethoxazole: R >2/38    Culture - Blood (collected 30 Jan 2025 18:40)  Source: .Blood BLOOD  Preliminary Report (04 Feb 2025 01:01):    No growth at 4 days    Culture - Blood (collected 30 Jan 2025 18:30)  Source: .Blood BLOOD  Preliminary Report (04 Feb 2025 01:01):    No growth at 4 days        RADIOLOGY & ADDITIONAL TESTS:  < from: Xray Chest 1 View- PORTABLE-Routine (02.01.25 @ 07:41) >    IMPRESSION: Some improvement in left lung infiltrate but there is a   diffuse increase advanced right lung infiltrate.      < end of copied text >  < from: CT Head No Cont (01.30.25 @ 23:18) >    IMPRESSION:    No obvious acute intracranial hemorrhage or mass effect. If clinically   indicated, short-term follow-up or MRI may be obtained for further   evaluation.    < end of copied text >    Care Discussed with Consultants/Other Providers:

## 2025-02-04 NOTE — BH CONSULTATION LIAISON PROGRESS NOTE - OTHER
Restricted speech largely incoherent.  Neutral Unable to test because speech was incoherent not tested.

## 2025-02-05 LAB
ALBUMIN SERPL ELPH-MCNC: 3.5 G/DL — SIGNIFICANT CHANGE UP (ref 3.3–5)
ALP SERPL-CCNC: 111 U/L — SIGNIFICANT CHANGE UP (ref 40–120)
ALT FLD-CCNC: 71 U/L — HIGH (ref 10–45)
ANION GAP SERPL CALC-SCNC: 13 MMOL/L — SIGNIFICANT CHANGE UP (ref 5–17)
AST SERPL-CCNC: 44 U/L — HIGH (ref 10–40)
BILIRUB SERPL-MCNC: 1.2 MG/DL — SIGNIFICANT CHANGE UP (ref 0.2–1.2)
BUN SERPL-MCNC: 23 MG/DL — SIGNIFICANT CHANGE UP (ref 7–23)
CALCIUM SERPL-MCNC: 9.5 MG/DL — SIGNIFICANT CHANGE UP (ref 8.4–10.5)
CHLORIDE SERPL-SCNC: 112 MMOL/L — HIGH (ref 96–108)
CO2 SERPL-SCNC: 22 MMOL/L — SIGNIFICANT CHANGE UP (ref 22–31)
CREAT SERPL-MCNC: 1.14 MG/DL — SIGNIFICANT CHANGE UP (ref 0.5–1.3)
CULTURE RESULTS: SIGNIFICANT CHANGE UP
CULTURE RESULTS: SIGNIFICANT CHANGE UP
EGFR: 52 ML/MIN/1.73M2 — LOW
GLUCOSE BLDC GLUCOMTR-MCNC: 123 MG/DL — HIGH (ref 70–99)
GLUCOSE BLDC GLUCOMTR-MCNC: 131 MG/DL — HIGH (ref 70–99)
GLUCOSE BLDC GLUCOMTR-MCNC: 171 MG/DL — HIGH (ref 70–99)
GLUCOSE BLDC GLUCOMTR-MCNC: 191 MG/DL — HIGH (ref 70–99)
GLUCOSE SERPL-MCNC: 132 MG/DL — HIGH (ref 70–99)
HCT VFR BLD CALC: 35.1 % — SIGNIFICANT CHANGE UP (ref 34.5–45)
HGB BLD-MCNC: 10.5 G/DL — LOW (ref 11.5–15.5)
MAGNESIUM SERPL-MCNC: 2.1 MG/DL — SIGNIFICANT CHANGE UP (ref 1.6–2.6)
MCHC RBC-ENTMCNC: 29.6 PG — SIGNIFICANT CHANGE UP (ref 27–34)
MCHC RBC-ENTMCNC: 29.9 G/DL — LOW (ref 32–36)
MCV RBC AUTO: 98.9 FL — SIGNIFICANT CHANGE UP (ref 80–100)
NRBC # BLD: 0 /100 WBCS — SIGNIFICANT CHANGE UP (ref 0–0)
NRBC BLD-RTO: 0 /100 WBCS — SIGNIFICANT CHANGE UP (ref 0–0)
PHOSPHATE SERPL-MCNC: 3.7 MG/DL — SIGNIFICANT CHANGE UP (ref 2.5–4.5)
PLATELET # BLD AUTO: 167 K/UL — SIGNIFICANT CHANGE UP (ref 150–400)
POTASSIUM SERPL-MCNC: 3.9 MMOL/L — SIGNIFICANT CHANGE UP (ref 3.5–5.3)
POTASSIUM SERPL-SCNC: 3.9 MMOL/L — SIGNIFICANT CHANGE UP (ref 3.5–5.3)
PROT SERPL-MCNC: 6.7 G/DL — SIGNIFICANT CHANGE UP (ref 6–8.3)
RBC # BLD: 3.55 M/UL — LOW (ref 3.8–5.2)
RBC # FLD: 15.3 % — HIGH (ref 10.3–14.5)
SODIUM SERPL-SCNC: 147 MMOL/L — HIGH (ref 135–145)
SPECIMEN SOURCE: SIGNIFICANT CHANGE UP
SPECIMEN SOURCE: SIGNIFICANT CHANGE UP
WBC # BLD: 8.31 K/UL — SIGNIFICANT CHANGE UP (ref 3.8–10.5)
WBC # FLD AUTO: 8.31 K/UL — SIGNIFICANT CHANGE UP (ref 3.8–10.5)

## 2025-02-05 PROCEDURE — 99233 SBSQ HOSP IP/OBS HIGH 50: CPT

## 2025-02-05 RX ORDER — BACTERIOSTATIC SODIUM CHLORIDE 0.9 %
1000 VIAL (ML) INJECTION
Refills: 0 | Status: DISCONTINUED | OUTPATIENT
Start: 2025-02-05 | End: 2025-02-05

## 2025-02-05 RX ADMIN — Medication 5000 UNIT(S): at 14:10

## 2025-02-05 RX ADMIN — Medication 5000 UNIT(S): at 21:26

## 2025-02-05 RX ADMIN — PIPERACILLIN SODIUM AND TAZOBACTAM SODIUM 25 GRAM(S): 2; 250 INJECTION, POWDER, FOR SOLUTION INTRAVENOUS at 23:39

## 2025-02-05 RX ADMIN — PIPERACILLIN SODIUM AND TAZOBACTAM SODIUM 25 GRAM(S): 2; 250 INJECTION, POWDER, FOR SOLUTION INTRAVENOUS at 00:21

## 2025-02-05 RX ADMIN — Medication 0.25 MILLIGRAM(S): at 11:19

## 2025-02-05 RX ADMIN — Medication 10 MILLIGRAM(S): at 11:19

## 2025-02-05 RX ADMIN — Medication 5000 UNIT(S): at 11:19

## 2025-02-05 RX ADMIN — ARIPIPRAZOLE 5 MILLIGRAM(S): 5 TABLET ORAL at 14:10

## 2025-02-05 RX ADMIN — Medication 2 TABLET(S): at 21:24

## 2025-02-05 RX ADMIN — ANTISEPTIC SURGICAL SCRUB 1 APPLICATION(S): 0.04 SOLUTION TOPICAL at 06:34

## 2025-02-05 RX ADMIN — Medication 0.5 MILLIGRAM(S): at 21:23

## 2025-02-05 RX ADMIN — Medication 500 MILLIGRAM(S): at 11:19

## 2025-02-05 RX ADMIN — Medication 75 MILLIGRAM(S): at 11:19

## 2025-02-05 RX ADMIN — PIPERACILLIN SODIUM AND TAZOBACTAM SODIUM 25 GRAM(S): 2; 250 INJECTION, POWDER, FOR SOLUTION INTRAVENOUS at 11:19

## 2025-02-05 RX ADMIN — Medication 2: at 11:48

## 2025-02-05 RX ADMIN — ATORVASTATIN CALCIUM 40 MILLIGRAM(S): 80 TABLET, FILM COATED ORAL at 21:24

## 2025-02-05 RX ADMIN — PANTOPRAZOLE 40 MILLIGRAM(S): 20 TABLET, DELAYED RELEASE ORAL at 06:26

## 2025-02-05 RX ADMIN — Medication 5000 UNIT(S): at 05:03

## 2025-02-05 RX ADMIN — Medication 2: at 08:44

## 2025-02-05 RX ADMIN — Medication 50 MILLIGRAM(S): at 05:03

## 2025-02-05 NOTE — PROGRESS NOTE ADULT - SUBJECTIVE AND OBJECTIVE BOX
Patient is a 69y old  Female who presents with a chief complaint of sepsis (04 Feb 2025 11:23)      Patient seen and examined at bedside. No overnight events reported. Patient is eating breakfast this morning and engaging in conversation appropriately. Patient denies chest pain, abdominal pain, nausea, vomiting.     ALLERGIES:  Ambien (Other)  sulfa drugs (Other)    MEDICATIONS  (STANDING):  ARIPiprazole 5 milliGRAM(s) Oral daily  ascorbic acid 500 milliGRAM(s) Oral daily  atorvastatin 40 milliGRAM(s) Oral at bedtime  cetirizine 10 milliGRAM(s) Oral daily  chlorhexidine 2% Cloths 1 Application(s) Topical <User Schedule>  cholecalciferol 5000 Unit(s) Oral daily  clonazePAM  Tablet 0.5 milliGRAM(s) Oral at bedtime  clonazePAM  Tablet 0.25 milliGRAM(s) Oral daily  clopidogrel Tablet 75 milliGRAM(s) Oral daily  heparin   Injectable 5000 Unit(s) SubCutaneous every 8 hours  hydrocortisone sodium succinate Injectable 50 milliGRAM(s) IV Push every 12 hours  insulin lispro (ADMELOG) corrective regimen sliding scale   SubCutaneous three times a day before meals  insulin lispro (ADMELOG) corrective regimen sliding scale   SubCutaneous at bedtime  pantoprazole    Tablet 40 milliGRAM(s) Oral before breakfast  piperacillin/tazobactam IVPB.. 3.375 Gram(s) IV Intermittent every 12 hours  senna 2 Tablet(s) Oral at bedtime  sodium chloride 0.9%. 1000 milliLiter(s) (100 mL/Hr) IV Continuous <Continuous>    MEDICATIONS  (PRN):    Vital Signs Last 24 Hrs  T(F): 97.4 (05 Feb 2025 04:16), Max: 98.2 (04 Feb 2025 19:04)  HR: 71 (05 Feb 2025 04:16) (64 - 71)  BP: 140/84 (05 Feb 2025 04:16) (140/84 - 144/78)  RR: 19 (05 Feb 2025 04:16) (17 - 19)  SpO2: 95% (05 Feb 2025 04:16) (95% - 100%)  I&O's Summary    PHYSICAL EXAM:  General: NAD, A/O x 1-2, speaking sentences   ENT: No gross hearing impairment, Moist mucous membranes, no thrush  Neck: Supple, No JVD  Lungs: Clear to auscultation bilaterally, good air entry, non-labored breathing  Cardio: RRR, S1/S2, No murmur  Abdomen: Soft, Nontender, Nondistended; Bowel sounds present  Extremities: No calf tenderness, No cyanosis, No pitting edema  Psych: Appropriate mood and affect    LABS:                        10.5   8.31  )-----------( 167      ( 05 Feb 2025 06:41 )             35.1     02-05    147  |  112  |  23  ----------------------------<  132  3.9   |  22  |  1.14    Ca    9.5      05 Feb 2025 06:41  Phos  3.7     02-05  Mg     2.1     02-05    TPro  6.7  /  Alb  3.5  /  TBili  1.2  /  DBili  x   /  AST  44  /  ALT  71  /  AlkPhos  111  02-05                                  POCT Blood Glucose.: 171 mg/dL (05 Feb 2025 08:39)  POCT Blood Glucose.: 203 mg/dL (04 Feb 2025 21:20)  POCT Blood Glucose.: 170 mg/dL (04 Feb 2025 17:47)  POCT Blood Glucose.: 151 mg/dL (04 Feb 2025 13:32)  POCT Blood Glucose.: 160 mg/dL (04 Feb 2025 12:45)      Urinalysis Basic - ( 05 Feb 2025 06:41 )    Color: x / Appearance: x / SG: x / pH: x  Gluc: 132 mg/dL / Ketone: x  / Bili: x / Urobili: x   Blood: x / Protein: x / Nitrite: x   Leuk Esterase: x / RBC: x / WBC x   Sq Epi: x / Non Sq Epi: x / Bacteria: x        Culture - Urine (collected 30 Jan 2025 19:50)  Source: Catheterized None  Final Report (03 Feb 2025 08:18):    10,000 - 49,000 CFU/mL Escherichia coli    10,000 - 49,000 CFU/mL Aerococcus urinae    Isolates of Aerococcus spp. are predictably susceptible to penicillin,    ampicillin, and vancomycin. All isolates are resistant to sulfonamides.  Organism: Escherichia coli (03 Feb 2025 08:18)  Organism: Escherichia coli (03 Feb 2025 08:18)      -  Levofloxacin: R 4      -  Tobramycin: R >8      -  Nitrofurantoin: S <=32 Should not be used to treat pyelonephritis      -  Aztreonam: S <=4      -  Gentamicin: R >8      -  Cefazolin: S <=2 For uncomplicated UTI with K. pneumoniae, E. coli, or P. mirablis: SARA <=16 is sensitive and SARA >=32 is resistant. This also predicts results for oral agents cefaclor, cefdinir, cefpodoxime, cefprozil, cefuroxime axetil, cephalexin and locarbef for uncomplicated UTI. Note that some isolates may be susceptible to these agents while testing resistant to cefazolin.      -  Cefepime: S <=2      -  Piperacillin/Tazobactam: S <=8      -  Ciprofloxacin: R >2      -  Imipenem: S <=1      -  Ceftriaxone: S <=1      -  Ampicillin: R >16 These ampicillin results predict results for amoxicillin      Method Type: SARA      -  Meropenem: S <=1      -  Ampicillin/Sulbactam: S 8/4      -  Cefoxitin: S <=8      -  Cefuroxime: S <=4      -  Amoxicillin/Clavulanic Acid: S <=8/4      -  Trimethoprim/Sulfamethoxazole: R >2/38      -  Ertapenem: S <=0.5    Culture - Blood (collected 30 Jan 2025 18:40)  Source: .Blood BLOOD  Final Report (05 Feb 2025 01:00):    No growth at 5 days    Culture - Blood (collected 30 Jan 2025 18:30)  Source: .Blood BLOOD  Final Report (05 Feb 2025 01:00):    No growth at 5 days        RADIOLOGY & ADDITIONAL TESTS:    Care Discussed with Consultants/Other Providers:

## 2025-02-05 NOTE — PROGRESS NOTE ADULT - ASSESSMENT
Pt. is a 69 year old female with a pmh of HTN, HLD, Bipolar illness MDD,, Fall, History of TIAs, and Cerebral infarction from [facility] with lethargy and fever admitted to ICU with septic shock now downgraded to medical floor.    Septic Shock suspect due to UTI, Enterocolitis, and possible pneumonia based on CT  Metabolic Encephalopathy suspected MF in s/o septic shock and Lithium Toxicity  Downgraded from ICU, off pressors, stable respiratory status  CT A/P/C showed suspected pneumonia, non specific fluid filled small bowel loops and right colon without significant inflammatory change possible enterocolitis  Supplemental oxygen as needed to maintain sats > 92%, wean as tolerated  blood cultures have remained negative, Urine culture with E Coli and Aerocccus, sensitivities reviewed, continue zosyn (Day 5)  taper IV hydrocortisone per Critical care and cont PPI while on steroids    ATN with normal baseline - resolved  Hypernatremia  Hypokalemia  Creatinine appears to have normalized  Na today 149, start on IVF today  Replete potassium, check Mg in am  Follow BMP    Lithium Toxicity - improving  Bipolar Disorder, MDD  Psych note appreciated, asked to see due to lithium toxicity  lithium level improving. rec is to stop Prozac, Seroquel, Gabapentin and lithium  started on Abilify 5 mg. daily and cont Klonopin, no Xanax  when more clinically stable plan to address cognition and consider namenda  Psych to f/u recommendations     HTN  TTE during this admission showed normal EF, RV moderately enlarged, mld to mod TR  Pt was on lisinopril 5mg daily, will restart when off IV hydrocortisone    HLD, History CVA  Restarting atorvastatin 40mg qhs, continue plavix    Prophylactic Measure  heparin    Dispo:  SW/CM following for D/C planning, no Emergency contact listed     Pt. is a 69 year old female with a pmh of HTN, HLD, Bipolar illness MDD,, Fall, History of TIAs, and Cerebral infarction from [facility] with lethargy and fever admitted to ICU with septic shock now downgraded to medical floor.    Septic Shock suspect due to UTI, Enterocolitis, and possible pneumonia based on CT  Metabolic Encephalopathy suspected MF in s/o septic shock and Lithium Toxicity  Downgraded from ICU, off pressors, stable respiratory status  CT A/P/C showed suspected pneumonia, non specific fluid filled small bowel loops and right colon without significant inflammatory change possible enterocolitis  Supplemental oxygen as needed to maintain sats > 92%, wean as tolerated  blood cultures negative, Urine culture with E Coli and Aerocccus, sensitivities reviewed, continue zosyn (Day 6)  taper IV hydrocortisone per Critical care and cont PPI while on steroids    ATN with normal baseline - resolved  Hypernatremia  Hypokalemia  Creatinine appears to have normalized  Na today 147, patient eating, will discontinue fluids   Monitor   Follow BMP    Lithium Toxicity - improving  Bipolar Disorder, MDD  Mental status improving  Psych note appreciated, asked to see due to lithium toxicity  lithium level improving. rec is to stop Prozac, Seroquel, Gabapentin and lithium  started on Abilify 5 mg. daily and cont Klonopin dose adjusted, no Xanax  when more clinically stable plan to address cognition and consider namenda  Psych to f/u recommendations     HTN  TTE during this admission showed normal EF, RV moderately enlarged, mld to mod TR  Pt was on lisinopril 5mg daily, will restart when off IV hydrocortisone    HLD, History CVA  Restarting atorvastatin 40mg qhs, continue plavix    Prophylactic Measure  heparin    Dispo:  SW/CM following for D/C planning, no Emergency contact listed     Pt. is a 69 year old female with a pmh of HTN, HLD, Bipolar illness MDD,, Fall, History of TIAs, and Cerebral infarction from [facility] with lethargy and fever admitted to ICU with septic shock now downgraded to medical floor.    Septic Shock suspect due to UTI, Enterocolitis, and possible pneumonia based on CT  Metabolic Encephalopathy suspected MF in s/o septic shock and Lithium Toxicity  Downgraded from ICU, off pressors, stable respiratory status  CT A/P/C showed suspected pneumonia, non specific fluid filled small bowel loops and right colon without significant inflammatory change possible enterocolitis  Supplemental oxygen as needed to maintain sats > 92%, wean as tolerated  blood cultures negative, Urine culture with E Coli and Aerocccus, sensitivities reviewed, continue zosyn (Day 7)  taper IV hydrocortisone and cont PPI while on steroids. Tomorrow AM is last dose of IV steroids. WIll stop it and PPI.. Monitor . VS    ATN with normal baseline - resolved  Hypernatremia  Hypokalemia  Creatinine appears to have normalized  Na today 147, patient eating, will discontinue fluids   Monitor   Follow BMP    Lithium Toxicity - improving  Bipolar Disorder, MDD  Mental status improving  Psych note appreciated, asked to see due to lithium toxicity  lithium level improving. rec is to stop Prozac, Seroquel, Gabapentin and lithium  started on Abilify 5 mg. daily and cont Klonopin dose adjusted, no Xanax  when more clinically stable plan to address cognition and consider namenda  Psych to f/u recommendations     HTN  TTE during this admission showed normal EF, RV moderately enlarged, mld to mod TR  Pt was on lisinopril 5mg daily, will restart when off IV hydrocortisone    HLD, History CVA  Restarting atorvastatin 40mg qhs, continue plavix    Prophylactic Measure  heparin    Dispo:  SW/CM following for D/C planning, no Emergency contact listed

## 2025-02-06 LAB
ALBUMIN SERPL ELPH-MCNC: 3.6 G/DL — SIGNIFICANT CHANGE UP (ref 3.3–5)
ALP SERPL-CCNC: 111 U/L — SIGNIFICANT CHANGE UP (ref 40–120)
ALT FLD-CCNC: 86 U/L — HIGH (ref 10–45)
ANION GAP SERPL CALC-SCNC: 11 MMOL/L — SIGNIFICANT CHANGE UP (ref 5–17)
AST SERPL-CCNC: 73 U/L — HIGH (ref 10–40)
BILIRUB SERPL-MCNC: 1.7 MG/DL — HIGH (ref 0.2–1.2)
BUN SERPL-MCNC: 16 MG/DL — SIGNIFICANT CHANGE UP (ref 7–23)
CALCIUM SERPL-MCNC: 9 MG/DL — SIGNIFICANT CHANGE UP (ref 8.4–10.5)
CHLORIDE SERPL-SCNC: 109 MMOL/L — HIGH (ref 96–108)
CO2 SERPL-SCNC: 28 MMOL/L — SIGNIFICANT CHANGE UP (ref 22–31)
CREAT SERPL-MCNC: 0.9 MG/DL — SIGNIFICANT CHANGE UP (ref 0.5–1.3)
EGFR: 68 ML/MIN/1.73M2 — SIGNIFICANT CHANGE UP
GLUCOSE BLDC GLUCOMTR-MCNC: 114 MG/DL — HIGH (ref 70–99)
GLUCOSE BLDC GLUCOMTR-MCNC: 133 MG/DL — HIGH (ref 70–99)
GLUCOSE BLDC GLUCOMTR-MCNC: 142 MG/DL — HIGH (ref 70–99)
GLUCOSE BLDC GLUCOMTR-MCNC: 180 MG/DL — HIGH (ref 70–99)
GLUCOSE SERPL-MCNC: 135 MG/DL — HIGH (ref 70–99)
HCT VFR BLD CALC: 33.6 % — LOW (ref 34.5–45)
HGB BLD-MCNC: 10.8 G/DL — LOW (ref 11.5–15.5)
MAGNESIUM SERPL-MCNC: 1.9 MG/DL — SIGNIFICANT CHANGE UP (ref 1.6–2.6)
MCHC RBC-ENTMCNC: 30.7 PG — SIGNIFICANT CHANGE UP (ref 27–34)
MCHC RBC-ENTMCNC: 32.1 G/DL — SIGNIFICANT CHANGE UP (ref 32–36)
MCV RBC AUTO: 95.5 FL — SIGNIFICANT CHANGE UP (ref 80–100)
NRBC # BLD: 0 /100 WBCS — SIGNIFICANT CHANGE UP (ref 0–0)
NRBC BLD-RTO: 0 /100 WBCS — SIGNIFICANT CHANGE UP (ref 0–0)
PHOSPHATE SERPL-MCNC: 3.7 MG/DL — SIGNIFICANT CHANGE UP (ref 2.5–4.5)
PLATELET # BLD AUTO: 258 K/UL — SIGNIFICANT CHANGE UP (ref 150–400)
POTASSIUM SERPL-MCNC: 3.1 MMOL/L — LOW (ref 3.5–5.3)
POTASSIUM SERPL-SCNC: 3.1 MMOL/L — LOW (ref 3.5–5.3)
PROT SERPL-MCNC: 6.6 G/DL — SIGNIFICANT CHANGE UP (ref 6–8.3)
RBC # BLD: 3.52 M/UL — LOW (ref 3.8–5.2)
RBC # FLD: 15.3 % — HIGH (ref 10.3–14.5)
SODIUM SERPL-SCNC: 148 MMOL/L — HIGH (ref 135–145)
WBC # BLD: 10.06 K/UL — SIGNIFICANT CHANGE UP (ref 3.8–10.5)
WBC # FLD AUTO: 10.06 K/UL — SIGNIFICANT CHANGE UP (ref 3.8–10.5)

## 2025-02-06 PROCEDURE — 99232 SBSQ HOSP IP/OBS MODERATE 35: CPT

## 2025-02-06 PROCEDURE — 99233 SBSQ HOSP IP/OBS HIGH 50: CPT

## 2025-02-06 RX ORDER — POTASSIUM CHLORIDE 750 MG/1
40 TABLET, EXTENDED RELEASE ORAL EVERY 4 HOURS
Refills: 0 | Status: COMPLETED | OUTPATIENT
Start: 2025-02-06 | End: 2025-02-06

## 2025-02-06 RX ORDER — BACTERIOSTATIC SODIUM CHLORIDE 0.9 %
1000 VIAL (ML) INJECTION
Refills: 0 | Status: DISCONTINUED | OUTPATIENT
Start: 2025-02-06 | End: 2025-02-07

## 2025-02-06 RX ADMIN — ARIPIPRAZOLE 5 MILLIGRAM(S): 5 TABLET ORAL at 11:28

## 2025-02-06 RX ADMIN — Medication 2 TABLET(S): at 22:40

## 2025-02-06 RX ADMIN — Medication 5000 UNIT(S): at 05:10

## 2025-02-06 RX ADMIN — ATORVASTATIN CALCIUM 40 MILLIGRAM(S): 80 TABLET, FILM COATED ORAL at 22:41

## 2025-02-06 RX ADMIN — Medication 500 MILLIGRAM(S): at 11:28

## 2025-02-06 RX ADMIN — Medication 0.5 MILLIGRAM(S): at 22:41

## 2025-02-06 RX ADMIN — Medication 5000 UNIT(S): at 13:07

## 2025-02-06 RX ADMIN — POTASSIUM CHLORIDE 40 MILLIEQUIVALENT(S): 750 TABLET, EXTENDED RELEASE ORAL at 17:00

## 2025-02-06 RX ADMIN — PANTOPRAZOLE 40 MILLIGRAM(S): 20 TABLET, DELAYED RELEASE ORAL at 05:10

## 2025-02-06 RX ADMIN — Medication 2: at 16:59

## 2025-02-06 RX ADMIN — Medication 0.25 MILLIGRAM(S): at 11:28

## 2025-02-06 RX ADMIN — Medication 5000 UNIT(S): at 11:28

## 2025-02-06 RX ADMIN — Medication 75 MILLIGRAM(S): at 11:29

## 2025-02-06 RX ADMIN — Medication 5000 UNIT(S): at 22:41

## 2025-02-06 RX ADMIN — PIPERACILLIN SODIUM AND TAZOBACTAM SODIUM 25 GRAM(S): 2; 250 INJECTION, POWDER, FOR SOLUTION INTRAVENOUS at 11:28

## 2025-02-06 RX ADMIN — PIPERACILLIN SODIUM AND TAZOBACTAM SODIUM 25 GRAM(S): 2; 250 INJECTION, POWDER, FOR SOLUTION INTRAVENOUS at 23:35

## 2025-02-06 RX ADMIN — Medication 50 MILLIGRAM(S): at 05:10

## 2025-02-06 RX ADMIN — POTASSIUM CHLORIDE 40 MILLIEQUIVALENT(S): 750 TABLET, EXTENDED RELEASE ORAL at 13:07

## 2025-02-06 RX ADMIN — Medication 10 MILLIGRAM(S): at 11:28

## 2025-02-06 NOTE — BH CONSULTATION LIAISON PROGRESS NOTE - NSBHMSESPABN_PSY_A_CORE
Soft volume/Increased latency/Impaired articulation/Other
Soft volume/Decreased productivity/Increased latency/Impaired articulation/Other
Soft volume/Decreased productivity/Increased latency/Impaired articulation/Other

## 2025-02-06 NOTE — PROGRESS NOTE ADULT - SUBJECTIVE AND OBJECTIVE BOX
Patient is a 69y old Female who presents with a chief complaint of sepsis (04 Feb 2025 11:23)      Patient seen and examined at bedside. No overnight events reported. Patient denies any complaints.     ALLERGIES:  Ambien (Other)  sulfa drugs (Other)    MEDICATIONS  (STANDING):  ARIPiprazole 5 milliGRAM(s) Oral daily  ascorbic acid 500 milliGRAM(s) Oral daily  atorvastatin 40 milliGRAM(s) Oral at bedtime  cetirizine 10 milliGRAM(s) Oral daily  chlorhexidine 2% Cloths 1 Application(s) Topical <User Schedule>  cholecalciferol 5000 Unit(s) Oral daily  clonazePAM  Tablet 0.5 milliGRAM(s) Oral at bedtime  clonazePAM  Tablet 0.25 milliGRAM(s) Oral daily  clopidogrel Tablet 75 milliGRAM(s) Oral daily  heparin   Injectable 5000 Unit(s) SubCutaneous every 8 hours  insulin lispro (ADMELOG) corrective regimen sliding scale   SubCutaneous three times a day before meals  insulin lispro (ADMELOG) corrective regimen sliding scale   SubCutaneous at bedtime  piperacillin/tazobactam IVPB.. 3.375 Gram(s) IV Intermittent every 12 hours  potassium chloride    Tablet ER 40 milliEquivalent(s) Oral every 4 hours  senna 2 Tablet(s) Oral at bedtime  sodium chloride 0.9%. 1000 milliLiter(s) (100 mL/Hr) IV Continuous <Continuous>    MEDICATIONS  (PRN):    Vital Signs Last 24 Hrs  T(F): 97.7 (06 Feb 2025 05:00), Max: 97.7 (05 Feb 2025 21:00)  HR: 81 (06 Feb 2025 05:00) (69 - 81)  BP: 155/94 (06 Feb 2025 05:00) (127/75 - 164/83)  RR: 16 (06 Feb 2025 05:00) (16 - 18)  SpO2: 95% (06 Feb 2025 05:00) (95% - 99%)  I&O's Summary    PHYSICAL EXAM:  General: NAD, A/O x 1-2, speaking sentences  ENT: No gross hearing impairment, Moist mucous membranes, no thrush  Neck: Supple, No JVD  Lungs: Clear to auscultation bilaterally, good air entry, non-labored breathing  Cardio: RRR, S1/S2, No murmur  Abdomen: Soft, Nontender, Nondistended; Bowel sounds present  Extremities: No calf tenderness, No cyanosis, No pitting edema    LABS:                        10.5   8.31  )-----------( 167      ( 05 Feb 2025 06:41 )             35.1     02-06    148  |  109  |  16  ----------------------------<  135  3.1   |  28  |  0.90    Ca    9.0      06 Feb 2025 08:20  Phos  3.7     02-06  Mg     1.9     02-06    TPro  6.6  /  Alb  3.6  /  TBili  1.7  /  DBili  x   /  AST  73  /  ALT  86  /  AlkPhos  111  02-06                                  POCT Blood Glucose.: 114 mg/dL (06 Feb 2025 08:43)  POCT Blood Glucose.: 123 mg/dL (05 Feb 2025 21:26)  POCT Blood Glucose.: 131 mg/dL (05 Feb 2025 16:58)      Urinalysis Basic - ( 06 Feb 2025 08:20 )    Color: x / Appearance: x / SG: x / pH: x  Gluc: 135 mg/dL / Ketone: x  / Bili: x / Urobili: x   Blood: x / Protein: x / Nitrite: x   Leuk Esterase: x / RBC: x / WBC x   Sq Epi: x / Non Sq Epi: x / Bacteria: x        Culture - Urine (collected 30 Jan 2025 19:50)  Source: Catheterized None  Final Report (03 Feb 2025 08:18):    10,000 - 49,000 CFU/mL Escherichia coli    10,000 - 49,000 CFU/mL Aerococcus urinae    Isolates of Aerococcus spp. are predictably susceptible to penicillin,    ampicillin, and vancomycin. All isolates are resistant to sulfonamides.  Organism: Escherichia coli (03 Feb 2025 08:18)  Organism: Escherichia coli (03 Feb 2025 08:18)      Method Type: SARA      -  Amoxicillin/Clavulanic Acid: S <=8/4      -  Ampicillin: R >16 These ampicillin results predict results for amoxicillin      -  Ampicillin/Sulbactam: S 8/4      -  Aztreonam: S <=4      -  Cefazolin: S <=2 For uncomplicated UTI with K. pneumoniae, E. coli, or P. mirablis: SARA <=16 is sensitive and SARA >=32 is resistant. This also predicts results for oral agents cefaclor, cefdinir, cefpodoxime, cefprozil, cefuroxime axetil, cephalexin and locarbef for uncomplicated UTI. Note that some isolates may be susceptible to these agents while testing resistant to cefazolin.      -  Cefepime: S <=2      -  Cefoxitin: S <=8      -  Ceftriaxone: S <=1      -  Cefuroxime: S <=4      -  Ciprofloxacin: R >2      -  Ertapenem: S <=0.5      -  Gentamicin: R >8      -  Imipenem: S <=1      -  Levofloxacin: R 4      -  Meropenem: S <=1      -  Nitrofurantoin: S <=32 Should not be used to treat pyelonephritis      -  Piperacillin/Tazobactam: S <=8      -  Tobramycin: R >8      -  Trimethoprim/Sulfamethoxazole: R >2/38    Culture - Blood (collected 30 Jan 2025 18:40)  Source: .Blood BLOOD  Final Report (05 Feb 2025 01:00):    No growth at 5 days    Culture - Blood (collected 30 Jan 2025 18:30)  Source: .Blood BLOOD  Final Report (05 Feb 2025 01:00):    No growth at 5 days        RADIOLOGY & ADDITIONAL TESTS:    Care Discussed with Consultants/Other Providers:

## 2025-02-06 NOTE — BH CONSULTATION LIAISON PROGRESS NOTE - ATTENDING COMMENTS
Pt evaluated with psychiatric NP on rounds, agree with plan of care, patient making slow improvement, recommend repeat Lithium level , would not resume due to risk of toxicity unless there was clear demonstration of benefits vs risks.

## 2025-02-06 NOTE — BH CONSULTATION LIAISON PROGRESS NOTE - NSBHFUPINTERVALHXFT_PSY_A_CORE
Pt. is a 69 year old female with a pmh of HTN, HLD, Bipolar illness MDD,, Fall, History of TIAs, and Cerebral infarction from [facility] with lethargy and fever admitted to ICU with septic shock now downgraded to medical floor. Psychiatry asked to see patient due to lithium toxicity and medication management recommendations, pt with no family contacts, and writer unable to reach anyone at [facility] for past psychiatric history.     C/L Psychiatry Note:  Chart reviewed and patient evaluated. Per interdisciplinary team, there were no behavioral complaints today and she has been more engaged in conversation compared to the past. They reported that she still has mood lability where she is crying one moment and laughing the next. Patient presents AAOx1, lying sideways with her feet hanging off the bed; calm and cooperative. Patient had slightly more coherent speech and linear thought process as she was able to explain that she was feeling "good" and stated that she slept last night when asked about sleep. However she was still disorganized and would repeat "gonna have spaghetti, garlic, and oil" randomly throughout the interview. No overt behavioral disturbances noted.  Pt. is a 69 year old female with a pmh of HTN, HLD, Bipolar illness MDD,, Fall, History of TIAs, and Cerebral infarction from [facility] with lethargy and fever admitted to ICU with septic shock now downgraded to medical floor. Psychiatry asked to see patient due to lithium toxicity and medication management recommendations, pt with no family contacts, and writer unable to reach anyone at [facility] for past psychiatric history.     C/L Psychiatry Note:  Chart reviewed and patient evaluated. Per interdisciplinary team, there were no behavioral complaints today and she has been more engaged in conversation compared to the past. They reported that she still has mood lability; where she is crying one moment and laughing the next. Patient presents AAOx1, lying sideways with her feet hanging off the bed; calm and cooperative. Although her baseline mental status remains unknown, she continues to appear confused. At times, patient is able to have slightly improved conversations; but mostly illogical. Primarily throughout the interview, she has disorganized speech that is repetitive/thematic, stating "spaghetti, garlic, and oil." Does not appear internally preoccupied or distracted by visual disturbance on exam. No suicidality reported.

## 2025-02-06 NOTE — BH CONSULTATION LIAISON PROGRESS NOTE - NSBHATTESTAPPBILLTIME_PSY_A_CORE
I attest my time as MARVEL is greater than 50% of the total combined time spent on qualifying patient care activities. I have reviewed and verified the documentation.

## 2025-02-06 NOTE — BH CONSULTATION LIAISON PROGRESS NOTE - NSBHMSETHTPROC_PSY_A_CORE
Disorganized/Illogical/Impaired reasoning/Other Disorganized/Perseverative/Illogical/Impaired reasoning/Other

## 2025-02-06 NOTE — PROGRESS NOTE ADULT - ASSESSMENT
Pt. is a 69 year old female with a pmh of HTN, HLD, Bipolar illness MDD,, Fall, History of TIAs, and Cerebral infarction from [facility] with lethargy and fever admitted to ICU with septic shock now downgraded to medical floor.    Septic Shock suspect due to UTI, Enterocolitis, and possible pneumonia based on CT  Metabolic Encephalopathy suspected MF in s/o septic shock and Lithium Toxicity  Downgraded from ICU, off pressors, stable respiratory status  CT A/P/C showed suspected pneumonia, non specific fluid filled small bowel loops and right colon without significant inflammatory change possible enterocolitis  Supplemental oxygen as needed to maintain sats > 92%, wean as tolerated  blood cultures negative, Urine culture with E Coli and Aerocccus, sensitivities reviewed, continue zosyn  tapered off IV hydrocortisone, discontinued ppi     ATN with normal baseline - resolved  Hypernatremia  Hypokalemia  Creatinine appears to have normalized  Na today 148, patient eating, restart IVF  Monitor, replete potassium  Follow BMP    Lithium Toxicity - improving  Bipolar Disorder, MDD  Mental status improving  Psych note appreciated, asked to see due to lithium toxicity  lithium level improving. rec is to stop Prozac, Seroquel, Gabapentin and lithium  started on Abilify 5 mg. daily and cont Klonopin dose adjusted, no Xanax  when more clinically stable plan to address cognition and consider namenda  Psych to f/u     HTN  TTE during this admission showed normal EF, RV moderately enlarged, mld to mod TR  Pt was on lisinopril 5mg daily, just completed hydrocortisone, consider restarting if bp persistently elevated     HLD, History CVA  Continue atorvastatin 40mg qhs, continue plavix    Prophylactic Measure  heparin    Dispo:  SW/CM following for D/C planning, no Emergency contact listed. From [facility]      Pt. is a 69 year old female with a pmh of HTN, HLD, Bipolar illness MDD,, Fall, History of TIAs, and Cerebral infarction from [facility] with lethargy and fever admitted to ICU with septic shock now downgraded to medical floor.    Septic Shock suspect due to UTI, Enterocolitis, and possible pneumonia based on CT  Metabolic Encephalopathy suspected MF in s/o septic shock and Lithium Toxicity  Downgraded from ICU, off pressors, stable respiratory status  CT A/P/C showed suspected pneumonia, non specific fluid filled small bowel loops and right colon without significant inflammatory change possible enterocolitis  Supplemental oxygen as needed to maintain sats > 92%, wean as tolerated  blood cultures negative, Urine culture with E Coli and Aerocccus, sensitivities reviewed, continue zosyn, ID to see   tapered off IV hydrocortisone, discontinued ppi     ATN with normal baseline - resolved  Hypernatremia  Hypokalemia  Creatinine appears to have normalized  Na today 148, patient eating, restart IVF  Monitor, replete potassium  Follow BMP    Lithium Toxicity - improving  Bipolar Disorder, MDD  Mental status improving  Psych note appreciated, asked to see due to lithium toxicity  lithium level improving. rec is to stop Prozac, Seroquel, Gabapentin and lithium  started on Abilify 5 mg. daily and cont Klonopin dose adjusted, no Xanax  when more clinically stable plan to address cognition and consider namenda  Psych to f/u today- to repeat lithium level on monday and increase ability to 2.5 in the am and 5 mg qhs     HTN  TTE during this admission showed normal EF, RV moderately enlarged, mld to mod TR  Pt was on lisinopril 5mg daily, just completed hydrocortisone, consider restarting if bp persistently elevated     HLD, History CVA  Continue atorvastatin 40mg qhs, continue plavix    Prophylactic Measure  heparin    Dispo:  SW/CM following for D/C planning, no Emergency contact listed. From [facility]

## 2025-02-06 NOTE — BH CONSULTATION LIAISON PROGRESS NOTE - NSBHCONSULTRECOMMENDOTHER_PSY_A_CORE FT
- Continue with Abilify 5mg daily. Defer use of any other mood stabilizers or psychotropic medications at this time until baseline mental status can be established or if mental status improves.  - Consider decreasing Klonopin from 0.5mg BID to Klonopin 0.25mg in the morning and 0.5mg in the evening.        - Recommend increasing Abilify to 2.5mg in the AM and 5mg qhs. Defer using of any other mood stabilizers or psychotropic medications at this time.   - Continue with Klonopin 0.25mg in the AM and 0.5mg qhs in the evening.  - Repeat Lithium levels on Monday 02/10/2025.

## 2025-02-06 NOTE — BH CONSULTATION LIAISON PROGRESS NOTE - OTHER
Restricted Neutral Unable to test because speech was incoherent Slightly more linear compared to the past. slightly more coherent compared to the past. not tested.  repeating theme of "spaghetti, garlic, and oil."  Unable to test because speech was mostly incoherent

## 2025-02-07 LAB
ALBUMIN SERPL ELPH-MCNC: 3.4 G/DL — SIGNIFICANT CHANGE UP (ref 3.3–5)
ALP SERPL-CCNC: 103 U/L — SIGNIFICANT CHANGE UP (ref 40–120)
ALT FLD-CCNC: 89 U/L — HIGH (ref 10–45)
ANION GAP SERPL CALC-SCNC: 11 MMOL/L — SIGNIFICANT CHANGE UP (ref 5–17)
ANION GAP SERPL CALC-SCNC: 11 MMOL/L — SIGNIFICANT CHANGE UP (ref 5–17)
ANION GAP SERPL CALC-SCNC: 8 MMOL/L — SIGNIFICANT CHANGE UP (ref 5–17)
AST SERPL-CCNC: 52 U/L — HIGH (ref 10–40)
BILIRUB SERPL-MCNC: 1.6 MG/DL — HIGH (ref 0.2–1.2)
BUN SERPL-MCNC: 10 MG/DL — SIGNIFICANT CHANGE UP (ref 7–23)
BUN SERPL-MCNC: 11 MG/DL — SIGNIFICANT CHANGE UP (ref 7–23)
BUN SERPL-MCNC: 12 MG/DL — SIGNIFICANT CHANGE UP (ref 7–23)
CALCIUM SERPL-MCNC: 8.5 MG/DL — SIGNIFICANT CHANGE UP (ref 8.4–10.5)
CALCIUM SERPL-MCNC: 8.6 MG/DL — SIGNIFICANT CHANGE UP (ref 8.4–10.5)
CALCIUM SERPL-MCNC: 8.7 MG/DL — SIGNIFICANT CHANGE UP (ref 8.4–10.5)
CHLORIDE SERPL-SCNC: 109 MMOL/L — HIGH (ref 96–108)
CHLORIDE SERPL-SCNC: 110 MMOL/L — HIGH (ref 96–108)
CHLORIDE SERPL-SCNC: 110 MMOL/L — HIGH (ref 96–108)
CO2 SERPL-SCNC: 25 MMOL/L — SIGNIFICANT CHANGE UP (ref 22–31)
CO2 SERPL-SCNC: 29 MMOL/L — SIGNIFICANT CHANGE UP (ref 22–31)
CO2 SERPL-SCNC: 30 MMOL/L — SIGNIFICANT CHANGE UP (ref 22–31)
CREAT SERPL-MCNC: 0.76 MG/DL — SIGNIFICANT CHANGE UP (ref 0.5–1.3)
CREAT SERPL-MCNC: 0.8 MG/DL — SIGNIFICANT CHANGE UP (ref 0.5–1.3)
CREAT SERPL-MCNC: 0.82 MG/DL — SIGNIFICANT CHANGE UP (ref 0.5–1.3)
EGFR: 76 ML/MIN/1.73M2 — SIGNIFICANT CHANGE UP
EGFR: 79 ML/MIN/1.73M2 — SIGNIFICANT CHANGE UP
EGFR: 84 ML/MIN/1.73M2 — SIGNIFICANT CHANGE UP
GLUCOSE BLDC GLUCOMTR-MCNC: 101 MG/DL — HIGH (ref 70–99)
GLUCOSE BLDC GLUCOMTR-MCNC: 122 MG/DL — HIGH (ref 70–99)
GLUCOSE BLDC GLUCOMTR-MCNC: 130 MG/DL — HIGH (ref 70–99)
GLUCOSE BLDC GLUCOMTR-MCNC: 139 MG/DL — HIGH (ref 70–99)
GLUCOSE SERPL-MCNC: 132 MG/DL — HIGH (ref 70–99)
GLUCOSE SERPL-MCNC: 133 MG/DL — HIGH (ref 70–99)
GLUCOSE SERPL-MCNC: 146 MG/DL — HIGH (ref 70–99)
HCT VFR BLD CALC: 31.5 % — LOW (ref 34.5–45)
HGB BLD-MCNC: 9.8 G/DL — LOW (ref 11.5–15.5)
MAGNESIUM SERPL-MCNC: 1.6 MG/DL — SIGNIFICANT CHANGE UP (ref 1.6–2.6)
MCHC RBC-ENTMCNC: 29.9 PG — SIGNIFICANT CHANGE UP (ref 27–34)
MCHC RBC-ENTMCNC: 31.1 G/DL — LOW (ref 32–36)
MCV RBC AUTO: 96 FL — SIGNIFICANT CHANGE UP (ref 80–100)
NRBC # BLD: 0 /100 WBCS — SIGNIFICANT CHANGE UP (ref 0–0)
NRBC BLD-RTO: 0 /100 WBCS — SIGNIFICANT CHANGE UP (ref 0–0)
PLATELET # BLD AUTO: 249 K/UL — SIGNIFICANT CHANGE UP (ref 150–400)
POTASSIUM SERPL-MCNC: 2.9 MMOL/L — CRITICAL LOW (ref 3.5–5.3)
POTASSIUM SERPL-MCNC: 3.8 MMOL/L — SIGNIFICANT CHANGE UP (ref 3.5–5.3)
POTASSIUM SERPL-MCNC: 4.4 MMOL/L — SIGNIFICANT CHANGE UP (ref 3.5–5.3)
POTASSIUM SERPL-SCNC: 2.9 MMOL/L — CRITICAL LOW (ref 3.5–5.3)
POTASSIUM SERPL-SCNC: 3.8 MMOL/L — SIGNIFICANT CHANGE UP (ref 3.5–5.3)
POTASSIUM SERPL-SCNC: 4.4 MMOL/L — SIGNIFICANT CHANGE UP (ref 3.5–5.3)
PROT SERPL-MCNC: 6.2 G/DL — SIGNIFICANT CHANGE UP (ref 6–8.3)
RBC # BLD: 3.28 M/UL — LOW (ref 3.8–5.2)
RBC # FLD: 15.7 % — HIGH (ref 10.3–14.5)
SODIUM SERPL-SCNC: 146 MMOL/L — HIGH (ref 135–145)
SODIUM SERPL-SCNC: 147 MMOL/L — HIGH (ref 135–145)
SODIUM SERPL-SCNC: 150 MMOL/L — HIGH (ref 135–145)
WBC # BLD: 9.01 K/UL — SIGNIFICANT CHANGE UP (ref 3.8–10.5)
WBC # FLD AUTO: 9.01 K/UL — SIGNIFICANT CHANGE UP (ref 3.8–10.5)

## 2025-02-07 PROCEDURE — 99233 SBSQ HOSP IP/OBS HIGH 50: CPT

## 2025-02-07 RX ORDER — ANTISEPTIC SURGICAL SCRUB 0.04 MG/ML
1 SOLUTION TOPICAL
Refills: 0 | Status: DISCONTINUED | OUTPATIENT
Start: 2025-02-07 | End: 2025-02-11

## 2025-02-07 RX ORDER — POTASSIUM CHLORIDE 750 MG/1
40 TABLET, EXTENDED RELEASE ORAL EVERY 4 HOURS
Refills: 0 | Status: COMPLETED | OUTPATIENT
Start: 2025-02-07 | End: 2025-02-07

## 2025-02-07 RX ORDER — POTASSIUM CHLORIDE 750 MG/1
10 TABLET, EXTENDED RELEASE ORAL
Refills: 0 | Status: COMPLETED | OUTPATIENT
Start: 2025-02-07 | End: 2025-02-07

## 2025-02-07 RX ORDER — SODIUM CHLORIDE 9 G/ML
1000 INJECTION, SOLUTION INTRAVENOUS
Refills: 0 | Status: DISCONTINUED | OUTPATIENT
Start: 2025-02-07 | End: 2025-02-11

## 2025-02-07 RX ORDER — MAGNESIUM SULFATE 0.8 MEQ/ML
2 AMPUL (ML) INJECTION ONCE
Refills: 0 | Status: COMPLETED | OUTPATIENT
Start: 2025-02-07 | End: 2025-02-07

## 2025-02-07 RX ORDER — HEPARIN SODIUM,PORCINE 10000/ML
5000 VIAL (ML) INJECTION EVERY 8 HOURS
Refills: 0 | Status: DISCONTINUED | OUTPATIENT
Start: 2025-02-07 | End: 2025-02-11

## 2025-02-07 RX ADMIN — Medication 25 GRAM(S): at 08:47

## 2025-02-07 RX ADMIN — ATORVASTATIN CALCIUM 40 MILLIGRAM(S): 80 TABLET, FILM COATED ORAL at 22:25

## 2025-02-07 RX ADMIN — Medication 5000 UNIT(S): at 13:02

## 2025-02-07 RX ADMIN — Medication 0.5 MILLIGRAM(S): at 22:25

## 2025-02-07 RX ADMIN — Medication 75 MILLIGRAM(S): at 11:23

## 2025-02-07 RX ADMIN — Medication 5000 UNIT(S): at 22:25

## 2025-02-07 RX ADMIN — Medication 5000 UNIT(S): at 13:06

## 2025-02-07 RX ADMIN — Medication 0.25 MILLIGRAM(S): at 11:23

## 2025-02-07 RX ADMIN — Medication 5 MILLIGRAM(S): at 13:07

## 2025-02-07 RX ADMIN — ARIPIPRAZOLE 5 MILLIGRAM(S): 5 TABLET ORAL at 11:23

## 2025-02-07 RX ADMIN — Medication 2 TABLET(S): at 22:24

## 2025-02-07 RX ADMIN — POTASSIUM CHLORIDE 100 MILLIEQUIVALENT(S): 750 TABLET, EXTENDED RELEASE ORAL at 14:52

## 2025-02-07 RX ADMIN — Medication 10 MILLIGRAM(S): at 11:23

## 2025-02-07 RX ADMIN — POTASSIUM CHLORIDE 40 MILLIEQUIVALENT(S): 750 TABLET, EXTENDED RELEASE ORAL at 13:06

## 2025-02-07 RX ADMIN — POTASSIUM CHLORIDE 100 MILLIEQUIVALENT(S): 750 TABLET, EXTENDED RELEASE ORAL at 10:41

## 2025-02-07 RX ADMIN — Medication 500 MILLIGRAM(S): at 11:23

## 2025-02-07 RX ADMIN — Medication 100 MILLILITER(S): at 10:41

## 2025-02-07 RX ADMIN — POTASSIUM CHLORIDE 40 MILLIEQUIVALENT(S): 750 TABLET, EXTENDED RELEASE ORAL at 09:49

## 2025-02-07 RX ADMIN — POTASSIUM CHLORIDE 100 MILLIEQUIVALENT(S): 750 TABLET, EXTENDED RELEASE ORAL at 12:41

## 2025-02-07 RX ADMIN — Medication 5000 UNIT(S): at 11:23

## 2025-02-07 RX ADMIN — Medication 5000 UNIT(S): at 06:01

## 2025-02-07 NOTE — PROGRESS NOTE ADULT - ASSESSMENT
Pt. is a 69 year old female with a pmh of HTN, HLD, Bipolar illness MDD,, Fall, History of TIAs, and Cerebral infarction from [facility] with lethargy and fever admitted to ICU with septic shock now downgraded to medical floor.    Septic Shock suspect due to UTI, Enterocolitis, and possible pneumonia based on CT-improved  Metabolic Encephalopathy suspected MF in s/o septic shock and Lithium Toxicity-improving  Downgraded from ICU, off pressors, stable respiratory status  CT A/P/C showed suspected pneumonia, non specific fluid filled small bowel loops and right colon without significant inflammatory change possible enterocolitis  Supplemental oxygen as needed to maintain sats > 92%, wean as tolerated  blood cultures negative, Urine culture with E Coli and Aerocccus, sensitivities reviewed, completed course of zosyn, ID to see   tapered off IV hydrocortisone, discontinued ppi     ATN with normal baseline - resolved  Hypernatremia  Hypokalemia  Creatinine appears to have normalized  continue IVF  Monitor, replete potassium  Patient given magnesium this am  Follow BMP    Lithium Toxicity - improving  Bipolar Disorder, MDD  Mental status improving  Psych note appreciated, asked to see due to lithium toxicity  lithium level improving. rec is to stop Prozac, Seroquel, Gabapentin and lithium  started on Abilify 5 mg. daily and cont Klonopin dose adjusted, no Xanax  Per psych recs, Abilify dosing now increased to 2.5mg in am and 5mg at night  Repeat Lithium level on Monday 2/10  when more clinically stable plan to address cognition and consider namenda  Psych following      HTN  TTE during this admission showed normal EF, RV moderately enlarged, mild to mod TR  Pt was on lisinopril 5mg daily at home, was held due to hypotension, will restart today as BP improved     HLD, History CVA  Continue atorvastatin 40mg qhs, continue plavix    Prophylactic Measure  heparin    Dispo:  Recheck lithium level on Monday. SW/CM following for D/C planning, no Emergency contact listed. From [facility]      Pt. is a 69 year old female with a pmh of HTN, HLD, Bipolar illness MDD,, Fall, History of TIAs, and Cerebral infarction from [facility] with lethargy and fever admitted to ICU with septic shock now downgraded to medical floor.    Septic Shock suspect due to UTI, Enterocolitis, and possible pneumonia based on CT-improved  Metabolic Encephalopathy suspected MF in s/o septic shock and Lithium Toxicity-improving  Downgraded from ICU, off pressors, stable respiratory status  CT A/P/C showed suspected pneumonia, non specific fluid filled small bowel loops and right colon without significant inflammatory change possible enterocolitis  Supplemental oxygen as needed to maintain sats > 92%, wean as tolerated  blood cultures negative, Urine culture with E Coli and Aerocccus, sensitivities reviewed, completed course of zosyn  tapered off IV hydrocortisone, discontinued ppi     ATN with normal baseline - resolved  Hypernatremia  Hypokalemia  Creatinine appears to have normalized  continue IVF  Monitor, replete potassium  Patient given magnesium this am  Follow BMP    Lithium Toxicity - improving  Bipolar Disorder, MDD  Mental status improving  Psych note appreciated, asked to see due to lithium toxicity  lithium level improving. rec is to stop Prozac, Seroquel, Gabapentin and lithium  started on Abilify 5 mg. daily and cont Klonopin dose adjusted, no Xanax  Per psych recs, Abilify dosing now increased to 2.5mg in am and 5mg at night  Repeat Lithium level on Monday 2/10  when more clinically stable plan to address cognition and consider namenda  Psych following      HTN  TTE during this admission showed normal EF, RV moderately enlarged, mild to mod TR  Pt was on lisinopril 5mg daily at home, was held due to hypotension, will restart today as BP improved     HLD, History CVA  Continue atorvastatin 40mg qhs, continue plavix    Prophylactic Measure  heparin    Dispo:  Recheck lithium level on Monday. SW/CM following for D/C planning, no Emergency contact listed. From [facility]

## 2025-02-07 NOTE — CHART NOTE - NSCHARTNOTEFT_GEN_A_CORE
Nutrition Follow Up Note  Hospital Course (Per Electronic Medical Record):   Source: Medical Record [X] Patient [X] Family [X] Nursing Staff [X]     Diet: Pureed with mild thickened liquids , Ensure Max QD     Patient noted alert , confused , tolerating her diet po intake improved consuming 25-50% of meals , PO Ensure Max provided for additional 190kcals/30gms protein , Labs reviewed ,hypernatremia IV fluids changed to NS @ 100ml/hr , Hypokalemia , Potassium repletion provided , Psych meds adjusted during hospitalization Psych following , SLP adjusted po liquids currently mild thickened , Will follow clinical course     Current Weight: (2/2) 215.1/97.6kg                          (2/1) 219.1/99.4kg     Pertinent Medications: MEDICATIONS  (STANDING):  ARIPiprazole 5 milliGRAM(s) Oral daily  ascorbic acid 500 milliGRAM(s) Oral daily  atorvastatin 40 milliGRAM(s) Oral at bedtime  cetirizine 10 milliGRAM(s) Oral daily  chlorhexidine 2% Cloths 1 Application(s) Topical <User Schedule>  cholecalciferol 5000 Unit(s) Oral daily  clonazePAM  Tablet 0.5 milliGRAM(s) Oral at bedtime  clonazePAM  Tablet 0.25 milliGRAM(s) Oral daily  clopidogrel Tablet 75 milliGRAM(s) Oral daily  heparin   Injectable 5000 Unit(s) SubCutaneous every 8 hours  insulin lispro (ADMELOG) corrective regimen sliding scale   SubCutaneous three times a day before meals  insulin lispro (ADMELOG) corrective regimen sliding scale   SubCutaneous at bedtime  lisinopril 5 milliGRAM(s) Oral daily  potassium chloride   Solution 40 milliEquivalent(s) Oral every 4 hours  potassium chloride  10 mEq/100 mL IVPB 10 milliEquivalent(s) IV Intermittent every 1 hour  senna 2 Tablet(s) Oral at bedtime  sodium chloride 0.9%. 1000 milliLiter(s) (100 mL/Hr) IV Continuous <Continuous>    MEDICATIONS  (PRN):      Pertinent Labs:  02-07 Na147 mmol/L[H] Glu 132 mg/dL[H] K+ 2.9 mmol/L[LL] Cr  0.82 mg/dL BUN 12 mg/dL 02-06 Phos 3.7 mg/dL 02-07 Alb 3.4 g/dL  Hgb 9.8g/dl<L>, Hct 31.5% <L>       Skin: intact    Edema: none    Last BM: (2/6)     Estimated Needs:   [X] No Change since Previous Assessment      Previous Nutrition Diagnosis: Inadequate Energy Intake, addressed , po intake noted improving         Interventions:   1. continue current nutrition regimen     Monitoring & Evaluation: will monitor:  [X] Weights   [X] PO Intake   [X] Follow Up (Per Protocol)  [X] Tolerance to Diet Prescription       RD to follow as per Nutrition protocol  Juliet Morataya RDN

## 2025-02-07 NOTE — PROGRESS NOTE ADULT - SUBJECTIVE AND OBJECTIVE BOX
Patient is a 69y old  Female who presents with a chief complaint of sepsis (04 Feb 2025 11:23)      Patient seen and examined at bedside. No overnight events reported. Patient denies chest pain, sob, nausea, vomiting.     ALLERGIES:  Ambien (Other)  sulfa drugs (Other)    MEDICATIONS  (STANDING):  ARIPiprazole 5 milliGRAM(s) Oral daily  ascorbic acid 500 milliGRAM(s) Oral daily  atorvastatin 40 milliGRAM(s) Oral at bedtime  cetirizine 10 milliGRAM(s) Oral daily  chlorhexidine 2% Cloths 1 Application(s) Topical <User Schedule>  cholecalciferol 5000 Unit(s) Oral daily  clonazePAM  Tablet 0.5 milliGRAM(s) Oral at bedtime  clonazePAM  Tablet 0.25 milliGRAM(s) Oral daily  clopidogrel Tablet 75 milliGRAM(s) Oral daily  heparin   Injectable 5000 Unit(s) SubCutaneous every 8 hours  insulin lispro (ADMELOG) corrective regimen sliding scale   SubCutaneous three times a day before meals  insulin lispro (ADMELOG) corrective regimen sliding scale   SubCutaneous at bedtime  potassium chloride   Solution 40 milliEquivalent(s) Oral every 4 hours  potassium chloride  10 mEq/100 mL IVPB 10 milliEquivalent(s) IV Intermittent every 1 hour  senna 2 Tablet(s) Oral at bedtime  sodium chloride 0.9%. 1000 milliLiter(s) (100 mL/Hr) IV Continuous <Continuous>    MEDICATIONS  (PRN):    Vital Signs Last 24 Hrs  T(F): 97.6 (07 Feb 2025 06:00), Max: 98.4 (06 Feb 2025 22:00)  HR: 77 (07 Feb 2025 06:00) (64 - 77)  BP: 157/68 (07 Feb 2025 06:00) (135/86 - 157/68)  RR: 17 (07 Feb 2025 06:00) (15 - 18)  SpO2: 93% (07 Feb 2025 06:00) (91% - 98%)  I&O's Summary    PHYSICAL EXAM:  General: NAD, A/O x 1, speaking sentences, some mumbling at times  ENT: No gross hearing impairment, Moist mucous membranes, no thrush  Neck: Supple, No JVD  Lungs: Clear to auscultation bilaterally, good air entry, non-labored breathing  Cardio: RRR, S1/S2, No murmur  Abdomen: Soft, Nontender, Nondistended; Bowel sounds present  Extremities: No calf tenderness, No cyanosis, No pitting edema    LABS:                        9.8    9.01  )-----------( 249      ( 07 Feb 2025 05:58 )             31.5     02-07    147  |  109  |  12  ----------------------------<  132  2.9   |  30  |  0.82    Ca    8.7      07 Feb 2025 05:58  Phos  3.7     02-06  Mg     1.6     02-07    TPro  6.2  /  Alb  3.4  /  TBili  1.6  /  DBili  x   /  AST  52  /  ALT  89  /  AlkPhos  103  02-07                                  POCT Blood Glucose.: 101 mg/dL (07 Feb 2025 08:15)  POCT Blood Glucose.: 142 mg/dL (06 Feb 2025 22:33)  POCT Blood Glucose.: 180 mg/dL (06 Feb 2025 16:46)  POCT Blood Glucose.: 133 mg/dL (06 Feb 2025 11:46)      Urinalysis Basic - ( 07 Feb 2025 05:58 )    Color: x / Appearance: x / SG: x / pH: x  Gluc: 132 mg/dL / Ketone: x  / Bili: x / Urobili: x   Blood: x / Protein: x / Nitrite: x   Leuk Esterase: x / RBC: x / WBC x   Sq Epi: x / Non Sq Epi: x / Bacteria: x          RADIOLOGY & ADDITIONAL TESTS:    Care Discussed with Consultants/Other Providers:

## 2025-02-07 NOTE — CONSULT NOTE ADULT - SUBJECTIVE AND OBJECTIVE BOX
HPI:   Patient is a 69y female with with MDD/bipolar on Lithium, cva, falls,  nursing home resident, admitted 8 days ago with fever, sarah, elevated liver enzymes, lethargy, elevated lithium level. She was found to have iniltrates on ct chest. [facility] finished 8 d of zosyn yesterday. URine grew aerococcus and ecoli with just mild pyuria and elevated squamous epi. Patient is not able to give a coherent history or answer quetions clerly.     REVIEW OF SYSTEMS:  All other review of systems negative (Comprehensive ROS)    PAST MEDICAL & SURGICAL HISTORY:  HTN (hypertension)      HLD (hyperlipidemia)      Bipolar illness      MDD (major depressive disorder)      Effusion, left hip      Fall      Muscle wasting      History of TIAs      Cerebral infarction      Pressure-induced deep tissue damage of left heel      Hypertension      Hyperlipidemia      History of cholecystectomy          Allergies    Ambien (Other)  sulfa drugs (Other)    Intolerances        Antimicrobials Day #  :    Other Medications:  ARIPiprazole 5 milliGRAM(s) Oral daily  ascorbic acid 500 milliGRAM(s) Oral daily  atorvastatin 40 milliGRAM(s) Oral at bedtime  cetirizine 10 milliGRAM(s) Oral daily  chlorhexidine 2% Cloths 1 Application(s) Topical <User Schedule>  cholecalciferol 5000 Unit(s) Oral daily  clonazePAM  Tablet 0.5 milliGRAM(s) Oral at bedtime  clonazePAM  Tablet 0.25 milliGRAM(s) Oral daily  clopidogrel Tablet 75 milliGRAM(s) Oral daily  heparin   Injectable 5000 Unit(s) SubCutaneous every 8 hours  insulin lispro (ADMELOG) corrective regimen sliding scale   SubCutaneous three times a day before meals  insulin lispro (ADMELOG) corrective regimen sliding scale   SubCutaneous at bedtime  lisinopril 5 milliGRAM(s) Oral daily  potassium chloride  10 mEq/100 mL IVPB 10 milliEquivalent(s) IV Intermittent every 1 hour  senna 2 Tablet(s) Oral at bedtime      FAMILY HISTORY:  FH: osteoporosis (Mother)        SOCIAL HISTORY:  Smoking: [ ]Yes [ ]No  ETOH: [ ]Yes x[ ]No  Drug Use: [ ]Yes x[ ]No      T(F): 98 (02-07-25 @ 13:00), Max: 98.4 (02-06-25 @ 22:00)  HR: 78 (02-07-25 @ 13:00)  BP: 146/88 (02-07-25 @ 13:00)  RR: 18 (02-07-25 @ 13:00)  SpO2: 99% (02-07-25 @ 13:00)  Wt(kg): --    PHYSICAL EXAM:  General: alert, no acute distress  Eyes:  anicteric, no conjunctival injection, no discharge  Oropharynx: no lesions or injection 	poor dentition  Neck: supple, without adenopathy  Lungs: basilar bronchial bx  to auscultation  Heart: regular rate and rhythm; no murmur, rubs or gallops  Abdomen: soft, nondistended, nontender, without mass or organomegaly  Skin: no lesions  Extremities: no clubbing, cyanosis, or edema. 2 scabs of left forearm  Neurologic: alert, oriented, moves all extremities  backside no lesions  LAB RESULTS:                        9.8    9.01  )-----------( 249      ( 07 Feb 2025 05:58 )             31.5     02-07    150[H]  |  110[H]  |  11  ----------------------------<  146[H]  3.8   |  29  |  0.80    Ca    8.6      07 Feb 2025 12:45  Phos  3.7     02-06  Mg     1.6     02-07    TPro  6.2  /  Alb  3.4  /  TBili  1.6[H]  /  DBili  x   /  AST  52[H]  /  ALT  89[H]  /  AlkPhos  103  02-07    LIVER FUNCTIONS - ( 07 Feb 2025 05:58 )  Alb: 3.4 g/dL / Pro: 6.2 g/dL / ALK PHOS: 103 U/L / ALT: 89 U/L / AST: 52 U/L / GGT: x           Urinalysis Basic - ( 07 Feb 2025 12:45 )    Color: x / Appearance: x / SG: x / pH: x  Gluc: 146 mg/dL / Ketone: x  / Bili: x / Urobili: x   Blood: x / Protein: x / Nitrite: x   Leuk Esterase: x / RBC: x / WBC x   Sq Epi: x / Non Sq Epi: x / Bacteria: x        MICROBIOLOGY:  RECENT CULTURES:        RADIOLOGY REVIEWED:  < from: CT Abdomen and Pelvis No Cont (01.30.25 @ 21:23) >    ACC: [id number] EXAM:  CT ABDOMEN AND PELVIS   ORDERED BY:  WILMER WILLIS     ACC: [id number] EXAM:  CT CHEST   ORDERED BY:  WILMER WILLIS     PROCEDURE DATE:  01/30/2025          INTERPRETATION:  CLINICAL INDICATION: sepsis, pna?    PROCEDURE: CT of the chest, abdomen and pelvis was performed without   intravenous contrast. Coronal and sagittal reconstruction images were   obtained.    CONTRAST/COMPLICATIONS:  IV Contrast: NONE  Oral Contrast: NONE  Complications: NA.    COMPARISON: 10/28/2024. 10/29/2024.    FINDINGS: Evaluation of the thoracic, abdominal and pelvic organs and   vasculature is limited without intravenous contrast. Patient's   respiratory motion and arms degrades images.    CHEST:    LUNGS AND AIRWAYS: Patent central airways. Patchy opacities at bilateral   lower lobes. Question small opacities at the right middle lobe.   Atelectasis.  PLEURA: No pleural effusion or pneumothorax.  HEART: Normal size. No pericardial effusion.  VESSELS: Atherosclerosis. Normal caliber ofthe thoracic aorta.  MEDIASTINUM AND ALEKSANDER: Subcentimeter lymph nodes.  CHEST WALL AND LOWER NECK: Unremarkable.    ABDOMEN/PELVIS:    LIVER: Unremarkable.  BILE DUCTS/GALLBLADDER: No intrahepatic biliary dilatation. Prominent   common bile duct, reflecting postcholecystectomy state.  PANCREAS: Diffuse fatty atrophy.  SPLEEN: Unremarkable.    ADRENALS: Unremarkable.  KIDNEYS/URETERS: Bilateral perinephric stranding without   hydroureteronephrosis.  BLADDER: Collapsed around a Lo catheter.  REPRODUCTIVE ORGANS: Unremarkable.    BOWEL: Small hiatal hernia. No bowel obstruction. Unremarkable appendix.   Colon diverticulosis. Fluid-filled small bowel loops and right colon.  PERITONEUM: No organized fluid collection or free air.  VESSELS: Atherosclerosis. Normal caliber of the abdominal aorta.  RETROPERITONEUM/LYMPH NODE: No lymphadenopathy.  ABDOMINAL WALL/SOFT TISSUES: Small fat-containing umbilical hernia. Small   left hip joint effusion again noted.  BONES: Rightward curvature and degenerative changes of the spine.    IMPRESSION:    Suspect pneumonia. Recommend follow-up to resolution to exclude neoplasm,   following completion of treatment.    Nonspecific fluid-filled small bowel loops and right colon without   significant inflammatory change. Recommend clinical correlation to assess   enterocolitis.    Additional findings as described.    --- End of Report ---        < end of copied text >              < from: CT Head No Cont (01.30.25 @ 23:18) >  ACC: [id number] EXAM:  CT BRAIN   ORDERED BY:  GURMEET KARIMI     PROCEDURE DATE:  01/30/2025          INTERPRETATION:  CLINICAL INDICATION: ams    TECHNIQUE: CT axial images of the head were obtained without intravenous   contrast. Computer-reconstructed coronal and sagittal images were   obtained.    CONTRAST:  IV Contrast: NONE  Complications: NA.    COMPARISON: 10/28/2024.    FINDINGS: Artifact degrades images of the posterior fossa/lower brain.   There is no obvious acute intracranial hemorrhage, mass effect or midline   shift, given the extent of artifact. Nonspecific mild periventricular and   subcortical white matter hypodensities likely represent microvascular   ischemic changes. There is stable cerebral volume loss. Partially empty   sella.    There is no depressed skull fracture. Persistent sinus mucosal thickening   with opacification of the left ethmoid and maxillary sinuses. Persistent   heterogenous structure at the left nasal cavity protruding into the left   maxillary sinus. Visualized tympanomastoid region is unremarkable.    IMPRESSION:    No obvious acute intracranial hemorrhage or mass effect. If clinically   indicated, short-term follow-up or MRI may be obtained for further   evaluation.    --- End of Report ---      < end of copied text >      Impression: 69y female with with MDD/bipolar on Lithium, nursing home resident, admitted 8 days ago with fever, sarah, elevated liver enzymes, lethargy, elevated lithium level. She was found to have iniltrates on ct chest. [facility] finished 8 d of zosyn yesterday. URine grew aerococcus and ecoli with just mild pyuria and elevated squamous epi. Patient is not able to give a coherent history. Patient has no further fever, wbc ok, breathing ok on room air. Pneumonia is the infection that precipitated the admission adn now has been well addressed with zosyn for 8 days. UTI , if present, has been well treated as well. No active infection is apparent at present.   Recommendations:  monitor off further antibiotics  aspiration precautions

## 2025-02-08 LAB
ANION GAP SERPL CALC-SCNC: 11 MMOL/L — SIGNIFICANT CHANGE UP (ref 5–17)
BUN SERPL-MCNC: 8 MG/DL — SIGNIFICANT CHANGE UP (ref 7–23)
CALCIUM SERPL-MCNC: 8.8 MG/DL — SIGNIFICANT CHANGE UP (ref 8.4–10.5)
CHLORIDE SERPL-SCNC: 107 MMOL/L — SIGNIFICANT CHANGE UP (ref 96–108)
CO2 SERPL-SCNC: 29 MMOL/L — SIGNIFICANT CHANGE UP (ref 22–31)
CREAT SERPL-MCNC: 0.6 MG/DL — SIGNIFICANT CHANGE UP (ref 0.5–1.3)
EGFR: 96 ML/MIN/1.73M2 — SIGNIFICANT CHANGE UP
GLUCOSE BLDC GLUCOMTR-MCNC: 132 MG/DL — HIGH (ref 70–99)
GLUCOSE BLDC GLUCOMTR-MCNC: 134 MG/DL — HIGH (ref 70–99)
GLUCOSE BLDC GLUCOMTR-MCNC: 134 MG/DL — HIGH (ref 70–99)
GLUCOSE BLDC GLUCOMTR-MCNC: 149 MG/DL — HIGH (ref 70–99)
GLUCOSE SERPL-MCNC: 144 MG/DL — HIGH (ref 70–99)
HCT VFR BLD CALC: 34.1 % — LOW (ref 34.5–45)
HGB BLD-MCNC: 10.3 G/DL — LOW (ref 11.5–15.5)
MAGNESIUM SERPL-MCNC: 1.9 MG/DL — SIGNIFICANT CHANGE UP (ref 1.6–2.6)
MCHC RBC-ENTMCNC: 29.6 PG — SIGNIFICANT CHANGE UP (ref 27–34)
MCHC RBC-ENTMCNC: 30.2 G/DL — LOW (ref 32–36)
MCV RBC AUTO: 98 FL — SIGNIFICANT CHANGE UP (ref 80–100)
NRBC # BLD: 0 /100 WBCS — SIGNIFICANT CHANGE UP (ref 0–0)
NRBC BLD-RTO: 0 /100 WBCS — SIGNIFICANT CHANGE UP (ref 0–0)
PHOSPHATE SERPL-MCNC: 2.7 MG/DL — SIGNIFICANT CHANGE UP (ref 2.5–4.5)
PLATELET # BLD AUTO: 276 K/UL — SIGNIFICANT CHANGE UP (ref 150–400)
POTASSIUM SERPL-MCNC: 4.1 MMOL/L — SIGNIFICANT CHANGE UP (ref 3.5–5.3)
POTASSIUM SERPL-SCNC: 4.1 MMOL/L — SIGNIFICANT CHANGE UP (ref 3.5–5.3)
RBC # BLD: 3.48 M/UL — LOW (ref 3.8–5.2)
RBC # FLD: 16 % — HIGH (ref 10.3–14.5)
SODIUM SERPL-SCNC: 147 MMOL/L — HIGH (ref 135–145)
WBC # BLD: 9.37 K/UL — SIGNIFICANT CHANGE UP (ref 3.8–10.5)
WBC # FLD AUTO: 9.37 K/UL — SIGNIFICANT CHANGE UP (ref 3.8–10.5)

## 2025-02-08 PROCEDURE — 99233 SBSQ HOSP IP/OBS HIGH 50: CPT

## 2025-02-08 RX ADMIN — Medication 10 MILLIGRAM(S): at 11:41

## 2025-02-08 RX ADMIN — ARIPIPRAZOLE 5 MILLIGRAM(S): 5 TABLET ORAL at 15:44

## 2025-02-08 RX ADMIN — Medication 0.25 MILLIGRAM(S): at 11:44

## 2025-02-08 RX ADMIN — Medication 5000 UNIT(S): at 05:14

## 2025-02-08 RX ADMIN — Medication 5000 UNIT(S): at 15:44

## 2025-02-08 RX ADMIN — Medication 75 MILLIGRAM(S): at 11:40

## 2025-02-08 RX ADMIN — Medication 5 MILLIGRAM(S): at 05:15

## 2025-02-08 RX ADMIN — Medication 500 MILLIGRAM(S): at 11:40

## 2025-02-08 RX ADMIN — Medication 5000 UNIT(S): at 11:40

## 2025-02-08 RX ADMIN — Medication 0.5 MILLIGRAM(S): at 21:56

## 2025-02-08 RX ADMIN — ANTISEPTIC SURGICAL SCRUB 1 APPLICATION(S): 0.04 SOLUTION TOPICAL at 05:12

## 2025-02-08 RX ADMIN — ATORVASTATIN CALCIUM 40 MILLIGRAM(S): 80 TABLET, FILM COATED ORAL at 21:57

## 2025-02-08 RX ADMIN — Medication 2 TABLET(S): at 21:57

## 2025-02-08 RX ADMIN — Medication 5000 UNIT(S): at 22:04

## 2025-02-08 RX ADMIN — SODIUM CHLORIDE 45 MILLILITER(S): 9 INJECTION, SOLUTION INTRAVENOUS at 16:54

## 2025-02-08 NOTE — PROGRESS NOTE ADULT - ASSESSMENT
Pt. is a 69 year old female with a pmh of HTN, HLD, Bipolar illness MDD,, Fall, History of TIAs, and Cerebral infarction from [facility] with lethargy and fever admitted to ICU with septic shock now downgraded to medical floor.    Septic Shock suspect due to UTI, Enterocolitis, and possible pneumonia based on CT-improved  Metabolic Encephalopathy suspected MF in s/o septic shock and Lithium Toxicity-improving  Downgraded from ICU, off pressors, stable respiratory status  CT A/P/C showed suspected pneumonia, non specific fluid filled small bowel loops and right colon without significant inflammatory change possible enterocolitis  Supplemental oxygen as needed to maintain sats > 92%, wean as tolerated  blood cultures negative, Urine culture with E Coli and Aerocccus, sensitivities reviewed, completed course of zosyn  tapered off IV hydrocortisone, discontinued ppi     ATN with normal baseline - resolved  Hypernatremia  Hypokalemia  Creatinine appears to have normalized  continue IVF  Monitor, replete potassium  Patient given magnesium this am  Follow BMP    Lithium Toxicity - improving  Bipolar Disorder, MDD  Mental status improving  Psych note appreciated, asked to see due to lithium toxicity  lithium level improving. rec is to stop Prozac, Seroquel, Gabapentin and lithium  started on Abilify 5 mg. daily and cont Klonopin dose adjusted, no Xanax  Per psych recs, Abilify dosing now increased to 2.5mg in am and 5mg at night  Repeat Lithium level on Monday 2/10  when more clinically stable plan to address cognition and consider namenda  Psych following      HTN  TTE during this admission showed normal EF, RV moderately enlarged, mild to mod TR  Pt was on lisinopril 5mg daily at home, was held due to hypotension, will restart  as BP improved     HLD, History CVA  Continue atorvastatin 40mg qhs, continue plavix    Prophylactic Measure  heparin    Dispo:  Recheck lithium level on Monday. SW/CM following for D/C planning, no Emergency contact listed. From [facility]

## 2025-02-08 NOTE — PROGRESS NOTE ADULT - SUBJECTIVE AND OBJECTIVE BOX
PROGRESS NOTE:     Patient is a 69y old  Female who presents with a chief complaint of septic shock (07 Feb 2025 14:05)      SUBJECTIVE / OVERNIGHT EVENTS: Patient was seen and evaluated today, no complains were offered.  she was resting comfortably  labs reviewed     ADDITIONAL REVIEW OF SYSTEMS:    MEDICATIONS  (STANDING):  ARIPiprazole 5 milliGRAM(s) Oral daily  ascorbic acid 500 milliGRAM(s) Oral daily  atorvastatin 40 milliGRAM(s) Oral at bedtime  cetirizine 10 milliGRAM(s) Oral daily  chlorhexidine 2% Cloths 1 Application(s) Topical <User Schedule>  cholecalciferol 5000 Unit(s) Oral daily  clonazePAM  Tablet 0.5 milliGRAM(s) Oral at bedtime  clonazePAM  Tablet 0.25 milliGRAM(s) Oral daily  clopidogrel Tablet 75 milliGRAM(s) Oral daily  dextrose 5%. 1000 milliLiter(s) (45 mL/Hr) IV Continuous <Continuous>  heparin   Injectable 5000 Unit(s) SubCutaneous every 8 hours  insulin lispro (ADMELOG) corrective regimen sliding scale   SubCutaneous three times a day before meals  insulin lispro (ADMELOG) corrective regimen sliding scale   SubCutaneous at bedtime  lisinopril 5 milliGRAM(s) Oral daily  senna 2 Tablet(s) Oral at bedtime    MEDICATIONS  (PRN):      CAPILLARY BLOOD GLUCOSE      POCT Blood Glucose.: 132 mg/dL (08 Feb 2025 12:14)  POCT Blood Glucose.: 134 mg/dL (08 Feb 2025 08:45)  POCT Blood Glucose.: 139 mg/dL (07 Feb 2025 23:40)  POCT Blood Glucose.: 122 mg/dL (07 Feb 2025 17:04)    I&O's Summary    07 Feb 2025 07:01  -  08 Feb 2025 07:00  --------------------------------------------------------  IN: 0 mL / OUT: 200 mL / NET: -200 mL        PHYSICAL EXAM:  Vital Signs Last 24 Hrs  T(C): 36.8 (08 Feb 2025 05:00), Max: 36.9 (07 Feb 2025 20:00)  T(F): 98.2 (08 Feb 2025 05:00), Max: 98.4 (07 Feb 2025 20:00)  HR: 76 (08 Feb 2025 05:00) (74 - 76)  BP: 153/78 (08 Feb 2025 05:00) (143/86 - 153/78)  BP(mean): --  RR: 18 (08 Feb 2025 05:00) (18 - 18)  SpO2: 97% (08 Feb 2025 05:00) (97% - 100%)    Parameters below as of 07 Feb 2025 20:00  Patient On (Oxygen Delivery Method): room air     PHYSICAL EXAM:  General: NAD, A/O x 1, speaking sentences, some mumbling at times  ENT: No gross hearing impairment, Moist mucous membranes, no thrush  Neck: Supple, No JVD  Lungs: Clear to auscultation bilaterally, good air entry, non-labored breathing  Cardio: RRR, S1/S2, No murmur  Abdomen: Soft, Nontender, Nondistended; Bowel sounds present  Extremities: No calf tenderness, No cyanosis, No pitting edema        LABS:                        10.3   9.37  )-----------( 276      ( 08 Feb 2025 07:05 )             34.1     02-08    147[H]  |  107  |  8   ----------------------------<  144[H]  4.1   |  29  |  0.60    Ca    8.8      08 Feb 2025 07:05  Phos  2.7     02-08  Mg     1.9     02-08    TPro  6.2  /  Alb  3.4  /  TBili  1.6[H]  /  DBili  x   /  AST  52[H]  /  ALT  89[H]  /  AlkPhos  103  02-07          Urinalysis Basic - ( 08 Feb 2025 07:05 )    Color: x / Appearance: x / SG: x / pH: x  Gluc: 144 mg/dL / Ketone: x  / Bili: x / Urobili: x   Blood: x / Protein: x / Nitrite: x   Leuk Esterase: x / RBC: x / WBC x   Sq Epi: x / Non Sq Epi: x / Bacteria: x

## 2025-02-09 LAB
ALBUMIN SERPL ELPH-MCNC: 3.2 G/DL — LOW (ref 3.3–5)
ALP SERPL-CCNC: 110 U/L — SIGNIFICANT CHANGE UP (ref 40–120)
ALT FLD-CCNC: 73 U/L — HIGH (ref 10–45)
ANION GAP SERPL CALC-SCNC: 11 MMOL/L — SIGNIFICANT CHANGE UP (ref 5–17)
AST SERPL-CCNC: 51 U/L — HIGH (ref 10–40)
BILIRUB SERPL-MCNC: 1.8 MG/DL — HIGH (ref 0.2–1.2)
BUN SERPL-MCNC: 8 MG/DL — SIGNIFICANT CHANGE UP (ref 7–23)
CALCIUM SERPL-MCNC: 9.1 MG/DL — SIGNIFICANT CHANGE UP (ref 8.4–10.5)
CHLORIDE SERPL-SCNC: 106 MMOL/L — SIGNIFICANT CHANGE UP (ref 96–108)
CO2 SERPL-SCNC: 26 MMOL/L — SIGNIFICANT CHANGE UP (ref 22–31)
CREAT SERPL-MCNC: 0.76 MG/DL — SIGNIFICANT CHANGE UP (ref 0.5–1.3)
EGFR: 83 ML/MIN/1.73M2 — SIGNIFICANT CHANGE UP
GLUCOSE BLDC GLUCOMTR-MCNC: 124 MG/DL — HIGH (ref 70–99)
GLUCOSE BLDC GLUCOMTR-MCNC: 138 MG/DL — HIGH (ref 70–99)
GLUCOSE BLDC GLUCOMTR-MCNC: 143 MG/DL — HIGH (ref 70–99)
GLUCOSE BLDC GLUCOMTR-MCNC: 169 MG/DL — HIGH (ref 70–99)
GLUCOSE SERPL-MCNC: 151 MG/DL — HIGH (ref 70–99)
HCT VFR BLD CALC: 34.2 % — LOW (ref 34.5–45)
HGB BLD-MCNC: 10.7 G/DL — LOW (ref 11.5–15.5)
MAGNESIUM SERPL-MCNC: 2 MG/DL — SIGNIFICANT CHANGE UP (ref 1.6–2.6)
MCHC RBC-ENTMCNC: 30.1 PG — SIGNIFICANT CHANGE UP (ref 27–34)
MCHC RBC-ENTMCNC: 31.3 G/DL — LOW (ref 32–36)
MCV RBC AUTO: 96.3 FL — SIGNIFICANT CHANGE UP (ref 80–100)
NRBC # BLD: 0 /100 WBCS — SIGNIFICANT CHANGE UP (ref 0–0)
NRBC BLD-RTO: 0 /100 WBCS — SIGNIFICANT CHANGE UP (ref 0–0)
PHOSPHATE SERPL-MCNC: 3.2 MG/DL — SIGNIFICANT CHANGE UP (ref 2.5–4.5)
PLATELET # BLD AUTO: 260 K/UL — SIGNIFICANT CHANGE UP (ref 150–400)
POTASSIUM SERPL-MCNC: 4.1 MMOL/L — SIGNIFICANT CHANGE UP (ref 3.5–5.3)
POTASSIUM SERPL-SCNC: 4.1 MMOL/L — SIGNIFICANT CHANGE UP (ref 3.5–5.3)
PROT SERPL-MCNC: 6.4 G/DL — SIGNIFICANT CHANGE UP (ref 6–8.3)
RBC # BLD: 3.55 M/UL — LOW (ref 3.8–5.2)
RBC # FLD: 15.6 % — HIGH (ref 10.3–14.5)
SODIUM SERPL-SCNC: 143 MMOL/L — SIGNIFICANT CHANGE UP (ref 135–145)
WBC # BLD: 8.89 K/UL — SIGNIFICANT CHANGE UP (ref 3.8–10.5)
WBC # FLD AUTO: 8.89 K/UL — SIGNIFICANT CHANGE UP (ref 3.8–10.5)

## 2025-02-09 PROCEDURE — 99233 SBSQ HOSP IP/OBS HIGH 50: CPT

## 2025-02-09 RX ADMIN — Medication 0.5 MILLIGRAM(S): at 21:00

## 2025-02-09 RX ADMIN — Medication 2 TABLET(S): at 20:57

## 2025-02-09 RX ADMIN — Medication 5000 UNIT(S): at 13:32

## 2025-02-09 RX ADMIN — Medication 75 MILLIGRAM(S): at 11:31

## 2025-02-09 RX ADMIN — Medication 5000 UNIT(S): at 20:57

## 2025-02-09 RX ADMIN — ATORVASTATIN CALCIUM 40 MILLIGRAM(S): 80 TABLET, FILM COATED ORAL at 20:57

## 2025-02-09 RX ADMIN — Medication 500 MILLIGRAM(S): at 11:31

## 2025-02-09 RX ADMIN — Medication 10 MILLIGRAM(S): at 11:31

## 2025-02-09 RX ADMIN — Medication 0.25 MILLIGRAM(S): at 11:30

## 2025-02-09 RX ADMIN — Medication 5000 UNIT(S): at 07:05

## 2025-02-09 RX ADMIN — Medication 5000 UNIT(S): at 11:31

## 2025-02-09 RX ADMIN — ANTISEPTIC SURGICAL SCRUB 1 APPLICATION(S): 0.04 SOLUTION TOPICAL at 07:03

## 2025-02-09 RX ADMIN — ARIPIPRAZOLE 5 MILLIGRAM(S): 5 TABLET ORAL at 11:31

## 2025-02-09 RX ADMIN — Medication 5 MILLIGRAM(S): at 07:05

## 2025-02-09 NOTE — PROGRESS NOTE ADULT - SUBJECTIVE AND OBJECTIVE BOX
PROGRESS NOTE:     Patient is a 69y old  Female who presents with a chief complaint of septic shock (07 Feb 2025 14:05)      SUBJECTIVE / OVERNIGHT EVENTS: pt was seen and evalauted today  no complains and no overnight events  she is resting comfortably in bed     ADDITIONAL REVIEW OF SYSTEMS:as per HPI    MEDICATIONS  (STANDING):  ARIPiprazole 5 milliGRAM(s) Oral daily  ascorbic acid 500 milliGRAM(s) Oral daily  atorvastatin 40 milliGRAM(s) Oral at bedtime  cetirizine 10 milliGRAM(s) Oral daily  chlorhexidine 2% Cloths 1 Application(s) Topical <User Schedule>  cholecalciferol 5000 Unit(s) Oral daily  clonazePAM  Tablet 0.5 milliGRAM(s) Oral at bedtime  clonazePAM  Tablet 0.25 milliGRAM(s) Oral daily  clopidogrel Tablet 75 milliGRAM(s) Oral daily  dextrose 5%. 1000 milliLiter(s) (45 mL/Hr) IV Continuous <Continuous>  heparin   Injectable 5000 Unit(s) SubCutaneous every 8 hours  insulin lispro (ADMELOG) corrective regimen sliding scale   SubCutaneous three times a day before meals  insulin lispro (ADMELOG) corrective regimen sliding scale   SubCutaneous at bedtime  lisinopril 5 milliGRAM(s) Oral daily  senna 2 Tablet(s) Oral at bedtime    MEDICATIONS  (PRN):      CAPILLARY BLOOD GLUCOSE      POCT Blood Glucose.: 124 mg/dL (09 Feb 2025 08:05)  POCT Blood Glucose.: 149 mg/dL (08 Feb 2025 21:54)  POCT Blood Glucose.: 134 mg/dL (08 Feb 2025 17:04)  POCT Blood Glucose.: 132 mg/dL (08 Feb 2025 12:14)    I&O's Summary    08 Feb 2025 07:01  -  09 Feb 2025 07:00  --------------------------------------------------------  IN: 0 mL / OUT: 900 mL / NET: -900 mL        PHYSICAL EXAM:  Vital Signs Last 24 Hrs  T(C): 36.6 (09 Feb 2025 06:00), Max: 36.6 (09 Feb 2025 06:00)  T(F): 97.8 (09 Feb 2025 06:00), Max: 97.8 (09 Feb 2025 06:00)  HR: 76 (09 Feb 2025 06:00) (76 - 76)  BP: 126/82 (09 Feb 2025 06:00) (126/82 - 149/79)  BP(mean): --  RR: 16 (09 Feb 2025 06:00) (16 - 17)  SpO2: 99% (09 Feb 2025 06:00) (97% - 99%)    Parameters below as of 09 Feb 2025 06:00  Patient On (Oxygen Delivery Method): room air       PHYSICAL EXAM:  General: NAD, A/O x 1, speaking sentences, some mumbling at times  ENT: No gross hearing impairment, Moist mucous membranes, no thrush  Neck: Supple, No JVD  Lungs: Clear to auscultation bilaterally, good air entry, non-labored breathing  Cardio: RRR, S1/S2, No murmur  Abdomen: Soft, Nontender, Nondistended; Bowel sounds present  Extremities: No calf tenderness, No cyanosis, No pitting edema        LABS:                        10.7   8.89  )-----------( 260      ( 09 Feb 2025 06:20 )             34.2     02-09    143  |  106  |  8   ----------------------------<  151[H]  4.1   |  26  |  0.76    Ca    9.1      09 Feb 2025 06:20  Phos  3.2     02-09  Mg     2.0     02-09    TPro  6.4  /  Alb  3.2[L]  /  TBili  1.8[H]  /  DBili  x   /  AST  51[H]  /  ALT  73[H]  /  AlkPhos  110  02-09          Urinalysis Basic - ( 09 Feb 2025 06:20 )    Color: x / Appearance: x / SG: x / pH: x  Gluc: 151 mg/dL / Ketone: x  / Bili: x / Urobili: x   Blood: x / Protein: x / Nitrite: x   Leuk Esterase: x / RBC: x / WBC x   Sq Epi: x / Non Sq Epi: x / Bacteria: x

## 2025-02-09 NOTE — PROGRESS NOTE ADULT - ASSESSMENT
Pt. is a 69 year old female with a pmh of HTN, HLD, Bipolar illness MDD,, Fall, History of TIAs, and Cerebral infarction from [facility] with lethargy and fever admitted to ICU with septic shock now downgraded to medical floor.    Septic Shock suspect due to UTI, Enterocolitis, and possible pneumonia based on CT-improved  Metabolic Encephalopathy suspected MF in s/o septic shock and Lithium Toxicity-improving  Downgraded from ICU, off pressors, stable respiratory status  CT A/P/C showed suspected pneumonia, non specific fluid filled small bowel loops and right colon without significant inflammatory change possible enterocolitis  Supplemental oxygen as needed to maintain sats > 92%, wean as tolerated  blood cultures negative, Urine culture with E Coli and Aerocccus, sensitivities reviewed, completed course of zosyn  tapered off IV hydrocortisone, discontinued ppi     ATN with normal baseline - resolved  Hypernatremia  Hypokalemia  Creatinine appears to have normalized  continue IVF  Monitor, replete potassium and mag prn   Follow BMP    Lithium Toxicity - improving  Bipolar Disorder, MDD  Mental status improving  Psych note appreciated, asked to see due to lithium toxicity  lithium level improving. rec is to stop Prozac, Seroquel, Gabapentin and lithium  started on Abilify 5 mg. daily and cont Klonopin dose adjusted, no Xanax  Per psych recs, Abilify dosing now increased to 2.5mg in am and 5mg at night  Repeat Lithium level on Monday 2/10  when more clinically stable plan to address cognition and consider namenda  Psych following      HTN  TTE during this admission showed normal EF, RV moderately enlarged, mild to mod TR  Pt was on lisinopril 5mg daily at home, was held due to hypotension, will restart  as BP improved     HLD, History CVA  Continue atorvastatin 40mg qhs, continue plavix    Prophylactic Measure  heparin    Dispo:  Recheck lithium level on Monday. SW/CM following for D/C planning, no Emergency contact listed. From [facility]

## 2025-02-10 LAB
ALBUMIN SERPL ELPH-MCNC: 3.4 G/DL — SIGNIFICANT CHANGE UP (ref 3.3–5)
ALP SERPL-CCNC: 109 U/L — SIGNIFICANT CHANGE UP (ref 40–120)
ALT FLD-CCNC: 80 U/L — HIGH (ref 10–45)
ANION GAP SERPL CALC-SCNC: 11 MMOL/L — SIGNIFICANT CHANGE UP (ref 5–17)
AST SERPL-CCNC: 58 U/L — HIGH (ref 10–40)
BILIRUB SERPL-MCNC: 1.6 MG/DL — HIGH (ref 0.2–1.2)
BUN SERPL-MCNC: 9 MG/DL — SIGNIFICANT CHANGE UP (ref 7–23)
CALCIUM SERPL-MCNC: 9.7 MG/DL — SIGNIFICANT CHANGE UP (ref 8.4–10.5)
CHLORIDE SERPL-SCNC: 103 MMOL/L — SIGNIFICANT CHANGE UP (ref 96–108)
CO2 SERPL-SCNC: 26 MMOL/L — SIGNIFICANT CHANGE UP (ref 22–31)
CREAT SERPL-MCNC: 0.66 MG/DL — SIGNIFICANT CHANGE UP (ref 0.5–1.3)
EGFR: 94 ML/MIN/1.73M2 — SIGNIFICANT CHANGE UP
GLUCOSE BLDC GLUCOMTR-MCNC: 107 MG/DL — HIGH (ref 70–99)
GLUCOSE BLDC GLUCOMTR-MCNC: 133 MG/DL — HIGH (ref 70–99)
GLUCOSE BLDC GLUCOMTR-MCNC: 142 MG/DL — HIGH (ref 70–99)
GLUCOSE BLDC GLUCOMTR-MCNC: 142 MG/DL — HIGH (ref 70–99)
GLUCOSE SERPL-MCNC: 144 MG/DL — HIGH (ref 70–99)
HCT VFR BLD CALC: 35 % — SIGNIFICANT CHANGE UP (ref 34.5–45)
HGB BLD-MCNC: 10.8 G/DL — LOW (ref 11.5–15.5)
LITHIUM SERPL-MCNC: <0.2 MMOL/L — LOW (ref 0.6–1.2)
MAGNESIUM SERPL-MCNC: 1.7 MG/DL — SIGNIFICANT CHANGE UP (ref 1.6–2.6)
MCHC RBC-ENTMCNC: 29.8 PG — SIGNIFICANT CHANGE UP (ref 27–34)
MCHC RBC-ENTMCNC: 30.9 G/DL — LOW (ref 32–36)
MCV RBC AUTO: 96.4 FL — SIGNIFICANT CHANGE UP (ref 80–100)
NRBC # BLD: 0 /100 WBCS — SIGNIFICANT CHANGE UP (ref 0–0)
NRBC BLD-RTO: 0 /100 WBCS — SIGNIFICANT CHANGE UP (ref 0–0)
PLATELET # BLD AUTO: 273 K/UL — SIGNIFICANT CHANGE UP (ref 150–400)
POTASSIUM SERPL-MCNC: 3.9 MMOL/L — SIGNIFICANT CHANGE UP (ref 3.5–5.3)
POTASSIUM SERPL-SCNC: 3.9 MMOL/L — SIGNIFICANT CHANGE UP (ref 3.5–5.3)
PROT SERPL-MCNC: 6.8 G/DL — SIGNIFICANT CHANGE UP (ref 6–8.3)
RBC # BLD: 3.63 M/UL — LOW (ref 3.8–5.2)
RBC # FLD: 15.6 % — HIGH (ref 10.3–14.5)
SODIUM SERPL-SCNC: 140 MMOL/L — SIGNIFICANT CHANGE UP (ref 135–145)
WBC # BLD: 9.29 K/UL — SIGNIFICANT CHANGE UP (ref 3.8–10.5)
WBC # FLD AUTO: 9.29 K/UL — SIGNIFICANT CHANGE UP (ref 3.8–10.5)

## 2025-02-10 PROCEDURE — 99233 SBSQ HOSP IP/OBS HIGH 50: CPT

## 2025-02-10 PROCEDURE — 99231 SBSQ HOSP IP/OBS SF/LOW 25: CPT

## 2025-02-10 RX ADMIN — Medication 5000 UNIT(S): at 04:54

## 2025-02-10 RX ADMIN — Medication 500 MILLIGRAM(S): at 11:17

## 2025-02-10 RX ADMIN — ARIPIPRAZOLE 5 MILLIGRAM(S): 5 TABLET ORAL at 11:16

## 2025-02-10 RX ADMIN — Medication 10 MILLIGRAM(S): at 11:26

## 2025-02-10 RX ADMIN — ATORVASTATIN CALCIUM 40 MILLIGRAM(S): 80 TABLET, FILM COATED ORAL at 22:21

## 2025-02-10 RX ADMIN — ANTISEPTIC SURGICAL SCRUB 1 APPLICATION(S): 0.04 SOLUTION TOPICAL at 06:36

## 2025-02-10 RX ADMIN — Medication 5 MILLIGRAM(S): at 04:54

## 2025-02-10 RX ADMIN — Medication 5000 UNIT(S): at 22:21

## 2025-02-10 RX ADMIN — Medication 5000 UNIT(S): at 11:17

## 2025-02-10 RX ADMIN — Medication 0.25 MILLIGRAM(S): at 11:16

## 2025-02-10 RX ADMIN — Medication 5000 UNIT(S): at 14:56

## 2025-02-10 RX ADMIN — Medication 0.5 MILLIGRAM(S): at 22:22

## 2025-02-10 RX ADMIN — Medication 75 MILLIGRAM(S): at 11:16

## 2025-02-10 NOTE — BH CONSULTATION LIAISON PROGRESS NOTE - NSBHFUPINTERVALHXFT_PSY_A_CORE
HPI:  69 year old female with a PMH of MDD, HTN, HLD, bipolar disorder, CVA who presented to the ED from [facility] with lethargy and fever.  In the ED, workup revealed a leukocytosis, GURDEEP, transaminitis, and UA +.  Received 2600 cc IVF and remained hypotensive prompting an ICU consult.  (30 Jan 2025 22:08)    C/L Psychiatry Note: Pt seen and evaluated today, psychiatry following for medication management in the context of change in mental status, and Lithium toxicity.   Pt had been on several agents prior to her admission to [facility]. Pt was on Lithium 300 mg bid, Klonopin 0.5 mg  bid, Prozac 60 mg , Gabapentin 300 mg tid, Quetiapine 200 mg at bedtime. Due to agitation and delirium her psychiatric medications were held and a trial of Abilify as monotherapy was started, pt dose titrated up to 7.5 mg in divided doses. Psychiatry asked to see patient for clearance for return to [facility]. Pt level of combativeness and mood lability better, however her baseline mental status is not known as there is no family or other sources for collateral information. Per nursing she has not been a management problem and there is no EPS or akathisia noted on Abilify. Pt denied suicidal or homicidal ideation, intent or plan, pt appropriate when discussing fact that family is gone, she does present as possibly having a dementia as well as she does not relate autobiographical information with details, and she has deficits in short term memory. Pt does NOT have waxing or waning sensorium, thus her delirium at present is resolved.

## 2025-02-10 NOTE — BH CONSULTATION LIAISON PROGRESS NOTE - CURRENT MEDICATION
MEDICATIONS  (STANDING):  albumin human 25% IVPB 100 milliLiter(s) IV Intermittent every 4 hours  ascorbic acid 500 milliGRAM(s) Oral daily  cetirizine 10 milliGRAM(s) Oral daily  chlorhexidine 2% Cloths 1 Application(s) Topical <User Schedule>  cholecalciferol 5000 Unit(s) Oral daily  clonazePAM  Tablet 0.5 milliGRAM(s) Oral two times a day  clopidogrel Tablet 75 milliGRAM(s) Oral daily  FLUoxetine Solution 60 milliGRAM(s) Oral daily  heparin   Injectable 5000 Unit(s) SubCutaneous every 8 hours  hydrocortisone sodium succinate Injectable 50 milliGRAM(s) IV Push every 6 hours  lactated ringers. 1000 milliLiter(s) (100 mL/Hr) IV Continuous <Continuous>  norepinephrine Infusion 0.05 MICROgram(s)/kG/Min (9.23 mL/Hr) IV Continuous <Continuous>  piperacillin/tazobactam IVPB.. 3.375 Gram(s) IV Intermittent every 12 hours  QUEtiapine 200 milliGRAM(s) Oral at bedtime  senna 2 Tablet(s) Oral at bedtime    MEDICATIONS  (PRN):  
MEDICATIONS  (STANDING):  ARIPiprazole 5 milliGRAM(s) Oral daily  ascorbic acid 500 milliGRAM(s) Oral daily  atorvastatin 40 milliGRAM(s) Oral at bedtime  cetirizine 10 milliGRAM(s) Oral daily  chlorhexidine 2% Cloths 1 Application(s) Topical <User Schedule>  cholecalciferol 5000 Unit(s) Oral daily  clonazePAM  Tablet 0.5 milliGRAM(s) Oral at bedtime  clonazePAM  Tablet 0.25 milliGRAM(s) Oral daily  clopidogrel Tablet 75 milliGRAM(s) Oral daily  dextrose 5%. 1000 milliLiter(s) (45 mL/Hr) IV Continuous <Continuous>  heparin   Injectable 5000 Unit(s) SubCutaneous every 8 hours  insulin lispro (ADMELOG) corrective regimen sliding scale   SubCutaneous three times a day before meals  insulin lispro (ADMELOG) corrective regimen sliding scale   SubCutaneous at bedtime  lisinopril 5 milliGRAM(s) Oral daily  senna 2 Tablet(s) Oral at bedtime    MEDICATIONS  (PRN):  
MEDICATIONS  (STANDING):  ARIPiprazole 5 milliGRAM(s) Oral daily  ascorbic acid 500 milliGRAM(s) Oral daily  atorvastatin 40 milliGRAM(s) Oral at bedtime  cetirizine 10 milliGRAM(s) Oral daily  chlorhexidine 2% Cloths 1 Application(s) Topical <User Schedule>  cholecalciferol 5000 Unit(s) Oral daily  clonazePAM  Tablet 0.5 milliGRAM(s) Oral at bedtime  clonazePAM  Tablet 0.25 milliGRAM(s) Oral daily  clopidogrel Tablet 75 milliGRAM(s) Oral daily  heparin   Injectable 5000 Unit(s) SubCutaneous every 8 hours  insulin lispro (ADMELOG) corrective regimen sliding scale   SubCutaneous three times a day before meals  insulin lispro (ADMELOG) corrective regimen sliding scale   SubCutaneous at bedtime  piperacillin/tazobactam IVPB.. 3.375 Gram(s) IV Intermittent every 12 hours  potassium chloride    Tablet ER 40 milliEquivalent(s) Oral every 4 hours  senna 2 Tablet(s) Oral at bedtime  sodium chloride 0.9%. 1000 milliLiter(s) (100 mL/Hr) IV Continuous <Continuous>    MEDICATIONS  (PRN):  
MEDICATIONS  (STANDING):  ARIPiprazole 5 milliGRAM(s) Oral daily  ascorbic acid 500 milliGRAM(s) Oral daily  atorvastatin 40 milliGRAM(s) Oral at bedtime  cetirizine 10 milliGRAM(s) Oral daily  chlorhexidine 2% Cloths 1 Application(s) Topical <User Schedule>  cholecalciferol 5000 Unit(s) Oral daily  clonazePAM  Tablet 0.5 milliGRAM(s) Oral two times a day  clopidogrel Tablet 75 milliGRAM(s) Oral daily  heparin   Injectable 5000 Unit(s) SubCutaneous every 8 hours  insulin lispro (ADMELOG) corrective regimen sliding scale   SubCutaneous three times a day before meals  insulin lispro (ADMELOG) corrective regimen sliding scale   SubCutaneous at bedtime  pantoprazole    Tablet 40 milliGRAM(s) Oral before breakfast  piperacillin/tazobactam IVPB.. 3.375 Gram(s) IV Intermittent every 12 hours  senna 2 Tablet(s) Oral at bedtime  sodium chloride 0.9%. 1000 milliLiter(s) (100 mL/Hr) IV Continuous <Continuous>    MEDICATIONS  (PRN):

## 2025-02-10 NOTE — BH CONSULTATION LIAISON PROGRESS NOTE - NSBHPSYCHOLCOGABN_PSY_A_CORE
disoriented to time/disoriented to place/disoriented to situation
disoriented to time/disoriented to place/disoriented to situation
disoriented to time/disoriented to place/disoriented to person/disoriented to situation
disoriented to time/disoriented to place/disoriented to situation

## 2025-02-10 NOTE — BH CONSULTATION LIAISON PROGRESS NOTE - GENERAL APPEARANCE
nails long and unkempt./No deformities present
No deformities present

## 2025-02-10 NOTE — BH CONSULTATION LIAISON PROGRESS NOTE - NSBHCHARTREVIEWVS_PSY_A_CORE FT
Vital Signs Last 24 Hrs  T(C): 37.1 (01 Feb 2025 11:51), Max: 37.1 (01 Feb 2025 11:51)  T(F): 98.7 (01 Feb 2025 11:51), Max: 98.7 (01 Feb 2025 11:51)  HR: 75 (01 Feb 2025 12:30) (41 - 90)  BP: 116/68 (01 Feb 2025 12:30) (59/38 - 154/134)  BP(mean): 80 (01 Feb 2025 12:30) (43 - 143)  RR: 18 (01 Feb 2025 12:30) (10 - 26)  SpO2: 100% (01 Feb 2025 12:30) (92% - 100%)    Parameters below as of 01 Feb 2025 10:30  Patient On (Oxygen Delivery Method): nasal cannula  O2 Flow (L/min): 2  
Vital Signs Last 24 Hrs  T(C): 36.9 (10 Feb 2025 14:01), Max: 36.9 (10 Feb 2025 14:01)  T(F): 98.5 (10 Feb 2025 14:01), Max: 98.5 (10 Feb 2025 14:01)  HR: 74 (10 Feb 2025 14:01) (74 - 77)  BP: 120/67 (10 Feb 2025 14:01) (112/64 - 135/77)  BP(mean): --  RR: 16 (10 Feb 2025 14:01) (14 - 16)  SpO2: 97% (10 Feb 2025 14:01) (96% - 98%)    Parameters below as of 10 Feb 2025 14:01  Patient On (Oxygen Delivery Method): room air    
Vital Signs Last 24 Hrs  T(C): 36.5 (06 Feb 2025 05:00), Max: 36.5 (05 Feb 2025 21:00)  T(F): 97.7 (06 Feb 2025 05:00), Max: 97.7 (05 Feb 2025 21:00)  HR: 81 (06 Feb 2025 05:00) (72 - 81)  BP: 155/94 (06 Feb 2025 05:00) (155/94 - 164/83)  BP(mean): --  RR: 16 (06 Feb 2025 05:00) (16 - 18)  SpO2: 95% (06 Feb 2025 05:00) (95% - 99%)    Parameters below as of 06 Feb 2025 05:00  Patient On (Oxygen Delivery Method): room air    
Vital Signs Last 24 Hrs  T(C): 36.4 (04 Feb 2025 11:56), Max: 37.1 (03 Feb 2025 20:00)  T(F): 97.6 (04 Feb 2025 11:56), Max: 98.7 (03 Feb 2025 20:00)  HR: 70 (04 Feb 2025 11:56) (59 - 70)  BP: 142/81 (04 Feb 2025 11:56) (142/81 - 147/83)  BP(mean): --  RR: 19 (04 Feb 2025 11:56) (19 - 23)  SpO2: 99% (04 Feb 2025 11:56) (95% - 99%)    Parameters below as of 04 Feb 2025 11:56  Patient On (Oxygen Delivery Method): room air

## 2025-02-10 NOTE — BH CONSULTATION LIAISON PROGRESS NOTE - NSBHCONSULTRECOMMENDOTHER_PSY_A_CORE FT
Continue Klonopin, for anxiety. (0.75 mg total dose).   Continue Abilify for mood stabilization 2.5 mg po q am and 5 mg po q bedtime ( if sundowning evening dose can be switched to 6 pm).   Continue to trend LFT's. ( unchanged).   Due to slow elimination of Lithium would NOT resume this medication.

## 2025-02-10 NOTE — PROGRESS NOTE ADULT - SUBJECTIVE AND OBJECTIVE BOX
CC: f/u for  septic shock  Patient report she wants chinese food, chicken lo mein and egg rolls with sauce    REVIEW OF SYSTEMS:  All other review of systems negative (Comprehensive ROS)    Antimicrobials Day #  :    Other Medications Reviewed    T(F): 97.9 (02-10-25 @ 20:00), Max: 98.5 (02-10-25 @ 14:01)  HR: 80 (02-10-25 @ 20:00)  BP: 126/76 (02-10-25 @ 20:00)  RR: 16 (02-10-25 @ 20:00)  SpO2: 95% (02-10-25 @ 20:00)  Wt(kg): --    PHYSICAL EXAM:  General: alert, no acute distress  Eyes:  anicteric, no conjunctival injection, no discharge  Oropharynx: no lesions or injection 	  Neck: supple, without adenopathy  Lungs: clear to auscultation  Heart: regular rate and rhythm; no murmur, rubs or gallops  Abdomen: soft, nondistended, nontender, without mass or organomegaly  Skin: no lesions  Extremities: no clubbing, cyanosis, or edema  Neurologic: alert, confused moves all extremities    LAB RESULTS:                        10.8   9.29  )-----------( 273      ( 10 Feb 2025 05:50 )             35.0     02-10    140  |  103  |  9   ----------------------------<  144[H]  3.9   |  26  |  0.66    Ca    9.7      10 Feb 2025 05:50  Phos  3.2     02-09  Mg     1.7     02-10    TPro  6.8  /  Alb  3.4  /  TBili  1.6[H]  /  DBili  x   /  AST  58[H]  /  ALT  80[H]  /  AlkPhos  109  02-10    LIVER FUNCTIONS - ( 10 Feb 2025 05:50 )  Alb: 3.4 g/dL / Pro: 6.8 g/dL / ALK PHOS: 109 U/L / ALT: 80 U/L / AST: 58 U/L / GGT: x           Urinalysis Basic - ( 10 Feb 2025 05:50 )    Color: x / Appearance: x / SG: x / pH: x  Gluc: 144 mg/dL / Ketone: x  / Bili: x / Urobili: x   Blood: x / Protein: x / Nitrite: x   Leuk Esterase: x / RBC: x / WBC x   Sq Epi: x / Non Sq Epi: x / Bacteria: x      MICROBIOLOGY:  RECENT CULTURES:      RADIOLOGY REVIEWED:    < from: Xray Chest 1 View- PORTABLE-Routine (02.01.25 @ 07:41) >  N ROBERTO     PROCEDURE DATE:  02/01/2025          INTERPRETATION:  AP chest on February 1, 2025 at 7:27 AM. Concern is   pneumonia.    Right thoracic curve again noted. Heart magnified by technique.   Atelectasis and small infiltration around the left hilum again noted   showing some improvement from January 30.    The right lung shows significantly increased diffuse infiltrate.    IMPRESSION: Some improvement in left lung infiltrate but there is a   diffuse increase advanced right lung infiltrate.    --- End of Report ---        < from: CT Abdomen and Pelvis No Cont (01.30.25 @ 21:23) >    ACC: [id number] EXAM:  CT ABDOMEN AND PELVIS   ORDERED BY:  WILMER WILLIS     ACC: [id number] EXAM:  CT CHEST   ORDERED BY:  WILMER WILLIS     PROCEDURE DATE:  01/30/2025          INTERPRETATION:  CLINICAL INDICATION: sepsis, pna?    PROCEDURE: CT of the chest, abdomen and pelvis was performed without   intravenous contrast. Coronal and sagittal reconstruction images were   obtained.    CONTRAST/COMPLICATIONS:  IV Contrast: NONE  Oral Contrast: NONE  Complications: NA.    COMPARISON: 10/28/2024. 10/29/2024.    FINDINGS: Evaluation of the thoracic, abdominal and pelvic organs and   vasculature is limited without intravenous contrast. Patient's   respiratory motion and arms degrades images.    CHEST:    LUNGS AND AIRWAYS: Patent central airways. Patchy opacities at bilateral   lower lobes. Question small opacities at the right middle lobe.   Atelectasis.  PLEURA: No pleural effusion or pneumothorax.  HEART: Normal size. No pericardial effusion.  VESSELS: Atherosclerosis. Normal caliber ofthe thoracic aorta.  MEDIASTINUM AND ALEKSANDER: Subcentimeter lymph nodes.  CHEST WALL AND LOWER NECK: Unremarkable.    ABDOMEN/PELVIS:    LIVER: Unremarkable.  BILE DUCTS/GALLBLADDER: No intrahepatic biliary dilatation. Prominent   common bile duct, reflecting postcholecystectomy state.  PANCREAS: Diffuse fatty atrophy.  SPLEEN: Unremarkable.    ADRENALS: Unremarkable.  KIDNEYS/URETERS: Bilateral perinephric stranding without   hydroureteronephrosis.  BLADDER: Collapsed around a Lo catheter.  REPRODUCTIVE ORGANS: Unremarkable.    BOWEL: Small hiatal hernia. No bowel obstruction. Unremarkable appendix.   Colon diverticulosis. Fluid-filled small bowel loops and right colon.  PERITONEUM: No organized fluid collection or free air.  VESSELS: Atherosclerosis. Normal caliber of the abdominal aorta.  RETROPERITONEUM/LYMPH NODE: No lymphadenopathy.  ABDOMINAL WALL/SOFT TISSUES: Small fat-containing umbilical hernia. Small   left hip joint effusion again noted.  BONES: Rightward curvature and degenerative changes of the spine.    IMPRESSION:    Suspect pneumonia. Recommend follow-up to resolution to exclude neoplasm,   following completion of treatment.    Nonspecific fluid-filled small bowel loops and right colon without   significant inflammatory change. Recommend clinical correlation to assess   enterocolitis.    Additional findings as described.      < end of copied text >    < end of copied text >            Assessment:  69y female with with MDD/bipolar on Lithium, cva, falls,  nursing home resident, admitted 11 days ago with fever, sarah, elevated liver enzymes, lethargy, elevated lithium level. She was found to have infiltrates on ct chest. She finished 8 d of zosyn 4 days ago. . URine grew aerococcus and ecoli with just mild pyuria and elevated squamous epi. Patient is not able to give a coherent history or answer questions clearly. Suspect pulmonary source of sepsis but gu source treated too. Mental status is doing much better, no fever, no active infection is apparent at present.   Plan:  monitor off abx  call if further input is needed

## 2025-02-10 NOTE — PROGRESS NOTE ADULT - ASSESSMENT
Pt. is a 69 year old female with a pmh of HTN, HLD, Bipolar illness MDD,, Fall, History of TIAs, and Cerebral infarction from [facility] with lethargy and fever admitted to ICU with septic shock now downgraded to medical floor.    Septic Shock suspect due to UTI, Enterocolitis, and possible pneumonia based on CT-improved  Metabolic Encephalopathy suspected MF in s/o septic shock and Lithium Toxicity-improving  Downgraded from ICU, off pressors, stable respiratory status  CT A/P/C showed suspected pneumonia, non specific fluid filled small bowel loops and right colon without significant inflammatory change possible enterocolitis  Supplemental oxygen as needed to maintain sats > 92%, wean as tolerated  blood cultures negative, Urine culture with E Coli and Aerocccus, sensitivities reviewed, completed course of zosyn  tapered off IV hydrocortisone, discontinued ppi     ATN with normal baseline - resolved  Hypernatremia  Hypokalemia  Creatinine appears to have normalized  continue IVF  Monitor, replete potassium and mag prn   Follow BMP    Lithium Toxicity - improving  Bipolar Disorder, MDD  Mental status improving  Psych note appreciated, asked to see due to lithium toxicity  lithium level improving. rec is to stop Prozac, Seroquel, Gabapentin and lithium  started on Abilify 5 mg. daily and cont Klonopin dose adjusted, no Xanax  Per psych recs, Abilify dosing now increased to 2.5mg in am and 5mg at night  Repeat Lithium level on Monday 2/10 collected   when more clinically stable plan to address cognition and consider namenda  Psych following  today aware to see her     HTN  TTE during this admission showed normal EF, RV moderately enlarged, mild to mod TR  Pt was on lisinopril 5mg daily at home, was held due to hypotension, will restart  as BP improved     HLD, History CVA  Continue atorvastatin 40mg qhs, continue plavix    Prophylactic Measure  heparin    Dispo:  Recheck lithium level on Monday, psych to optimize meds today and possible dc    SW/CM following for D/C planning, no Emergency contact listed. From [facility]

## 2025-02-10 NOTE — BH CONSULTATION LIAISON PROGRESS NOTE - NSICDXBHSECONDARYDX_PSY_ALL_CORE
Bipolar I disorder with mixed features   F31.9  

## 2025-02-10 NOTE — PROGRESS NOTE ADULT - SUBJECTIVE AND OBJECTIVE BOX
PROGRESS NOTE:     Patient is a 69y old  Female who presents with a chief complaint of septic shock (07 Feb 2025 14:05)      SUBJECTIVE / OVERNIGHT EVENTS: pt was seen and evaluated otday  no complains offered  lithium level collected as per brody wiseman and they will re-evaluate her today  no complains offered     ADDITIONAL REVIEW OF SYSTEMS: as per HPI    MEDICATIONS  (STANDING):  ARIPiprazole 5 milliGRAM(s) Oral daily  ascorbic acid 500 milliGRAM(s) Oral daily  atorvastatin 40 milliGRAM(s) Oral at bedtime  cetirizine 10 milliGRAM(s) Oral daily  chlorhexidine 2% Cloths 1 Application(s) Topical <User Schedule>  cholecalciferol 5000 Unit(s) Oral daily  clonazePAM  Tablet 0.5 milliGRAM(s) Oral at bedtime  clonazePAM  Tablet 0.25 milliGRAM(s) Oral daily  clopidogrel Tablet 75 milliGRAM(s) Oral daily  dextrose 5%. 1000 milliLiter(s) (45 mL/Hr) IV Continuous <Continuous>  heparin   Injectable 5000 Unit(s) SubCutaneous every 8 hours  insulin lispro (ADMELOG) corrective regimen sliding scale   SubCutaneous three times a day before meals  insulin lispro (ADMELOG) corrective regimen sliding scale   SubCutaneous at bedtime  lisinopril 5 milliGRAM(s) Oral daily  senna 2 Tablet(s) Oral at bedtime    MEDICATIONS  (PRN):      CAPILLARY BLOOD GLUCOSE      POCT Blood Glucose.: 133 mg/dL (10 Feb 2025 08:11)  POCT Blood Glucose.: 169 mg/dL (09 Feb 2025 20:55)  POCT Blood Glucose.: 138 mg/dL (09 Feb 2025 17:01)  POCT Blood Glucose.: 143 mg/dL (09 Feb 2025 11:34)    I&O's Summary    09 Feb 2025 07:01  -  10 Feb 2025 07:00  --------------------------------------------------------  IN: 0 mL / OUT: 1925 mL / NET: -1925 mL        PHYSICAL EXAM:  Vital Signs Last 24 Hrs  T(C): 36.7 (10 Feb 2025 04:49), Max: 36.8 (09 Feb 2025 19:21)  T(F): 98.1 (10 Feb 2025 04:49), Max: 98.3 (09 Feb 2025 19:21)  HR: 76 (10 Feb 2025 04:49) (76 - 78)  BP: 112/64 (10 Feb 2025 04:49) (112/64 - 135/83)  BP(mean): --  RR: 16 (10 Feb 2025 04:49) (14 - 16)  SpO2: 98% (10 Feb 2025 04:49) (96% - 98%)    Parameters below as of 10 Feb 2025 04:49  Patient On (Oxygen Delivery Method): room air           PHYSICAL EXAM:  General: NAD, A/O x 1, speaking sentences, some mumbling at times  ENT: No gross hearing impairment, Moist mucous membranes, no thrush  Neck: Supple, No JVD  Lungs: Clear to auscultation bilaterally, good air entry, non-labored breathing  Cardio: RRR, S1/S2, No murmur  Abdomen: Soft, Nontender, Nondistended; Bowel sounds present  Extremities: No calf tenderness, No cyanosis, No pitting edema    LABS:                        10.8   9.29  )-----------( 273      ( 10 Feb 2025 05:50 )             35.0     02-10    140  |  103  |  9   ----------------------------<  144[H]  3.9   |  26  |  0.66    Ca    9.7      10 Feb 2025 05:50  Phos  3.2     02-09  Mg     1.7     02-10    TPro  6.8  /  Alb  3.4  /  TBili  1.6[H]  /  DBili  x   /  AST  58[H]  /  ALT  80[H]  /  AlkPhos  109  02-10          Urinalysis Basic - ( 10 Feb 2025 05:50 )    Color: x / Appearance: x / SG: x / pH: x  Gluc: 144 mg/dL / Ketone: x  / Bili: x / Urobili: x   Blood: x / Protein: x / Nitrite: x   Leuk Esterase: x / RBC: x / WBC x   Sq Epi: x / Non Sq Epi: x / Bacteria: x          case discussed during IDR

## 2025-02-10 NOTE — BH CONSULTATION LIAISON PROGRESS NOTE - NSICDXBHPRIMARYDX_PSY_ALL_CORE
Delirium due to multiple etiologies   F05  

## 2025-02-10 NOTE — BH CONSULTATION LIAISON PROGRESS NOTE - NSBHASSESSMENTFT_PSY_ALL_CORE
69 year old female with a PMH of MDD, HTN, HLD, bipolar disorder, CVA who presented to the ED from [facility] with lethargy and fever.  Psychiatry asked to see patient due to lithium toxicity and medication management recommendations, pt with no family contacts, and unable to reach anyone at [facility] for past psychiatric history.   
69 year old female with a PMH of MDD, HTN, HLD, bipolar disorder, CVA who presented to the ED from [facility] with lethargy and fever.  Psychiatry asked to see patient due to lithium toxicity and medication management recommendations, pt with no family contacts.  Pt had medication changed to stop Gabapentin, Prozac and Lithium, pt stable on Klonopin and Abilify.   Consideration of adding Namenda for management of dementia. 
69 year old female with a PMH of MDD, HTN, HLD, bipolar disorder, CVA who presented to the ED from [facility] with lethargy and fever.  Psychiatry asked to see patient due to lithium toxicity and medication management recommendations, pt with no family contacts, and unable to reach anyone at [facility] for past psychiatric history.

## 2025-02-10 NOTE — BH CONSULTATION LIAISON PROGRESS NOTE - NSBHCHARTREVIEWLAB_PSY_A_CORE FT
Complete Blood Count (02.06.25 @ 11:57)   Nucleated RBC: 0 /100 WBCs  WBC Count: 10.06 K/uL  RBC Count: 3.52 M/uL  Hemoglobin: 10.8 g/dL  Hematocrit: 33.6 %  Mean Cell Volume: 95.5 fl  Mean Cell Hemoglobin: 30.7 pg  Mean Cell Hemoglobin Conc: 32.1 g/dL  Red Cell Distrib Width: 15.3 %  Platelet Count - Automated: 258 K/uL    Comprehensive Metabolic Panel in AM (02.06.25 @ 08:20)   Sodium: 148 mmol/L  Potassium: 3.1 mmol/L  Chloride: 109 mmol/L  Carbon Dioxide: 28 mmol/L  Anion Gap: 11 mmol/L  Blood Urea Nitrogen: 16 mg/dL  Creatinine: 0.90 mg/dL  Glucose: 135 mg/dL  Calcium: 9.0 mg/dL  Protein Total: 6.6 g/dL  Albumin: 3.6 g/dL  Bilirubin Total: 1.7 mg/dL  Alkaline Phosphatase: 111 U/L  Aspartate Aminotransferase (AST/SGOT): 73 U/L  Alanine Aminotransferase (ALT/SGPT): 86 U/L  eGFR: 68    Lithium Level, Serum (02.03.25 @ 07:16)   Lithium Level, Serum: 0.93 mmoL/L
Complete Blood Count in AM (02.04.25 @ 07:10)   Nucleated RBC: 0 /100 WBCs  WBC Count: 6.24 K/uL  RBC Count: 3.23 M/uL  Hemoglobin: 9.9 g/dL  Hematocrit: 30.8 %  Mean Cell Volume: 95.4 fl  Mean Cell Hemoglobin: 30.7 pg  Mean Cell Hemoglobin Conc: 32.1 g/dL  Red Cell Distrib Width: 14.6 %  Platelet Count - Automated: 210 K/uL    Comprehensive Metabolic Panel in AM (02.03.25 @ 07:16)   Sodium: 146 mmol/L  Potassium: 3.3 mmol/L  Chloride: 109 mmol/L  Carbon Dioxide: 25 mmol/L  Anion Gap: 12 mmol/L  Blood Urea Nitrogen: 20 mg/dL  Creatinine: 0.96 mg/dL  Glucose: 149 mg/dL  Calcium: 9.9 mg/dL  Protein Total: 6.6 g/dL  Albumin: 3.7 g/dL  Bilirubin Total: 1.4 mg/dL  Alkaline Phosphatase: 119 U/L  Aspartate Aminotransferase (AST/SGOT): 30 U/L  Alanine Aminotransferase (ALT/SGPT): 99 U/L  eGFR: 63    Lithium Level, Serum (02.03.25 @ 07:16)   Lithium Level, Serum: 0.93 mmoL/L
02-10    140  |  103  |  9   ----------------------------<  144[H]  3.9   |  26  |  0.66    Ca    9.7      10 Feb 2025 05:50  Phos  3.2     02-09  Mg     1.7     02-10    TPro  6.8  /  Alb  3.4  /  TBili  1.6[H]  /  DBili  x   /  AST  58[H]  /  ALT  80[H]  /  AlkPhos  109  02-10    Last Lithium level 0.93, would not reinstate Lithium.   
01-31    140  |  108  |  21  ----------------------------<  120[H]  3.9   |  22  |  1.20    Ca    8.7      31 Jan 2025 12:54  Phos  2.1     01-31  Mg     1.4     01-31    TPro  6.5  /  Alb  2.9[L]  /  TBili  1.0  /  DBili  x   /  AST  164[H]  /  ALT  157[H]  /  AlkPhos  159[H]  01-31                          10.4   17.68 )-----------( 234      ( 31 Jan 2025 05:45 )             33.4     Lithium level 2.72

## 2025-02-10 NOTE — BH CONSULTATION LIAISON PROGRESS NOTE - NSBHMSEMUSCLE_PSY_A_CORE
Abnormal muscle tone/strength
Normal muscle tone/strength

## 2025-02-11 ENCOUNTER — TRANSCRIPTION ENCOUNTER (OUTPATIENT)
Age: 70
End: 2025-02-11

## 2025-02-11 VITALS
HEART RATE: 79 BPM | DIASTOLIC BLOOD PRESSURE: 79 MMHG | SYSTOLIC BLOOD PRESSURE: 138 MMHG | OXYGEN SATURATION: 96 % | RESPIRATION RATE: 17 BRPM | TEMPERATURE: 98 F

## 2025-02-11 LAB
A1C WITH ESTIMATED AVERAGE GLUCOSE RESULT: 5.7 % — HIGH (ref 4–5.6)
ESTIMATED AVERAGE GLUCOSE: 117 MG/DL — HIGH (ref 68–114)
GLUCOSE BLDC GLUCOMTR-MCNC: 120 MG/DL — HIGH (ref 70–99)
GLUCOSE BLDC GLUCOMTR-MCNC: 130 MG/DL — HIGH (ref 70–99)

## 2025-02-11 PROCEDURE — 85730 THROMBOPLASTIN TIME PARTIAL: CPT

## 2025-02-11 PROCEDURE — 87641 MR-STAPH DNA AMP PROBE: CPT

## 2025-02-11 PROCEDURE — 84436 ASSAY OF TOTAL THYROXINE: CPT

## 2025-02-11 PROCEDURE — 87449 NOS EACH ORGANISM AG IA: CPT

## 2025-02-11 PROCEDURE — 80048 BASIC METABOLIC PNL TOTAL CA: CPT

## 2025-02-11 PROCEDURE — 87186 SC STD MICRODIL/AGAR DIL: CPT

## 2025-02-11 PROCEDURE — 84443 ASSAY THYROID STIM HORMONE: CPT

## 2025-02-11 PROCEDURE — 74176 CT ABD & PELVIS W/O CONTRAST: CPT | Mod: MC

## 2025-02-11 PROCEDURE — 87640 STAPH A DNA AMP PROBE: CPT

## 2025-02-11 PROCEDURE — 96374 THER/PROPH/DIAG INJ IV PUSH: CPT

## 2025-02-11 PROCEDURE — 96375 TX/PRO/DX INJ NEW DRUG ADDON: CPT

## 2025-02-11 PROCEDURE — 99239 HOSP IP/OBS DSCHRG MGMT >30: CPT

## 2025-02-11 PROCEDURE — 71250 CT THORAX DX C-: CPT | Mod: MC

## 2025-02-11 PROCEDURE — 87637 SARSCOV2&INF A&B&RSV AMP PRB: CPT

## 2025-02-11 PROCEDURE — 92526 ORAL FUNCTION THERAPY: CPT

## 2025-02-11 PROCEDURE — 36415 COLL VENOUS BLD VENIPUNCTURE: CPT

## 2025-02-11 PROCEDURE — 80178 ASSAY OF LITHIUM: CPT

## 2025-02-11 PROCEDURE — 93005 ELECTROCARDIOGRAM TRACING: CPT

## 2025-02-11 PROCEDURE — 85610 PROTHROMBIN TIME: CPT

## 2025-02-11 PROCEDURE — 81001 URINALYSIS AUTO W/SCOPE: CPT

## 2025-02-11 PROCEDURE — 85027 COMPLETE CBC AUTOMATED: CPT

## 2025-02-11 PROCEDURE — 87899 AGENT NOS ASSAY W/OPTIC: CPT

## 2025-02-11 PROCEDURE — 71045 X-RAY EXAM CHEST 1 VIEW: CPT

## 2025-02-11 PROCEDURE — P9047: CPT

## 2025-02-11 PROCEDURE — 87086 URINE CULTURE/COLONY COUNT: CPT

## 2025-02-11 PROCEDURE — 93306 TTE W/DOPPLER COMPLETE: CPT

## 2025-02-11 PROCEDURE — 87077 CULTURE AEROBIC IDENTIFY: CPT

## 2025-02-11 PROCEDURE — 99285 EMERGENCY DEPT VISIT HI MDM: CPT | Mod: 25

## 2025-02-11 PROCEDURE — 83036 HEMOGLOBIN GLYCOSYLATED A1C: CPT

## 2025-02-11 PROCEDURE — 80053 COMPREHEN METABOLIC PANEL: CPT

## 2025-02-11 PROCEDURE — 82962 GLUCOSE BLOOD TEST: CPT

## 2025-02-11 PROCEDURE — 0241U: CPT

## 2025-02-11 PROCEDURE — 84100 ASSAY OF PHOSPHORUS: CPT

## 2025-02-11 PROCEDURE — 83735 ASSAY OF MAGNESIUM: CPT

## 2025-02-11 PROCEDURE — 83605 ASSAY OF LACTIC ACID: CPT

## 2025-02-11 PROCEDURE — 92610 EVALUATE SWALLOWING FUNCTION: CPT

## 2025-02-11 PROCEDURE — 87040 BLOOD CULTURE FOR BACTERIA: CPT

## 2025-02-11 PROCEDURE — 85025 COMPLETE CBC W/AUTO DIFF WBC: CPT

## 2025-02-11 PROCEDURE — 80074 ACUTE HEPATITIS PANEL: CPT

## 2025-02-11 PROCEDURE — 97161 PT EVAL LOW COMPLEX 20 MIN: CPT

## 2025-02-11 PROCEDURE — 70450 CT HEAD/BRAIN W/O DYE: CPT | Mod: MC

## 2025-02-11 RX ORDER — METFORMIN HYDROCHLORIDE 1000 MG/1
1 TABLET, COATED ORAL
Qty: 60 | Refills: 0
Start: 2025-02-11

## 2025-02-11 RX ORDER — ASCORBIC ACID 500 MG/ML
1 VIAL (ML) INJECTION
Qty: 0 | Refills: 0 | DISCHARGE
Start: 2025-02-11

## 2025-02-11 RX ORDER — CETIRIZINE HCL 10 MG
1 TABLET ORAL
Qty: 0 | Refills: 0 | DISCHARGE
Start: 2025-02-11

## 2025-02-11 RX ORDER — ARIPIPRAZOLE 5 MG/1
1 TABLET ORAL
Qty: 0 | Refills: 0 | DISCHARGE
Start: 2025-02-11

## 2025-02-11 RX ORDER — ARIPIPRAZOLE 5 MG/1
0.5 TABLET ORAL
Qty: 15 | Refills: 0
Start: 2025-02-11 | End: 2025-03-12

## 2025-02-11 RX ADMIN — Medication 75 MILLIGRAM(S): at 11:38

## 2025-02-11 RX ADMIN — Medication 5 MILLIGRAM(S): at 05:29

## 2025-02-11 RX ADMIN — Medication 5000 UNIT(S): at 11:37

## 2025-02-11 RX ADMIN — ARIPIPRAZOLE 5 MILLIGRAM(S): 5 TABLET ORAL at 11:38

## 2025-02-11 RX ADMIN — Medication 5000 UNIT(S): at 05:29

## 2025-02-11 RX ADMIN — SODIUM CHLORIDE 45 MILLILITER(S): 9 INJECTION, SOLUTION INTRAVENOUS at 05:30

## 2025-02-11 RX ADMIN — ANTISEPTIC SURGICAL SCRUB 1 APPLICATION(S): 0.04 SOLUTION TOPICAL at 05:29

## 2025-02-11 RX ADMIN — Medication 0.25 MILLIGRAM(S): at 11:40

## 2025-02-11 RX ADMIN — Medication 500 MILLIGRAM(S): at 11:40

## 2025-02-11 RX ADMIN — Medication 10 MILLIGRAM(S): at 11:38

## 2025-02-11 NOTE — DISCHARGE NOTE PROVIDER - CARE PROVIDER_API CALL
Hannah Feliciano  Pulmonary Disease  [address], [address]  [place] [address]  Phone: [phone]  Fax: [phone]  Follow Up Time:     Kody Roblero  Pulmonary Disease  [address]  [place] [address]  Phone: [phone]  Fax: [phone]  Established Patient  Follow Up Time:

## 2025-02-11 NOTE — DISCHARGE NOTE NURSING/CASE MANAGEMENT/SOCIAL WORK - NSDCFUADDAPPT_GEN_ALL_CORE_FT
Detail Level: Detailed
Patient to return for reading on Friday. Test site is mid ventral right forearm and photographed in patient chart.
Discharge to [facility] with Dr. Butler as PCP.

## 2025-02-11 NOTE — PROGRESS NOTE ADULT - NS ATTEND AMEND GEN_ALL_CORE FT
.
Patient not as confused or lethargic. Today is the last day of Abx. tapering steroids as tolerate. Psych to assess before d/c
Tapering off hydrocortisone. Adjusting psych meds. Patient too lethargic to eat so will continue to monitor. PT ivone
pt seen and examined  comfortable  more awake, calm   respiratory status improving  can transfer to medical floor  continue antibiotics
Patient confused currently. Taper off hydrocortisone. Monitor VS. restart statin. No Known NOK. Continue Abx for 7 days total

## 2025-02-11 NOTE — DISCHARGE NOTE PROVIDER - CARE PROVIDERS DIRECT ADDRESSES
[email or web address]

## 2025-02-11 NOTE — PROGRESS NOTE ADULT - TIME BILLING
Time spent includes direct patient care  (interview and examination of patient), discussion with other providers, support staff and/or patient's family members, review of medical records, ordering diagnostic tests and analyzing results, and documentation.
Time spent for extensive review of the physical chart, electronic medical record, and documentation to obtain collateral information including but not limited to:  [x] Inpatient records (ED, H&P, primary team, and consultants if applicable, care coordination)  [x] Inpatient values/results (biomarkers, immunoassays, imaging, and microbiology results)  [x] Current or proposed treatment plans  [x] Pharmacotherapy review  [x] Discussion and coordinating care with primary team and interdisciplinary staff and floor staff  [x] Discussion including counseling/ education with the patient, surrogate decision maker, or family.
Time spent for extensive review of the physical chart, electronic medical record, and documentation to obtain collateral information including but not limited to:  [x] Inpatient records (ED, H&P, primary team, and consultants if applicable, care coordination)  [x] Inpatient values/results (biomarkers, immunoassays, imaging, and microbiology results)  [x] Current or proposed treatment plans  [x] Pharmacotherapy review  [x] Discussion and coordinating care with primary team and interdisciplinary staff and floor staff  [x] Discussion including counseling/ education with the patient, surrogate decision maker, or family.

## 2025-02-11 NOTE — DISCHARGE NOTE PROVIDER - NSDCCPCAREPLAN_GEN_ALL_CORE_FT
PRINCIPAL DISCHARGE DIAGNOSIS  Diagnosis: Sepsis  Assessment and Plan of Treatment: please repeat the CT chest again to ensure the resolution of infection  follow up with pulm outpatient      SECONDARY DISCHARGE DIAGNOSES  Diagnosis: Acute UTI  Assessment and Plan of Treatment: s/p abx    Diagnosis: Pneumonia  Assessment and Plan of Treatment: need a repeat CT to ensure resolution of her pna and rule out maliganancy

## 2025-02-11 NOTE — PROGRESS NOTE ADULT - ASSESSMENT
eptic Shock suspect due to UTI, Enterocolitis, and possible pneumonia based on CT-improved  Metabolic Encephalopathy suspected MF in s/o septic shock and Lithium Toxicity-improving  Downgraded from ICU, off pressors, stable respiratory status  CT A/P/C showed suspected pneumonia, non specific fluid filled small bowel loops and right colon without significant inflammatory change possible enterocolitis  needs repeat CT on dc to ensure resolution of pna and rule out malignancy Dr. Butler aware   blood cultures negative, Urine culture with E Coli and Aerocccus, sensitivities reviewed, completed course of zosyn  tapered off IV hydrocortisone, discontinued ppi     ATN with normal baseline - resolved  Hypernatremia  Hypokalemia  Creatinine appears to have normalized  continue IVF  Monitor, replete potassium and mag prn   Follow BMP    Lithium Toxicity - improving  Bipolar Disorder, MDD  Mental status improving  Psych note appreciated, asked to see due to lithium toxicity  lithium level improving.   Continue Klonopin, for anxiety. (0.75 mg total dose).   Continue Abilify for mood stabilization 2.5 mg po q am and 5 mg po q bedtime ( if sundowning evening dose can be switched to 6 pm).   Continue to trend LFT's. ( unchanged).   Due to slow elimination of Lithium would NOT resume this medication.  when more clinically stable plan to address cognition and consider namenda  Psych to follow up on [facility]     HTN  TTE during this admission showed normal EF, RV moderately enlarged, mild to mod TR  Pt was on lisinopril 5mg daily at home, was held due to hypotension, will restart  as BP improved     HLD, History CVA  Continue atorvastatin 40mg qhs, continue plavix    Prophylactic Measure  heparin  You will need to follow up with your primary care physician.

## 2025-02-11 NOTE — DISCHARGE NOTE NURSING/CASE MANAGEMENT/SOCIAL WORK - PATIENT PORTAL LINK FT
You can access the FollowMyHealth Patient Portal offered by [facility] by registering at the following website: [email or web address]. By joining [employer]’s FollowMyHealth portal, you will also be able to view your health information using other applications (apps) compatible with our system.

## 2025-02-11 NOTE — PROGRESS NOTE ADULT - SUBJECTIVE AND OBJECTIVE BOX
PROGRESS NOTE:     Patient is a 69y old  Female who presents with a chief complaint of septic shock (10 Feb 2025 22:12)      SUBJECTIVE / OVERNIGHT EVENTS: pt was seen and evaluated today  no complains offered  going to to rehab today      ADDITIONAL REVIEW OF SYSTEMS: as per HPI    MEDICATIONS  (STANDING):  ARIPiprazole 5 milliGRAM(s) Oral daily  ascorbic acid 500 milliGRAM(s) Oral daily  atorvastatin 40 milliGRAM(s) Oral at bedtime  cetirizine 10 milliGRAM(s) Oral daily  chlorhexidine 2% Cloths 1 Application(s) Topical <User Schedule>  cholecalciferol 5000 Unit(s) Oral daily  clonazePAM  Tablet 0.5 milliGRAM(s) Oral at bedtime  clonazePAM  Tablet 0.25 milliGRAM(s) Oral daily  clopidogrel Tablet 75 milliGRAM(s) Oral daily  dextrose 5%. 1000 milliLiter(s) (45 mL/Hr) IV Continuous <Continuous>  heparin   Injectable 5000 Unit(s) SubCutaneous every 8 hours  insulin lispro (ADMELOG) corrective regimen sliding scale   SubCutaneous three times a day before meals  insulin lispro (ADMELOG) corrective regimen sliding scale   SubCutaneous at bedtime  lisinopril 5 milliGRAM(s) Oral daily  senna 2 Tablet(s) Oral at bedtime    MEDICATIONS  (PRN):      CAPILLARY BLOOD GLUCOSE      POCT Blood Glucose.: 120 mg/dL (11 Feb 2025 08:06)  POCT Blood Glucose.: 142 mg/dL (10 Feb 2025 22:27)  POCT Blood Glucose.: 107 mg/dL (10 Feb 2025 16:56)  POCT Blood Glucose.: 142 mg/dL (10 Feb 2025 12:01)    I&O's Summary    10 Feb 2025 07:01  -  11 Feb 2025 07:00  --------------------------------------------------------  IN: 0 mL / OUT: 2200 mL / NET: -2200 mL        PHYSICAL EXAM:  Vital Signs Last 24 Hrs  T(C): 36.4 (11 Feb 2025 05:01), Max: 36.9 (10 Feb 2025 14:01)  T(F): 97.6 (11 Feb 2025 05:01), Max: 98.5 (10 Feb 2025 14:01)  HR: 77 (11 Feb 2025 05:01) (74 - 80)  BP: 145/81 (11 Feb 2025 05:01) (120/67 - 145/81)  BP(mean): --  RR: 17 (11 Feb 2025 05:01) (16 - 17)  SpO2: 97% (11 Feb 2025 05:01) (95% - 97%)    Parameters below as of 11 Feb 2025 05:01  Patient On (Oxygen Delivery Method): room air       PHYSICAL EXAM:  General: NAD, A/O x 1, speaking sentences, some mumbling at times  ENT: No gross hearing impairment, Moist mucous membranes, no thrush  Neck: Supple, No JVD  Lungs: Clear to auscultation bilaterally, good air entry, non-labored breathing  Cardio: RRR, S1/S2, No murmur  Abdomen: Soft, Nontender, Nondistended; Bowel sounds present  Extremities: No calf tenderness, No cyanosis, No pitting edema      LABS:                        10.8   9.29  )-----------( 273      ( 10 Feb 2025 05:50 )             35.0     02-10    140  |  103  |  9   ----------------------------<  144[H]  3.9   |  26  |  0.66    Ca    9.7      10 Feb 2025 05:50  Mg     1.7     02-10    TPro  6.8  /  Alb  3.4  /  TBili  1.6[H]  /  DBili  x   /  AST  58[H]  /  ALT  80[H]  /  AlkPhos  109  02-10          Urinalysis Basic - ( 10 Feb 2025 05:50 )    Color: x / Appearance: x / SG: x / pH: x  Gluc: 144 mg/dL / Ketone: x  / Bili: x / Urobili: x   Blood: x / Protein: x / Nitrite: x   Leuk Esterase: x / RBC: x / WBC x   Sq Epi: x / Non Sq Epi: x / Bacteria: x

## 2025-02-11 NOTE — DISCHARGE NOTE NURSING/CASE MANAGEMENT/SOCIAL WORK - FINANCIAL ASSISTANCE
[facility] provides services at a reduced cost to those who are determined to be eligible through [facility]’s financial assistance program. Information regarding [facility]’s financial assistance program can be found by going to [email or web address] or by calling [phone].

## 2025-02-11 NOTE — DISCHARGE NOTE PROVIDER - HOSPITAL COURSE
Hospital Course  HPI:  69 year old female with a PMH of MDD, HTN, HLD, bipolar disorder, CVA who presented to the ED from [facility] with lethargy and fever.  In the ED, workup revealed a leukocytosis, GURDEEP, transaminitis, and UA +.  Received 2600 cc IVF and remained hypotensive prompting an ICU consult.  (30 Jan 2025 22:08)      You were admitted for     septic Shock suspect due to UTI, Enterocolitis, and possible pneumonia based on CT-improved  Metabolic Encephalopathy suspected MF in s/o septic shock and Lithium Toxicity-improving  Downgraded from ICU, off pressors, stable respiratory status  CT A/P/C showed suspected pneumonia, non specific fluid filled small bowel loops and right colon without significant inflammatory change possible enterocolitis    blood cultures negative, Urine culture with E Coli and Aerocccus, sensitivities reviewed, completed course of zosyn  tapered off IV hydrocortisone, discontinued ppi     ATN with normal baseline - resolved  Hypernatremia  Hypokalemia  Creatinine appears to have normalized  continue IVF  Monitor, replete potassium and mag prn   Follow BMP    Lithium Toxicity - improving  Bipolar Disorder, MDD  Mental status improving  Psych note appreciated, asked to see due to lithium toxicity  lithium level improving.   Continue Klonopin, for anxiety. (0.75 mg total dose).   Continue Abilify for mood stabilization 2.5 mg po q am and 5 mg po q bedtime ( if sundowning evening dose can be switched to 6 pm).   Continue to trend LFT's. ( unchanged).   Due to slow elimination of Lithium would NOT resume this medication.  when more clinically stable plan to address cognition and consider namenda  Psych to follow up on [facility]     HTN  TTE during this admission showed normal EF, RV moderately enlarged, mild to mod TR  Pt was on lisinopril 5mg daily at home, was held due to hypotension, will restart  as BP improved     HLD, History CVA  Continue atorvastatin 40mg qhs, continue plavix    Prophylactic Measure  heparin  You will need to follow up with your primary care physician.    Source of Infection: uti  Antibiotic / Last Day: finished abx course       Discharging Provider:  Tania Collado DO  Contact Info: Cell [phone] - Please call with any questions or concerns.      Signout given to  [facility] Provider:       Hospital Course  HPI:  69 year old female with a PMH of MDD, HTN, HLD, bipolar disorder, CVA who presented to the ED from [facility] with lethargy and fever.  In the ED, workup revealed a leukocytosis, GURDEEP, transaminitis, and UA +.  Received 2600 cc IVF and remained hypotensive prompting an ICU consult.  (30 Jan 2025 22:08)      You were admitted for     septic Shock suspect due to UTI, Enterocolitis, and possible pneumonia based on CT-improved  Metabolic Encephalopathy suspected MF in s/o septic shock and Lithium Toxicity-improving  Downgraded from ICU, off pressors, stable respiratory status  CT A/P/C showed suspected pneumonia, non specific fluid filled small bowel loops and right colon without significant inflammatory change possible enterocolitis    blood cultures negative, Urine culture with E Coli and Aerocccus, sensitivities reviewed, completed course of zosyn  tapered off IV hydrocortisone, discontinued ppi     ATN with normal baseline - resolved  Hypernatremia  Hypokalemia  Creatinine appears to have normalized  continue IVF  Monitor, replete potassium and mag prn   Follow BMP    Lithium Toxicity - improving  Bipolar Disorder, MDD  Mental status improving  Psych note appreciated, asked to see due to lithium toxicity  lithium level improving.   Continue Klonopin, for anxiety. (0.75 mg total dose).   Continue Abilify for mood stabilization 2.5 mg po q am and 5 mg po q bedtime ( if sundowning evening dose can be switched to 6 pm).   Continue to trend LFT's. ( unchanged).   Due to slow elimination of Lithium would NOT resume this medication.  when more clinically stable plan to address cognition and consider namenda  Psych to follow up on [facility]     HTN  TTE during this admission showed normal EF, RV moderately enlarged, mild to mod TR  Pt was on lisinopril 5mg daily at home, was held due to hypotension, will restart  as BP improved     HLD, History CVA  Continue atorvastatin 40mg qhs, continue plavix    Prophylactic Measure  heparin  You will need to follow up with your primary care physician.    Source of Infection: uti  Antibiotic / Last Day: finished abx course       Discharging Provider:  Tania Collado DO  Contact Info: Cell [phone] - Please call with any questions or concerns.      Signout given to  SNF Provider: Dr. Butler , handoff given

## 2025-02-11 NOTE — DISCHARGE NOTE NURSING/CASE MANAGEMENT/SOCIAL WORK - NSDCPEFALRISK_GEN_ALL_CORE
For information on Fall & Injury Prevention, visit: [email or web address] OR  [email or web address] OR  https://www.cdc.gov/steadi/patient.html

## 2025-02-11 NOTE — DISCHARGE NOTE PROVIDER - NSDCMRMEDTOKEN_GEN_ALL_CORE_FT
Abilify 5 mg oral tablet: 0.5 tab(s) orally once a day (in the morning)  ARIPiprazole 5 mg oral tablet: 1 tab(s) orally once a day (at bedtime)  ascorbic acid 500 mg oral tablet: 1 tab(s) orally once a day  atorvastatin 40 mg oral tablet: 1 tab(s) orally once a day (at bedtime)  cetirizine 10 mg oral tablet: 1 tab(s) orally once a day  cholecalciferol 125 mcg (5000 intl units) oral capsule: 1 cap(s) orally once a day  clopidogrel 75 mg oral tablet: 1 tab(s) orally once a day  Colace 100 mg oral capsule: 1 cap(s) orally once a day (at bedtime)  Dulcogen 10 mg rectal suppository: 1 suppository(ies) rectally once a day as needed for  constipation if no BM after MOM  KlonoPIN 0.5 mg oral tablet: 1 tab(s) orally 2 times a day MDD: 2  lisinopril 5 mg oral tablet: 1 tab(s) orally once a day  senna (sennosides) 8.6 mg oral tablet: 2 tab(s) orally once a day (at bedtime)   Abilify 5 mg oral tablet: 0.5 tab(s) orally once a day (in the morning)  ARIPiprazole 5 mg oral tablet: 1 tab(s) orally once a day (at bedtime)  ascorbic acid 500 mg oral tablet: 1 tab(s) orally once a day  atorvastatin 40 mg oral tablet: 1 tab(s) orally once a day (at bedtime)  cetirizine 10 mg oral tablet: 1 tab(s) orally once a day  cholecalciferol 125 mcg (5000 intl units) oral capsule: 1 cap(s) orally once a day  clopidogrel 75 mg oral tablet: 1 tab(s) orally once a day  Colace 100 mg oral capsule: 1 cap(s) orally once a day (at bedtime)  Dulcogen 10 mg rectal suppository: 1 suppository(ies) rectally once a day as needed for  constipation if no BM after MOM  KlonoPIN 0.5 mg oral tablet: 1 tab(s) orally 2 times a day MDD: 2  lisinopril 5 mg oral tablet: 1 tab(s) orally once a day  metFORMIN 500 mg oral tablet: 1 tab(s) orally 2 times a day  senna (sennosides) 8.6 mg oral tablet: 2 tab(s) orally once a day (at bedtime)

## 2025-02-11 NOTE — PROGRESS NOTE ADULT - NS ATTEND OPT1 GEN_ALL_CORE
I independently performed the documented:
I attest my time as attending is greater than 50% of the total combined time spent on qualifying patient care activities by the PA/NP and attending.
I independently performed the documented:
I independently performed the documented:
I attest my time as attending is greater than 50% of the total combined time spent on qualifying patient care activities by the PA/NP and attending.
I independently performed the documented:
I attest my time as attending is greater than 50% of the total combined time spent on qualifying patient care activities by the PA/NP and attending.
I attest my time as attending is greater than 50% of the total combined time spent on qualifying patient care activities by the PA/NP and attending.

## 2025-02-11 NOTE — PROGRESS NOTE ADULT - PROVIDER SPECIALTY LIST ADULT
Critical Care
Hospitalist
Critical Care
Hospitalist
Hospitalist
Critical Care
Hospitalist
Infectious Disease
Critical Care
Hospitalist

## 2025-04-16 NOTE — DIETITIAN INITIAL EVALUATION ADULT - NUTRITION DIAGNOSITC TERMINOLOGY #1
Called and LM for pt to call back regarding [place]. jtn    ----- Message from Med Assistant Bansal sent at 4/15/2025  4:58 PM CDT -----  Regarding: FW: [phone]    ----- Message -----  From: Farzana Jiang  Sent: 4/15/2025  11:50 AM CDT  To: Boris ARELLANO Staff  Subject: [phone]                                     Type: Patient Call BackWho called: self What is the request in detail: needs to r/s her [place] procedure. Can the clinic reply by [facility]? Yes Would the patient rather a call back or a response via [facility]? My chart Best call back number: [phone]  
Overweight/Obesity